# Patient Record
Sex: FEMALE | Race: WHITE | NOT HISPANIC OR LATINO | Employment: OTHER | URBAN - METROPOLITAN AREA
[De-identification: names, ages, dates, MRNs, and addresses within clinical notes are randomized per-mention and may not be internally consistent; named-entity substitution may affect disease eponyms.]

---

## 2023-03-08 DIAGNOSIS — G89.4 CHRONIC PAIN SYNDROME: Primary | ICD-10-CM

## 2023-03-08 RX ORDER — HYDROCODONE BITARTRATE AND ACETAMINOPHEN 5; 325 MG/1; MG/1
1 TABLET ORAL 2 TIMES DAILY PRN
Qty: 20 TABLET | Refills: 0 | Status: SHIPPED | OUTPATIENT
Start: 2023-03-08 | End: 2023-03-13 | Stop reason: SDUPTHER

## 2023-03-08 RX ORDER — HYDROCODONE BITARTRATE AND ACETAMINOPHEN 5; 325 MG/1; MG/1
1 TABLET ORAL 2 TIMES DAILY PRN
COMMUNITY
End: 2023-03-08 | Stop reason: SDUPTHER

## 2023-03-09 ENCOUNTER — TELEPHONE (OUTPATIENT)
Dept: PRIMARY CARE | Facility: CLINIC | Age: 73
End: 2023-03-09
Payer: COMMERCIAL

## 2023-03-09 DIAGNOSIS — Z79.4 TYPE 2 DIABETES MELLITUS WITHOUT COMPLICATION, WITH LONG-TERM CURRENT USE OF INSULIN (MULTI): ICD-10-CM

## 2023-03-09 DIAGNOSIS — R07.9 CHEST PAIN IN ADULT: ICD-10-CM

## 2023-03-09 DIAGNOSIS — E11.9 TYPE 2 DIABETES MELLITUS WITHOUT COMPLICATION, WITH LONG-TERM CURRENT USE OF INSULIN (MULTI): ICD-10-CM

## 2023-03-09 RX ORDER — METHADONE HYDROCHLORIDE 5 MG/1
5 TABLET ORAL 2 TIMES DAILY
COMMUNITY
End: 2023-04-06 | Stop reason: SDUPTHER

## 2023-03-09 RX ORDER — ALBUTEROL SULFATE 90 UG/1
2 AEROSOL, METERED RESPIRATORY (INHALATION) EVERY 4 HOURS PRN
COMMUNITY
End: 2023-04-06 | Stop reason: SDUPTHER

## 2023-03-09 RX ORDER — CARVEDILOL 6.25 MG/1
6.25 TABLET ORAL 2 TIMES DAILY
COMMUNITY
End: 2023-04-20 | Stop reason: SDUPTHER

## 2023-03-09 RX ORDER — ATORVASTATIN CALCIUM 40 MG/1
40 TABLET, FILM COATED ORAL DAILY
COMMUNITY
End: 2023-04-20 | Stop reason: SDUPTHER

## 2023-03-09 RX ORDER — NITROGLYCERIN 0.4 MG/1
0.4 TABLET SUBLINGUAL EVERY 5 MIN PRN
COMMUNITY
End: 2023-03-09 | Stop reason: SDUPTHER

## 2023-03-09 RX ORDER — METFORMIN HYDROCHLORIDE 1000 MG/1
1000 TABLET ORAL 2 TIMES DAILY
Qty: 90 TABLET | Refills: 3 | Status: SHIPPED | OUTPATIENT
Start: 2023-03-09 | End: 2023-03-09 | Stop reason: SDUPTHER

## 2023-03-09 RX ORDER — NITROGLYCERIN 0.4 MG/1
0.4 TABLET SUBLINGUAL EVERY 5 MIN PRN
Qty: 25 TABLET | Refills: 1 | Status: SHIPPED | OUTPATIENT
Start: 2023-03-09 | End: 2023-04-06 | Stop reason: ALTCHOICE

## 2023-03-09 RX ORDER — MONTELUKAST SODIUM 10 MG/1
10 TABLET ORAL DAILY
COMMUNITY
End: 2023-07-18 | Stop reason: SDUPTHER

## 2023-03-09 RX ORDER — NITROGLYCERIN 0.4 MG/1
0.4 TABLET SUBLINGUAL EVERY 5 MIN PRN
Qty: 90 TABLET | Refills: 2 | Status: SHIPPED | OUTPATIENT
Start: 2023-03-09 | End: 2023-03-09 | Stop reason: SDUPTHER

## 2023-03-09 RX ORDER — ALBUTEROL SULFATE 0.83 MG/ML
2.5 SOLUTION RESPIRATORY (INHALATION) EVERY 4 HOURS PRN
COMMUNITY
End: 2023-04-06 | Stop reason: SDUPTHER

## 2023-03-09 RX ORDER — POTASSIUM CHLORIDE 1500 MG/1
1 TABLET, EXTENDED RELEASE ORAL 2 TIMES DAILY
COMMUNITY
End: 2023-05-01 | Stop reason: SDUPTHER

## 2023-03-09 RX ORDER — METFORMIN HYDROCHLORIDE 1000 MG/1
1000 TABLET ORAL 2 TIMES DAILY
Qty: 90 TABLET | Refills: 3 | Status: SHIPPED | OUTPATIENT
Start: 2023-03-09 | End: 2023-04-06 | Stop reason: ALTCHOICE

## 2023-03-09 RX ORDER — METFORMIN HYDROCHLORIDE 1000 MG/1
1 TABLET ORAL 2 TIMES DAILY
COMMUNITY
End: 2023-03-09 | Stop reason: SDUPTHER

## 2023-03-09 RX ORDER — FUROSEMIDE 40 MG/1
40 TABLET ORAL 2 TIMES DAILY
COMMUNITY
End: 2023-04-20 | Stop reason: SDUPTHER

## 2023-03-09 RX ORDER — MELOXICAM 7.5 MG/1
7.5 TABLET ORAL DAILY PRN
COMMUNITY

## 2023-03-09 RX ORDER — BLOOD SUGAR DIAGNOSTIC
STRIP MISCELLANEOUS 2 TIMES DAILY
COMMUNITY

## 2023-03-13 DIAGNOSIS — G89.4 CHRONIC PAIN SYNDROME: ICD-10-CM

## 2023-03-13 RX ORDER — NALOXONE HYDROCHLORIDE 1 MG/ML
0.4 INJECTION INTRAMUSCULAR; INTRAVENOUS; SUBCUTANEOUS AS NEEDED
Qty: 4 ML | Status: SHIPPED | OUTPATIENT
Start: 2023-03-13 | End: 2023-04-06 | Stop reason: ALTCHOICE

## 2023-03-13 RX ORDER — HYDROCODONE BITARTRATE AND ACETAMINOPHEN 5; 325 MG/1; MG/1
1 TABLET ORAL 2 TIMES DAILY PRN
Qty: 20 TABLET | Refills: 0 | Status: SHIPPED | OUTPATIENT
Start: 2023-03-13 | End: 2023-04-06 | Stop reason: ALTCHOICE

## 2023-03-13 NOTE — TELEPHONE ENCOUNTER
Informed pt    ----- Message from Yamini Beckwith DO sent at 3/13/2023  9:43 AM EDT -----  Regarding: RE: Medication Refill  I fixed it, will be for 30 days next time she picksa up  ----- Message -----  From: Rea Payne  Sent: 3/13/2023   8:53 AM EDT  To: Yamini Beckwith DO  Subject: Medication Refill                                Pt said that when her hydrocodone was called in she only got 20 tablets. She said she usually gets 60 tablets because she takes it twice a day

## 2023-03-23 PROBLEM — R53.81 PHYSICAL DEBILITY: Status: ACTIVE | Noted: 2023-03-23

## 2023-03-23 PROBLEM — D51.0 PERNICIOUS ANEMIA: Status: ACTIVE | Noted: 2023-03-23

## 2023-03-23 PROBLEM — I25.119 CORONARY ARTERY DISEASE INVOLVING NATIVE HEART WITH ANGINA PECTORIS (CMS-HCC): Status: ACTIVE | Noted: 2023-03-23

## 2023-03-23 PROBLEM — L03.90 CELLULITIS: Status: ACTIVE | Noted: 2023-03-23

## 2023-03-23 PROBLEM — N94.89 ADNEXAL MASS: Status: ACTIVE | Noted: 2023-03-23

## 2023-03-23 PROBLEM — I10 ESSENTIAL HYPERTENSION: Status: ACTIVE | Noted: 2023-03-23

## 2023-03-23 PROBLEM — N85.00 ENDOMETRIAL HYPERPLASIA: Status: ACTIVE | Noted: 2023-03-23

## 2023-03-23 PROBLEM — E78.5 HLD (HYPERLIPIDEMIA): Status: ACTIVE | Noted: 2023-03-23

## 2023-03-23 PROBLEM — E53.8 VITAMIN B12 DEFICIENCY: Status: ACTIVE | Noted: 2023-03-23

## 2023-03-23 PROBLEM — G89.4 CHRONIC PAIN SYNDROME: Status: ACTIVE | Noted: 2023-03-23

## 2023-03-23 PROBLEM — J45.909 ASTHMA IN ADULT, UNSPECIFIED ASTHMA SEVERITY, UNCOMPLICATED (HHS-HCC): Status: ACTIVE | Noted: 2023-03-23

## 2023-03-23 PROBLEM — M19.90 DJD (DEGENERATIVE JOINT DISEASE): Status: ACTIVE | Noted: 2023-03-23

## 2023-03-23 PROBLEM — I50.9 CHRONIC CONGESTIVE HEART FAILURE (MULTI): Status: ACTIVE | Noted: 2023-03-23

## 2023-03-23 RX ORDER — NALOXONE HYDROCHLORIDE 4 MG/.1ML
4 SPRAY NASAL AS NEEDED
COMMUNITY
Start: 2020-01-30 | End: 2023-04-06 | Stop reason: SDUPTHER

## 2023-03-23 RX ORDER — MUPIROCIN 20 MG/G
1 OINTMENT TOPICAL 2 TIMES DAILY
COMMUNITY
End: 2023-04-06 | Stop reason: SDUPTHER

## 2023-03-23 RX ORDER — CLOTRIMAZOLE 1 %
1 CREAM (GRAM) TOPICAL 2 TIMES DAILY
COMMUNITY
End: 2023-04-06 | Stop reason: SDUPTHER

## 2023-03-23 RX ORDER — VITAMIN B COMPLEX
TABLET ORAL
COMMUNITY
Start: 2021-10-11 | End: 2023-04-20 | Stop reason: SDUPTHER

## 2023-04-05 DIAGNOSIS — R21 RASH: ICD-10-CM

## 2023-04-05 DIAGNOSIS — J45.909 ASTHMA IN ADULT, UNSPECIFIED ASTHMA SEVERITY, UNCOMPLICATED (HHS-HCC): Primary | ICD-10-CM

## 2023-04-05 NOTE — TELEPHONE ENCOUNTER
Pt called and said that she wants to talk to you directly. She said that she is having an issue with some of her medications and wants to know if you can call her at [phone]

## 2023-04-06 DIAGNOSIS — Z79.4 TYPE 2 DIABETES MELLITUS WITHOUT COMPLICATION, WITH LONG-TERM CURRENT USE OF INSULIN (MULTI): ICD-10-CM

## 2023-04-06 DIAGNOSIS — E11.9 TYPE 2 DIABETES MELLITUS WITHOUT COMPLICATION, WITH LONG-TERM CURRENT USE OF INSULIN (MULTI): ICD-10-CM

## 2023-04-06 DIAGNOSIS — G89.4 CHRONIC PAIN SYNDROME: ICD-10-CM

## 2023-04-06 DIAGNOSIS — R07.9 CHEST PAIN IN ADULT: ICD-10-CM

## 2023-04-06 RX ORDER — MUPIROCIN 20 MG/G
1 OINTMENT TOPICAL 2 TIMES DAILY PRN
Qty: 22 G | Refills: 3 | Status: SHIPPED | OUTPATIENT
Start: 2023-04-06 | End: 2023-05-01 | Stop reason: SDUPTHER

## 2023-04-06 RX ORDER — CLOTRIMAZOLE 1 %
1 CREAM (GRAM) TOPICAL 2 TIMES DAILY
Qty: 30 G | Refills: 3 | Status: SHIPPED | OUTPATIENT
Start: 2023-04-06 | End: 2023-05-01 | Stop reason: SDUPTHER

## 2023-04-06 RX ORDER — HYDROCODONE BITARTRATE AND ACETAMINOPHEN 5; 325 MG/1; MG/1
1 TABLET ORAL 2 TIMES DAILY PRN
Qty: 60 TABLET | Refills: 0 | Status: SHIPPED | OUTPATIENT
Start: 2023-04-06 | End: 2023-04-20 | Stop reason: SDUPTHER

## 2023-04-06 RX ORDER — METFORMIN HYDROCHLORIDE 1000 MG/1
1000 TABLET ORAL 2 TIMES DAILY
Qty: 180 TABLET | Refills: 3 | Status: SHIPPED | OUTPATIENT
Start: 2023-04-06 | End: 2023-10-14 | Stop reason: HOSPADM

## 2023-04-06 RX ORDER — NITROGLYCERIN 0.4 MG/1
0.4 TABLET SUBLINGUAL EVERY 5 MIN PRN
Qty: 25 TABLET | Refills: 1 | Status: SHIPPED | OUTPATIENT
Start: 2023-04-06 | End: 2024-04-05

## 2023-04-06 RX ORDER — ALBUTEROL SULFATE 90 UG/1
2 AEROSOL, METERED RESPIRATORY (INHALATION) EVERY 4 HOURS PRN
Qty: 18 G | Refills: 2 | Status: SHIPPED | OUTPATIENT
Start: 2023-04-06 | End: 2023-05-01 | Stop reason: SDUPTHER

## 2023-04-06 RX ORDER — ALBUTEROL SULFATE 0.83 MG/ML
2.5 SOLUTION RESPIRATORY (INHALATION) EVERY 4 HOURS PRN
Qty: 75 ML | Refills: 3 | Status: SHIPPED | OUTPATIENT
Start: 2023-04-06 | End: 2023-05-01 | Stop reason: SDUPTHER

## 2023-04-06 RX ORDER — NALOXONE HYDROCHLORIDE 1 MG/ML
0.4 INJECTION INTRAMUSCULAR; INTRAVENOUS; SUBCUTANEOUS AS NEEDED
Qty: 4 ML | Status: SHIPPED | OUTPATIENT
Start: 2023-04-06 | End: 2023-04-20 | Stop reason: ALTCHOICE

## 2023-04-06 RX ORDER — NALOXONE HYDROCHLORIDE 4 MG/.1ML
4 SPRAY NASAL AS NEEDED
Qty: 2 EACH | Refills: 1 | Status: SHIPPED | OUTPATIENT
Start: 2023-04-06

## 2023-04-06 RX ORDER — METHADONE HYDROCHLORIDE 5 MG/1
5 TABLET ORAL 2 TIMES DAILY
Qty: 60 TABLET | Refills: 0 | Status: SHIPPED | OUTPATIENT
Start: 2023-04-06 | End: 2023-04-20 | Stop reason: ALTCHOICE

## 2023-04-06 NOTE — TELEPHONE ENCOUNTER
Pt said that she waited all day yesterday for a call back and didn't receive a call. She wants someone to call her back asap.

## 2023-04-20 ENCOUNTER — OFFICE VISIT (OUTPATIENT)
Dept: PRIMARY CARE | Facility: CLINIC | Age: 73
End: 2023-04-20
Payer: COMMERCIAL

## 2023-04-20 VITALS
SYSTOLIC BLOOD PRESSURE: 124 MMHG | BODY MASS INDEX: 43.81 KG/M2 | DIASTOLIC BLOOD PRESSURE: 58 MMHG | WEIGHT: 209.6 LBS | HEART RATE: 95 BPM | TEMPERATURE: 97.4 F

## 2023-04-20 DIAGNOSIS — R53.81 PHYSICAL DEBILITY: ICD-10-CM

## 2023-04-20 DIAGNOSIS — I10 ESSENTIAL HYPERTENSION: ICD-10-CM

## 2023-04-20 DIAGNOSIS — Z79.899 HIGH RISK MEDICATION USE: ICD-10-CM

## 2023-04-20 DIAGNOSIS — D51.0 PERNICIOUS ANEMIA: ICD-10-CM

## 2023-04-20 DIAGNOSIS — R73.9 ELEVATED BLOOD SUGAR LEVEL: ICD-10-CM

## 2023-04-20 DIAGNOSIS — I50.9 CHRONIC CONGESTIVE HEART FAILURE, UNSPECIFIED HEART FAILURE TYPE (MULTI): ICD-10-CM

## 2023-04-20 DIAGNOSIS — G89.4 CHRONIC PAIN SYNDROME: ICD-10-CM

## 2023-04-20 DIAGNOSIS — E53.8 VITAMIN B12 DEFICIENCY: ICD-10-CM

## 2023-04-20 DIAGNOSIS — E11.69 TYPE 2 DIABETES MELLITUS WITH OTHER SPECIFIED COMPLICATION, WITHOUT LONG-TERM CURRENT USE OF INSULIN (MULTI): ICD-10-CM

## 2023-04-20 DIAGNOSIS — J45.909 ASTHMA IN ADULT, UNSPECIFIED ASTHMA SEVERITY, UNCOMPLICATED (HHS-HCC): ICD-10-CM

## 2023-04-20 DIAGNOSIS — M15.9 PRIMARY OSTEOARTHRITIS INVOLVING MULTIPLE JOINTS: ICD-10-CM

## 2023-04-20 DIAGNOSIS — Z00.00 ROUTINE GENERAL MEDICAL EXAMINATION AT HEALTH CARE FACILITY: Primary | ICD-10-CM

## 2023-04-20 DIAGNOSIS — E78.2 MIXED HYPERLIPIDEMIA: ICD-10-CM

## 2023-04-20 PROBLEM — L97.309: Status: ACTIVE | Noted: 2023-04-20

## 2023-04-20 PROBLEM — E11.9 TYPE 2 DIABETES MELLITUS, WITHOUT LONG-TERM CURRENT USE OF INSULIN (MULTI): Status: ACTIVE | Noted: 2023-04-20

## 2023-04-20 PROCEDURE — 1170F FXNL STATUS ASSESSED: CPT | Performed by: FAMILY MEDICINE

## 2023-04-20 PROCEDURE — 1036F TOBACCO NON-USER: CPT | Performed by: FAMILY MEDICINE

## 2023-04-20 PROCEDURE — 1159F MED LIST DOCD IN RCRD: CPT | Performed by: FAMILY MEDICINE

## 2023-04-20 PROCEDURE — 3078F DIAST BP <80 MM HG: CPT | Performed by: FAMILY MEDICINE

## 2023-04-20 PROCEDURE — 3074F SYST BP LT 130 MM HG: CPT | Performed by: FAMILY MEDICINE

## 2023-04-20 PROCEDURE — G0439 PPPS, SUBSEQ VISIT: HCPCS | Performed by: FAMILY MEDICINE

## 2023-04-20 RX ORDER — ATORVASTATIN CALCIUM 40 MG/1
40 TABLET, FILM COATED ORAL DAILY
Qty: 90 TABLET | Refills: 3 | Status: SHIPPED | OUTPATIENT
Start: 2023-04-20 | End: 2023-07-18 | Stop reason: SDUPTHER

## 2023-04-20 RX ORDER — HYDROCODONE BITARTRATE AND ACETAMINOPHEN 5; 325 MG/1; MG/1
1 TABLET ORAL 2 TIMES DAILY PRN
Qty: 60 TABLET | Refills: 0 | Status: SHIPPED | OUTPATIENT
Start: 2023-06-03 | End: 2023-07-03

## 2023-04-20 RX ORDER — VITAMIN B COMPLEX
1 TABLET ORAL
Qty: 90 TABLET | Refills: 3 | Status: SHIPPED | OUTPATIENT
Start: 2023-04-20

## 2023-04-20 RX ORDER — HYDROCODONE BITARTRATE AND ACETAMINOPHEN 5; 325 MG/1; MG/1
1 TABLET ORAL 2 TIMES DAILY PRN
Qty: 60 TABLET | Refills: 0 | Status: SHIPPED | OUTPATIENT
Start: 2023-05-05

## 2023-04-20 RX ORDER — CARVEDILOL 6.25 MG/1
6.25 TABLET ORAL 2 TIMES DAILY
Qty: 180 TABLET | Refills: 3 | Status: SHIPPED | OUTPATIENT
Start: 2023-04-20 | End: 2023-07-18 | Stop reason: SDUPTHER

## 2023-04-20 RX ORDER — FUROSEMIDE 40 MG/1
40 TABLET ORAL 2 TIMES DAILY
Qty: 90 TABLET | Refills: 3 | Status: SHIPPED | OUTPATIENT
Start: 2023-04-20 | End: 2023-10-14 | Stop reason: HOSPADM

## 2023-04-20 ASSESSMENT — ACTIVITIES OF DAILY LIVING (ADL)
ASSISTIVE_DEVICE: CANE;WALKER
BATHING: INDEPENDENT
WALKS IN HOME: INDEPENDENT
PATIENT'S MEMORY ADEQUATE TO SAFELY COMPLETE DAILY ACTIVITIES?: YES
HEARING - LEFT EAR: FUNCTIONAL
DRESSING YOURSELF: INDEPENDENT
FEEDING YOURSELF: INDEPENDENT
ADEQUATE_TO_COMPLETE_ADL: YES
JUDGMENT_ADEQUATE_SAFELY_COMPLETE_DAILY_ACTIVITIES: YES
HEARING - RIGHT EAR: FUNCTIONAL
TOILETING: INDEPENDENT
GROOMING: INDEPENDENT

## 2023-04-20 ASSESSMENT — PATIENT HEALTH QUESTIONNAIRE - PHQ9
SUM OF ALL RESPONSES TO PHQ9 QUESTIONS 1 AND 2: 0
1. LITTLE INTEREST OR PLEASURE IN DOING THINGS: NOT AT ALL
2. FEELING DOWN, DEPRESSED OR HOPELESS: NOT AT ALL

## 2023-04-20 NOTE — PROGRESS NOTES
Subjective   Reason for Visit: Isa Jack is an 72 y.o. female here for a Medicare Wellness visit.     Past Medical, Surgical, and Family History reviewed and updated in chart.    Reviewed all medications by prescribing practitioner or clinical pharmacist (such as prescriptions, OTCs, herbal therapies and supplements) and documented in the medical record.    Taking less hydrocodone and stopped methadone  Out of methadone, doing ok off of it, taking otc tylenol too  Has been able to do most adls at home in the last two weeks without methadone        Patient Care Team:  Yamini Beckwith DO as PCP - General  Yamini Beckwith DO as PCP - [place] [employer] PCP       Objective   Vitals:  /58   Pulse 95   Temp 36.3 °C (97.4 °F)   Wt 95.1 kg (209 lb 9.6 oz)   BMI 43.81 kg/m²       Physical Exam  Vitals reviewed.   Constitutional:       General: She is not in acute distress.     Appearance: Normal appearance.   HENT:      Head: Normocephalic and atraumatic.   Eyes:      General: No scleral icterus.     Conjunctiva/sclera: Conjunctivae normal.   Cardiovascular:      Rate and Rhythm: Normal rate and regular rhythm.      Heart sounds: No murmur heard.  Pulmonary:      Effort: Pulmonary effort is normal.      Breath sounds: No wheezing, rhonchi or rales.   Abdominal:      General: Bowel sounds are normal.      Palpations: Abdomen is soft.      Tenderness: There is no abdominal tenderness.   Musculoskeletal:      Right lower leg: Edema present.      Left lower leg: Edema present.      Comments: 3+ le edema of b/l le  Venous stasis ulcer of LLE, no surrounding redness or induration.  Ambulates with wheeled walker   Skin:     General: Skin is warm and dry.      Findings: No rash.   Neurological:      General: No focal deficit present.      Mental Status: She is alert.      Gait: Gait normal.   Psychiatric:         Mood and Affect: Mood normal.         Behavior: Behavior normal.         Assessment/Plan   Problem List  Items Addressed This Visit          Nervous    Chronic pain syndrome    Relevant Medications    HYDROcodone-acetaminophen (Norco) 5-325 mg tablet (Start on 5/5/2023)    HYDROcodone-acetaminophen (Norco) 5-325 mg tablet (Start on 6/3/2023)       Respiratory    Asthma in adult, unspecified asthma severity, uncomplicated       Circulatory    Chronic congestive heart failure (CMS/HCC)    Relevant Medications    carvedilol (Coreg) 6.25 mg tablet    furosemide (Lasix) 40 mg tablet    Essential hypertension    Relevant Medications    carvedilol (Coreg) 6.25 mg tablet    furosemide (Lasix) 40 mg tablet    Other Relevant Orders    Comprehensive Metabolic Panel       Musculoskeletal    DJD (degenerative joint disease)       Endocrine/Metabolic    Vitamin B12 deficiency    Relevant Medications    cyanocobalamin, vitamin B-12, 2,500 mcg tablet, sublingual    Other Relevant Orders    Vitamin B12       Hematologic    Pernicious anemia    Relevant Orders    CBC    Vitamin B12       Other    HLD (hyperlipidemia)    Relevant Medications    atorvastatin (Lipitor) 40 mg tablet    Other Relevant Orders    Lipid Panel    Physical debility    Overview     This patient has a mobility limitation that significantly impairs the ability to participate in MRADL without the help of a walker and/or rollator.  There also may be a need for hospital bed for positioning and bedside commode due to mobility limitations.            Other Visit Diagnoses       Routine general medical examination at health care facility    -  Primary    Relevant Orders    1 Year Follow Up In [facility] - PCP - Wellness Exam    High risk medication use        Relevant Orders    OPIATE/OPIOID/BENZO PRESCRIPTION COMPLIANCE    Elevated blood sugar level        Relevant Orders    Hemoglobin A1C               OARRS:  Yamini Beckwith DO on 4/20/2023  9:27 AM  I have personally reviewed the OARRS report for Isa Jack. I have considered the risks of abuse,  dependence, addiction and diversion    Is the patient prescribed a combination of a benzodiazepine and opioid?  No    Last Urine Drug Screen / ordered today: Yes  Recent Results (from the past 22695 hour(s))   OPIATE/OPIOID/BENZO PRESCRIPTION COMPLIANCE    Collection Time: 04/06/22 10:07 AM   Result Value Ref Range    DRUG SCREEN COMMENT URINE SEE BELOW     Creatine, Urine 212.0 mg/dL    Amphetamine Screen, Urine PRESUMPTIVE NEGATIVE NEGATIVE    Barbiturate Screen, Urine PRESUMPTIVE NEGATIVE NEGATIVE    Cannabinoid Screen, Urine PRESUMPTIVE NEGATIVE NEGATIVE    Cocaine Screen, Urine PRESUMPTIVE NEGATIVE NEGATIVE    PCP Screen, Urine PRESUMPTIVE NEGATIVE NEGATIVE    7-Aminoclonazepam <25 Cutoff <25 ng/mL    Alpha-Hydroxyalprazolam <25 Cutoff <25 ng/mL    Alpha-Hydroxymidazolam <25 Cutoff <25 ng/mL    Alprazolam <25 Cutoff <25 ng/mL    Chlordiazepoxide <25 Cutoff <25 ng/mL    Clonazepam <25 Cutoff <25 ng/mL    Diazepam <25 Cutoff <25 ng/mL    Lorazepam <25 Cutoff <25 ng/mL    Midazolam <25 Cutoff <25 ng/mL    Nordiazepam <25 Cutoff <25 ng/mL    Oxazepam <25 Cutoff <25 ng/mL    Temazepam <25 Cutoff <25 ng/mL    Zolpidem <25 Cutoff <25 ng/mL    Zolpidem Metabolite (ZCA) <25 Cutoff <25 ng/mL    6-Acetylmorphine <25 Cutoff <25 ng/mL    Codeine <50 Cutoff <50 ng/mL    Hydrocodone >2500 (A) Cutoff <25 ng/mL    Hydromorphone 256 (A) Cutoff <25 ng/mL    Morphine Urine <50 Cutoff <50 ng/mL    Norhydrocodone >1000 (A) Cutoff <25 ng/mL    Noroxycodone <25 Cutoff <25 ng/mL    Oxycodone <25 Cutoff <25 ng/mL    Oxymorphone <25 Cutoff <25 ng/mL    Tramadol <50 Cutoff <50 ng/mL    O-Desmethyltramadol <50 Cutoff <50 ng/mL    Fentanyl <2.5 Cutoff<2.5 ng/mL    Norfentanyl <2.5 Cutoff<2.5 ng/mL    METHADONE CONFIRMATION,URINE >1000 (A) Cutoff <25 ng/mL    EDDP >1000 (A) Cutoff <25 ng/mL     N/A    Controlled Substance Agreement:  Date of the Last Agreement: 4/19/23  Reviewed Controlled Substance Agreement including but not limited to the  benefits, risks, and alternatives to treatment with a Controlled Substance medication(s).    Opioids:  What is the patient's goal of therapy? Completion of adls with chronic pain  Is this being achieved with current treatment? yes    I have calculated the patient's Morphine Dose Equivalent (MED):   I have considered referral to Pain Management and/or a specialist, and do not feel it is necessary at this time.    I feel that it is clinically indicated to continue this current medication regimen after consideration of alternative therapies, and other non-opioid treatment.    Opioid Risk Screening:  No data recorded    Pain Assessment:  No data recorded

## 2023-04-21 LAB
ALBUMIN (MG/L) IN URINE: 164.5 MG/L
ALBUMIN/CREATININE (UG/MG) IN URINE: 119.2 UG/MG CRT (ref 0–30)
CREATININE (MG/DL) IN URINE: 138 MG/DL (ref 20–320)

## 2023-04-22 LAB
ALANINE AMINOTRANSFERASE (SGPT) (U/L) IN SER/PLAS: NORMAL
ALBUMIN (G/DL) IN SER/PLAS: NORMAL
ALKALINE PHOSPHATASE (U/L) IN SER/PLAS: NORMAL
ANION GAP IN SER/PLAS: NORMAL
ASPARTATE AMINOTRANSFERASE (SGOT) (U/L) IN SER/PLAS: NORMAL
BILIRUBIN TOTAL (MG/DL) IN SER/PLAS: NORMAL
CALCIDIOL (25 OH VITAMIN D3) (NG/ML) IN SER/PLAS: NORMAL
CALCIUM (MG/DL) IN SER/PLAS: NORMAL
CARBON DIOXIDE, TOTAL (MMOL/L) IN SER/PLAS: NORMAL
CHLORIDE (MMOL/L) IN SER/PLAS: NORMAL
CHOLESTEROL (MG/DL) IN SER/PLAS: NORMAL
CHOLESTEROL IN HDL (MG/DL) IN SER/PLAS: NORMAL
CHOLESTEROL/HDL RATIO: NORMAL
CREATININE (MG/DL) IN SER/PLAS: NORMAL
ERYTHROCYTE DISTRIBUTION WIDTH (RATIO) BY AUTOMATED COUNT: NORMAL
ERYTHROCYTE MEAN CORPUSCULAR HEMOGLOBIN CONCENTRATION (G/DL) BY AUTOMATED: NORMAL
ERYTHROCYTE MEAN CORPUSCULAR VOLUME (FL) BY AUTOMATED COUNT: NORMAL
ERYTHROCYTES (10*6/UL) IN BLOOD BY AUTOMATED COUNT: NORMAL
GFR FEMALE: NORMAL
GFR MALE: NORMAL
GLUCOSE (MG/DL) IN SER/PLAS: NORMAL
HEMATOCRIT (%) IN BLOOD BY AUTOMATED COUNT: NORMAL
HEMOGLOBIN (G/DL) IN BLOOD: NORMAL
LDL: NORMAL
LEUKOCYTES (10*3/UL) IN BLOOD BY AUTOMATED COUNT: NORMAL
NON HDL CHOLESTEROL: NORMAL
NRBC (PER 100 WBCS) BY AUTOMATED COUNT: NORMAL
PLATELETS (10*3/UL) IN BLOOD AUTOMATED COUNT: NORMAL
POTASSIUM (MMOL/L) IN SER/PLAS: NORMAL
PROTEIN TOTAL: NORMAL
SODIUM (MMOL/L) IN SER/PLAS: NORMAL
THYROTROPIN (MIU/L) IN SER/PLAS BY DETECTION LIMIT <= 0.05 MIU/L: NORMAL
TRIGLYCERIDE (MG/DL) IN SER/PLAS: NORMAL
UREA NITROGEN (MG/DL) IN SER/PLAS: NORMAL
VLDL: NORMAL

## 2023-04-25 ENCOUNTER — TELEPHONE (OUTPATIENT)
Dept: PRIMARY CARE | Facility: CLINIC | Age: 73
End: 2023-04-25
Payer: COMMERCIAL

## 2023-04-25 DIAGNOSIS — R80.9 MICROALBUMINURIA: Primary | ICD-10-CM

## 2023-04-25 LAB
6-ACETYLMORPHINE: <25 NG/ML
7-AMINOCLONAZEPAM: <25 NG/ML
ALPHA-HYDROXYALPRAZOLAM: <25 NG/ML
ALPHA-HYDROXYMIDAZOLAM: <25 NG/ML
ALPRAZOLAM: <25 NG/ML
AMPHETAMINE (PRESENCE) IN URINE BY SCREEN METHOD: ABNORMAL
BARBITURATES PRESENCE IN URINE BY SCREEN METHOD: ABNORMAL
CANNABINOIDS IN URINE BY SCREEN METHOD: ABNORMAL
CHLORDIAZEPOXIDE: <25 NG/ML
CLONAZEPAM: <25 NG/ML
COCAINE (PRESENCE) IN URINE BY SCREEN METHOD: ABNORMAL
CODEINE: <50 NG/ML
CREATINE, URINE FOR DRUG: 139.1 MG/DL
DIAZEPAM: <25 NG/ML
DRUG SCREEN COMMENT URINE: ABNORMAL
EDDP: 225 NG/ML
FENTANYL CONFIRMATION, URINE: <2.5 NG/ML
HYDROCODONE: 593 NG/ML
HYDROMORPHONE: 114 NG/ML
LORAZEPAM: <25 NG/ML
METHADONE CONFIRMATION,URINE: 59 NG/ML
MIDAZOLAM: <25 NG/ML
MORPHINE URINE: <50 NG/ML
NORDIAZEPAM: <25 NG/ML
NORFENTANYL: <2.5 NG/ML
NORHYDROCODONE: >1000 NG/ML
NOROXYCODONE: <25 NG/ML
O-DESMETHYLTRAMADOL: <50 NG/ML
OXAZEPAM: <25 NG/ML
OXYCODONE: <25 NG/ML
OXYMORPHONE: <25 NG/ML
PHENCYCLIDINE (PRESENCE) IN URINE BY SCREEN METHOD: ABNORMAL
TEMAZEPAM: <25 NG/ML
TRAMADOL: <50 NG/ML
ZOLPIDEM METABOLITE (ZCA): <25 NG/ML
ZOLPIDEM: <25 NG/ML

## 2023-04-25 RX ORDER — LISINOPRIL 2.5 MG/1
2.5 TABLET ORAL DAILY
Qty: 90 TABLET | Refills: 3 | Status: SHIPPED | OUTPATIENT
Start: 2023-04-25 | End: 2023-10-14 | Stop reason: HOSPADM

## 2023-04-25 NOTE — TELEPHONE ENCOUNTER
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

## 2023-04-26 DIAGNOSIS — R73.9 ELEVATED BLOOD SUGAR LEVEL: ICD-10-CM

## 2023-04-26 DIAGNOSIS — I10 ESSENTIAL HYPERTENSION: ICD-10-CM

## 2023-04-26 DIAGNOSIS — E11.69 TYPE 2 DIABETES MELLITUS WITH OTHER SPECIFIED COMPLICATION, WITHOUT LONG-TERM CURRENT USE OF INSULIN (MULTI): ICD-10-CM

## 2023-04-26 DIAGNOSIS — E78.2 MIXED HYPERLIPIDEMIA: ICD-10-CM

## 2023-04-26 DIAGNOSIS — E53.8 VITAMIN B12 DEFICIENCY: ICD-10-CM

## 2023-04-26 DIAGNOSIS — E55.9 VITAMIN D DEFICIENCY: Primary | ICD-10-CM

## 2023-05-01 DIAGNOSIS — I10 ESSENTIAL HYPERTENSION: Primary | ICD-10-CM

## 2023-05-01 DIAGNOSIS — R21 RASH: ICD-10-CM

## 2023-05-01 DIAGNOSIS — J45.909 ASTHMA IN ADULT, UNSPECIFIED ASTHMA SEVERITY, UNCOMPLICATED (HHS-HCC): ICD-10-CM

## 2023-05-01 RX ORDER — MUPIROCIN 20 MG/G
1 OINTMENT TOPICAL 2 TIMES DAILY PRN
Qty: 22 G | Refills: 3 | Status: SHIPPED | OUTPATIENT
Start: 2023-05-01 | End: 2023-06-09 | Stop reason: SDUPTHER

## 2023-05-01 RX ORDER — ALBUTEROL SULFATE 90 UG/1
2 AEROSOL, METERED RESPIRATORY (INHALATION) EVERY 4 HOURS PRN
Qty: 18 G | Refills: 2 | Status: SHIPPED | OUTPATIENT
Start: 2023-05-01 | End: 2023-05-15 | Stop reason: SDUPTHER

## 2023-05-01 RX ORDER — ALBUTEROL SULFATE 0.83 MG/ML
2.5 SOLUTION RESPIRATORY (INHALATION) EVERY 4 HOURS PRN
Qty: 75 ML | Refills: 3 | Status: SHIPPED | OUTPATIENT
Start: 2023-05-01 | End: 2023-05-15 | Stop reason: SDUPTHER

## 2023-05-01 RX ORDER — POTASSIUM CHLORIDE 1500 MG/1
20 TABLET, EXTENDED RELEASE ORAL 2 TIMES DAILY
Qty: 180 TABLET | Refills: 3 | Status: SHIPPED | OUTPATIENT
Start: 2023-05-01 | End: 2023-05-02 | Stop reason: SDUPTHER

## 2023-05-01 RX ORDER — CLOTRIMAZOLE 1 %
1 CREAM (GRAM) TOPICAL 2 TIMES DAILY
Qty: 30 G | Refills: 3 | Status: SHIPPED | OUTPATIENT
Start: 2023-05-01

## 2023-05-01 NOTE — TELEPHONE ENCOUNTER
Rx Refill Request Telephone Encounter    Name:  Isa Jack  :  [id number]  Medication Name:  clotrimacole 1% cream. 2-3 tubes per month, mupirocin ointment 2% 2 tubes, albuterol 83% for nebulizer wants 12 boxes for a 3 month supply,albuterol inhaler #3, potassium 20meq 2 every day #180, hydrocodone 2 every day,            Specific Pharmacy location:  [facility]  Date of last appointment:   Date of next appointment:  2024  Best number to reach patient:  [phone]

## 2023-05-02 DIAGNOSIS — I10 ESSENTIAL HYPERTENSION: ICD-10-CM

## 2023-05-02 RX ORDER — POTASSIUM CHLORIDE 20 MEQ/1
20 TABLET, EXTENDED RELEASE ORAL 2 TIMES DAILY
Qty: 180 TABLET | Refills: 3 | Status: SHIPPED | OUTPATIENT
Start: 2023-05-02 | End: 2023-10-14 | Stop reason: HOSPADM

## 2023-05-02 NOTE — TELEPHONE ENCOUNTER
Can you resend. It had the RASHEED checked and they called to see if what she has been getting is ok to fill or did we want RASHEED.  I took the RASHEED off//yv

## 2023-05-15 DIAGNOSIS — J45.909 ASTHMA IN ADULT, UNSPECIFIED ASTHMA SEVERITY, UNCOMPLICATED (HHS-HCC): ICD-10-CM

## 2023-05-15 RX ORDER — ALBUTEROL SULFATE 90 UG/1
2 AEROSOL, METERED RESPIRATORY (INHALATION) EVERY 4 HOURS PRN
Qty: 18 G | Refills: 2 | Status: SHIPPED | OUTPATIENT
Start: 2023-05-15 | End: 2023-06-09 | Stop reason: SDUPTHER

## 2023-05-15 RX ORDER — ALBUTEROL SULFATE 0.83 MG/ML
2.5 SOLUTION RESPIRATORY (INHALATION) EVERY 4 HOURS PRN
Qty: 75 ML | Refills: 3 | Status: SHIPPED | OUTPATIENT
Start: 2023-05-15 | End: 2023-06-09 | Stop reason: SDUPTHER

## 2023-05-15 NOTE — TELEPHONE ENCOUNTER
Pt wanted to know if you could call in more of the albuterol inhaler and albuterol nebulizer solution. She said that she used to get three boxes at a time of both the inhaler and the nebulizer solution and she only got one of each the last time her meds were picked up and they only last her a couple days. Can you call in more for her? Thank you!

## 2023-06-08 DIAGNOSIS — J45.909 ASTHMA IN ADULT, UNSPECIFIED ASTHMA SEVERITY, UNCOMPLICATED (HHS-HCC): ICD-10-CM

## 2023-06-08 DIAGNOSIS — R21 RASH: ICD-10-CM

## 2023-06-08 NOTE — TELEPHONE ENCOUNTER
Pt requesting meds and supplies:    5 or 6 boxes of nebulizer solution  3 albuterol inhalers  Mupirocin cream 2%  She only has one refill of hydrocodone left.  All of the above [facility]      She also needs:  Rolled gauze  Paper tape  To aubrey mora

## 2023-06-09 RX ORDER — ALBUTEROL SULFATE 0.83 MG/ML
2.5 SOLUTION RESPIRATORY (INHALATION) EVERY 4 HOURS PRN
Qty: 75 ML | Refills: 11 | Status: SHIPPED | OUTPATIENT
Start: 2023-06-09

## 2023-06-09 RX ORDER — MUPIROCIN 20 MG/G
1 OINTMENT TOPICAL 2 TIMES DAILY PRN
Qty: 22 G | Refills: 11 | Status: SHIPPED | OUTPATIENT
Start: 2023-06-09 | End: 2023-07-18 | Stop reason: SDUPTHER

## 2023-06-09 RX ORDER — ALBUTEROL SULFATE 90 UG/1
2 AEROSOL, METERED RESPIRATORY (INHALATION) EVERY 4 HOURS PRN
Qty: 18 G | Refills: 11 | Status: SHIPPED | OUTPATIENT
Start: 2023-06-09 | End: 2023-07-18 | Stop reason: SDUPTHER

## 2023-07-18 DIAGNOSIS — E78.2 MIXED HYPERLIPIDEMIA: ICD-10-CM

## 2023-07-18 DIAGNOSIS — J45.909 ASTHMA IN ADULT, UNSPECIFIED ASTHMA SEVERITY, UNCOMPLICATED (HHS-HCC): ICD-10-CM

## 2023-07-18 DIAGNOSIS — R21 RASH: ICD-10-CM

## 2023-07-18 DIAGNOSIS — I10 ESSENTIAL HYPERTENSION: ICD-10-CM

## 2023-07-18 RX ORDER — CARVEDILOL 6.25 MG/1
6.25 TABLET ORAL 2 TIMES DAILY
Qty: 180 TABLET | Refills: 3 | Status: SHIPPED | OUTPATIENT
Start: 2023-07-18 | End: 2023-10-14 | Stop reason: HOSPADM

## 2023-07-18 RX ORDER — MONTELUKAST SODIUM 10 MG/1
10 TABLET ORAL DAILY
Qty: 90 TABLET | Refills: 3 | Status: SHIPPED | OUTPATIENT
Start: 2023-07-18

## 2023-07-18 RX ORDER — ALBUTEROL SULFATE 90 UG/1
2 AEROSOL, METERED RESPIRATORY (INHALATION) EVERY 4 HOURS PRN
Qty: 18 G | Refills: 11 | Status: SHIPPED | OUTPATIENT
Start: 2023-07-18

## 2023-07-18 RX ORDER — ATORVASTATIN CALCIUM 40 MG/1
40 TABLET, FILM COATED ORAL DAILY
Qty: 90 TABLET | Refills: 3 | Status: SHIPPED | OUTPATIENT
Start: 2023-07-18 | End: 2023-10-14 | Stop reason: HOSPADM

## 2023-07-18 RX ORDER — MUPIROCIN 20 MG/G
1 OINTMENT TOPICAL 2 TIMES DAILY PRN
Qty: 22 G | Refills: 11 | Status: SHIPPED | OUTPATIENT
Start: 2023-07-18

## 2023-07-18 NOTE — TELEPHONE ENCOUNTER
PT CALLING FOR REFILLS ON    Carvediolol 6.25mg 2 pills daily #180  Singulair 10mg daily #90  Lipitor 40mg daily #90  Albuterol 90mcg #3 inhalers  Mupirocin 2% ointment 3 tubes          [facility] for a 90 day supply

## 2023-10-07 ENCOUNTER — APPOINTMENT (OUTPATIENT)
Dept: RADIOLOGY | Facility: HOSPITAL | Age: 73
DRG: 871 | End: 2023-10-07
Payer: COMMERCIAL

## 2023-10-07 ENCOUNTER — HOSPITAL ENCOUNTER (INPATIENT)
Facility: HOSPITAL | Age: 73
LOS: 7 days | Discharge: NURSING, REHAB OR LONG-TERM CARE | DRG: 871 | End: 2023-10-14
Attending: STUDENT IN AN ORGANIZED HEALTH CARE EDUCATION/TRAINING PROGRAM | Admitting: STUDENT IN AN ORGANIZED HEALTH CARE EDUCATION/TRAINING PROGRAM
Payer: COMMERCIAL

## 2023-10-07 DIAGNOSIS — A41.9 SEPSIS DUE TO PNEUMONIA (MULTI): ICD-10-CM

## 2023-10-07 DIAGNOSIS — L97.321 SKIN ULCER OF LEFT ANKLE, LIMITED TO BREAKDOWN OF SKIN (MULTI): Primary | ICD-10-CM

## 2023-10-07 DIAGNOSIS — J18.9 SEPSIS DUE TO PNEUMONIA (MULTI): ICD-10-CM

## 2023-10-07 DIAGNOSIS — I48.92 ATRIAL FIBRILLATION AND FLUTTER (MULTI): ICD-10-CM

## 2023-10-07 DIAGNOSIS — I48.91 ATRIAL FIBRILLATION AND FLUTTER (MULTI): ICD-10-CM

## 2023-10-07 DIAGNOSIS — J81.0 ACUTE PULMONARY EDEMA (MULTI): ICD-10-CM

## 2023-10-07 LAB
ALBUMIN SERPL BCP-MCNC: 4 G/DL (ref 3.4–5)
ALP SERPL-CCNC: 70 U/L (ref 33–136)
ALT SERPL W P-5'-P-CCNC: 11 U/L (ref 7–45)
ANION GAP SERPL CALC-SCNC: 20 MMOL/L (ref 10–20)
APPEARANCE UR: CLEAR
APTT PPP: 25 SECONDS (ref 27–38)
AST SERPL W P-5'-P-CCNC: 17 U/L (ref 9–39)
BASOPHILS # BLD AUTO: 0.05 X10*3/UL (ref 0–0.1)
BASOPHILS NFR BLD AUTO: 0.3 %
BILIRUB SERPL-MCNC: 0.6 MG/DL (ref 0–1.2)
BILIRUB UR STRIP.AUTO-MCNC: NEGATIVE MG/DL
BNP SERPL-MCNC: 301 PG/ML (ref 0–99)
BUN SERPL-MCNC: 17 MG/DL (ref 6–23)
CALCIUM SERPL-MCNC: 9.1 MG/DL (ref 8.6–10.3)
CARDIAC TROPONIN I PNL SERPL HS: 20 NG/L (ref 0–13)
CARDIAC TROPONIN I PNL SERPL HS: 46 NG/L (ref 0–13)
CARDIAC TROPONIN I PNL SERPL HS: 55 NG/L (ref 0–13)
CHLORIDE SERPL-SCNC: 103 MMOL/L (ref 98–107)
CO2 SERPL-SCNC: 20 MMOL/L (ref 21–32)
COLOR UR: YELLOW
CREAT SERPL-MCNC: 0.72 MG/DL (ref 0.5–1.05)
EOSINOPHIL # BLD AUTO: 0.01 X10*3/UL (ref 0–0.4)
EOSINOPHIL NFR BLD AUTO: 0.1 %
ERYTHROCYTE [DISTWIDTH] IN BLOOD BY AUTOMATED COUNT: 13.4 % (ref 11.5–14.5)
FLUAV RNA RESP QL NAA+PROBE: NOT DETECTED
FLUBV RNA RESP QL NAA+PROBE: NOT DETECTED
GFR SERPL CREATININE-BSD FRML MDRD: 88 ML/MIN/1.73M*2
GLUCOSE SERPL-MCNC: 212 MG/DL (ref 74–99)
GLUCOSE UR STRIP.AUTO-MCNC: NEGATIVE MG/DL
HCT VFR BLD AUTO: 36.6 % (ref 36–46)
HGB BLD-MCNC: 11.4 G/DL (ref 12–16)
IMM GRANULOCYTES # BLD AUTO: 0.21 X10*3/UL (ref 0–0.5)
IMM GRANULOCYTES NFR BLD AUTO: 1.3 % (ref 0–0.9)
INR PPP: 1 (ref 0.9–1.1)
KETONES UR STRIP.AUTO-MCNC: NEGATIVE MG/DL
LACTATE SERPL-SCNC: 1.7 MMOL/L (ref 0.4–2)
LACTATE SERPL-SCNC: 3.2 MMOL/L (ref 0.4–2)
LEUKOCYTE ESTERASE UR QL STRIP.AUTO: NEGATIVE
LYMPHOCYTES # BLD AUTO: 0.3 X10*3/UL (ref 0.8–3)
LYMPHOCYTES NFR BLD AUTO: 1.8 %
MAGNESIUM SERPL-MCNC: 1.68 MG/DL (ref 1.6–2.4)
MCH RBC QN AUTO: 32.2 PG (ref 26–34)
MCHC RBC AUTO-ENTMCNC: 31.1 G/DL (ref 32–36)
MCV RBC AUTO: 103 FL (ref 80–100)
MONOCYTES # BLD AUTO: 0.72 X10*3/UL (ref 0.05–0.8)
MONOCYTES NFR BLD AUTO: 4.3 %
NEUTROPHILS # BLD AUTO: 15.41 X10*3/UL (ref 1.6–5.5)
NEUTROPHILS NFR BLD AUTO: 92.2 %
NITRITE UR QL STRIP.AUTO: NEGATIVE
NRBC BLD-RTO: 0 /100 WBCS (ref 0–0)
PH UR STRIP.AUTO: 6 [PH]
PLATELET # BLD AUTO: 224 X10*3/UL (ref 150–450)
PMV BLD AUTO: 10.4 FL (ref 7.5–11.5)
POTASSIUM SERPL-SCNC: 4.4 MMOL/L (ref 3.5–5.3)
PROT SERPL-MCNC: 7.4 G/DL (ref 6.4–8.2)
PROT UR STRIP.AUTO-MCNC: ABNORMAL MG/DL
PROTHROMBIN TIME: 10.7 SECONDS (ref 9.8–12.8)
RBC # BLD AUTO: 3.54 X10*6/UL (ref 4–5.2)
RBC # UR STRIP.AUTO: ABNORMAL /UL
RBC #/AREA URNS AUTO: NORMAL /HPF
SARS-COV-2 RNA RESP QL NAA+PROBE: NOT DETECTED
SODIUM SERPL-SCNC: 139 MMOL/L (ref 136–145)
SP GR UR STRIP.AUTO: 1.03
SQUAMOUS #/AREA URNS AUTO: NORMAL /HPF
TSH SERPL-ACNC: 0.84 MIU/L (ref 0.44–3.98)
UROBILINOGEN UR STRIP.AUTO-MCNC: <2 MG/DL
VANCOMYCIN SERPL-MCNC: 30 UG/ML (ref 5–20)
WBC # BLD AUTO: 16.7 X10*3/UL (ref 4.4–11.3)
WBC #/AREA URNS AUTO: NORMAL /HPF

## 2023-10-07 PROCEDURE — 83605 ASSAY OF LACTIC ACID: CPT | Performed by: PHYSICIAN ASSISTANT

## 2023-10-07 PROCEDURE — 96361 HYDRATE IV INFUSION ADD-ON: CPT

## 2023-10-07 PROCEDURE — 84484 ASSAY OF TROPONIN QUANT: CPT | Performed by: PHYSICIAN ASSISTANT

## 2023-10-07 PROCEDURE — 80202 ASSAY OF VANCOMYCIN: CPT | Performed by: STUDENT IN AN ORGANIZED HEALTH CARE EDUCATION/TRAINING PROGRAM

## 2023-10-07 PROCEDURE — 87040 BLOOD CULTURE FOR BACTERIA: CPT | Mod: CMCLAB,ELYLAB | Performed by: PHYSICIAN ASSISTANT

## 2023-10-07 PROCEDURE — 96375 TX/PRO/DX INJ NEW DRUG ADDON: CPT

## 2023-10-07 PROCEDURE — 85610 PROTHROMBIN TIME: CPT | Performed by: PHYSICIAN ASSISTANT

## 2023-10-07 PROCEDURE — 94640 AIRWAY INHALATION TREATMENT: CPT

## 2023-10-07 PROCEDURE — 80053 COMPREHEN METABOLIC PANEL: CPT | Performed by: PHYSICIAN ASSISTANT

## 2023-10-07 PROCEDURE — 84484 ASSAY OF TROPONIN QUANT: CPT | Performed by: STUDENT IN AN ORGANIZED HEALTH CARE EDUCATION/TRAINING PROGRAM

## 2023-10-07 PROCEDURE — 96367 TX/PROPH/DG ADDL SEQ IV INF: CPT

## 2023-10-07 PROCEDURE — 1200000002 HC GENERAL ROOM WITH TELEMETRY DAILY

## 2023-10-07 PROCEDURE — 99285 EMERGENCY DEPT VISIT HI MDM: CPT | Mod: CS,25 | Performed by: STUDENT IN AN ORGANIZED HEALTH CARE EDUCATION/TRAINING PROGRAM

## 2023-10-07 PROCEDURE — 71045 X-RAY EXAM CHEST 1 VIEW: CPT | Mod: FY

## 2023-10-07 PROCEDURE — 99222 1ST HOSP IP/OBS MODERATE 55: CPT | Performed by: STUDENT IN AN ORGANIZED HEALTH CARE EDUCATION/TRAINING PROGRAM

## 2023-10-07 PROCEDURE — 81001 URINALYSIS AUTO W/SCOPE: CPT | Performed by: PHYSICIAN ASSISTANT

## 2023-10-07 PROCEDURE — 2500000002 HC RX 250 W HCPCS SELF ADMINISTERED DRUGS (ALT 637 FOR MEDICARE OP, ALT 636 FOR OP/ED): Performed by: PHYSICIAN ASSISTANT

## 2023-10-07 PROCEDURE — 85025 COMPLETE CBC W/AUTO DIFF WBC: CPT | Performed by: PHYSICIAN ASSISTANT

## 2023-10-07 PROCEDURE — G1004 CDSM NDSC: HCPCS

## 2023-10-07 PROCEDURE — 76937 US GUIDE VASCULAR ACCESS: CPT

## 2023-10-07 PROCEDURE — 2500000004 HC RX 250 GENERAL PHARMACY W/ HCPCS (ALT 636 FOR OP/ED): Performed by: STUDENT IN AN ORGANIZED HEALTH CARE EDUCATION/TRAINING PROGRAM

## 2023-10-07 PROCEDURE — 2500000004 HC RX 250 GENERAL PHARMACY W/ HCPCS (ALT 636 FOR OP/ED): Performed by: PHYSICIAN ASSISTANT

## 2023-10-07 PROCEDURE — 2500000001 HC RX 250 WO HCPCS SELF ADMINISTERED DRUGS (ALT 637 FOR MEDICARE OP): Performed by: STUDENT IN AN ORGANIZED HEALTH CARE EDUCATION/TRAINING PROGRAM

## 2023-10-07 PROCEDURE — 96376 TX/PRO/DX INJ SAME DRUG ADON: CPT

## 2023-10-07 PROCEDURE — 96365 THER/PROPH/DIAG IV INF INIT: CPT

## 2023-10-07 PROCEDURE — 83880 ASSAY OF NATRIURETIC PEPTIDE: CPT | Performed by: PHYSICIAN ASSISTANT

## 2023-10-07 PROCEDURE — 36415 COLL VENOUS BLD VENIPUNCTURE: CPT | Performed by: PHYSICIAN ASSISTANT

## 2023-10-07 PROCEDURE — 84443 ASSAY THYROID STIM HORMONE: CPT | Performed by: PHYSICIAN ASSISTANT

## 2023-10-07 PROCEDURE — 71250 CT THORAX DX C-: CPT | Performed by: RADIOLOGY

## 2023-10-07 PROCEDURE — 71045 X-RAY EXAM CHEST 1 VIEW: CPT | Performed by: RADIOLOGY

## 2023-10-07 PROCEDURE — 2500000002 HC RX 250 W HCPCS SELF ADMINISTERED DRUGS (ALT 637 FOR MEDICARE OP, ALT 636 FOR OP/ED): Performed by: STUDENT IN AN ORGANIZED HEALTH CARE EDUCATION/TRAINING PROGRAM

## 2023-10-07 PROCEDURE — 83735 ASSAY OF MAGNESIUM: CPT | Performed by: PHYSICIAN ASSISTANT

## 2023-10-07 PROCEDURE — 87502 INFLUENZA DNA AMP PROBE: CPT | Performed by: PHYSICIAN ASSISTANT

## 2023-10-07 RX ORDER — IPRATROPIUM BROMIDE AND ALBUTEROL SULFATE 2.5; .5 MG/3ML; MG/3ML
3 SOLUTION RESPIRATORY (INHALATION) ONCE
Status: COMPLETED | OUTPATIENT
Start: 2023-10-07 | End: 2023-10-07

## 2023-10-07 RX ORDER — FUROSEMIDE 40 MG/1
40 TABLET ORAL 2 TIMES DAILY
COMMUNITY
Start: 2021-09-17

## 2023-10-07 RX ORDER — IPRATROPIUM BROMIDE AND ALBUTEROL SULFATE 2.5; .5 MG/3ML; MG/3ML
3 SOLUTION RESPIRATORY (INHALATION)
Status: DISCONTINUED | OUTPATIENT
Start: 2023-10-07 | End: 2023-10-08

## 2023-10-07 RX ORDER — ACETAMINOPHEN 325 MG/1
650 TABLET ORAL EVERY 4 HOURS PRN
Status: DISCONTINUED | OUTPATIENT
Start: 2023-10-07 | End: 2023-10-14 | Stop reason: HOSPADM

## 2023-10-07 RX ORDER — ATORVASTATIN CALCIUM 40 MG/1
40 TABLET, FILM COATED ORAL NIGHTLY
COMMUNITY
Start: 2021-09-08

## 2023-10-07 RX ORDER — ACETAMINOPHEN 325 MG/1
650 TABLET ORAL ONCE
Status: COMPLETED | OUTPATIENT
Start: 2023-10-07 | End: 2023-10-07

## 2023-10-07 RX ORDER — FUROSEMIDE 10 MG/ML
20 INJECTION INTRAMUSCULAR; INTRAVENOUS ONCE
Status: COMPLETED | OUTPATIENT
Start: 2023-10-07 | End: 2023-10-07

## 2023-10-07 RX ORDER — ALUMINUM HYDROXIDE, MAGNESIUM HYDROXIDE, AND SIMETHICONE 1200; 120; 1200 MG/30ML; MG/30ML; MG/30ML
30 SUSPENSION ORAL EVERY 6 HOURS PRN
Status: DISCONTINUED | OUTPATIENT
Start: 2023-10-07 | End: 2023-10-14 | Stop reason: HOSPADM

## 2023-10-07 RX ORDER — ACETAMINOPHEN 325 MG/1
975 TABLET ORAL ONCE
Status: COMPLETED | OUTPATIENT
Start: 2023-10-07 | End: 2023-10-07

## 2023-10-07 RX ORDER — MONTELUKAST SODIUM 10 MG/1
10 TABLET ORAL NIGHTLY
COMMUNITY
Start: 2021-09-13

## 2023-10-07 RX ORDER — CEFADROXIL 500 MG/1
1000 CAPSULE ORAL 2 TIMES DAILY
COMMUNITY
Start: 2023-10-03 | End: 2023-10-14 | Stop reason: HOSPADM

## 2023-10-07 RX ORDER — GUAIFENESIN/DEXTROMETHORPHAN 100-10MG/5
5 SYRUP ORAL EVERY 4 HOURS PRN
Status: DISCONTINUED | OUTPATIENT
Start: 2023-10-07 | End: 2023-10-14 | Stop reason: HOSPADM

## 2023-10-07 RX ORDER — HYDROCODONE BITARTRATE AND ACETAMINOPHEN 5; 325 MG/1; MG/1
1 TABLET ORAL 2 TIMES DAILY
COMMUNITY
Start: 2021-09-08

## 2023-10-07 RX ORDER — POTASSIUM CHLORIDE 20 MEQ/1
20 TABLET, EXTENDED RELEASE ORAL 2 TIMES DAILY
COMMUNITY
Start: 2021-09-14

## 2023-10-07 RX ORDER — ALBUTEROL SULFATE 0.83 MG/ML
2.5 SOLUTION RESPIRATORY (INHALATION) EVERY 4 HOURS PRN
COMMUNITY
Start: 2021-09-08

## 2023-10-07 RX ORDER — IPRATROPIUM BROMIDE AND ALBUTEROL SULFATE 2.5; .5 MG/3ML; MG/3ML
3 SOLUTION RESPIRATORY (INHALATION) EVERY 20 MIN
Status: COMPLETED | OUTPATIENT
Start: 2023-10-07 | End: 2023-10-07

## 2023-10-07 RX ORDER — METHADONE HYDROCHLORIDE 5 MG/1
5 TABLET ORAL 2 TIMES DAILY
COMMUNITY
Start: 2021-09-08

## 2023-10-07 RX ORDER — ONDANSETRON HYDROCHLORIDE 2 MG/ML
4 INJECTION, SOLUTION INTRAVENOUS EVERY 8 HOURS PRN
Status: DISCONTINUED | OUTPATIENT
Start: 2023-10-07 | End: 2023-10-14 | Stop reason: HOSPADM

## 2023-10-07 RX ORDER — VANCOMYCIN HYDROCHLORIDE 1 G/200ML
1 INJECTION, SOLUTION INTRAVENOUS ONCE
Status: DISCONTINUED | OUTPATIENT
Start: 2023-10-07 | End: 2023-10-07

## 2023-10-07 RX ORDER — METFORMIN HYDROCHLORIDE 1000 MG/1
1000 TABLET ORAL 2 TIMES DAILY
COMMUNITY
Start: 2021-09-08

## 2023-10-07 RX ORDER — TALC
3 POWDER (GRAM) TOPICAL DAILY
Status: DISCONTINUED | OUTPATIENT
Start: 2023-10-07 | End: 2023-10-14 | Stop reason: HOSPADM

## 2023-10-07 RX ORDER — NITROGLYCERIN 0.4 MG/1
0.4 TABLET SUBLINGUAL EVERY 5 MIN PRN
COMMUNITY
Start: 2021-07-03

## 2023-10-07 RX ORDER — CARVEDILOL 6.25 MG/1
6.25 TABLET ORAL
COMMUNITY
Start: 2021-08-08

## 2023-10-07 RX ORDER — POLYETHYLENE GLYCOL 3350 17 G/17G
17 POWDER, FOR SOLUTION ORAL DAILY
Status: DISCONTINUED | OUTPATIENT
Start: 2023-10-08 | End: 2023-10-14 | Stop reason: HOSPADM

## 2023-10-07 RX ORDER — ALBUTEROL SULFATE 90 UG/1
2 AEROSOL, METERED RESPIRATORY (INHALATION) EVERY 4 HOURS PRN
COMMUNITY
Start: 2021-09-08

## 2023-10-07 RX ORDER — ALBUTEROL SULFATE 0.83 MG/ML
SOLUTION RESPIRATORY (INHALATION)
Status: DISCONTINUED
Start: 2023-10-07 | End: 2023-10-07 | Stop reason: WASHOUT

## 2023-10-07 RX ORDER — DEXTROMETHORPHAN HYDROBROMIDE, GUAIFENESIN 5; 100 MG/5ML; MG/5ML
650 LIQUID ORAL EVERY 12 HOURS PRN
COMMUNITY
Start: 2023-09-29 | End: 2023-11-28

## 2023-10-07 RX ORDER — SODIUM CHLORIDE 9 MG/ML
100 INJECTION, SOLUTION INTRAVENOUS CONTINUOUS
Status: DISCONTINUED | OUTPATIENT
Start: 2023-10-07 | End: 2023-10-14 | Stop reason: HOSPADM

## 2023-10-07 RX ORDER — SODIUM CHLORIDE 9 MG/ML
10 INJECTION, SOLUTION INTRAVENOUS EVERY 8 HOURS
Status: DISCONTINUED | OUTPATIENT
Start: 2023-10-08 | End: 2023-10-14 | Stop reason: HOSPADM

## 2023-10-07 RX ADMIN — METHYLPREDNISOLONE SODIUM SUCCINATE 60 MG: 125 INJECTION, POWDER, FOR SOLUTION INTRAMUSCULAR; INTRAVENOUS at 12:35

## 2023-10-07 RX ADMIN — IPRATROPIUM BROMIDE AND ALBUTEROL SULFATE 3 ML: .5; 3 SOLUTION RESPIRATORY (INHALATION) at 16:13

## 2023-10-07 RX ADMIN — IPRATROPIUM BROMIDE AND ALBUTEROL SULFATE 3 ML: .5; 3 SOLUTION RESPIRATORY (INHALATION) at 16:15

## 2023-10-07 RX ADMIN — SODIUM CHLORIDE 1000 ML: 9 INJECTION, SOLUTION INTRAVENOUS at 19:43

## 2023-10-07 RX ADMIN — IPRATROPIUM BROMIDE AND ALBUTEROL SULFATE 3 ML: .5; 3 SOLUTION RESPIRATORY (INHALATION) at 16:11

## 2023-10-07 RX ADMIN — IPRATROPIUM BROMIDE AND ALBUTEROL SULFATE 3 ML: 2.5; .5 SOLUTION RESPIRATORY (INHALATION) at 23:43

## 2023-10-07 RX ADMIN — AZITHROMYCIN MONOHYDRATE 500 MG: 500 INJECTION, POWDER, LYOPHILIZED, FOR SOLUTION INTRAVENOUS at 16:51

## 2023-10-07 RX ADMIN — ACETAMINOPHEN 650 MG: 325 TABLET ORAL at 20:53

## 2023-10-07 RX ADMIN — METHYLPREDNISOLONE SODIUM SUCCINATE 40 MG: 40 INJECTION, POWDER, FOR SOLUTION INTRAMUSCULAR; INTRAVENOUS at 23:43

## 2023-10-07 RX ADMIN — IPRATROPIUM BROMIDE AND ALBUTEROL SULFATE 3 ML: .5; 3 SOLUTION RESPIRATORY (INHALATION) at 16:05

## 2023-10-07 RX ADMIN — SODIUM CHLORIDE 1000 ML: 9 INJECTION, SOLUTION INTRAVENOUS at 12:38

## 2023-10-07 RX ADMIN — METHYLPREDNISOLONE SODIUM SUCCINATE 60 MG: 125 INJECTION, POWDER, FOR SOLUTION INTRAMUSCULAR; INTRAVENOUS at 14:05

## 2023-10-07 RX ADMIN — VANCOMYCIN HYDROCHLORIDE 2 G: 1 INJECTION, POWDER, LYOPHILIZED, FOR SOLUTION INTRAVENOUS at 11:55

## 2023-10-07 RX ADMIN — FUROSEMIDE 20 MG: 10 INJECTION, SOLUTION INTRAMUSCULAR; INTRAVENOUS at 16:51

## 2023-10-07 RX ADMIN — ACETAMINOPHEN 975 MG: 325 TABLET ORAL at 14:24

## 2023-10-07 RX ADMIN — TAZOBACTAM SODIUM AND PIPERACILLIN SODIUM 4.5 G: 500; 4 INJECTION, SOLUTION INTRAVENOUS at 11:55

## 2023-10-07 RX ADMIN — PIPERACILLIN SODIUM AND TAZOBACTAM SODIUM 3.38 G: 3; .375 INJECTION, SOLUTION INTRAVENOUS at 23:42

## 2023-10-07 RX ADMIN — SODIUM CHLORIDE 100 ML/HR: 9 INJECTION, SOLUTION INTRAVENOUS at 23:43

## 2023-10-07 RX ADMIN — SODIUM CHLORIDE 1000 ML: 9 INJECTION, SOLUTION INTRAVENOUS at 14:46

## 2023-10-07 RX ADMIN — Medication 3 MG: at 23:43

## 2023-10-07 SDOH — ECONOMIC STABILITY: TRANSPORTATION INSECURITY
IN THE PAST 12 MONTHS, HAS LACK OF TRANSPORTATION KEPT YOU FROM MEETINGS, WORK, OR FROM GETTING THINGS NEEDED FOR DAILY LIVING?: NO

## 2023-10-07 SDOH — ECONOMIC STABILITY: HOUSING INSECURITY: IN THE LAST 12 MONTHS, HOW MANY PLACES HAVE YOU LIVED?: 2

## 2023-10-07 SDOH — ECONOMIC STABILITY: HOUSING INSECURITY
IN THE LAST 12 MONTHS, WAS THERE A TIME WHEN YOU DID NOT HAVE A STEADY PLACE TO SLEEP OR SLEPT IN A SHELTER (INCLUDING NOW)?: NO

## 2023-10-07 SDOH — ECONOMIC STABILITY: INCOME INSECURITY: IN THE LAST 12 MONTHS, WAS THERE A TIME WHEN YOU WERE NOT ABLE TO PAY THE MORTGAGE OR RENT ON TIME?: NO

## 2023-10-07 SDOH — ECONOMIC STABILITY: FOOD INSECURITY: WITHIN THE PAST 12 MONTHS, YOU WORRIED THAT YOUR FOOD WOULD RUN OUT BEFORE YOU GOT MONEY TO BUY MORE.: NEVER TRUE

## 2023-10-07 SDOH — SOCIAL STABILITY: SOCIAL NETWORK: IN A TYPICAL WEEK, HOW MANY TIMES DO YOU TALK ON THE PHONE WITH FAMILY, FRIENDS, OR NEIGHBORS?: NEVER

## 2023-10-07 SDOH — SOCIAL STABILITY: SOCIAL INSECURITY: DO YOU FEEL UNSAFE GOING BACK TO THE PLACE WHERE YOU ARE LIVING?: NO

## 2023-10-07 SDOH — ECONOMIC STABILITY: FOOD INSECURITY: WITHIN THE PAST 12 MONTHS, THE FOOD YOU BOUGHT JUST DIDN'T LAST AND YOU DIDN'T HAVE MONEY TO GET MORE.: NEVER TRUE

## 2023-10-07 SDOH — HEALTH STABILITY: MENTAL HEALTH: HOW OFTEN DO YOU HAVE A DRINK CONTAINING ALCOHOL?: NEVER

## 2023-10-07 SDOH — ECONOMIC STABILITY: INCOME INSECURITY: IN THE PAST 12 MONTHS, HAS THE ELECTRIC, GAS, OIL, OR WATER COMPANY THREATENED TO SHUT OFF SERVICE IN YOUR HOME?: NO

## 2023-10-07 SDOH — SOCIAL STABILITY: SOCIAL NETWORK
DO YOU BELONG TO ANY CLUBS OR ORGANIZATIONS SUCH AS CHURCH GROUPS UNIONS, FRATERNAL OR ATHLETIC GROUPS, OR SCHOOL GROUPS?: YES

## 2023-10-07 SDOH — HEALTH STABILITY: MENTAL HEALTH
STRESS IS WHEN SOMEONE FEELS TENSE, NERVOUS, ANXIOUS, OR CAN'T SLEEP AT NIGHT BECAUSE THEIR MIND IS TROUBLED. HOW STRESSED ARE YOU?: NOT AT ALL

## 2023-10-07 SDOH — HEALTH STABILITY: MENTAL HEALTH: HOW OFTEN DO YOU HAVE 6 OR MORE DRINKS ON ONE OCCASION?: NEVER

## 2023-10-07 SDOH — ECONOMIC STABILITY: INCOME INSECURITY: HOW HARD IS IT FOR YOU TO PAY FOR THE VERY BASICS LIKE FOOD, HOUSING, MEDICAL CARE, AND HEATING?: NOT HARD AT ALL

## 2023-10-07 SDOH — SOCIAL STABILITY: SOCIAL INSECURITY: ARE YOU OR HAVE YOU BEEN THREATENED OR ABUSED PHYSICALLY, EMOTIONALLY, OR SEXUALLY BY ANYONE?: NO

## 2023-10-07 SDOH — SOCIAL STABILITY: SOCIAL INSECURITY: WERE YOU ABLE TO COMPLETE ALL THE BEHAVIORAL HEALTH SCREENINGS?: YES

## 2023-10-07 SDOH — SOCIAL STABILITY: SOCIAL INSECURITY: ARE THERE ANY APPARENT SIGNS OF INJURIES/BEHAVIORS THAT COULD BE RELATED TO ABUSE/NEGLECT?: NO

## 2023-10-07 SDOH — SOCIAL STABILITY: SOCIAL NETWORK: HOW OFTEN DO YOU ATTEND CHURCH OR RELIGIOUS SERVICES?: NEVER

## 2023-10-07 SDOH — HEALTH STABILITY: MENTAL HEALTH: HOW MANY STANDARD DRINKS CONTAINING ALCOHOL DO YOU HAVE ON A TYPICAL DAY?: PATIENT DOES NOT DRINK

## 2023-10-07 SDOH — SOCIAL STABILITY: SOCIAL NETWORK: HOW OFTEN DO YOU GET TOGETHER WITH FRIENDS OR RELATIVES?: NEVER

## 2023-10-07 SDOH — ECONOMIC STABILITY: TRANSPORTATION INSECURITY
IN THE PAST 12 MONTHS, HAS THE LACK OF TRANSPORTATION KEPT YOU FROM MEDICAL APPOINTMENTS OR FROM GETTING MEDICATIONS?: NO

## 2023-10-07 SDOH — ECONOMIC STABILITY: HOUSING INSECURITY: IN THE LAST 12 MONTHS, HOW MANY PLACES HAVE YOU LIVED?: 1

## 2023-10-07 SDOH — HEALTH STABILITY: PHYSICAL HEALTH: ON AVERAGE, HOW MANY DAYS PER WEEK DO YOU ENGAGE IN MODERATE TO STRENUOUS EXERCISE (LIKE A BRISK WALK)?: 0 DAYS

## 2023-10-07 SDOH — SOCIAL STABILITY: SOCIAL INSECURITY: DOES ANYONE TRY TO KEEP YOU FROM HAVING/CONTACTING OTHER FRIENDS OR DOING THINGS OUTSIDE YOUR HOME?: NO

## 2023-10-07 SDOH — SOCIAL STABILITY: SOCIAL INSECURITY: HAVE YOU HAD THOUGHTS OF HARMING ANYONE ELSE?: NO

## 2023-10-07 SDOH — SOCIAL STABILITY: SOCIAL INSECURITY: ABUSE: ADULT

## 2023-10-07 SDOH — SOCIAL STABILITY: SOCIAL INSECURITY: HAS ANYONE EVER THREATENED TO HURT YOUR FAMILY OR YOUR PETS?: NO

## 2023-10-07 SDOH — SOCIAL STABILITY: SOCIAL NETWORK: HOW OFTEN DO YOU ATTENT MEETINGS OF THE CLUB OR ORGANIZATION YOU BELONG TO?: NEVER

## 2023-10-07 SDOH — SOCIAL STABILITY: SOCIAL INSECURITY: DO YOU FEEL ANYONE HAS EXPLOITED OR TAKEN ADVANTAGE OF YOU FINANCIALLY OR OF YOUR PERSONAL PROPERTY?: NO

## 2023-10-07 SDOH — HEALTH STABILITY: PHYSICAL HEALTH: ON AVERAGE, HOW MANY MINUTES DO YOU ENGAGE IN EXERCISE AT THIS LEVEL?: 0 MIN

## 2023-10-07 ASSESSMENT — LIFESTYLE VARIABLES
AUDIT-C TOTAL SCORE: 0
AUDIT-C TOTAL SCORE: 0
HAVE YOU EVER FELT YOU SHOULD CUT DOWN ON YOUR DRINKING: NO
HOW OFTEN DO YOU HAVE A DRINK CONTAINING ALCOHOL: NEVER
AUDIT-C TOTAL SCORE: 0
SUBSTANCE_ABUSE_PAST_12_MONTHS: NO
SKIP TO QUESTIONS 9-10: 1
PRESCIPTION_ABUSE_PAST_12_MONTHS: NO
AUDIT-C TOTAL SCORE: 0
SKIP TO QUESTIONS 9-10: 1
HAVE PEOPLE ANNOYED YOU BY CRITICIZING YOUR DRINKING: NO
SKIP TO QUESTIONS 9-10: 1
EVER HAD A DRINK FIRST THING IN THE MORNING TO STEADY YOUR NERVES TO GET RID OF A HANGOVER: NO
HOW MANY STANDARD DRINKS CONTAINING ALCOHOL DO YOU HAVE ON A TYPICAL DAY: PATIENT DOES NOT DRINK
HOW OFTEN DO YOU HAVE 6 OR MORE DRINKS ON ONE OCCASION: NEVER
EVER FELT BAD OR GUILTY ABOUT YOUR DRINKING: NO

## 2023-10-07 ASSESSMENT — ACTIVITIES OF DAILY LIVING (ADL)
ADEQUATE_TO_COMPLETE_ADL: YES
GROOMING: NEEDS ASSISTANCE
DRESSING YOURSELF: DEPENDENT
BATHING: DEPENDENT
WALKS IN HOME: DEPENDENT
HEARING - RIGHT EAR: FUNCTIONAL
FEEDING YOURSELF: INDEPENDENT
PATIENT'S MEMORY ADEQUATE TO SAFELY COMPLETE DAILY ACTIVITIES?: YES
JUDGMENT_ADEQUATE_SAFELY_COMPLETE_DAILY_ACTIVITIES: YES
HEARING - LEFT EAR: FUNCTIONAL
ASSISTIVE_DEVICE: CANE;WHEELCHAIR
TOILETING: DEPENDENT

## 2023-10-07 ASSESSMENT — PATIENT HEALTH QUESTIONNAIRE - PHQ9
1. LITTLE INTEREST OR PLEASURE IN DOING THINGS: NOT AT ALL
SUM OF ALL RESPONSES TO PHQ9 QUESTIONS 1 & 2: 0
2. FEELING DOWN, DEPRESSED OR HOPELESS: NOT AT ALL

## 2023-10-07 ASSESSMENT — COLUMBIA-SUICIDE SEVERITY RATING SCALE - C-SSRS
1. IN THE PAST MONTH, HAVE YOU WISHED YOU WERE DEAD OR WISHED YOU COULD GO TO SLEEP AND NOT WAKE UP?: NO
2. HAVE YOU ACTUALLY HAD ANY THOUGHTS OF KILLING YOURSELF?: NO
6. HAVE YOU EVER DONE ANYTHING, STARTED TO DO ANYTHING, OR PREPARED TO DO ANYTHING TO END YOUR LIFE?: NO

## 2023-10-07 ASSESSMENT — ENCOUNTER SYMPTOMS
VOMITING: 0
PSYCHIATRIC NEGATIVE: 1
CHILLS: 1
COLOR CHANGE: 1
GASTROINTESTINAL NEGATIVE: 1
COUGH: 1
ALLERGIC/IMMUNOLOGIC NEGATIVE: 1
WOUND: 1
MUSCULOSKELETAL NEGATIVE: 1
SHORTNESS OF BREATH: 1
NAUSEA: 0
WHEEZING: 1
ENDOCRINE NEGATIVE: 1
NEUROLOGICAL NEGATIVE: 1
FATIGUE: 1
EYES NEGATIVE: 1
DIZZINESS: 0
LIGHT-HEADEDNESS: 0
PALPITATIONS: 1
ABDOMINAL PAIN: 0
HEMATOLOGIC/LYMPHATIC NEGATIVE: 1

## 2023-10-07 ASSESSMENT — PAIN SCALES - GENERAL
PAINLEVEL_OUTOF10: 9
PAINLEVEL_OUTOF10: 9

## 2023-10-07 NOTE — H&P
History Of Present Illness  sIa Jack is a 72 y.o. female presenting with With past medical history of chronic pain syndrome on Norco, former smoker, asthma/COPD not on home oxygen, CHF, essential hypertension, degenerative joint disease, B12 deficiency, hyperlipidemia, chronic venous stasis, chronic ulcer over the left leg presented to the ED with a complaint of increased discharge from the left lower extremity wound, fever, increased shortness of breath.  Patient reported that she has a chronic wound over the left lower extremity for which she does a dressing at home also has a home care services that come to her house through [facility], reported that she is noted increased discharge foul-smelling from the left leg more than the usual.  Has increased redness over the left lower extremity.  Also reports fever.  Reports increased shortness of breath more than the usual but states that she is usually short of breath due to her underlying COPD.  Does not use home oxygen.  Denies any chest pain palpitation dizziness or syncope.  Lives with her son and daughter-in-law.    Upon presentation to the ED vital signs include blood pressure 163/115, but it trended down to 118/55, febrile temperature of 101.3, tachycardic 138, sinus tachycardia, On labs CBC showed leukocytosis 16,000 hemoglobin 11.4 which is around her baseline, MCV of 103, patient has pernicious anemia/B12 deficiency, on CMP had hyperglycemia at 112 bicarb of 20 anion gap of 20 magnesium was 1.6 TSH within normal limit initial lactate was 3.2 and repeat lactate normalized to 1.7, blood culture was drawn, urinalysis showed no UTI, small blood, no RBCs, chest x-ray was done and showed increased irregular interstitial thickening bilaterally with patchy multifocal infiltrate greater in perihilar regions related to vascular congestion or edema, CT chest was done and showed severe cardiomegaly with interstitial edema and trace pleural effusion, mild  mediastinal lymphadenopathy perhaps reactive BNP was done and was 301.  Patient was admitted for sepsis secondary to cellulitis     Past Medical History  She has a past medical history of Diabetes mellitus (CMS/[facility]), Hyperlipemia, Hypertension, Morbid (severe) obesity due to excess calories (CMS/[facility]) (10/28/2022), and Non-pressure chronic ulcer of left ankle with unspecified severity (CMS/[facility]) (2020).    Surgical History  She has a past surgical history that includes Other surgical history (2019).       Social History  She reports that she quit smoking about 4 years ago. Her smoking use included cigarettes. She has never used smokeless tobacco. She reports that she does not drink alcohol and does not use drugs.    Family History  Reviewed noncontributory to current presentation     Allergies  Patient has no known allergies.    Review of Systems   Constitutional:  Positive for chills and fatigue.   HENT: Negative.     Eyes: Negative.  Negative for visual disturbance.   Respiratory:  Positive for cough, shortness of breath and wheezing.    Cardiovascular:  Positive for palpitations and leg swelling. Negative for chest pain.   Gastrointestinal: Negative.  Negative for abdominal pain, nausea and vomiting.   Endocrine: Negative.    Genitourinary: Negative.    Musculoskeletal: Negative.    Skin:  Positive for color change and wound.   Allergic/Immunologic: Negative.    Neurological: Negative.  Negative for dizziness, syncope and light-headedness.   Hematological: Negative.    Psychiatric/Behavioral: Negative.  Negative for behavioral problems.         Physical Exam  Constitutional:       General: She is not in acute distress.     Appearance: Normal appearance.   HENT:      Head: Normocephalic and atraumatic.      Nose: Nose normal.      Mouth/Throat:      Mouth: Mucous membranes are dry.   Eyes:      Extraocular Movements: Extraocular movements intact.      Conjunctiva/sclera: Conjunctivae normal.      " Pupils: Pupils are equal, round, and reactive to light.   Cardiovascular:      Rate and Rhythm: Tachycardia present.      Heart sounds: No murmur heard.     No gallop.   Pulmonary:      Breath sounds: Wheezing present.      Comments: Diminished air entry diffuse wheeze  Abdominal:      General: Abdomen is flat. Bowel sounds are normal. There is no distension.      Tenderness: There is no abdominal tenderness.   Musculoskeletal:      Cervical back: Normal range of motion and neck supple. No rigidity or tenderness.      Right lower leg: No edema.      Left lower leg: No edema.   Neurological:      Mental Status: She is alert.          Last Recorded Vitals  Blood pressure 118/55, pulse (!) 132, temperature 38.3 °C (100.9 °F), temperature source Tympanic, resp. rate 26, weight 100 kg (220 lb 7.4 oz), SpO2 99 %.    Intake/Output Summary (Last 24 hours) at 10/7/2023 1755  Last data filed at 10/7/2023 1155  Gross per 24 hour   Intake 100 ml   Output --   Net 100 ml      No current facility-administered medications for this encounter.     Current Outpatient Medications   Medication Sig Dispense Refill    albuterol 2.5 mg /3 mL (0.083 %) nebulizer solution Take 3 mL (2.5 mg) by nebulization every 4 hours if needed for shortness of breath or wheezing. 75 mL 11    albuterol 90 mcg/actuation inhaler Inhale 2 puffs every 4 hours if needed for shortness of breath or wheezing. OK to substitute in class 18 g 11    atorvastatin (Lipitor) 40 mg tablet Take 1 tablet (40 mg) by mouth once daily. 90 tablet 3    carvedilol (Coreg) 6.25 mg tablet Take 1 tablet (6.25 mg) by mouth 2 times a day. 180 tablet 3    clotrimazole (Lotrimin) 1 % cream Apply 1 Application topically 2 times a day. Apply to affected areas 2-3 times daily 30 g 3    cyanocobalamin, vitamin B-12, 2,500 mcg tablet, sublingual Place 1 each under the tongue once daily. 90 tablet 3    elastic bandage (BAND-AID ACTIVE FLEX TOP) USE AS DIRECTED. apply 1 4x4\" bandage to open " wound once daily      elastic bandage bandage USE AS DIRECTED.      furosemide (Lasix) 40 mg tablet Take 1 tablet (40 mg) by mouth in the morning and 1 tablet (40 mg) before bedtime. 90 tablet 3    HYDROcodone-acetaminophen (Norco) 5-325 mg tablet Take 1 tablet by mouth 2 times a day as needed for severe pain (7 - 10). Do not start before May 5, 2023. 60 tablet 0    HYDROcodone-acetaminophen (Norco) 5-325 mg tablet Take 1 tablet by mouth 2 times a day as needed for severe pain (7 - 10). Do not start before [place] 3, 2023. 60 tablet 0    lisinopril 2.5 mg tablet Take 1 tablet (2.5 mg) by mouth once daily. 90 tablet 3    meloxicam (Mobic) 7.5 mg tablet Take 1 tablet (7.5 mg) by mouth once daily as needed.      metFORMIN (Glucophage) 1,000 mg tablet Take 1 tablet (1,000 mg) by mouth in the morning and 1 tablet (1,000 mg) before bedtime. 180 tablet 3    montelukast (Singulair) 10 mg tablet Take 1 tablet (10 mg) by mouth once daily. 90 tablet 3    mupirocin (Bactroban) 2 % ointment Apply 1 Application topically 2 times a day as needed (infection). 22 g 11    naloxone (Narcan) 4 mg/0.1 mL nasal spray Administer 1 spray (4 mg) into affected nostril(s) if needed for opioid reversal. 2 each 1    nitroglycerin (Nitrostat) 0.4 mg SL tablet Place 1 tablet (0.4 mg) under the tongue every 5 minutes if needed for chest pain (chest pain). 25 tablet 1    OneTouch Ultra Test strip 2 times a day.      potassium chloride CR (Klor-Con M20) 20 mEq ER tablet Take 1 tablet (20 mEq) by mouth 2 times a day. 180 tablet 3      Prior to Admission medications    Medication Sig Start Date End Date Taking? Authorizing Provider   albuterol 2.5 mg /3 mL (0.083 %) nebulizer solution Take 3 mL (2.5 mg) by nebulization every 4 hours if needed for shortness of breath or wheezing. 6/9/23   Yamini Beckwith, DO   albuterol 90 mcg/actuation inhaler Inhale 2 puffs every 4 hours if needed for shortness of breath or wheezing. OK to substitute in class 7/18/23    "Yamini Beckwith, DO   atorvastatin (Lipitor) 40 mg tablet Take 1 tablet (40 mg) by mouth once daily. 7/18/23   Yamini Beckwith DO   carvedilol (Coreg) 6.25 mg tablet Take 1 tablet (6.25 mg) by mouth 2 times a day. 7/18/23   Yamini Beckwith, DO   clotrimazole (Lotrimin) 1 % cream Apply 1 Application topically 2 times a day. Apply to affected areas 2-3 times daily 5/1/23   Yamini Beckwith, DO   cyanocobalamin, vitamin B-12, 2,500 mcg tablet, sublingual Place 1 each under the tongue once daily. 4/20/23   Yamini Beckwith, DO   elastic bandage (BAND-AID ACTIVE FLEX TOP) USE AS DIRECTED. apply 1 4x4\" bandage to open wound once daily 1/22/20   Historical Provider, MD   elastic bandage bandage USE AS DIRECTED.    Historical Provider, MD   furosemide (Lasix) 40 mg tablet Take 1 tablet (40 mg) by mouth in the morning and 1 tablet (40 mg) before bedtime. 4/20/23   Yamini Beckwith DO   HYDROcodone-acetaminophen (Norco) 5-325 mg tablet Take 1 tablet by mouth 2 times a day as needed for severe pain (7 - 10). Do not start before May 5, 2023. 5/5/23   Yamini Beckwith DO   HYDROcodone-acetaminophen (Norco) 5-325 mg tablet Take 1 tablet by mouth 2 times a day as needed for severe pain (7 - 10). Do not start before [place] 3, 2023. 6/3/23 7/3/23  Yamini Beckwith DO   lisinopril 2.5 mg tablet Take 1 tablet (2.5 mg) by mouth once daily. 4/25/23 10/22/23  Yamini Beckwith DO   meloxicam (Mobic) 7.5 mg tablet Take 1 tablet (7.5 mg) by mouth once daily as needed.    Historical Provider, MD   metFORMIN (Glucophage) 1,000 mg tablet Take 1 tablet (1,000 mg) by mouth in the morning and 1 tablet (1,000 mg) before bedtime. 4/6/23   Yamini Beckwith DO   montelukast (Singulair) 10 mg tablet Take 1 tablet (10 mg) by mouth once daily. 7/18/23   Yamini Beckwith, DO   mupirocin (Bactroban) 2 % ointment Apply 1 Application topically 2 times a day as needed (infection). 7/18/23   Yamini Beckwith, DO   naloxone (Narcan) 4 mg/0.1 mL nasal spray Administer 1 spray " (4 mg) into affected nostril(s) if needed for opioid reversal. 4/6/23   Yamini Beckwith, DO   nitroglycerin (Nitrostat) 0.4 mg SL tablet Place 1 tablet (0.4 mg) under the tongue every 5 minutes if needed for chest pain (chest pain). 4/6/23 4/5/24  Yamini Beckwith, DO   OneTouch Ultra Test strip 2 times a day.    Historical Provider, MD   potassium chloride CR (Klor-Con M20) 20 mEq ER tablet Take 1 tablet (20 mEq) by mouth 2 times a day. 5/2/23   Yamini Beckwith, DO      Relevant Results      Results for orders placed or performed during the hospital encounter of 10/07/23 (from the past 24 hour(s))   Sars-CoV-2 RT PCR, Symptomatic   Result Value Ref Range    Coronavirus 2019, PCR Not Detected Not Detected   Influenza A, and B PCR   Result Value Ref Range    Flu A Result Not Detected Not Detected    Flu B Result Not Detected Not Detected   CBC and Auto Differential   Result Value Ref Range    WBC 16.7 (H) 4.4 - 11.3 x10*3/uL    nRBC 0.0 0.0 - 0.0 /100 WBCs    RBC 3.54 (L) 4.00 - 5.20 x10*6/uL    Hemoglobin 11.4 (L) 12.0 - 16.0 g/dL    Hematocrit 36.6 36.0 - 46.0 %     (H) 80 - 100 fL    MCH 32.2 26.0 - 34.0 pg    MCHC 31.1 (L) 32.0 - 36.0 g/dL    RDW 13.4 11.5 - 14.5 %    Platelets 224 150 - 450 x10*3/uL    MPV 10.4 7.5 - 11.5 fL    Neutrophils % 92.2 40.0 - 80.0 %    Immature Granulocytes %, Automated 1.3 (H) 0.0 - 0.9 %    Lymphocytes % 1.8 13.0 - 44.0 %    Monocytes % 4.3 2.0 - 10.0 %    Eosinophils % 0.1 0.0 - 6.0 %    Basophils % 0.3 0.0 - 2.0 %    Neutrophils Absolute 15.41 (H) 1.60 - 5.50 x10*3/uL    Immature Granulocytes Absolute, Automated 0.21 0.00 - 0.50 x10*3/uL    Lymphocytes Absolute 0.30 (L) 0.80 - 3.00 x10*3/uL    Monocytes Absolute 0.72 0.05 - 0.80 x10*3/uL    Eosinophils Absolute 0.01 0.00 - 0.40 x10*3/uL    Basophils Absolute 0.05 0.00 - 0.10 x10*3/uL   Comprehensive Metabolic Panel   Result Value Ref Range    Glucose 212 (H) 74 - 99 mg/dL    Sodium 139 136 - 145 mmol/L    Potassium 4.4 3.5 -  5.3 mmol/L    Chloride 103 98 - 107 mmol/L    Bicarbonate 20 (L) 21 - 32 mmol/L    Anion Gap 20 10 - 20 mmol/L    Urea Nitrogen 17 6 - 23 mg/dL    Creatinine 0.72 0.50 - 1.05 mg/dL    eGFR 88 >60 mL/min/1.73m*2    Calcium 9.1 8.6 - 10.3 mg/dL    Albumin 4.0 3.4 - 5.0 g/dL    Alkaline Phosphatase 70 33 - 136 U/L    Total Protein 7.4 6.4 - 8.2 g/dL    AST 17 9 - 39 U/L    Bilirubin, Total 0.6 0.0 - 1.2 mg/dL    ALT 11 7 - 45 U/L   Magnesium   Result Value Ref Range    Magnesium 1.68 1.60 - 2.40 mg/dL   Coagulation Screen   Result Value Ref Range    Protime 10.7 9.8 - 12.8 seconds    INR 1.0 0.9 - 1.1    aPTT 25 (L) 27 - 38 seconds   TSH with reflex to Free T4 if abnormal   Result Value Ref Range    Thyroid Stimulating Hormone 0.84 0.44 - 3.98 mIU/L   Troponin I, High Sensitivity, Initial   Result Value Ref Range    Troponin I, High Sensitivity 20 (H) 0 - 13 ng/L   Lactate   Result Value Ref Range    Lactate 3.2 (H) 0.4 - 2.0 mmol/L   Troponin, High Sensitivity, 1 Hour   Result Value Ref Range    Troponin I, High Sensitivity 46 (H) 0 - 13 ng/L   B-type natriuretic peptide   Result Value Ref Range     (H) 0 - 99 pg/mL   Urinalysis with Reflex Microscopic and Culture   Result Value Ref Range    Color, Urine Yellow Straw, Yellow    Appearance, Urine Clear Clear    Specific Gravity, Urine 1.026 1.005 - 1.035    pH, Urine 6.0 5.0, 5.5, 6.0, 6.5, 7.0, 7.5, 8.0    Protein, Urine 100 (2+) (N) NEGATIVE mg/dL    Glucose, Urine NEGATIVE NEGATIVE mg/dL    Blood, Urine SMALL (1+) (A) NEGATIVE    Ketones, Urine NEGATIVE NEGATIVE mg/dL    Bilirubin, Urine NEGATIVE NEGATIVE    Urobilinogen, Urine <2.0 <2.0 mg/dL    Nitrite, Urine NEGATIVE NEGATIVE    Leukocyte Esterase, Urine NEGATIVE NEGATIVE   Urinalysis Microscopic   Result Value Ref Range    WBC, Urine 1-5 1-5, NONE /HPF    RBC, Urine 1-2 NONE, 1-2, 3-5 /HPF    Squamous Epithelial Cells, Urine 1-9 (SPARSE) Reference range not established. /HPF   Lactate   Result Value Ref  Range    Lactate 1.7 0.4 - 2.0 mmol/L           Principal Problem:    Sepsis due to pneumonia (CMS/HCC)            Assessment and plan     #Possible sepsis secondary to left lower extremity cellulitis/infected wound  #Fever, leukocytosis, tachycardia secondary to the above    Admit to medical floor  Telemetry monitoring  Sinus tachycardia noted with fever and leukocytosis  Tylenol as needed for fever  Patient received 1 L NS bolus  We will give another liter bolus and start NS  Clinically patient is dry no significant fluid overload noted  Started on vancomycin, Zosyn and Zithromax  Seen during culture  Send procalcitonin  ID consult  Wound care consult for left lower extremity wound  Local wound care  Send cultures from left lower extremity wound  Follow-up blood culture result  Patient is tachycardic but not hypotensive if patient becomes hypotensive might need care upgraded to ICU    #Acute hypoxic respiratory failure  #COPD exacerbation  #Possible pneumonia  Former smoker    Started on steroid Solu-Medrol IV 40 mg every 8 hours  Duo nebulization every 4 hours  Nasal cannula oxygen titrate down as tolerated    #Chronic heart failure,    Patient reports taking diuretics at home  Received 1 dose of IV diuretics  Based on my exam patient is on the dry side not clinically fluid overloaded  We will hold off further diuresis  Hold off home dose of Lasix  Rehydrate and monitor for fluid overload  Echocardiogram  #Essential hypertension  #Hyperlipidemia  #Chronic venous stasis  #Chronic back pain    Resume home medications  Hold off on antihypertensives since patient's blood pressure is borderline low     DVT prophylaxis on Lovenox  CODE STATUS patient confirmed being full code at this time      Principal Problem:    Sepsis due to pneumonia (CMS/HCC)      I spent 45 minutes in the professional and overall care of this patient.      Cheri Mcdermott MD  10/07/23 5:55 PM

## 2023-10-07 NOTE — ED PROVIDER NOTES
HPI   Chief Complaint   Patient presents with    Weakness, Gen       This is a 72-year-old female with PMH HTN, HLD, T2DM, COPD presenting for evaluation of weakness.  Presents via EMS from home, called by her son.  She states she woke up this morning with a heavy midsternal chest discomfort.  Did not take any medications this morning.  Feeling generally weak, fatigued no energy.  States her current shortness of breath is baseline for her.  Denies fevers, chills, hemoptysis, abdominal pain, nausea, vomiting, diarrhea, headache.                            No data recorded                Patient History   Past Medical History:   Diagnosis Date    Diabetes mellitus (CMS/[facility])     Hyperlipemia     Hypertension     Morbid (severe) obesity due to excess calories (CMS/[facility]) 10/28/2022    Class 3 severe obesity due to excess calories with serious comorbidity and body mass index (BMI) of 40.0 to 44.9 in adult    Non-pressure chronic ulcer of left ankle with unspecified severity (CMS/[facility]) 2020    Ulcer of left ankle     Past Surgical History:   Procedure Laterality Date    OTHER SURGICAL HISTORY  2019    No history of surgery     No family history on file.  Social History     Tobacco Use    Smoking status: Former     Types: Cigarettes     Quit date:      Years since quittin.7    Smokeless tobacco: Never   Substance Use Topics    Alcohol use: Never    Drug use: Never       Physical Exam   ED Triage Vitals [10/07/23 1139]   Temp Heart Rate Resp BP   38.5 °C (101.3 °F) (!) 138 (!) 36 (!) 163/115      SpO2 Temp src Heart Rate Source Patient Position   95 % -- Monitor --      BP Location FiO2 (%)     -- --       Physical Exam    General: Vitals noted, no distress.  Febrile.  EENT: Posterior oropharynx patent and unremarkable. Mucous membranes moist  Neck: Supple. No meningismus  Cardiac: Tachycardic rate regular rhythm. No murmur  Pulmonary: Expiratory wheezing bilaterally.  Abdomen: Soft. Nontender  Extremities:  No peripheral edema   Skin: No rash  Neuro: No focal neurologic deficits    ED Course & MDM   Diagnoses as of 10/07/23 1704   Sepsis due to pneumonia (CMS/HCC)   Acute pulmonary edema (CMS/HCC)     Labs Reviewed   CBC WITH AUTO DIFFERENTIAL - Abnormal       Result Value    WBC 16.7 (*)     nRBC 0.0      RBC 3.54 (*)     Hemoglobin 11.4 (*)     Hematocrit 36.6       (*)     MCH 32.2      MCHC 31.1 (*)     RDW 13.4      Platelets 224      MPV 10.4      Neutrophils % 92.2      Immature Granulocytes %, Automated 1.3 (*)     Lymphocytes % 1.8      Monocytes % 4.3      Eosinophils % 0.1      Basophils % 0.3      Neutrophils Absolute 15.41 (*)     Immature Granulocytes Absolute, Automated 0.21      Lymphocytes Absolute 0.30 (*)     Monocytes Absolute 0.72      Eosinophils Absolute 0.01      Basophils Absolute 0.05     COMPREHENSIVE METABOLIC PANEL - Abnormal    Glucose 212 (*)     Sodium 139      Potassium 4.4      Chloride 103      Bicarbonate 20 (*)     Anion Gap 20      Urea Nitrogen 17      Creatinine 0.72      eGFR 88      Calcium 9.1      Albumin 4.0      Alkaline Phosphatase 70      Total Protein 7.4      AST 17      Bilirubin, Total 0.6      ALT 11     COAGULATION SCREEN - Abnormal    Protime 10.7      INR 1.0      aPTT 25 (*)     Narrative:     The APTT is no longer used for monitoring Unfractionated Heparin Therapy. For monitoring Heparin Therapy, use the Heparin Assay.   SERIAL TROPONIN-INITIAL - Abnormal    Troponin I, High Sensitivity 20 (*)     Narrative:     Less than 99th percentile of normal range cutoff-  Female and children under 18 years old <14 ng/L; Male <21 ng/L: Negative  Repeat testing should be performed if clinically indicated.     Female and children under 18 years old 14-50 ng/L; Male 21-50 ng/L:  Consistent with possible cardiac damage and possible increased clinical   risk. Serial measurements may help to assess extent of myocardial damage.     >50 ng/L: Consistent with cardiac damage,  increased clinical risk and  myocardial infarction. Serial measurements may help assess extent of   myocardial damage.      NOTE: Children less than 1 year old may have higher baseline troponin   levels and results should be interpreted in conjunction with the overall   clinical context.     NOTE: Troponin I testing is performed using a different   testing methodology at [facility] than at other   [facility]. Direct result comparisons should only   be made within the same method.   LACTATE - Abnormal    Lactate 3.2 (*)     Narrative:     Venipuncture immediately after or during the administration of Metamizole may lead to falsely low results. Testing should be performed immediately  prior to Metamizole dosing.   SERIAL TROPONIN, 1 HOUR - Abnormal    Troponin I, High Sensitivity 46 (*)     Narrative:     Less than 99th percentile of normal range cutoff-  Female and children under 18 years old <14 ng/L; Male <21 ng/L: Negative  Repeat testing should be performed if clinically indicated.     Female and children under 18 years old 14-50 ng/L; Male 21-50 ng/L:  Consistent with possible cardiac damage and possible increased clinical   risk. Serial measurements may help to assess extent of myocardial damage.     >50 ng/L: Consistent with cardiac damage, increased clinical risk and  myocardial infarction. Serial measurements may help assess extent of   myocardial damage.      NOTE: Children less than 1 year old may have higher baseline troponin   levels and results should be interpreted in conjunction with the overall   clinical context.     NOTE: Troponin I testing is performed using a different   testing methodology at [facility] than at other   [facility]. Direct result comparisons should only   be made within the same method.   B-TYPE NATRIURETIC PEPTIDE - Abnormal     (*)     Narrative:        <100 pg/mL - Heart failure unlikely  100-299 pg/mL - Intermediate probability of  acute heart                  failure exacerbation. Correlate with clinical                  context and patient history.    >=300 pg/mL - Heart Failure likely. Correlate with clinical                  context and patient history.    BNP testing is performed using different testing methodology at [facility] than at other [facility]. Direct result comparisons should only be made within the same method.      MAGNESIUM - Normal    Magnesium 1.68     SARS-COV-2 PCR, SYMPTOMATIC - Normal    Coronavirus 2019, PCR Not Detected      Narrative:     This assay has received FDA Emergency Use Authorization (EUA) and is only authorized for the duration of time that circumstances exist to justify the authorization of the emergency use of in vitro diagnostic tests for the detection of SARS-CoV-2 virus and/or diagnosis of COVID-19 infection under section 564(b)(1) of the Act, 21 U.S.C. 360bbb-3(b)(1). This assay is an in vitro diagnostic nucleic acid amplification test for the qualitative detection of SARS-CoV-2 from nasopharyngeal specimens and has been validated for use at [facility]. Negative results do not preclude COVID-19 infections and should not be used as the sole basis for diagnosis, treatment, or other management decisions.     INFLUENZA A AND B PCR - Normal    Flu A Result Not Detected      Flu B Result Not Detected      Narrative:     This assay is an in vitro diagnostic multiplex nucleic acid amplification test for the detection and discrimination of Influenza A & B from nasopharyngeal specimens, and has been validated for use at [facility]. Negative results do not preclude Influenza A/B infections, and should not be used as the sole basis for diagnosis, treatment, or other management decisions. If Influenza A/B and RSV PCR results are negative, testing for Parainfluenza virus, Adenovirus and Metapneumovirus is routinely performed for [facility] pediatric  oncology and intensive care inpatients, and is available on other patients by placing an add-on request.   TSH WITH REFLEX TO FREE T4 IF ABNORMAL - Normal    Thyroid Stimulating Hormone 0.84      Narrative:     TSH testing is performed using different testing methodology at [facility] than at other [facility]. Direct result comparisons should only be made within the same method.     BLOOD CULTURE   BLOOD CULTURE   TROPONIN SERIES- (INITIAL, 1 HR)    Narrative:     The following orders were created for panel order Troponin I Series, High Sensitivity (0, 1 HR).  Procedure                               Abnormality         Status                     ---------                               -----------         ------                     Troponin I, High Sensiti...[[id number]]  Abnormal            Final result               Troponin, High Sensitivi...[[id number]]  Abnormal            Final result                 Please view results for these tests on the individual orders.   URINALYSIS WITH REFLEX MICROSCOPIC AND CULTURE    Narrative:     The following orders were created for panel order Urinalysis with Reflex Microscopic and Culture.  Procedure                               Abnormality         Status                     ---------                               -----------         ------                     Urinalysis with Reflex M...[[id number]]                                                 Extra Urine Gray Tube[[id number]]                                                         Please view results for these tests on the individual orders.   URINALYSIS WITH REFLEX MICROSCOPIC AND CULTURE   EXTRA URINE GRAY TUBE   LACTATE      CT chest wo IV contrast   Final Result   1.  Severe cardiomegaly with interstitial edema and trace pleural   effusions.   2. Mild mediastinal lymphadenopathy perhaps reactive. Consider   follow-up in 3-6 months to document stability.             Signed by: Bassem Holbrook 10/7/2023 4:15  PM   Dictation workstation:   [id number]      XR chest 1 view   Final Result   1.  Increased irregular interstitial thickening bilaterally with   patchy multifocal infiltrates greatest in the perihilar regions which   may be related to vascular congestion/edema. Infection not excluded.                  MACRO:   None.        Signed by: Hector Barron 10/7/2023 12:53 PM   Dictation workstation:   [id number]           Medical Decision Making  DDx: Sepsis, dysrhythmia, metabolic derangement, ACS, PE      Elderly female presenting febrile and tachycardic.  Sepsis alert paged after vital signs taken.  Was ordered empiric broad coverage antibiotics IV Zosyn IV vancomycin and IV normal saline.  Was not given 30 cc/kg given age and comorbidities.  Found to be bronchospastic on exam and was ordered a DuoNeb and half dose Solu-Medrol due to age but was later given the rest.  She is saturating in the mid 90s on room air.  Soft nontender abdomen.  Mentating appropriately.  Her work of breathing continued to be unimproved so she was started on supplemental oxygen 3 L nasal cannula.  Her chest x-ray as read by the radiologist showed increased irregular interstitial thickening and patchy multifocal infiltrates possibly congestion versus edema versus infection.  CT scan without contrast was obtained given that the patient apparently has a contrast allergy that she verbalized to the CT tech and it demonstrated severe cardiomegaly with interstitial edema and bilateral pleural effusions.  She has a white count of 16, lactate elevated at 3.2, hyperglycemia without evidence of DKA.  Remainder of her lab reevaluate [place] evaluation is significant for a troponin that went from 20 to 40s.  Was given IV Lasix.  Her heart rate is minimally improved.  Her blood pressure was initially elevated and is somewhat improved at this time.  She will require hospitalization for further antibiosis and continued reassessment and care.  Discussed  with the hospitalist team who accepted the patient.  This visit was staffed with the attending physician Dr. Ragland.    Impression: See diagnosis    Disposition: Hospitalize      Disclaimer: This note was dictated using speech recognition software. An attempt at proofreading was made to minimize errors. Minor errors in transcription may be present. Please call if questions.    Problems Addressed:  Acute pulmonary edema (CMS/HCC): acute illness or injury  Sepsis due to pneumonia (CMS/HCC): acute illness or injury    Amount and/or Complexity of Data Reviewed  ECG/medicine tests: ordered and independent interpretation performed. Decision-making details documented in ED Course.     Details: EKG Interpreted by me: Sinus tachycardia.  Rate 138.  LAD.  QTc 360 ms.  PVCs.  No STEMI.   EKG Interpreted by me: Sinus tachycardia. Rate 131. LAD. QTc 522 ms. No STEMI.        Procedure  Procedures     Yousif Thomas PA-C  10/07/23 8338

## 2023-10-08 LAB
ALBUMIN SERPL BCP-MCNC: 3.4 G/DL (ref 3.4–5)
ANION GAP SERPL CALC-SCNC: 15 MMOL/L (ref 10–20)
ANION GAP SERPL CALC-SCNC: 16 MMOL/L (ref 10–20)
BUN SERPL-MCNC: 22 MG/DL (ref 6–23)
BUN SERPL-MCNC: 23 MG/DL (ref 6–23)
CALCIUM SERPL-MCNC: 8 MG/DL (ref 8.6–10.3)
CALCIUM SERPL-MCNC: 8.1 MG/DL (ref 8.6–10.3)
CHLORIDE SERPL-SCNC: 102 MMOL/L (ref 98–107)
CHLORIDE SERPL-SCNC: 102 MMOL/L (ref 98–107)
CO2 SERPL-SCNC: 21 MMOL/L (ref 21–32)
CO2 SERPL-SCNC: 21 MMOL/L (ref 21–32)
CREAT SERPL-MCNC: 0.96 MG/DL (ref 0.5–1.05)
CREAT SERPL-MCNC: 0.97 MG/DL (ref 0.5–1.05)
ERYTHROCYTE [DISTWIDTH] IN BLOOD BY AUTOMATED COUNT: 13.8 % (ref 11.5–14.5)
GFR SERPL CREATININE-BSD FRML MDRD: 62 ML/MIN/1.73M*2
GFR SERPL CREATININE-BSD FRML MDRD: 63 ML/MIN/1.73M*2
GLUCOSE BLD MANUAL STRIP-MCNC: 282 MG/DL (ref 74–99)
GLUCOSE BLD MANUAL STRIP-MCNC: 289 MG/DL (ref 74–99)
GLUCOSE BLD MANUAL STRIP-MCNC: 311 MG/DL (ref 74–99)
GLUCOSE SERPL-MCNC: 275 MG/DL (ref 74–99)
GLUCOSE SERPL-MCNC: 284 MG/DL (ref 74–99)
HCT VFR BLD AUTO: 30.2 % (ref 36–46)
HGB BLD-MCNC: 9.5 G/DL (ref 12–16)
MCH RBC QN AUTO: 31.8 PG (ref 26–34)
MCHC RBC AUTO-ENTMCNC: 31.5 G/DL (ref 32–36)
MCV RBC AUTO: 101 FL (ref 80–100)
NRBC BLD-RTO: 0 /100 WBCS (ref 0–0)
PHOSPHATE SERPL-MCNC: 3.9 MG/DL (ref 2.5–4.9)
PLATELET # BLD AUTO: 178 X10*3/UL (ref 150–450)
PMV BLD AUTO: 11.2 FL (ref 7.5–11.5)
POTASSIUM SERPL-SCNC: 3.8 MMOL/L (ref 3.5–5.3)
POTASSIUM SERPL-SCNC: 3.9 MMOL/L (ref 3.5–5.3)
PROCALCITONIN SERPL-MCNC: 15.29 NG/ML
RBC # BLD AUTO: 2.99 X10*6/UL (ref 4–5.2)
SODIUM SERPL-SCNC: 134 MMOL/L (ref 136–145)
SODIUM SERPL-SCNC: 135 MMOL/L (ref 136–145)
VANCOMYCIN SERPL-MCNC: 10.7 UG/ML (ref 5–20)
WBC # BLD AUTO: 16.8 X10*3/UL (ref 4.4–11.3)

## 2023-10-08 PROCEDURE — 80048 BASIC METABOLIC PNL TOTAL CA: CPT | Mod: CCI | Performed by: STUDENT IN AN ORGANIZED HEALTH CARE EDUCATION/TRAINING PROGRAM

## 2023-10-08 PROCEDURE — 97161 PT EVAL LOW COMPLEX 20 MIN: CPT | Mod: GP

## 2023-10-08 PROCEDURE — 82947 ASSAY GLUCOSE BLOOD QUANT: CPT

## 2023-10-08 PROCEDURE — 87186 SC STD MICRODIL/AGAR DIL: CPT | Mod: CMCLAB,ELYLAB | Performed by: STUDENT IN AN ORGANIZED HEALTH CARE EDUCATION/TRAINING PROGRAM

## 2023-10-08 PROCEDURE — 2500000001 HC RX 250 WO HCPCS SELF ADMINISTERED DRUGS (ALT 637 FOR MEDICARE OP): Performed by: INTERNAL MEDICINE

## 2023-10-08 PROCEDURE — 84145 PROCALCITONIN (PCT): CPT | Mod: CMCLAB,ELYLAB | Performed by: STUDENT IN AN ORGANIZED HEALTH CARE EDUCATION/TRAINING PROGRAM

## 2023-10-08 PROCEDURE — 2500000002 HC RX 250 W HCPCS SELF ADMINISTERED DRUGS (ALT 637 FOR MEDICARE OP, ALT 636 FOR OP/ED): Performed by: STUDENT IN AN ORGANIZED HEALTH CARE EDUCATION/TRAINING PROGRAM

## 2023-10-08 PROCEDURE — 80069 RENAL FUNCTION PANEL: CPT | Performed by: STUDENT IN AN ORGANIZED HEALTH CARE EDUCATION/TRAINING PROGRAM

## 2023-10-08 PROCEDURE — 2500000004 HC RX 250 GENERAL PHARMACY W/ HCPCS (ALT 636 FOR OP/ED): Performed by: STUDENT IN AN ORGANIZED HEALTH CARE EDUCATION/TRAINING PROGRAM

## 2023-10-08 PROCEDURE — 96372 THER/PROPH/DIAG INJ SC/IM: CPT | Performed by: STUDENT IN AN ORGANIZED HEALTH CARE EDUCATION/TRAINING PROGRAM

## 2023-10-08 PROCEDURE — 97167 OT EVAL HIGH COMPLEX 60 MIN: CPT | Mod: GO | Performed by: OCCUPATIONAL THERAPIST

## 2023-10-08 PROCEDURE — 36415 COLL VENOUS BLD VENIPUNCTURE: CPT | Performed by: STUDENT IN AN ORGANIZED HEALTH CARE EDUCATION/TRAINING PROGRAM

## 2023-10-08 PROCEDURE — 85027 COMPLETE CBC AUTOMATED: CPT | Performed by: STUDENT IN AN ORGANIZED HEALTH CARE EDUCATION/TRAINING PROGRAM

## 2023-10-08 PROCEDURE — 99232 SBSQ HOSP IP/OBS MODERATE 35: CPT | Performed by: STUDENT IN AN ORGANIZED HEALTH CARE EDUCATION/TRAINING PROGRAM

## 2023-10-08 PROCEDURE — 1200000002 HC GENERAL ROOM WITH TELEMETRY DAILY

## 2023-10-08 PROCEDURE — 94640 AIRWAY INHALATION TREATMENT: CPT

## 2023-10-08 PROCEDURE — 2500000001 HC RX 250 WO HCPCS SELF ADMINISTERED DRUGS (ALT 637 FOR MEDICARE OP): Performed by: STUDENT IN AN ORGANIZED HEALTH CARE EDUCATION/TRAINING PROGRAM

## 2023-10-08 PROCEDURE — 80202 ASSAY OF VANCOMYCIN: CPT | Performed by: STUDENT IN AN ORGANIZED HEALTH CARE EDUCATION/TRAINING PROGRAM

## 2023-10-08 RX ORDER — FUROSEMIDE 40 MG/1
40 TABLET ORAL 2 TIMES DAILY
Status: DISCONTINUED | OUTPATIENT
Start: 2023-10-08 | End: 2023-10-08

## 2023-10-08 RX ORDER — FUROSEMIDE 40 MG/1
20 TABLET ORAL 2 TIMES DAILY
Status: DISCONTINUED | OUTPATIENT
Start: 2023-10-08 | End: 2023-10-09

## 2023-10-08 RX ORDER — CARVEDILOL 3.12 MG/1
3.12 TABLET ORAL 2 TIMES DAILY
Status: DISCONTINUED | OUTPATIENT
Start: 2023-10-08 | End: 2023-10-12

## 2023-10-08 RX ORDER — INSULIN LISPRO 100 [IU]/ML
0-5 INJECTION, SOLUTION INTRAVENOUS; SUBCUTANEOUS
Status: DISCONTINUED | OUTPATIENT
Start: 2023-10-08 | End: 2023-10-14 | Stop reason: HOSPADM

## 2023-10-08 RX ORDER — UBIDECARENONE 75 MG
2500 CAPSULE ORAL
Status: DISCONTINUED | OUTPATIENT
Start: 2023-10-08 | End: 2023-10-14 | Stop reason: HOSPADM

## 2023-10-08 RX ORDER — ATORVASTATIN CALCIUM 20 MG/1
40 TABLET, FILM COATED ORAL NIGHTLY
Status: DISCONTINUED | OUTPATIENT
Start: 2023-10-08 | End: 2023-10-14 | Stop reason: HOSPADM

## 2023-10-08 RX ORDER — MONTELUKAST SODIUM 10 MG/1
10 TABLET ORAL DAILY
Status: DISCONTINUED | OUTPATIENT
Start: 2023-10-08 | End: 2023-10-14 | Stop reason: HOSPADM

## 2023-10-08 RX ORDER — LISINOPRIL 2.5 MG/1
2.5 TABLET ORAL DAILY
Status: DISCONTINUED | OUTPATIENT
Start: 2023-10-08 | End: 2023-10-08

## 2023-10-08 RX ORDER — DEXTROSE 50 % IN WATER (D50W) INTRAVENOUS SYRINGE
25
Status: DISCONTINUED | OUTPATIENT
Start: 2023-10-08 | End: 2023-10-14 | Stop reason: HOSPADM

## 2023-10-08 RX ORDER — LISINOPRIL 5 MG/1
2.5 TABLET ORAL DAILY
Status: DISCONTINUED | OUTPATIENT
Start: 2023-10-08 | End: 2023-10-11

## 2023-10-08 RX ORDER — VANCOMYCIN HYDROCHLORIDE 1 G/200ML
1 INJECTION, SOLUTION INTRAVENOUS EVERY 24 HOURS
Status: DISCONTINUED | OUTPATIENT
Start: 2023-10-08 | End: 2023-10-09 | Stop reason: DRUGHIGH

## 2023-10-08 RX ORDER — TRAMADOL HYDROCHLORIDE 50 MG/1
50 TABLET ORAL EVERY 8 HOURS PRN
Status: DISCONTINUED | OUTPATIENT
Start: 2023-10-08 | End: 2023-10-14 | Stop reason: HOSPADM

## 2023-10-08 RX ORDER — CARVEDILOL 6.25 MG/1
6.25 TABLET ORAL 2 TIMES DAILY
Status: DISCONTINUED | OUTPATIENT
Start: 2023-10-08 | End: 2023-10-08

## 2023-10-08 RX ORDER — ALBUTEROL SULFATE 0.83 MG/ML
2.5 SOLUTION RESPIRATORY (INHALATION) EVERY 4 HOURS PRN
Status: DISCONTINUED | OUTPATIENT
Start: 2023-10-08 | End: 2023-10-14 | Stop reason: HOSPADM

## 2023-10-08 RX ORDER — HYDROCODONE BITARTRATE AND ACETAMINOPHEN 5; 325 MG/1; MG/1
1 TABLET ORAL 2 TIMES DAILY PRN
Status: DISCONTINUED | OUTPATIENT
Start: 2023-10-08 | End: 2023-10-14 | Stop reason: HOSPADM

## 2023-10-08 RX ORDER — NITROGLYCERIN 0.4 MG/1
0.4 TABLET SUBLINGUAL EVERY 5 MIN PRN
Status: DISCONTINUED | OUTPATIENT
Start: 2023-10-08 | End: 2023-10-14 | Stop reason: HOSPADM

## 2023-10-08 RX ORDER — IPRATROPIUM BROMIDE AND ALBUTEROL SULFATE 2.5; .5 MG/3ML; MG/3ML
3 SOLUTION RESPIRATORY (INHALATION) 4 TIMES DAILY
Status: DISCONTINUED | OUTPATIENT
Start: 2023-10-09 | End: 2023-10-14 | Stop reason: HOSPADM

## 2023-10-08 RX ORDER — DEXTROSE MONOHYDRATE 100 MG/ML
0.3 INJECTION, SOLUTION INTRAVENOUS ONCE AS NEEDED
Status: DISCONTINUED | OUTPATIENT
Start: 2023-10-08 | End: 2023-10-14 | Stop reason: HOSPADM

## 2023-10-08 RX ORDER — MELOXICAM 7.5 MG/1
7.5 TABLET ORAL DAILY PRN
Status: DISCONTINUED | OUTPATIENT
Start: 2023-10-08 | End: 2023-10-14 | Stop reason: HOSPADM

## 2023-10-08 RX ADMIN — PIPERACILLIN SODIUM AND TAZOBACTAM SODIUM 3.38 G: 3; .375 INJECTION, SOLUTION INTRAVENOUS at 23:49

## 2023-10-08 RX ADMIN — FUROSEMIDE 20 MG: 40 TABLET ORAL at 20:17

## 2023-10-08 RX ADMIN — AZITHROMYCIN 500 MG: 500 INJECTION, POWDER, LYOPHILIZED, FOR SOLUTION INTRAVENOUS at 09:00

## 2023-10-08 RX ADMIN — ATORVASTATIN CALCIUM 40 MG: 20 TABLET, FILM COATED ORAL at 20:16

## 2023-10-08 RX ADMIN — LISINOPRIL 2.5 MG: 5 TABLET ORAL at 20:21

## 2023-10-08 RX ADMIN — VANCOMYCIN HYDROCHLORIDE 1 G: 1 INJECTION, SOLUTION INTRAVENOUS at 16:12

## 2023-10-08 RX ADMIN — TRAMADOL HYDROCHLORIDE 50 MG: 50 TABLET, COATED ORAL at 10:11

## 2023-10-08 RX ADMIN — Medication 4 L/MIN: at 12:48

## 2023-10-08 RX ADMIN — SODIUM CHLORIDE 10 ML/HR: 9 INJECTION, SOLUTION INTRAVENOUS at 23:47

## 2023-10-08 RX ADMIN — TRAMADOL HYDROCHLORIDE 50 MG: 50 TABLET, COATED ORAL at 00:52

## 2023-10-08 RX ADMIN — PIPERACILLIN SODIUM AND TAZOBACTAM SODIUM 3.38 G: 3; .375 INJECTION, SOLUTION INTRAVENOUS at 16:11

## 2023-10-08 RX ADMIN — IPRATROPIUM BROMIDE AND ALBUTEROL SULFATE 3 ML: 2.5; .5 SOLUTION RESPIRATORY (INHALATION) at 04:00

## 2023-10-08 RX ADMIN — HYDROCODONE BITARTRATE AND ACETAMINOPHEN 1 TABLET: 5; 325 TABLET ORAL at 22:13

## 2023-10-08 RX ADMIN — Medication: at 23:13

## 2023-10-08 RX ADMIN — INSULIN LISPRO 3 UNITS: 100 INJECTION, SOLUTION INTRAVENOUS; SUBCUTANEOUS at 17:24

## 2023-10-08 RX ADMIN — CYANOCOBALAMIN TAB 500 MCG 2500 MCG: 500 TAB at 11:48

## 2023-10-08 RX ADMIN — HYDROCODONE BITARTRATE AND ACETAMINOPHEN 1 TABLET: 5; 325 TABLET ORAL at 13:34

## 2023-10-08 RX ADMIN — METHYLPREDNISOLONE SODIUM SUCCINATE 40 MG: 40 INJECTION, POWDER, FOR SOLUTION INTRAMUSCULAR; INTRAVENOUS at 04:17

## 2023-10-08 RX ADMIN — Medication 3 MG: at 20:17

## 2023-10-08 RX ADMIN — CARVEDILOL 3.12 MG: 3.12 TABLET, FILM COATED ORAL at 20:17

## 2023-10-08 RX ADMIN — METHYLPREDNISOLONE SODIUM SUCCINATE 40 MG: 40 INJECTION, POWDER, FOR SOLUTION INTRAMUSCULAR; INTRAVENOUS at 22:14

## 2023-10-08 RX ADMIN — IPRATROPIUM BROMIDE AND ALBUTEROL SULFATE 3 ML: 2.5; .5 SOLUTION RESPIRATORY (INHALATION) at 12:41

## 2023-10-08 RX ADMIN — INSULIN LISPRO 4 UNITS: 100 INJECTION, SOLUTION INTRAVENOUS; SUBCUTANEOUS at 11:49

## 2023-10-08 RX ADMIN — POLYETHYLENE GLYCOL 3350 17 G: 17 POWDER, FOR SOLUTION ORAL at 10:04

## 2023-10-08 RX ADMIN — METHYLPREDNISOLONE SODIUM SUCCINATE 40 MG: 40 INJECTION, POWDER, FOR SOLUTION INTRAMUSCULAR; INTRAVENOUS at 16:12

## 2023-10-08 RX ADMIN — IPRATROPIUM BROMIDE AND ALBUTEROL SULFATE 3 ML: 2.5; .5 SOLUTION RESPIRATORY (INHALATION) at 07:12

## 2023-10-08 RX ADMIN — SODIUM CHLORIDE 10 ML/HR: 9 INJECTION, SOLUTION INTRAVENOUS at 04:18

## 2023-10-08 RX ADMIN — ACETAMINOPHEN 650 MG: 325 TABLET ORAL at 00:39

## 2023-10-08 RX ADMIN — MONTELUKAST SODIUM 10 MG: 10 TABLET, FILM COATED ORAL at 11:48

## 2023-10-08 RX ADMIN — PIPERACILLIN SODIUM AND TAZOBACTAM SODIUM 3.38 G: 3; .375 INJECTION, SOLUTION INTRAVENOUS at 04:17

## 2023-10-08 RX ADMIN — METHYLPREDNISOLONE SODIUM SUCCINATE 40 MG: 40 INJECTION, POWDER, FOR SOLUTION INTRAMUSCULAR; INTRAVENOUS at 10:04

## 2023-10-08 RX ADMIN — SODIUM CHLORIDE 10 ML/HR: 9 INJECTION, SOLUTION INTRAVENOUS at 17:02

## 2023-10-08 SDOH — SOCIAL STABILITY: SOCIAL INSECURITY: HAS ANYONE EVER THREATENED TO HURT YOUR FAMILY OR YOUR PETS?: NO

## 2023-10-08 SDOH — SOCIAL STABILITY: SOCIAL INSECURITY: ABUSE: ADULT

## 2023-10-08 SDOH — SOCIAL STABILITY: SOCIAL INSECURITY: WERE YOU ABLE TO COMPLETE ALL THE BEHAVIORAL HEALTH SCREENINGS?: YES

## 2023-10-08 SDOH — SOCIAL STABILITY: SOCIAL INSECURITY: ARE THERE ANY APPARENT SIGNS OF INJURIES/BEHAVIORS THAT COULD BE RELATED TO ABUSE/NEGLECT?: NO

## 2023-10-08 SDOH — SOCIAL STABILITY: SOCIAL INSECURITY: DO YOU FEEL ANYONE HAS EXPLOITED OR TAKEN ADVANTAGE OF YOU FINANCIALLY OR OF YOUR PERSONAL PROPERTY?: NO

## 2023-10-08 SDOH — SOCIAL STABILITY: SOCIAL INSECURITY: DO YOU FEEL UNSAFE GOING BACK TO THE PLACE WHERE YOU ARE LIVING?: NO

## 2023-10-08 SDOH — SOCIAL STABILITY: SOCIAL INSECURITY

## 2023-10-08 SDOH — SOCIAL STABILITY: SOCIAL INSECURITY: DOES ANYONE TRY TO KEEP YOU FROM HAVING/CONTACTING OTHER FRIENDS OR DOING THINGS OUTSIDE YOUR HOME?: NO

## 2023-10-08 SDOH — SOCIAL STABILITY: SOCIAL INSECURITY: ARE YOU OR HAVE YOU BEEN THREATENED OR ABUSED PHYSICALLY, EMOTIONALLY, OR SEXUALLY BY ANYONE?: NO

## 2023-10-08 SDOH — SOCIAL STABILITY: SOCIAL INSECURITY: HAVE YOU HAD THOUGHTS OF HARMING ANYONE ELSE?: NO

## 2023-10-08 ASSESSMENT — COGNITIVE AND FUNCTIONAL STATUS - GENERAL
CLIMB 3 TO 5 STEPS WITH RAILING: TOTAL
TURNING FROM BACK TO SIDE WHILE IN FLAT BAD: A LOT
STANDING UP FROM CHAIR USING ARMS: A LITTLE
MOVING TO AND FROM BED TO CHAIR: A LOT
CLIMB 3 TO 5 STEPS WITH RAILING: TOTAL
TOILETING: A LITTLE
CLIMB 3 TO 5 STEPS WITH RAILING: A LOT
MOVING FROM LYING ON BACK TO SITTING ON SIDE OF FLAT BED WITH BEDRAILS: A LOT
DRESSING REGULAR LOWER BODY CLOTHING: TOTAL
DRESSING REGULAR UPPER BODY CLOTHING: A LOT
WALKING IN HOSPITAL ROOM: A LOT
MOVING FROM LYING ON BACK TO SITTING ON SIDE OF FLAT BED WITH BEDRAILS: A LOT
MOBILITY SCORE: 11
STANDING UP FROM CHAIR USING ARMS: A LOT
TOILETING: A LITTLE
HELP NEEDED FOR BATHING: A LOT
MOVING TO AND FROM BED TO CHAIR: TOTAL
DRESSING REGULAR UPPER BODY CLOTHING: TOTAL
STANDING UP FROM CHAIR USING ARMS: TOTAL
TOILETING: TOTAL
MOVING TO AND FROM BED TO CHAIR: A LITTLE
HELP NEEDED FOR BATHING: A LITTLE
DAILY ACTIVITIY SCORE: 17
WALKING IN HOSPITAL ROOM: A LOT
TURNING FROM BACK TO SIDE WHILE IN FLAT BAD: TOTAL
PATIENT BASELINE BEDBOUND: NO
WALKING IN HOSPITAL ROOM: A LITTLE
MOBILITY SCORE: 19
DRESSING REGULAR LOWER BODY CLOTHING: A LITTLE
HELP NEEDED FOR BATHING: TOTAL
PERSONAL GROOMING: A LITTLE
DRESSING REGULAR LOWER BODY CLOTHING: A LOT
DAILY ACTIVITIY SCORE: 21

## 2023-10-08 ASSESSMENT — PAIN SCALES - GENERAL
PAINLEVEL_OUTOF10: 0 - NO PAIN
PAINLEVEL_OUTOF10: 10 - WORST POSSIBLE PAIN
PAINLEVEL_OUTOF10: 8
PAINLEVEL_OUTOF10: 8
PAINLEVEL_OUTOF10: 7
PAINLEVEL_OUTOF10: 7
PAINLEVEL_OUTOF10: 9
PAINLEVEL_OUTOF10: 10 - WORST POSSIBLE PAIN
PAINLEVEL_OUTOF10: 0 - NO PAIN

## 2023-10-08 ASSESSMENT — PAIN - FUNCTIONAL ASSESSMENT
PAIN_FUNCTIONAL_ASSESSMENT: 0-10

## 2023-10-08 ASSESSMENT — LIFESTYLE VARIABLES
AUDIT-C TOTAL SCORE: 0
PRESCIPTION_ABUSE_PAST_12_MONTHS: NO
AUDIT-C TOTAL SCORE: 0
SKIP TO QUESTIONS 9-10: 1
HOW OFTEN DO YOU HAVE 6 OR MORE DRINKS ON ONE OCCASION: NEVER
HOW MANY STANDARD DRINKS CONTAINING ALCOHOL DO YOU HAVE ON A TYPICAL DAY: PATIENT DOES NOT DRINK
SUBSTANCE_ABUSE_PAST_12_MONTHS: NO
HOW OFTEN DO YOU HAVE A DRINK CONTAINING ALCOHOL: NEVER

## 2023-10-08 ASSESSMENT — ACTIVITIES OF DAILY LIVING (ADL)
TOILETING_ASSISTANCE: MINIMAL
ADL_ASSISTANCE: NEEDS ASSISTANCE
BATHING_ASSISTANCE: MAXIMAL
ADL_ASSISTANCE: NEEDS ASSISTANCE
BATHING_ASSISTANCE: MINIMAL

## 2023-10-08 NOTE — CARE PLAN
Problem: Nutrition  Goal: Promote healing  10/8/2023 0042 by Deanna Gayle RN  Outcome: Progressing  10/8/2023 0041 by Deanna Gayle RN  Outcome: Progressing   The patient's goals for the shift include Patient will be free of pain this shift    The clinical goals for the shift include LLE wound care and dressing change    Over the shift, the patient did not make progress toward the following goals. Barriers to progression include ***. Recommendations to address these barriers include ***.

## 2023-10-08 NOTE — CARE PLAN
Problem: OT Problem  Goal: PATIENT WILL MAINTAIN DYNAMIC STANDING BALANCE DURING ADLs  Outcome: Progressing  Goal: PATIENT WILL MAINTAIN DYNAMIC SEATED BALANCE DURING ADLs  Outcome: Progressing  Goal: PATIENT WILL DEMONSTRATE INDEPENDENCE WITH UPPER BODY donning and doffing all UE clothes  Outcome: Progressing  Goal: PATIENT WILL DEMONSTRATE INDEPENDENCE WITH LOWER BODY donning and doffing all LE clothes   Outcome: Progressing  Goal: PATIENT WILL BE INDEPENDENT IN USE OF reacher IN ORDER TO DRESS ON OWN  Outcome: Progressing  Goal: Patient will demonstrate independence with UB/LB bathing with AE as needed  10/8/2023 1408 by Allie Han OT  Outcome: Progressing

## 2023-10-08 NOTE — CARE PLAN
The patient's goals for the shift include Patient will be free of pain this shift    The clinical goals for the shift include LLE wound care and dressing change

## 2023-10-08 NOTE — PROGRESS NOTES
Physical Therapy    Physical Therapy Evaluation    Patient Name: Isa Jack  MRN: [id number]  Today's Date: 10/8/2023   Time Calculation  Start Time: 0953  Stop Time: 1017  Time Calculation (min): 24 min    Assessment/Plan   PT Assessment  PT Assessment Results: Decreased strength, Decreased endurance, Impaired balance, Decreased mobility, Decreased coordination, Pain  Rehab Prognosis: Good  Evaluation/Treatment Tolerance: Patient limited by fatigue, Patient limited by pain  End of Session Communication: Bedside nurse  Assessment Comment: pt presents with decreased mobility/gait , decreased strength, balance and endurance pt to benefit from skilled PT during hospital stay to address deficits . improve mobilty  End of Session Patient Position: Alarm on, Up in chair  IP OR SWING BED PT PLAN  Inpatient or Swing Bed: Inpatient  PT Plan  Treatment/Interventions: Bed mobility, Transfer training, Gait training, Stair training, Balance training, Therapeutic exercise, Therapeutic activity, Home exercise program, Endurance training, Strengthening  PT Plan: Skilled PT  PT Frequency: 3 times per week  PT Discharge Recommendations: Moderate intensity level of continued care, Low intensity level of continued care (low to mod 24 hrs)  PT Recommended Transfer Status: Assist x1 (with FWW)    Subjective     Current Problem:  1. Sepsis due to pneumonia (CMS/HCC)        2. Acute pulmonary edema (CMS/HCC)  Transthoracic Echo (TTE) Complete    Transthoracic Echo (TTE) Complete        Past Medical History:   Diagnosis Date    Diabetes mellitus (CMS/HCC)     Hyperlipemia     Hypertension     Morbid (severe) obesity due to excess calories (CMS/HCC) 10/28/2022    Class 3 severe obesity due to excess calories with serious comorbidity and body mass index (BMI) of 40.0 to 44.9 in adult    Non-pressure chronic ulcer of left ankle with unspecified severity (CMS/HCC) 04/27/2020    Ulcer of left ankle     Past Surgical History:   Procedure  Laterality Date    OTHER SURGICAL HISTORY  08/07/2019    No history of surgery       General Visit Information:  General  Reason for Referral: weakness/ impaired mobility  Referred By: Daniel  OT/PT 10/7  Past Medical History Relevant to Rehab: Asthma, COPD, HTN , CAD,  CHF, Anemia , Dm2,  HLD , adrenal mass, venous stasis  Chronic L LE ulcers . DJD, cellulitis .  Prior to Session Communication: Bedside nurse  Patient Position Received: Bed, 3 rail up, Alarm on (pt has IV, purwick 02 .)  General Comment: pt is a 73 yo female came into the hospital on 10/7  with reports of discharge from L LE wounds .  dx : cellulitis , sepsis, PNA .  test/lab :  hgb 11.4, trop 46, CXR: increased irregular  interstitial  thickening bilateral  with patchy multifocal infiltrates .  CT chest :  cardiomegaly intersitial edema,  trace pleural effusion .    Home Living:  Home Living  Type of Home: House  Lives With: Adult children  Home Adaptive Equipment: None  Home Layout: One level  Home Access: Stairs to enter with rails  Entrance Stairs-Number of Steps: 2  Bathroom Shower/Tub: Tub/shower unit  Bathroom Equipment: Shower chair with back    Prior Level of Function:  Prior Function Per Pt/Caregiver Report  Level of [place]: Other (Comment) (pt ind with gait in home with Rollator or 3 point cane . assistance with alds and ialds.)  Receives Help From: Family  ADL Assistance: Needs assistance  Homemaking Assistance: Needs assistance  Driving/Transportation: Family/Friend  Prior Function Comments: no falls doesnt drive    Precautions:  Precautions  Medical Precautions: Fall precautions, Oxygen therapy device and L/min    Vital Signs:     Objective     Pain:  Pain Assessment  Pain Assessment: 0-10  Pain Score: 8  Pain Type: Chronic pain  Pain Location: Leg  Pain Orientation: Right, Left    Cognition:  Cognition  Overall Cognitive Status: Within Functional Limits    General Assessments:     Strength  Strength Comments: 4-/5 BLEs      Dynamic Sitting Balance  Dynamic Sitting-Comments: fair -  Dynamic Standing Balance  Dynamic Standing-Comments: fair -    Functional Assessments:     Bed Mobility  Bed Mobility: Yes  Bed Mobility 1  Bed Mobility 1: Supine to sitting, Scooting  Level of Assistance 1: Moderate assistance  Transfers  Transfer: Yes  Transfer 1  Technique 1: Sit to stand, Stand to sit  Transfer Device 1: Walker (FWW)  Transfer Level of Assistance 1: Moderate assistance  Trials/Comments 1: cues to push up  Ambulation/Gait Training  Ambulation/Gait Training Performed: Yes  Ambulation/Gait Training 1  Surface 1: Level tile  Device 1: Rolling walker  Assistance 1: Minimum assistance  Quality of Gait 1: Inconsistent stride length  Comments/Distance (ft) 1: 4 steps to chair . slow gait speed .     Extremity/Trunk Assessments:    RLE   RLE : Within Functional Limits (ROM)  LLE   LLE : Within Functional Limits (ROM)    Outcome Measures:     [facility] Basic Mobility  Turning from your back to your side while in a flat bed without using bedrails: A lot  Moving from lying on your back to sitting on the side of a flat bed without using bedrails: A lot  Moving to and from bed to chair (including a wheelchair): A lot  Standing up from a chair using your arms (e.g. wheelchair or bedside chair): A lot  To walk in hospital room: A lot  Climbing 3-5 steps with railing: Total  Basic Mobility - Total Score: 11  Encounter Problems       Encounter Problems (Active)       Pain - Adult          Weakness/ impaired mobility        Pt will demonstrate mod I  with bed mobility to edge of bed.   (Progressing)       Start:  10/08/23    Expected End:  10/22/23            Pt will demonstrate mod I  with sit to stand/chair transfers with FWW.   (Progressing)       Start:  10/08/23    Expected End:  10/22/23            Pt will ambulate 40 feet with FWW SBA.   (Progressing)       Start:  10/08/23    Expected End:  10/22/23            Pt to demo improved BLE strength by  being able to complete supine/seated thera ex 2x20 BLEs with 4 or less rest breaks .   (Progressing)       Start:  10/08/23    Expected End:  10/22/23                 Education Documentation  Mobility Training, taught by Yousif Castillo PT at 10/8/2023 12:28 PM.  Learner: Patient  Readiness: Eager  Method: Explanation  Response: Verbalizes Understanding    Education Comments  No comments found.

## 2023-10-08 NOTE — CARE PLAN
Problem: Weakness/ impaired mobility   Goal: Pt will demonstrate mod I  with bed mobility to edge of bed.    Outcome: Progressing  Goal: Pt will demonstrate mod I  with sit to stand/chair transfers with FWW.    Outcome: Progressing  Goal: Pt will ambulate 40 feet with FWW SBA.    Outcome: Progressing  Goal: Pt to demo improved BLE strength by being able to complete supine/seated thera ex 2x20 BLEs with 4 or less rest breaks .    Outcome: Progressing

## 2023-10-08 NOTE — CARE PLAN
Problem: Skin  Goal: Promote skin healing  10/8/2023 0049 by Deanna Gayle RN  Outcome: Progressing  10/8/2023 0045 by Deanna Gayle RN  Outcome: Progressing   The patient's goals for the shift include Patient will be free of pain this shift    The clinical goals for the shift include LLE wound care and dressing change    Over the shift, the patient did not make progress toward the following goals. Barriers to progression include ***. Recommendations to address these barriers include ***.

## 2023-10-08 NOTE — CARE PLAN
Problem: Nutrition  Goal: Lab values WNL  10/8/2023 0042 by Deanna Gayle RN  Outcome: Progressing  10/8/2023 0041 by Deanna Gayle RN  Outcome: Progressing     Problem: Nutrition  Goal: Electrolytes WNL  10/8/2023 0042 by Deanna Gayle RN  Outcome: Progressing  10/8/2023 0041 by Deanna Gayle RN  Outcome: Progressing     Problem: Nutrition  Goal: Promote healing  10/8/2023 0042 by Deanna Gayle RN  Outcome: Progressing  10/8/2023 0041 by Deanna Gayle RN  Outcome: Progressing     Problem: Fall/Injury  Goal: Not fall by end of shift  10/8/2023 0042 by Deanna Gayle RN  Outcome: Progressing  10/8/2023 0041 by Deanna Gayle RN  Outcome: Progressing     Problem: Fall/Injury  Goal: Be free from injury by end of the shift  10/8/2023 0042 by Deanna Gayle RN  Outcome: Progressing  10/8/2023 0041 by Deanna Gayle RN  Outcome: Progressing     Problem: Fall/Injury  Goal: Verbalize understanding of personal risk factors for fall in the hospital  10/8/2023 0042 by Deanna Gayle RN  Outcome: Progressing  10/8/2023 0041 by Deanna Gayle RN  Outcome: Progressing     Problem: Fall/Injury  Goal: Verbalize understanding of risk factor reduction measures to prevent injury from fall in the home  10/8/2023 0042 by Deanna Gayle RN  Outcome: Progressing  10/8/2023 0041 by Deanna Gayle RN  Outcome: Progressing     Problem: Fall/Injury  Goal: Use assistive devices by end of the shift  10/8/2023 0042 by Deanna Gayle RN  Outcome: Progressing  10/8/2023 0041 by Deanna Gayle RN  Outcome: Progressing     Problem: Fall/Injury  Goal: Pace activities to prevent fatigue by end of the shift  10/8/2023 0042 by Deanna Gayle RN  Outcome: Progressing  10/8/2023 0041 by Deanna Gayle RN  Outcome: Progressing     Problem: Pain  Goal: Takes deep breaths with improved pain control throughout the shift  10/8/2023 0042 by Deanna Gayle RN  Outcome: Progressing  10/8/2023 0041 by Deanna Gayle RN  Outcome: Progressing     Problem:  Pain  Goal: Turns in bed with improved pain control throughout the shift  10/8/2023 0042 by Deanna Gayle RN  Outcome: Progressing  10/8/2023 0041 by Deanna Gayle RN  Outcome: Progressing     Problem: Pain  Goal: Walks with improved pain control throughout the shift  10/8/2023 0042 by Deanna Gayle RN  Outcome: Progressing  10/8/2023 0041 by Deanna Gayle RN  Outcome: Progressing     Problem: Pain  Goal: Performs ADL's with improved pain control throughout shift  10/8/2023 0042 by Deanna Gayle RN  Outcome: Progressing  10/8/2023 0041 by Deanna Gayle RN  Outcome: Progressing     Problem: Pain  Goal: Participates in PT with improved pain control throughout the shift  10/8/2023 0042 by Deanna Gayle RN  Outcome: Progressing  10/8/2023 0041 by Deanna Gayle RN  Outcome: Progressing     Problem: Pain  Goal: Free from opioid side effects throughout the shift  10/8/2023 0042 by Deanna Gayle RN  Outcome: Progressing  10/8/2023 0041 by Deanna Gayle RN  Outcome: Progressing   The patient's goals for the shift include Patient will be free of pain this shift    The clinical goals for the shift include LLE wound care and dressing change    Over the shift, the patient did not make progress toward the following goals. Barriers to progression include ***. Recommendations to address these barriers include ***.

## 2023-10-08 NOTE — PROGRESS NOTES
"Vancomycin Dosing by Pharmacy- INITIAL    Isa Jack is a 72 y.o. year old female who Pharmacy has been consulted for vancomycin dosing for cellulitis, skin and soft tissue. Based on the patient's indication and renal status this patient will be dosed based on a goal AUC of 400-600.     Renal function is currently stable.    Visit Vitals  /64 (BP Location: Right arm, Patient Position: Lying)   Pulse 99   Temp 37 °C (98.6 °F) (Temporal)   Resp 21        Lab Results   Component Value Date    CREATININE 0.72 10/07/2023    CREATININE CANCELED 04/22/2023    CREATININE 0.80 10/04/2021    CREATININE 0.92 08/08/2019    CREATININE 0.79 10/22/2018        Patient weight is No results found for: \"PTWEIGHT\"    No results found for: \"CULTURE\"     I/O last 3 completed shifts:  In: 100 (1 mL/kg) [IV Piggyback:100]  Out: - (0 mL/kg)   Weight: 100 kg   [unfilled]    No results found for: \"PATIENTTEMP\"       Assessment/Plan     Patient has already been given a loading dose of 2000 mg in ER at 1155am on 10/7/23.  Will initiate vancomycin maintenance,  1500 mg every 24 hours, beginning 12pm on 10/8/23.    This dosing regimen is predicted by [employer] to result in the following pharmacokinetic parameters:  Loading dose: 2000 mg IV 11:55 on 10/7/23  Regimen: 1500 mg IV every 24 hours.  Start time: 12:00 on 10/08/2023  Exposure target: AUC24 (range)400-600 mg/L.hr   AUC24,ss: 521 mg/L.hr  Probability of AUC24 > 400: 70 %  Ctrough,ss: 12.6 mg/L  Probability of Ctrough,ss > 20: 30 %  Probability of nephrotoxicity (Lodise BURKE 2009): 8 %    Follow-up level will be ordered on 10/7/23 at 1040pm (using specimen drawn 3 hours prior) and second order for 10/8/23 with 1st AM labs  unless clinically indicated sooner.  Will continue to monitor renal function daily while on vancomycin and order serum creatinine at least every 48 hours if not already ordered.  Follow for continued vancomycin needs, clinical response, and signs/symptoms of " Total bilirubin now 12 49  Increased by < 1 in 12 hrs  Will d/c home  F/U with PCP tomorrow morning  PCP to order f/u bilirubin as needed  Will give parents lab results to take to PCP as she is not [facility]  toxicity.       Tray Pineda, PharmD

## 2023-10-08 NOTE — PROGRESS NOTES
Occupational Therapy  Occupational Therapy    Evaluation/Treatment    Patient Name: Isa Jack  MRN: [id number]  : 1950  Today's Date: 10/08/23  Time Calculation  Start Time: 954  Stop Time:   Time Calculation (min): 22 min       Assessment:  OT Assessment: Impaired ADLs  Prognosis: Good  OT Assessment Results: Decreased ADL status  Prognosis: Good    Plan:  Treatment Interventions: ADL retraining  Treatment Interventions: ADL retraining  Subjective     Current Problem:  1. Sepsis due to pneumonia (CMS/HCC)        2. Acute pulmonary edema (CMS/HCC)  Transthoracic Echo (TTE) Complete    Transthoracic Echo (TTE) Complete        Past Medical History:   Diagnosis Date    Diabetes mellitus (CMS/HCC)     Hyperlipemia     Hypertension     Morbid (severe) obesity due to excess calories (CMS/HCC) 10/28/2022    Class 3 severe obesity due to excess calories with serious comorbidity and body mass index (BMI) of 40.0 to 44.9 in adult    Non-pressure chronic ulcer of left ankle with unspecified severity (CMS/HCC) 2020    Ulcer of left ankle     Past Surgical History:   Procedure Laterality Date    OTHER SURGICAL HISTORY  2019    No history of surgery       General:   OT Received On: 10/08/23  General  Reason for Referral: ADLs  Referred By: Daniel  OT/PT 10/7  Past Medical History Relevant to Rehab: Asthma, COPD, HTN , CAD,  CHF, Anemia , Dm2,  HLD , adrenal mass, venous stasis  Chronic L LE ulcers . DJD, cellulitis .  Prior to Session Communication: Bedside nurse (RN Cleared patietn for therapy)  Patient Position Received: Bed, 3 rail up  General Comment: pt is a 73 yo female came into the hospital on 10/7  with reports of discharge from L LE wounds .  dx : cellulitis , sepsis, PNA .  test/lab :  hgb 11.4, trop 46, CXR: increased irregular  interstitial  thickening bilateral  with patchy multifocal infiltrates .  CT chest :  cardiomegaly intersitial edema,  trace pleural effusion  .    Precautions:  Medical Precautions: Fall precautions, Oxygen therapy device and L/min    Vital Signs:       Pain:  Pain Assessment  Pain Assessment: 0-10  Pain Score: 8  Pain Type: Chronic pain (Per patient- chronic leg pain)  Pain Location: Leg (Leg with the wound)  Pain Orientation: Left  Objective     Cognition:  Overall Cognitive Status: Within Functional Limits  Insight: Within function limits         Balance:  Static Sitting: Fair+/G-  Dynamic Sitting: Fair  Static Standing: Fair  Dynamic Standing: Fair    Home Living:  Type of Home: House  Home Adaptive Equipment: Cane (Rollator)  Home Layout: One level  Entrance Stairs-Number of Steps: 2 (with HR)  Bathroom Shower/Tub: Tub/shower unit  Bathroom Equipment: Shower chair with back    Prior Function:  Level of [place]: Other (Comment)  Receives Help From: Family  ADL Assistance: Needs assistance  Bath: Minimal  Toileting: Minimal  Dressing: Minimal  Grooming:  (none)  Feeding:  (none)  Homemaking Assistance: Needs assistance  Driving/Transportation: Family/Friend  Prior Function Comments: no falls doesnt drive        ADL  Eating Assistance: Independent  Grooming Assistance: Independent  Bathing Assistance: Maximal  UE Dressing Assistance: Moderate  LE Dressing Assistance: Maximal  Toileting Deficit: Bedside commode  Functional Assistance: Moderate  Functional Deficit:  (mod/max)       Bed Mobility/Transfers: Bed Mobility  Bed Mobility: Yes  Bed Mobility 1  Level of Assistance 1: Moderate assistance  Transfers  Transfer: Yes  Transfer 1  Technique 1: Sit to stand, Stand to sit  Transfer Device 1: Walker  Transfer Level of Assistance 1: Moderate assistance  Trials/Comments 1: save mobility with FWW       Strength:  Strength Comments: LUE 4-/5; RUE -baseline impaired at shoulder 3-/5      Outcome Measures: [facility] Daily Activity  Putting on and taking off regular lower body clothing: A lot  Bathing (including washing, rinsing, drying): A lot  Putting on and  taking off regular upper body clothing: A lot  Toileting, which includes using toilet, bedpan or urinal: A little  Taking care of personal grooming such as brushing teeth: None  Eating Meals: None  Daily Activity - Total Score: 17  Education Documentation  ADL Training, taught by Allie Han OT at 10/8/2023  2:14 PM.  Learner: Patient  Readiness: Acceptance  Method: Explanation  Response: Verbalizes Understanding    Education Comments  No comments found.        EDUCATION:       Goals:  Encounter Problems       Encounter Problems (Active)       OT Problem       PATIENT WILL MAINTAIN DYNAMIC STANDING BALANCE DURING ADLs (Progressing)       Start:  10/08/23    Expected End:  10/22/23            PATIENT WILL MAINTAIN DYNAMIC SEATED BALANCE DURING ADLs (Progressing)       Start:  10/08/23    Expected End:  10/22/23            PATIENT WILL DEMONSTRATE INDEPENDENCE WITH UPPER BODY donning and doffing all UE clothes (Progressing)       Start:  10/08/23    Expected End:  10/22/23            PATIENT WILL DEMONSTRATE INDEPENDENCE WITH LOWER BODY donning and doffing all LE clothes  (Progressing)       Start:  10/08/23    Expected End:  10/22/23            PATIENT WILL BE INDEPENDENT IN USE OF reacher IN ORDER TO DRESS ON OWN (Progressing)       Start:  10/08/23    Expected End:  10/22/23            Patient will demonstrate independence with UB/LB bathing with AE as needed (Progressing)       Start:  10/08/23    Expected End:  10/22/23

## 2023-10-08 NOTE — PROGRESS NOTES
"Vancomycin Dosing by Pharmacy- FOLLOW UP    Isa Jack is a 72 y.o. year old female who Pharmacy has been consulted for vancomycin dosing for cellulitis, skin and soft tissue. Based on the patient's indication and renal status this patient is being dosed based on a goal AUC of 400-600.     Renal function is currently declining.    Current vancomycin dose: 1500 mg given every 24 hours    Estimated vancomycin AUC on current dose: 673 mg/L.hr     Visit Vitals  /72   Pulse 96   Temp 36.6 °C (97.9 °F) (Temporal)   Resp 18        Lab Results   Component Value Date    CREATININE 0.97 10/08/2023    CREATININE 0.96 10/08/2023    CREATININE 0.72 10/07/2023    CREATININE CANCELED 04/22/2023    CREATININE 0.80 10/04/2021    CREATININE 0.92 08/08/2019    CREATININE 0.79 10/22/2018        Patient weight is No results found for: \"PTWEIGHT\"    No results found for: \"CULTURE\"     I/O last 3 completed shifts:  In: 1210 (11.7 mL/kg) [I.V.:110 (1.1 mL/kg); IV Piggyback:1100]  Out: - (0 mL/kg)   Weight: 103.4 kg   [unfilled]    No results found for: \"PATIENTTEMP\"     Assessment/Plan    Above goal AUC. Orders placed for new vancomcyin regimen of 1000 mg every 24 hours to begin at 10-8-23 1500.    This dosing regimen is predicted by [employer] to result in the following pharmacokinetic parameters:  <<<<<PASTE [employer] DATA HERE>>>>>    The next level will be obtained on 10-9-23 at 1st am labs. May be obtained sooner if clinically indicated.   Will continue to monitor renal function daily while on vancomycin and order serum creatinine at least every 48 hours if not already ordered.  Follow for continued vancomycin needs, clinical response, and signs/symptoms of toxicity.       Hector Hutchison, PharmD           "

## 2023-10-08 NOTE — PROGRESS NOTES
"Daily progress note     Isa Jack is 72 y.o. female who was admitted with sepsis left lower extremity cellulitis infected wound  Fever has resolved, tachycardia has resolved, patient received IV hydration  On nasal cannula oxygen 2 L  Denies any chest pain dizziness lightheadedness.  No nausea or vomiting            Vital signs in last 24 hours:  Temp:  [36.6 °C (97.9 °F)-38.3 °C (100.9 °F)] 36.6 °C (97.9 °F)  Heart Rate:  [] 96  Resp:  [18-26] 18  BP: ()/(55-72) 120/72  FiO2 (%):  [32 %] 32 %  /72   Pulse 96   Temp 36.6 °C (97.9 °F) (Temporal)   Resp 18   Ht 1.549 m (5' 0.98\")   Wt 103 kg (227 lb 15.3 oz)   SpO2 99%   BMI 43.09 kg/m²    Intake/Output last 3 shifts:  I/O last 3 completed shifts:  In: 1210 (11.7 mL/kg) [I.V.:110 (1.1 mL/kg); IV Piggyback:1100]  Out: - (0 mL/kg)   Weight: 103.4 kg   Intake/Output this shift:  No intake/output data recorded.    Physical Exam  Constitutional:       General: She is not in acute distress.     Appearance: Normal appearance. She is obese.   HENT:      Head: Normocephalic and atraumatic.      Nose: No congestion or rhinorrhea.      Mouth/Throat:      Mouth: Mucous membranes are moist.   Eyes:      Extraocular Movements: Extraocular movements intact.      Pupils: Pupils are equal, round, and reactive to light.   Cardiovascular:      Rate and Rhythm: Normal rate and regular rhythm.      Pulses: Normal pulses.      Heart sounds: Normal heart sounds. No murmur heard.  Pulmonary:      Effort: No respiratory distress.      Breath sounds: Wheezing present. No rales.   Abdominal:      General: Abdomen is flat. Bowel sounds are normal. There is no distension.      Palpations: Abdomen is soft.   Musculoskeletal:         General: Swelling present.      Cervical back: Normal range of motion and neck supple. No rigidity.      Right lower leg: Edema present.      Left lower leg: Edema present.   Skin:     Findings: Erythema and lesion present. "   Neurological:      General: No focal deficit present.      Mental Status: She is alert and oriented to person, place, and time.   Psychiatric:         Mood and Affect: Mood normal.         Behavior: Behavior normal.         Current medication   Current Facility-Administered Medications   Medication Dose Route Frequency Provider Last Rate Last Admin    acetaminophen (Tylenol) tablet 650 mg  650 mg oral q4h PRN Cheri Mcdermott MD   650 mg at 10/08/23 0039    acetaminophen (Tylenol) tablet 650 mg  650 mg oral q4h PRN Cheri Mcdermott MD        albuterol 2.5 mg /3 mL (0.083 %) nebulizer solution 2.5 mg  2.5 mg nebulization q4h PRN Cheri Mcdermott MD        alum-mag hydroxide-simeth (Mylanta) 200-200-20 mg/5 mL oral suspension 30 mL  30 mL oral q6h PRN Cheri Mcdermott MD        atorvastatin (Lipitor) tablet 40 mg  40 mg oral Nightly Cheri Mcdermott MD        azithromycin (Zithromax) in sodium chloride 0.9 % 250 mL  mg  500 mg intravenous Daily Cheri Mcdermott MD   Stopped at 10/08/23 1000    [Held by provider] carvedilol (Coreg) tablet 6.25 mg  6.25 mg oral BID Cheri Mcdermott MD        cyanocobalamin (Vitamin B-12) tablet 2,500 mcg  2,500 mcg oral Daily Cheri Mcdermott MD   2,500 mcg at 10/08/23 1148    dextromethorphan-guaifenesin (Robitussin DM)  mg/5 mL oral liquid 5 mL  5 mL oral q4h PRN Cheri Mcdermott MD        dextrose 10 % in water (D10W) infusion  0.3 g/kg/hr intravenous Once PRN Cheri Mcdermott MD        dextrose 50 % injection 25 g  25 g intravenous q15 min PRN Cheri Mcdermott MD        [Held by provider] furosemide (Lasix) tablet 40 mg  40 mg oral BID Cheri Mcdermott MD        glucagon (Glucagen) injection 1 mg  1 mg intramuscular q15 min PRN Cheri Mcdermott MD        HYDROcodone-acetaminophen (Norco) 5-325 mg per tablet 1 tablet  1 tablet oral BID ELMER Mcdermott MD   1 tablet at 10/08/23 1334    insulin lispro (HumaLOG) injection 0-5 Units  0-5  Units subcutaneous TID with meals Cheri Mcdermott MD   4 Units at 10/08/23 1149    ipratropium-albuteroL (Duo-Neb) 0.5-2.5 mg/3 mL nebulizer solution 3 mL  3 mL nebulization q4h Cheri Mcdermott MD   3 mL at 10/08/23 1241    [Held by provider] lisinopril tablet 2.5 mg  2.5 mg oral Daily Cheri Mcdermott MD        melatonin tablet 3 mg  3 mg oral Daily Cheri Mcdermott MD   3 mg at 10/07/23 2343    methylPREDNISolone sod succinate (PF) (SOLU-Medrol) 40 mg/mL injection 40 mg  40 mg intravenous q6h Cheri Mcdermott MD   40 mg at 10/08/23 1004    montelukast (Singulair) tablet 10 mg  10 mg oral Daily Cheri Mcdermott MD   10 mg at 10/08/23 1148    [Held by provider] nitroglycerin (Nitrostat) SL tablet 0.4 mg  0.4 mg sublingual q5 min PRN Cheri Mcdermott MD        ondansetron (Zofran) injection 4 mg  4 mg intravenous q8h PRN Cheri Mcdermott MD        oxygen (O2) therapy   inhalation Continuous PRN - O2/gases Cheri Mcdermott MD   4 L/min at 10/08/23 1248    perflutren lipid microspheres (Definity) injection 0.5 mL  0.5 mL intravenous Once in imaging Cheri Mcdermott MD        piperacillin-tazobactam-dextrose (Zosyn) IV 3.375 g  3.375 g intravenous q8h Cheri Mcdermott MD   Stopped at 10/08/23 0817    And    sodium chloride 0.9% infusion  10 mL/hr intravenous q8h Cheri Mcdermott MD 10 mL/hr at 10/08/23 0418 10 mL/hr at 10/08/23 0418    polyethylene glycol (Glycolax, Miralax) packet 17 g  17 g oral Daily Cheri Mcdermott MD   17 g at 10/08/23 1004    sodium chloride 0.9 % bolus 1,000 mL  1,000 mL intravenous Once Cheri Mcdermott MD        [Held by provider] sodium chloride 0.9% infusion  100 mL/hr intravenous Continuous Cheri Mcdermott  mL/hr at 10/08/23 0049 100 mL/hr at 10/08/23 0049    traMADol (Ultram) tablet 50 mg  50 mg oral q8h PRN Marc Olvera MD   50 mg at 10/08/23 1011    vancomycin in dextrose 5 % (Vancocin) IVPB 1 g  1 g intravenous q24h Cheri Mcdermott MD             Labs  Lab Results   Component Value Date    WBC 16.8 (H) 10/08/2023    HGB 9.5 (L) 10/08/2023    HCT 30.2 (L) 10/08/2023     (H) 10/08/2023     10/08/2023     Lab Results   Component Value Date    HGBA1C 6.5 (H) 08/17/2023     Lab Results   Component Value Date    GLUCOSE 275 (H) 10/08/2023    CALCIUM 8.0 (L) 10/08/2023     (L) 10/08/2023    K 3.9 10/08/2023    CO2 21 10/08/2023     10/08/2023    BUN 23 10/08/2023    CREATININE 0.97 10/08/2023           Principal Problem:    Sepsis due to pneumonia (CMS/[facility])      Assessment and Plan   #Possible sepsis secondary to left lower extremity cellulitis/infected wound  #Fever, leukocytosis, tachycardia secondary to the above     Admit to medical floor  Telemetry monitoring  Sinus tachycardia noted with fever and leukocytosis  Tylenol as needed for fever  Patient received 1 L NS bolus  We will give another liter bolus and start NS  Clinically patient is dry no significant fluid overload noted  Started on vancomycin, Zosyn and Zithromax  Seen during culture  Send procalcitonin  ID consult  Wound care consult for left lower extremity wound  Local wound care  Send cultures from left lower extremity wound  Follow-up blood culture result  Patient is tachycardic but not hypotensive if patient becomes hypotensive might need care upgraded to ICU     #Acute hypoxic respiratory failure  #COPD exacerbation  #Possible pneumonia  Former smoker     Started on steroid Solu-Medrol IV 40 mg every 8 hours  Duo nebulization every 4 hours  Nasal cannula oxygen titrate down as tolerated     #Chronic heart failure,     Patient reports taking diuretics at home  Received 1 dose of IV diuretics  Based on my exam patient is on the dry side not clinically fluid overloaded  We will hold off further diuresis  Hold off home dose of Lasix  Rehydrate and monitor for fluid overload  Echocardiogram  #Essential hypertension  #Hyperlipidemia  #Chronic venous stasis  #Chronic  back pain     Resume home medications  Hold off on antihypertensives since patient's blood pressure is borderline low      DVT prophylaxis on Lovenox  CODE STATUS full code    10/8/2023  Patient was seen and examined at bedside  Tachycardia resolved leukocytosis present but fever has resolved  Blood pressure has improved but continue holding antihypertensives for now  Follow culture result  Continue antibiotics, procalcitonin was 15.29  ID consulted  Continue antibiotics broad-spectrum for now  Likely source of sepsis left leg cellulitis and wound  Follow wound culture result            LOS: 1 day

## 2023-10-09 ENCOUNTER — APPOINTMENT (OUTPATIENT)
Dept: CARDIOLOGY | Facility: HOSPITAL | Age: 73
DRG: 871 | End: 2023-10-09
Payer: COMMERCIAL

## 2023-10-09 LAB
ANION GAP SERPL CALC-SCNC: 18 MMOL/L (ref 10–20)
BUN SERPL-MCNC: 27 MG/DL (ref 6–23)
CALCIUM SERPL-MCNC: 8.6 MG/DL (ref 8.6–10.3)
CHLORIDE SERPL-SCNC: 101 MMOL/L (ref 98–107)
CO2 SERPL-SCNC: 21 MMOL/L (ref 21–32)
CREAT SERPL-MCNC: 1 MG/DL (ref 0.5–1.05)
EJECTION FRACTION APICAL 4 CHAMBER: 33.7
ERYTHROCYTE [DISTWIDTH] IN BLOOD BY AUTOMATED COUNT: 13.7 % (ref 11.5–14.5)
GFR SERPL CREATININE-BSD FRML MDRD: 60 ML/MIN/1.73M*2
GLUCOSE BLD MANUAL STRIP-MCNC: 242 MG/DL (ref 74–99)
GLUCOSE BLD MANUAL STRIP-MCNC: 248 MG/DL (ref 74–99)
GLUCOSE BLD MANUAL STRIP-MCNC: 257 MG/DL (ref 74–99)
GLUCOSE BLD MANUAL STRIP-MCNC: 258 MG/DL (ref 74–99)
GLUCOSE SERPL-MCNC: 247 MG/DL (ref 74–99)
HCT VFR BLD AUTO: 32.4 % (ref 36–46)
HGB BLD-MCNC: 10.2 G/DL (ref 12–16)
LEGIONELLA AG UR QL: NEGATIVE
MCH RBC QN AUTO: 31.8 PG (ref 26–34)
MCHC RBC AUTO-ENTMCNC: 31.5 G/DL (ref 32–36)
MCV RBC AUTO: 101 FL (ref 80–100)
NRBC BLD-RTO: 0 /100 WBCS (ref 0–0)
PLATELET # BLD AUTO: 154 X10*3/UL (ref 150–450)
PMV BLD AUTO: 12 FL (ref 7.5–11.5)
POTASSIUM SERPL-SCNC: 4.1 MMOL/L (ref 3.5–5.3)
RBC # BLD AUTO: 3.21 X10*6/UL (ref 4–5.2)
S PNEUM AG UR QL: NEGATIVE
SODIUM SERPL-SCNC: 136 MMOL/L (ref 136–145)
VANCOMYCIN SERPL-MCNC: 12.8 UG/ML (ref 5–20)
WBC # BLD AUTO: 18 X10*3/UL (ref 4.4–11.3)

## 2023-10-09 PROCEDURE — 36415 COLL VENOUS BLD VENIPUNCTURE: CPT | Performed by: STUDENT IN AN ORGANIZED HEALTH CARE EDUCATION/TRAINING PROGRAM

## 2023-10-09 PROCEDURE — 87899 AGENT NOS ASSAY W/OPTIC: CPT | Mod: CMCLAB,ELYLAB | Performed by: STUDENT IN AN ORGANIZED HEALTH CARE EDUCATION/TRAINING PROGRAM

## 2023-10-09 PROCEDURE — 85027 COMPLETE CBC AUTOMATED: CPT | Performed by: STUDENT IN AN ORGANIZED HEALTH CARE EDUCATION/TRAINING PROGRAM

## 2023-10-09 PROCEDURE — 93306 TTE W/DOPPLER COMPLETE: CPT | Performed by: INTERNAL MEDICINE

## 2023-10-09 PROCEDURE — 2500000001 HC RX 250 WO HCPCS SELF ADMINISTERED DRUGS (ALT 637 FOR MEDICARE OP): Performed by: STUDENT IN AN ORGANIZED HEALTH CARE EDUCATION/TRAINING PROGRAM

## 2023-10-09 PROCEDURE — 87449 NOS EACH ORGANISM AG IA: CPT | Mod: CMCLAB,ELYLAB | Performed by: STUDENT IN AN ORGANIZED HEALTH CARE EDUCATION/TRAINING PROGRAM

## 2023-10-09 PROCEDURE — 80048 BASIC METABOLIC PNL TOTAL CA: CPT | Performed by: STUDENT IN AN ORGANIZED HEALTH CARE EDUCATION/TRAINING PROGRAM

## 2023-10-09 PROCEDURE — 80202 ASSAY OF VANCOMYCIN: CPT | Performed by: STUDENT IN AN ORGANIZED HEALTH CARE EDUCATION/TRAINING PROGRAM

## 2023-10-09 PROCEDURE — 97110 THERAPEUTIC EXERCISES: CPT | Mod: GP,CQ

## 2023-10-09 PROCEDURE — 1200000002 HC GENERAL ROOM WITH TELEMETRY DAILY

## 2023-10-09 PROCEDURE — 2500000004 HC RX 250 GENERAL PHARMACY W/ HCPCS (ALT 636 FOR OP/ED): Performed by: STUDENT IN AN ORGANIZED HEALTH CARE EDUCATION/TRAINING PROGRAM

## 2023-10-09 PROCEDURE — 94640 AIRWAY INHALATION TREATMENT: CPT

## 2023-10-09 PROCEDURE — 2500000002 HC RX 250 W HCPCS SELF ADMINISTERED DRUGS (ALT 637 FOR MEDICARE OP, ALT 636 FOR OP/ED): Performed by: STUDENT IN AN ORGANIZED HEALTH CARE EDUCATION/TRAINING PROGRAM

## 2023-10-09 PROCEDURE — 82947 ASSAY GLUCOSE BLOOD QUANT: CPT

## 2023-10-09 PROCEDURE — 93306 TTE W/DOPPLER COMPLETE: CPT

## 2023-10-09 PROCEDURE — 99232 SBSQ HOSP IP/OBS MODERATE 35: CPT | Performed by: STUDENT IN AN ORGANIZED HEALTH CARE EDUCATION/TRAINING PROGRAM

## 2023-10-09 RX ORDER — FUROSEMIDE 10 MG/ML
40 INJECTION INTRAMUSCULAR; INTRAVENOUS EVERY 12 HOURS
Status: DISCONTINUED | OUTPATIENT
Start: 2023-10-09 | End: 2023-10-14 | Stop reason: HOSPADM

## 2023-10-09 RX ORDER — KETOCONAZOLE 20 MG/G
CREAM TOPICAL DAILY
Status: DISCONTINUED | OUTPATIENT
Start: 2023-10-09 | End: 2023-10-14 | Stop reason: HOSPADM

## 2023-10-09 RX ADMIN — Medication: at 07:02

## 2023-10-09 RX ADMIN — ACETAMINOPHEN 650 MG: 325 TABLET ORAL at 08:23

## 2023-10-09 RX ADMIN — FUROSEMIDE 40 MG: 10 INJECTION, SOLUTION INTRAMUSCULAR; INTRAVENOUS at 13:45

## 2023-10-09 RX ADMIN — PERFLUTREN 0.5 ML: 6.52 INJECTION, SUSPENSION INTRAVENOUS at 14:42

## 2023-10-09 RX ADMIN — SODIUM CHLORIDE 10 ML/HR: 9 INJECTION, SOLUTION INTRAVENOUS at 13:00

## 2023-10-09 RX ADMIN — PIPERACILLIN SODIUM AND TAZOBACTAM SODIUM 3.38 G: 3; .375 INJECTION, SOLUTION INTRAVENOUS at 06:32

## 2023-10-09 RX ADMIN — LISINOPRIL 2.5 MG: 5 TABLET ORAL at 08:17

## 2023-10-09 RX ADMIN — METHYLPREDNISOLONE SODIUM SUCCINATE 40 MG: 40 INJECTION, POWDER, FOR SOLUTION INTRAMUSCULAR; INTRAVENOUS at 17:08

## 2023-10-09 RX ADMIN — SODIUM CHLORIDE 10 ML/HR: 9 INJECTION, SOLUTION INTRAVENOUS at 06:33

## 2023-10-09 RX ADMIN — PIPERACILLIN SODIUM AND TAZOBACTAM SODIUM 3.38 G: 3; .375 INJECTION, SOLUTION INTRAVENOUS at 20:12

## 2023-10-09 RX ADMIN — HYDROCODONE BITARTRATE AND ACETAMINOPHEN 1 TABLET: 5; 325 TABLET ORAL at 21:55

## 2023-10-09 RX ADMIN — CYANOCOBALAMIN TAB 500 MCG 2500 MCG: 500 TAB at 08:15

## 2023-10-09 RX ADMIN — PIPERACILLIN SODIUM AND TAZOBACTAM SODIUM 3.38 G: 3; .375 INJECTION, SOLUTION INTRAVENOUS at 13:00

## 2023-10-09 RX ADMIN — Medication 3 MG: at 20:14

## 2023-10-09 RX ADMIN — IPRATROPIUM BROMIDE AND ALBUTEROL SULFATE 3 ML: 2.5; .5 SOLUTION RESPIRATORY (INHALATION) at 07:00

## 2023-10-09 RX ADMIN — ATORVASTATIN CALCIUM 40 MG: 20 TABLET, FILM COATED ORAL at 20:14

## 2023-10-09 RX ADMIN — INSULIN LISPRO 3 UNITS: 100 INJECTION, SOLUTION INTRAVENOUS; SUBCUTANEOUS at 17:08

## 2023-10-09 RX ADMIN — METHYLPREDNISOLONE SODIUM SUCCINATE 40 MG: 40 INJECTION, POWDER, FOR SOLUTION INTRAMUSCULAR; INTRAVENOUS at 08:30

## 2023-10-09 RX ADMIN — FUROSEMIDE 20 MG: 40 TABLET ORAL at 08:16

## 2023-10-09 RX ADMIN — MONTELUKAST SODIUM 10 MG: 10 TABLET, FILM COATED ORAL at 08:16

## 2023-10-09 RX ADMIN — SODIUM CHLORIDE 10 ML/HR: 9 INJECTION, SOLUTION INTRAVENOUS at 20:13

## 2023-10-09 RX ADMIN — ACETAMINOPHEN 325 MG: 325 TABLET ORAL at 08:25

## 2023-10-09 RX ADMIN — INSULIN LISPRO 3 UNITS: 100 INJECTION, SOLUTION INTRAVENOUS; SUBCUTANEOUS at 11:15

## 2023-10-09 RX ADMIN — IPRATROPIUM BROMIDE AND ALBUTEROL SULFATE 3 ML: 2.5; .5 SOLUTION RESPIRATORY (INHALATION) at 20:52

## 2023-10-09 RX ADMIN — METHYLPREDNISOLONE SODIUM SUCCINATE 40 MG: 40 INJECTION, POWDER, FOR SOLUTION INTRAMUSCULAR; INTRAVENOUS at 04:08

## 2023-10-09 RX ADMIN — IPRATROPIUM BROMIDE AND ALBUTEROL SULFATE 3 ML: 2.5; .5 SOLUTION RESPIRATORY (INHALATION) at 11:06

## 2023-10-09 RX ADMIN — CARVEDILOL 3.12 MG: 3.12 TABLET, FILM COATED ORAL at 20:14

## 2023-10-09 RX ADMIN — CARVEDILOL 3.12 MG: 3.12 TABLET, FILM COATED ORAL at 08:16

## 2023-10-09 RX ADMIN — INSULIN LISPRO 2 UNITS: 100 INJECTION, SOLUTION INTRAVENOUS; SUBCUTANEOUS at 06:22

## 2023-10-09 RX ADMIN — IPRATROPIUM BROMIDE AND ALBUTEROL SULFATE 3 ML: 2.5; .5 SOLUTION RESPIRATORY (INHALATION) at 15:43

## 2023-10-09 ASSESSMENT — COGNITIVE AND FUNCTIONAL STATUS - GENERAL
MOVING TO AND FROM BED TO CHAIR: A LOT
MOBILITY SCORE: 11
STANDING UP FROM CHAIR USING ARMS: A LOT
CLIMB 3 TO 5 STEPS WITH RAILING: TOTAL
MOVING FROM LYING ON BACK TO SITTING ON SIDE OF FLAT BED WITH BEDRAILS: A LOT
WALKING IN HOSPITAL ROOM: A LOT
TURNING FROM BACK TO SIDE WHILE IN FLAT BAD: A LOT

## 2023-10-09 ASSESSMENT — PAIN SCALES - GENERAL: PAINLEVEL_OUTOF10: 8

## 2023-10-09 NOTE — CONSULTS
Wound Care Consult     Visit Date: 10/9/2023      Patient Name: Isa Jack         MRN: [id number]           YOB: 1950     Reason for Consult: left lower extremity wound     Wound History: Patient with chronic venous insufficiency wound to left lower extremity. Patient presented to ER on 10/7 with complaints of increased drainage, redness, fever and pain to leg. States she has home health care nurses that assist with dressing changes. States she has been using mupirocin ointment and dry sterile dressing daily.      Pertinent Labs:   Albumin   Date Value Ref Range Status   10/08/2023 3.4 3.4 - 5.0 g/dL Final     ALBUMIN (MG/L) IN URINE   Date Value Ref Range Status   04/20/2023 164.5 Not Established mg/L Final       Wound Assessment:  Wound 10/09/23 Tibial Dorsal;Left (Active)   Wound Image   10/09/23 1523   Wound Length (cm) 4 cm 10/09/23 1549   Wound Width (cm) 6 cm 10/09/23 1549   Wound Surface Area (cm^2) 24 cm^2 10/09/23 1549   Wound Depth (cm) 0.1 cm 10/09/23 1549   Wound Volume (cm^3) 2.4 cm^3 10/09/23 1549       Wound Team Summary Assessment: Dressing removed from left lower extremity. Leg slightly red - patient states redness and swelling has significantly improved. Open wound noted to left lower posterior leg. Wound bed with small amount of serous drainage. Total area measures 4.0 x 6.0 x 0.1 cm. Patient states she applies mupirocin ointment to area, covered with non-stick pads, wraps with kerlix and ACE wrap daily. Xeroform gauze, ABD, kerlix and ACE wrap applied until antibiotic ointment ordered.      Wound Team Plan: Continue with at home regimen. Physician notified.      Eb Harrison LPN  10/9/2023  3:50 PM

## 2023-10-09 NOTE — NURSING NOTE
CHF Clinical Nurse Navigator Documentation  Congestive Heart Failure disease education was performed by the Clinical Nurse Navigator with a good understanding: yes  CHF signs and symptoms discussed and when to call cardiologist?  yes  Living With Heart Failure Education booklet?  no  Controlling Heart Failure at Home Education? yes  CHF Education Teaching Tool? yes  EDILBERTO education modules assigned?  no  Home medication usage?  yes  Nutrition Education? Yes-low sodium   Fluid Restriction Education? yes  Daily Weight Education? Yes-daily weight log provided  Cardiovascular Rehab Referral ordered?  no  Follow-up with Cardiologist after discharge education? yes  Comments: Met with patient at bedside for heart failure education. Patient verbalized understanding. Patient lives at home with her son and daughter in law. Her cardiologist is Dr. Luque at [facility]. She says this is a new cardiologist for her. Patient is compliant with all her medications and follow up appointments. She receives a home health aide 7 days a week and a nurse every 2 months through [facility]. She receives a  through her [employer] insurance.  She also has low sodium meals delivered through the [facility]. She receives 2 melas a day. No questions or concerns at this time. Provided my contact information should any questions or concerns arise.

## 2023-10-09 NOTE — CARE PLAN
The patient's goals for the shift include Patient will be free of pain this shift    The clinical goals for the shift include Blood sugar will be wnl

## 2023-10-09 NOTE — PROGRESS NOTES
" Daily progress note    Isa Jack is 72 y.o. female   Patient is having shortness of breath and wheezing  On steroid antibiotics  IV Lasix was started for CHF  Echocardiogram was done showed EF of 25%        Vital signs in last 24 hours:  Temp:  [36.8 °C (98.2 °F)-37.3 °C (99.1 °F)] 37.3 °C (99.1 °F)  Heart Rate:  [93-99] 93  Resp:  [21-24] 21  BP: (119-130)/(62-72) 130/71  FiO2 (%):  [3 %-32 %] 3 %  /71   Pulse 93   Temp 37.3 °C (99.1 °F)   Resp 21   Ht 1.549 m (5' 0.98\")   Wt 103 kg (227 lb 15.3 oz)   SpO2 96%   BMI 43.09 kg/m²    Intake/Output last 3 shifts:  I/O last 3 completed shifts:  In: 925 (8.9 mL/kg) [P.O.:875; IV Piggyback:50]  Out: 350 (3.4 mL/kg) [Urine:350 (0.1 mL/kg/hr)]  Weight: 103.4 kg   Intake/Output this shift:  No intake/output data recorded.    Physical Exam  Constitutional:       General: She is not in acute distress.     Appearance: Normal appearance. She is obese.   HENT:      Head: Normocephalic and atraumatic.      Nose: No congestion or rhinorrhea.      Mouth/Throat:      Mouth: Mucous membranes are moist.   Eyes:      Extraocular Movements: Extraocular movements intact.      Pupils: Pupils are equal, round, and reactive to light.   Cardiovascular:      Rate and Rhythm: Normal rate and regular rhythm.      Pulses: Normal pulses.      Heart sounds: Normal heart sounds. No murmur heard.  Pulmonary:      Effort: No respiratory distress.      Breath sounds: Wheezing present. No rales.   Abdominal:      General: Abdomen is flat. Bowel sounds are normal. There is no distension.      Palpations: Abdomen is soft.   Musculoskeletal:         General: Swelling present.      Cervical back: Normal range of motion and neck supple. No rigidity.      Right lower leg: Edema present.      Left lower leg: Edema present.   Skin:     Findings: Erythema and lesion present.   Neurological:      General: No focal deficit present.      Mental Status: She is alert and oriented to person, " place, and time.   Psychiatric:         Mood and Affect: Mood normal.         Behavior: Behavior normal.   Current medication   Current Facility-Administered Medications   Medication Dose Route Frequency Provider Last Rate Last Admin    acetaminophen (Tylenol) tablet 650 mg  650 mg oral q4h PRN Cheri Mcdermott MD   650 mg at 10/09/23 0823    acetaminophen (Tylenol) tablet 650 mg  650 mg oral q4h PRN Cheri Mcdermott MD   325 mg at 10/09/23 0825    albuterol 2.5 mg /3 mL (0.083 %) nebulizer solution 2.5 mg  2.5 mg nebulization q4h PRN Cheri Mcdermott MD        alum-mag hydroxide-simeth (Mylanta) 200-200-20 mg/5 mL oral suspension 30 mL  30 mL oral q6h PRN Cheri Mcdermott MD        atorvastatin (Lipitor) tablet 40 mg  40 mg oral Nightly Cheri Mcdermott MD   40 mg at 10/08/23 2016    carvedilol (Coreg) tablet 3.125 mg  3.125 mg oral BID Cheri Mcdermott MD   3.125 mg at 10/09/23 0816    cyanocobalamin (Vitamin B-12) tablet 2,500 mcg  2,500 mcg oral Daily Cheri Mcdermott MD   2,500 mcg at 10/09/23 0815    dextromethorphan-guaifenesin (Robitussin DM)  mg/5 mL oral liquid 5 mL  5 mL oral q4h PRN Cheri Mcdermott MD        dextrose 10 % in water (D10W) infusion  0.3 g/kg/hr intravenous Once PRN Cheri Mcdermott MD        dextrose 50 % injection 25 g  25 g intravenous q15 min PRN Cheri Mcdermott MD        furosemide (Lasix) injection 40 mg  40 mg intravenous q12h Cheri Mcdermott MD   40 mg at 10/09/23 1345    glucagon (Glucagen) injection 1 mg  1 mg intramuscular q15 min PRN Cheri Mcdermott MD        HYDROcodone-acetaminophen (Norco) 5-325 mg per tablet 1 tablet  1 tablet oral BID PRN Cheri Mcdermott MD   1 tablet at 10/08/23 2213    insulin lispro (HumaLOG) injection 0-5 Units  0-5 Units subcutaneous TID with meals Cheri Mcdermott MD   3 Units at 10/09/23 1708    ipratropium-albuteroL (Duo-Neb) 0.5-2.5 mg/3 mL nebulizer solution 3 mL  3 mL nebulization 4x daily Cheri Mcdermott  MD   3 mL at 10/09/23 1543    ketoconazole (NIZOral) 2 % cream   Topical Daily Cheri Mcdermott MD        lisinopril tablet 2.5 mg  2.5 mg oral Daily Cheri Mcdermott MD   2.5 mg at 10/09/23 0817    melatonin tablet 3 mg  3 mg oral Daily Cheri Mcdermott MD   3 mg at 10/08/23 2017    meloxicam (Mobic) tablet 7.5 mg  7.5 mg oral Daily PRN Cheri Mcdermott MD        methylPREDNISolone sod succinate (PF) (SOLU-Medrol) 40 mg/mL injection 40 mg  40 mg intravenous q8h Cheri Mcdermott MD   40 mg at 10/09/23 1708    montelukast (Singulair) tablet 10 mg  10 mg oral Daily Cheri Mcdermott MD   10 mg at 10/09/23 0816    [Held by provider] nitroglycerin (Nitrostat) SL tablet 0.4 mg  0.4 mg sublingual q5 min PRN Cheri Mcdermott MD        ondansetron (Zofran) injection 4 mg  4 mg intravenous q8h PRN Cheri Mcdermott MD        oxygen (O2) therapy   inhalation Continuous PRN - O2/gases Cheri Mcdermott MD   Start at 10/09/23 0702    piperacillin-tazobactam-dextrose (Zosyn) IV 3.375 g  3.375 g intravenous q8h Cheri Mcdermott MD 12.5 mL/hr at 10/09/23 1300 3.375 g at 10/09/23 1300    And    sodium chloride 0.9% infusion  10 mL/hr intravenous q8h Cheri Mcdermott MD 10 mL/hr at 10/09/23 1300 10 mL/hr at 10/09/23 1300    polyethylene glycol (Glycolax, Miralax) packet 17 g  17 g oral Daily Cheri Mcdermott MD   17 g at 10/08/23 1004    sodium chloride 0.9 % bolus 1,000 mL  1,000 mL intravenous Once Cheri Mcdermott MD        [Held by provider] sodium chloride 0.9% infusion  100 mL/hr intravenous Continuous Cheri Mcdermott  mL/hr at 10/08/23 0049 100 mL/hr at 10/08/23 0049    traMADol (Ultram) tablet 50 mg  50 mg oral q8h PRN Marc Olvera MD   50 mg at 10/08/23 1011        Labs  Lab Results   Component Value Date    WBC 18.0 (H) 10/09/2023    HGB 10.2 (L) 10/09/2023    HCT 32.4 (L) 10/09/2023     (H) 10/09/2023     10/09/2023     Lab Results   Component Value Date    HGBA1C 6.5 (H)  08/17/2023     Lab Results   Component Value Date    GLUCOSE 247 (H) 10/09/2023    CALCIUM 8.6 10/09/2023     10/09/2023    K 4.1 10/09/2023    CO2 21 10/09/2023     10/09/2023    BUN 27 (H) 10/09/2023    CREATININE 1.00 10/09/2023           Principal Problem:    Sepsis due to pneumonia (CMS/[facility])      Assessment and Plan   #Possible sepsis secondary to left lower extremity cellulitis/infected wound  #Fever, leukocytosis, tachycardia secondary to the above     Admit to medical floor  Telemetry monitoring  Sinus tachycardia noted with fever and leukocytosis  Tylenol as needed for fever  Patient received 1 L NS bolus  We will give another liter bolus and start NS  Clinically patient is dry no significant fluid overload noted  Started on vancomycin, Zosyn and Zithromax  Seen during culture  Send procalcitonin  ID consult  Wound care consult for left lower extremity wound  Local wound care  Send cultures from left lower extremity wound  Follow-up blood culture result  Patient is tachycardic but not hypotensive if patient becomes hypotensive might need care upgraded to ICU     #Acute hypoxic respiratory failure  #COPD exacerbation  #Possible pneumonia  Former smoker     Started on steroid Solu-Medrol IV 40 mg every 8 hours  Duo nebulization every 4 hours  Nasal cannula oxygen titrate down as tolerated     #Chronic heart failure,     Patient reports taking diuretics at home  Received 1 dose of IV diuretics  Based on my exam patient is on the dry side not clinically fluid overloaded  We will hold off further diuresis  Hold off home dose of Lasix  Rehydrate and monitor for fluid overload  Echocardiogram  #Essential hypertension  #Hyperlipidemia  #Chronic venous stasis  #Chronic back pain     Resume home medications  Hold off on antihypertensives since patient's blood pressure is borderline low      DVT prophylaxis on Lovenox  CODE STATUS full code     10/9/2023  Patient was seen and examined at  bedside  Echocardiogram showed EF of 25%  Started on Lasix 40 mg twice daily  Cardio consult  Monitor intake output  Daily weight  Continue antibiotics  ID is on board  Follow culture result  Fever has resolved     LOS: 2 days

## 2023-10-09 NOTE — DISCHARGE INSTRUCTIONS
HEART FAILURE EDUCATION:  1. Weigh yourself daily and record on your weight log.  2. If you gain more than 2 or 3 pounds overnight, call your cardiologist.  3. Follow a low sodium diet. No more than 2000 mg in one day, or more than 650 mg per meal.  4. Limit total fluids to no more than 8 cups (or 2 liters) per day - this includes all fluids (water, coffee, juice, milk, tea, etc.)  5. Monitor your blood pressure daily and record on your weight log.  6. Call to schedule your follow-up appointments when you get home if they were not already scheduled for you.  7. Keep your follow-up appointments! Bring your weight log with you so the doctors can see your weight trend and blood pressure readings.  8. Be sure to  any new prescriptions and take them as directed. If unsure of the medications, be sure to call your cardiologist.  9. Stay as active as you can tolerate.   10. If you notice subtle change of symptoms (slight increase in swelling, slight shortness of breath, a new intolerance to laying flat, a new cough), be sure to call your cardiologist.

## 2023-10-09 NOTE — CONSULTS
Infectious Disease    Patient Name: Isa Jack  Date: 10/8/2023  YOB: 1950  Medical Record Number: [id number]        Chief Complaint   Patient presents with    Weakness, Gen         History of Present Illness:    Patient presents with increasing pain and swelling RLE. She had chronic ulcer and with a complaint of increased discharge from the left lower extremity wound, f. Associated ever, increased shortness of breath.  chronic wound over the left lower extremity for which she does a dressing at home . Gets care [facility]. Has been hospitalized there in past, nothing recent. Said she has had worsening drainage leg before and usually gets oral antibiotics. This time oral therapy didn't work         Review of Systems: All other ROS reviewed and are negative other than as stated in HPI            Social History     Tobacco Use    Smoking status: Former     Types: Cigarettes     Quit date: 2019     Years since quittin.7    Smokeless tobacco: Never   Substance Use Topics    Alcohol use: Never    Drug use: Never         Past Medical History:   Diagnosis Date    Diabetes mellitus (CMS/[facility])     Hyperlipemia     Hypertension     Morbid (severe) obesity due to excess calories (CMS/[facility]) 10/28/2022    Class 3 severe obesity due to excess calories with serious comorbidity and body mass index (BMI) of 40.0 to 44.9 in adult    Non-pressure chronic ulcer of left ankle with unspecified severity (CMS/[facility]) 2020    Ulcer of left ankle           Past Surgical History:   Procedure Laterality Date    OTHER SURGICAL HISTORY  2019    No history of surgery           Current Facility-Administered Medications:     acetaminophen (Tylenol) tablet 650 mg, 650 mg, oral, q4h PRN, Cheri Mcdermott MD, 650 mg at 10/08/23 0039    acetaminophen (Tylenol) tablet 650 mg, 650 mg, oral, q4h PRN, Cheri Mcdermott MD    albuterol 2.5 mg /3 mL (0.083 %) nebulizer solution 2.5 mg, 2.5 mg, nebulization, q4h PRN,  Cheri Mcdermott MD    alum-mag hydroxide-simeth (Mylanta) 200-200-20 mg/5 mL oral suspension 30 mL, 30 mL, oral, q6h PRN, Cheri Mcdermott MD    atorvastatin (Lipitor) tablet 40 mg, 40 mg, oral, Nightly, Cheri Mcdermott MD, 40 mg at 10/08/23 2016    azithromycin (Zithromax) in sodium chloride 0.9 % 250 mL  mg, 500 mg, intravenous, Daily, Cheri Mcdermott MD, Stopped at 10/08/23 1000    carvedilol (Coreg) tablet 3.125 mg, 3.125 mg, oral, BID, Cheri Mcdermott MD, 3.125 mg at 10/08/23 2017    cyanocobalamin (Vitamin B-12) tablet 2,500 mcg, 2,500 mcg, oral, Daily, Cheri Mcdermott MD, 2,500 mcg at 10/08/23 1148    dextromethorphan-guaifenesin (Robitussin DM)  mg/5 mL oral liquid 5 mL, 5 mL, oral, q4h PRN, Cheri Mcdermott MD    dextrose 10 % in water (D10W) infusion, 0.3 g/kg/hr, intravenous, Once PRN, Cheri Mcdermott MD    dextrose 50 % injection 25 g, 25 g, intravenous, q15 min PRN, Cheri Mcdermott MD    furosemide (Lasix) tablet 20 mg, 20 mg, oral, BID, Cheri Mcdermott MD, 20 mg at 10/08/23 2017    glucagon (Glucagen) injection 1 mg, 1 mg, intramuscular, q15 min PRN, Cheri Mcdermott MD    HYDROcodone-acetaminophen (Norco) 5-325 mg per tablet 1 tablet, 1 tablet, oral, BID PRN, Cheri Mcdermott MD, 1 tablet at 10/08/23 2213    insulin lispro (HumaLOG) injection 0-5 Units, 0-5 Units, subcutaneous, TID with meals, Cheri Mcdermott MD, 3 Units at 10/08/23 1724    ipratropium-albuteroL (Duo-Neb) 0.5-2.5 mg/3 mL nebulizer solution 3 mL, 3 mL, nebulization, q4h, Cheri Mcdermott MD, 3 mL at 10/08/23 1241    lisinopril tablet 2.5 mg, 2.5 mg, oral, Daily, Cheri Mcdermott MD, 2.5 mg at 10/08/23 2021    melatonin tablet 3 mg, 3 mg, oral, Daily, Cheri Mcdermott MD, 3 mg at 10/08/23 2017    meloxicam (Mobic) tablet 7.5 mg, 7.5 mg, oral, Daily PRN, Cheri Mcdermott MD    methylPREDNISolone sod succinate (PF) (SOLU-Medrol) 40 mg/mL injection 40 mg, 40 mg, intravenous, q6h,  "Cheri Mcdermott MD, 40 mg at 10/08/23 2214    montelukast (Singulair) tablet 10 mg, 10 mg, oral, Daily, Cheri Mcdermott MD, 10 mg at 10/08/23 1148    [Held by provider] nitroglycerin (Nitrostat) SL tablet 0.4 mg, 0.4 mg, sublingual, q5 min PRN, Cheri Mcdermott MD    ondansetron (Zofran) injection 4 mg, 4 mg, intravenous, q8h PRN, Cheri Mcdermott MD    oxygen (O2) therapy, , inhalation, Continuous PRN - O2/gases, Cheri Mcdermott MD, 4 L/min at 10/08/23 1248    perflutren lipid microspheres (Definity) injection 0.5 mL, 0.5 mL, intravenous, Once in imaging, Cheri Mcdermott MD    piperacillin-tazobactam-dextrose (Zosyn) IV 3.375 g, 3.375 g, intravenous, q8h, Stopped at 10/08/23 2011 **AND** sodium chloride 0.9% infusion, 10 mL/hr, intravenous, q8h, Cheri Mcdermott MD, Last Rate: 10 mL/hr at 10/08/23 1702, 10 mL/hr at 10/08/23 1702    polyethylene glycol (Glycolax, Miralax) packet 17 g, 17 g, oral, Daily, Cheri Mcdermott MD, 17 g at 10/08/23 1004    sodium chloride 0.9 % bolus 1,000 mL, 1,000 mL, intravenous, Once, Cheri Mcdermott MD    [Held by provider] sodium chloride 0.9% infusion, 100 mL/hr, intravenous, Continuous, Cheri Mcdermott MD, Last Rate: 100 mL/hr at 10/08/23 0049, 100 mL/hr at 10/08/23 0049    traMADol (Ultram) tablet 50 mg, 50 mg, oral, q8h PRN, Marc Olvera MD, 50 mg at 10/08/23 1011    vancomycin in dextrose 5 % (Vancocin) IVPB 1 g, 1 g, intravenous, q24h, Cheri Mcdermott MD, Last Rate: 200 mL/hr at 10/08/23 1612, 1 g at 10/08/23 1612     Allergies   Allergen Reactions    Iodine Hives    Adhesive Tape-Silicones Rash    Gadolinium-Containing Contrast Media Rash          No family history on file.      Physical Exam:    Blood pressure 125/64, pulse 102, temperature 36.9 °C (98.4 °F), resp. rate 18, height 1.549 m (5' 0.98\"), weight 103 kg (227 lb 15.3 oz), SpO2 96 %.  General: Patient appears ok at the present time. NAD  Skin: no new rashes  HEENT:  Neck is supple, No " subconjunctival hemorrhages, no oral exudates  Heart: S1 S2  Lungs: clear bilaterally  Abdomen: soft, ND, NTTP,  Back :no CVA tenderness  Extrem: No edema, non tender  Neuro exam: CN II-XII intact  Psych: cooperative    Labs:  I have reviewed all lab results by electronic record, including most recent CBC, metabolic panel, and pertinent abnormalities were addressed from an infectious disease perspective.  Trends are being monitored over time.    Lab Results   Component Value Date    WBC 16.8 (H) 10/08/2023    HGB 9.5 (L) 10/08/2023    HCT 30.2 (L) 10/08/2023     (H) 10/08/2023     10/08/2023     Lab Results   Component Value Date    GLUCOSE 275 (H) 10/08/2023    CALCIUM 8.0 (L) 10/08/2023     (L) 10/08/2023    K 3.9 10/08/2023    CO2 21 10/08/2023     10/08/2023    BUN 23 10/08/2023    CREATININE 0.97 10/08/2023       Radiology:  I have reviewed imaging results per electronic record and most pertinent abnormalities are being addressed from an infectious disease standpoint.            ASSESSMENT:  Problem List Items Addressed This Visit          Infectious Diseases    * (Principal) Sepsis due to pneumonia (CMS/HCC) - Primary     Other Visit Diagnoses       Acute pulmonary edema (CMS/HCC)        Relevant Orders    Transthoracic Echo (TTE) Complete         Venous stasis ulcer infected  Cellulitis  fever      PLAN:   Continue same therapy , culture wound   Picture of pneumonia not clear on ct can stop azithromycin

## 2023-10-09 NOTE — CONSULTS
Vancomycin Dosing by Pharmacy- Cessation of Therapy    Consult to pharmacy for vancomycin dosing has been discontinued by the prescriber, pharmacy will sign off at this time.    Please call pharmacy if there are further questions or re-enter a consult if vancomycin is resumed.     Julissa Jose, PharmD

## 2023-10-09 NOTE — PROGRESS NOTES
"Vancomycin Dosing by Pharmacy- FOLLOW UP    Isa Jack is a 72 y.o. year old female who Pharmacy has been consulted for vancomycin dosing for cellulitis, skin and soft tissue. Based on the patient's indication and renal status this patient is being dosed based on a goal AUC of 400-600.     Renal function is currently stable.    Current vancomycin dose: 1000 mg given every 24 hours    Estimated vancomycin AUC on current dose: 358 mg/L.hr     Visit Vitals  /62   Pulse 97   Temp 37.2 °C (99 °F)   Resp 21        Lab Results   Component Value Date    CREATININE 0.97 10/08/2023    CREATININE 0.96 10/08/2023    CREATININE 0.72 10/07/2023    CREATININE CANCELED 04/22/2023    CREATININE 0.80 10/04/2021    CREATININE 0.92 08/08/2019    CREATININE 0.79 10/22/2018        Patient weight is No results found for: \"PTWEIGHT\"    No results found for: \"CULTURE\"     I/O last 3 completed shifts:  In: 1260 (12.2 mL/kg) [P.O.:100; I.V.:110 (1.1 mL/kg); IV Piggyback:1050]  Out: 350 (3.4 mL/kg) [Urine:350 (0.1 mL/kg/hr)]  Weight: 103.4 kg   [unfilled]    No results found for: \"PATIENTTEMP\"     Assessment/Plan    Below goal AUC. Orders placed for new vancomcyin regimen of 1250 mg every 24 hours to begin at 1600 on 10/9/23.     This dosing regimen is predicted by [employer] to result in the following pharmacokinetic parameters:  Regimen: 1250 mg IV every 24 hours.  Start time: 16:12 on 10/09/2023  Exposure target: AUC24 (range)400-600 mg/L.hr   AUC24,ss: 447 mg/L.hr  Probability of AUC24 > 400: 99 %  Ctrough,ss: 6.4 mg/L  Probability of Ctrough,ss > 20: 0 %  Probability of nephrotoxicity (Lodise BURKE 2009): 4 %    The next level will be obtained on 10/11/23 at AM labs. May be obtained sooner if clinically indicated.   Will continue to monitor renal function daily while on vancomycin and order serum creatinine at least every 48 hours if not already ordered.  Follow for continued vancomycin needs, clinical response, and " signs/symptoms of toxicity.       Damaris Rick, PharmD

## 2023-10-09 NOTE — CONSULTS
"Nutrition Assessment Note  Assessment    Assessment:  Reason for Assessment  Reason for Assessment: Admission nursing screening    History:  Food and Nutrient History  Energy Intake: Good > 75 %  Food and Nutrient History: Patient reports appetite is fair. States she gets meals catered to her at home. Usually receives 14 meals per week that consist of a meat, vegetable, fruit, and sometimes a starch. Also gets 14 juice and chocolate milk cartons per week. Has an evening snack every night. Currently eating lunch - appears to have good intake.  Vitamin/Herbal Supplement Use: B12 per home meds list    Allergies   Allergen Reactions    Iodine Hives    Adhesive Tape-Silicones Rash    Gadolinium-Containing Contrast Media Rash      Dietary Orders (From admission, onward)       Start     Ordered    10/09/23 1252  Adult diet Carb Controlled; 75 gram carb/meal, 45 gram Carb evening snack; 70 gm fat, 2 - 3 gm Sodium  Diet effective now        Question Answer Comment   Diet type Carb Controlled    Carb diet selection: 75 gram carb/meal, 45 gram Carb evening snack    Other restriction(s): 70 gm fat    Other restriction(s): 2 - 3 gm Sodium        10/09/23 1252    10/07/23 2258  May Participate in Room Service With Assistance  Once        Question:  .  Answer:  Yes    10/07/23 2259                  Lab Results   Component Value Date    RZVAODSL03 124 (L) 10/04/2021      Glucose   Date/Time Value Ref Range Status   10/09/2023 04:21  (H) 74 - 99 mg/dL Final   10/08/2023 09:12  (H) 74 - 99 mg/dL Final   10/08/2023 07:09  (H) 74 - 99 mg/dL Final   10/07/2023 11:59  (H) 74 - 99 mg/dL Final         Anthropometrics:  Height: 154.9 cm (5' 0.98\")  Weight: 103 kg (227 lb 15.3 oz)  BMI (Calculated): 43.09    Weight Change  Weight History / % Weight Change: Pt reports weight gain from fluid. States usual dry weight is ~200#. Per EMR: 95.1 kg on 4/20/23.  Significant Weight Loss: No  Significant Weight Gain: Fluid " "related    IBW/kg (Dietitian Calculated): 47.7 kg     Energy Needs:  Calculated Energy Needs Using Equations  Height: 154.9 cm (5' 0.98\")  Weight Used for Equation Calculations: 103 kg (227 lb 15.3 oz)  [facility] Equation (Overweight or Obese Patients): 1476  Equation Chosen to Use by RD: [facility]  Activity Factor: 1.2  Total Energy Needs: 1771    Estimated Protein Needs  Total Protein Estimated Needs (g): 82 g  Total Protein Estimated Needs (g/kg): 0.8 g/kg  Method for Estimating Needs: ABW    Estimated Fluid Needs  Total Fluid Estimated Needs (mL): 1771 mL  Method for Estimating Needs: 1 ml/kcal    Nutrition Focused Physical Findings:  Subcutaneous Fat Loss  Orbital Fat Pads: Defer (Not indicated; pt eating lunch)    Muscle Wasting  Temporalis: Defer (Not indicated; pt eating lunch)    Edema  Edema: +3 moderate  Edema Location: BLE, per nursing flowsheets 10/8    Physical Findings (Nutrition Deficiency/Toxicity)  Skin: Positive (bruising/blisters per nursing assessment)    Diagnosis   Diagnosis:  Malnutrition Diagnosis  Patient has Malnutrition Diagnosis: No    Patient has Nutrition Diagnosis: Yes  Nutrition Diagnosis 1: Increased nutrient needs  Diagnosis Status (1): New  Related to (1): low serum B12 levels  As Evidenced by (1): B12 = 124 pg/ml this admission     Nutrition Diagnosis 2: Altered nutrition related to laboratory values  Diagnosis Status (2): New  Related to (2): steroid  As Evidenced by (2): BG levels >200 mg/dl this admission     Interventions/Recommendations   Interventions/Recommendations:   Food and/or Nutrient Delivery Interventions  Interventions: Meals and snacks, Vitamin supplement therapy    Meals and Snacks: Carbohydrate-modified diet  Goal: Add carbohydrate controlled diet restriction    Vitamin Supplement Therapy: Other (Comment) (B12)  Goal: Continue B12 supplement    Education Documentation  Nutrition/Diet, taught by Etelvina Price RD at 10/9/2023 12:53 PM.  Learner: " Patient  Readiness: Acceptance  Method: Explanation  Response: Verbalizes Understanding  Comment: Answered pt's questions regarding csardiac diet.      Monitoring and Evaluation   Monitoring/Evaluation:  Food and Nutrient Related History  Energy Intake: Estimated energy intake  Criteria: Pt consumes >75% of meals    Anthropometrics: Body Composition/Growth/Weight History  Weight: Measured weight  Criteria: Decreases weight from fluid    Biochemical Data, Medical Tests and Procedures   Glucose/Endocrine Profile: Glucose, casual  Criteria: BG within desirable range    Vitamin Profile: Other (Comment) (Serum B12)  Criteria: B12 levels WNL    Follow Up  Time Spent (min): 45 minutes  Last Date of Nutrition Visit: 10/09/23  Nutrition Follow-Up Needed?: 5-7 days

## 2023-10-09 NOTE — PROGRESS NOTES
Physical Therapy    Physical Therapy Treatment    Patient Name: Isa Jack  MRN: [id number]  Today's Date: 10/9/2023  Time Calculation  Start Time: 1102  Stop Time: 1130  Time Calculation (min): 28 min       Assessment/Plan   End of Session Patient Position: Bed, 4 rail up, Alarm on (Bed placed in chair position. Set up with lunch that arrived. Call light, phone, and traytable within reach.)  PT Plan  Inpatient/Swing Bed or Outpatient: Inpatient  PT Plan  Treatment/Interventions: Bed mobility, Transfer training, Gait training, Stair training, Balance training, Therapeutic exercise, Therapeutic activity, Home exercise program, Endurance training, Strengthening  PT Plan: Skilled PT  PT Frequency: 3 times per week  PT Discharge Recommendations: Moderate intensity level of continued care, Low intensity level of continued care (low to mod 24 hrs)  PT Recommended Transfer Status: Assist x1 (with FWW)      General Visit Information:   PT  Visit  PT Received On: 10/09/23  General  Family/Caregiver Present: No  Prior to Session Communication: Bedside nurse  Patient Position Received: Bed, 4 rail up ((L)LE elevated on pillow)  General Comment:  ((L)LE wrapped with ACE bandage(knee to ankle).)  Subjective     Precautions:  Precautions  Medical Precautions: Fall precautions    Vital Signs:  Vital Signs  Heart Rate:  (93bpm during session.)  SpO2:  (98% on 3L O2 via NC.)  Objective     Pain:  Pain Assessment  Pain Assessment:  (c/o generalized knee pain(B), but did not give a numeric pain rating. Audible crepitus with knee flexion/extension exercises. Nursing aware.)    Treatments:  Therapeutic Exercise  Therapeutic Exercise Performed:  (Instructed and assisted(PRN) patient in supine ther ex. AROM BLE with AP/QS/GS x12 and AAROM BLE with Heelslides x10(R)/x3(L) and Abd-add x12. (L)knee pain limiting her tolerance with heelslide exercises.)        Bed Mobility  Bed Mobility:  (Patient declined OOB/EOB transfer this session 2°  fatigue and going for a test soon. Patient agreeable to have bed placed in chair position to keep her upright for safely eating her lunch that arrived during session.)    Outcome Measures:  [facility] Basic Mobility  Turning from your back to your side while in a flat bed without using bedrails: A lot  Moving from lying on your back to sitting on the side of a flat bed without using bedrails: A lot  Moving to and from bed to chair (including a wheelchair): A lot  Standing up from a chair using your arms (e.g. wheelchair or bedside chair): A lot  To walk in hospital room: A lot  Climbing 3-5 steps with railing: Total  Basic Mobility - Total Score: 11    Education Documentation  Mobility Training, taught by Nadja Kern PTA at 10/9/2023  1:41 PM.  Learner: Patient  Readiness: Acceptance  Method: Explanation, Demonstration  Response: Verbalizes Understanding, Needs Reinforcement         Encounter Problems       Encounter Problems (Active)       Pain - Adult          Weakness/ impaired mobility        Pt will demonstrate mod I  with bed mobility to edge of bed.   (Progressing)       Start:  10/08/23    Expected End:  10/22/23            Pt will demonstrate mod I  with sit to stand/chair transfers with FWW.   (Progressing)       Start:  10/08/23    Expected End:  10/22/23            Pt will ambulate 40 feet with FWW SBA.   (Not Progressing)       Start:  10/08/23    Expected End:  10/22/23            Pt to demo improved BLE strength by being able to complete supine/seated thera ex 2x20 BLEs with 4 or less rest breaks .   (Progressing)       Start:  10/08/23    Expected End:  10/22/23

## 2023-10-09 NOTE — PROGRESS NOTES
Infectious Diseases Inpatient Progress Note          HISTORY OF PRESENT ILLNESS:  Follow up sepsis, left leg cellulitis, acute CHF and COPD exacerbation with questionable pneumonia, acute respiratory failure with hypoxia on IV vancomycin and Zosyn, well tolerated. Patient denies any cough.  Has persistent shortness of breath.  Severe left leg pain specially when touched.   Complaining of rash under breasts and groin area.   Currently on 3 L nasal cannula  Current Medications:    atorvastatin, 40 mg, oral, Nightly  carvedilol, 3.125 mg, oral, BID  cyanocobalamin, 2,500 mcg, oral, Daily  furosemide, 20 mg, oral, BID  insulin lispro, 0-5 Units, subcutaneous, TID with meals  ipratropium-albuteroL, 3 mL, nebulization, 4x daily  lisinopril, 2.5 mg, oral, Daily  melatonin, 3 mg, oral, Daily  methylPREDNISolone sodium succinate (PF), 40 mg, intravenous, q6h  montelukast, 10 mg, oral, Daily  perflutren lipid microspheres, 0.5 mL, intravenous, Once in imaging  piperacillin-tazobactam, 3.375 g, intravenous, q8h   And  sodium chloride 0.9%, 10 mL/hr, intravenous, q8h  polyethylene glycol, 17 g, oral, Daily  sodium chloride, 1,000 mL, intravenous, Once  vancomycin, 1,250 mg, intravenous, q24h        Allergies:  Iodine, Adhesive tape-silicones, and Gadolinium-containing contrast media      Review of Systems  14 system review is negative other than HPI  Positive rash and itching under the breasts and groin area with lack of improvement with topical treatment.   Physical Exam    Heart Rate:  []   Temp:  [36.8 °C (98.2 °F)-37.2 °C (99 °F)]   Resp:  [18-24]   BP: (119-128)/(62-72)   SpO2:  [94 %-99 %]    Vitals:    10/08/23 2313 10/09/23 0702 10/09/23 0745 10/09/23 1106   BP: 128/72  119/62    BP Location: Right arm  Left arm    Patient Position: Lying  Sitting    Pulse: 99  97    Resp: 21  21    Temp: 36.8 °C (98.2 °F)  37.2 °C (99 °F)    TempSrc: Temporal  Tympanic    SpO2: 97% 94% 96% 94%   Weight:       Height:          General Appearance: alert and oriented to person, place and time, well-developed and well-nourished, in no acute distress  On 3 L nasal cannula.  Does not use oxygen at home  Head: normocephalic and atraumatic  Eyes: anicteric sclerae  ENT: oropharynx clear and moist with normal mucous membranes. No oral thrush  Lungs: normal respiratory effort, bilateral end expiratory wheezes  Heart normal S1-S2 no murmur  Abdomen: soft, no tenderness, large pannus  No leg edema  Left leg decreased erythema, positive ulcerations most noticeable over posterior aspect, positive tenderness  Positive fungal rash under breasts and groins  Pure wick with dark yellow urine  DATA:    Lab Results   Component Value Date    WBC 18.0 (H) 10/09/2023    HGB 10.2 (L) 10/09/2023    HCT 32.4 (L) 10/09/2023     (H) 10/09/2023     10/09/2023     Lab Results   Component Value Date    CREATININE 1.00 10/09/2023    BUN 27 (H) 10/09/2023     10/09/2023    K 4.1 10/09/2023     10/09/2023    CO2 21 10/09/2023       Hepatic Function Panel:  Lab Results   Component Value Date    ALKPHOS 70 10/07/2023    ALT 11 10/07/2023    AST 17 10/07/2023    PROT 7.4 10/07/2023    BILITOT 0.6 10/07/2023       Microbiology:   Blood cultures collected on admission are negative so far  Wound cultures are pending  Imaging:   CT chest wo IV contrast    Result Date: 10/7/2023  Interpreted By:  Bassem Holbrook, STUDY: CT CHEST WO IV CONTRAST;  10/7/2023 3:51 pm   INDICATION: Signs/Symptoms:sob, tachy, sepsis, likely pneumonia on cxr.   COMPARISON: None.   ACCESSION NUMBER(S): [id number]   ORDERING CLINICIAN: SOLEDAD VOSS   TECHNIQUE: Helical data acquisition of the chest was obtained  without IV contrast.  Images were reformatted in axial, coronal, and sagittal planes.   FINDINGS: Exam limited due to motion artifact and body habitus..   LUNGS and AIRWAYS: Respiratory motion can obscure findings. Diffusely scattered interstitial edema. Mild bibasilar  atelectasis. Possible trace pleural effusions. Central airways are patent. No bronchiectasis.   MEDIASTINUM and MARIANGEL, LOWER NECK AND AXILLA: The visualized thyroid gland is grossly unremarkable.   A few prominent mediastinal lymph nodes are noted for example measuring 12 mm short axis in the lower right paratracheal region and 13 mm in the subcarinal region.   Esophagus is not dilated.   HEART and VESSELS: Severe cardiomegaly.   No significant pericardial effusion.   Severe coronary atherosclerosis.   Severe thoracic aortic atherosclerosis without aneurysm.   UPPER ABDOMEN: The visualized subdiaphragmatic structures demonstrate no acute findings on noncontrast images.   CHEST WALL and OSSEOUS STRUCTURES: No suspicious osseous lesions. Multilevel degenerative changes of the thoracic spine.       1.  Severe cardiomegaly with interstitial edema and trace pleural effusions. 2. Mild mediastinal lymphadenopathy perhaps reactive. Consider follow-up in 3-6 months to document stability.     Signed by: Bassem Holbrook 10/7/2023 4:15 PM Dictation workstation:   [id number]    XR chest 1 view    Result Date: 10/7/2023  Interpreted By:  Hector Barron, STUDY: XR CHEST 1 VIEW;  10/7/2023 12:47 pm   INDICATION: Signs/Symptoms:weakness.   COMPARISON: 09/29/2017.   ACCESSION NUMBER(S): [id number]   ORDERING CLINICIAN: SOLEDAD VOSS   FINDINGS: CARDIOMEDIASTINAL SILHOUETTE: Patient is slightly rotated to the left. Cardiomegaly and dense aortic calcification again seen.   LUNGS: There is increased irregular interstitial thickening and patchy multifocal infiltrates bilaterally greatest in the perihilar regions which may be related to vascular congestion/edema. Infection not excluded. Small effusions not excluded. No pneumothorax is seen.   ABDOMEN: No remarkable upper abdominal findings.   BONES: Degenerative changes of the shoulders are partially visualized along with mild endplate spurring of the spine.       1.  Increased irregular  interstitial thickening bilaterally with patchy multifocal infiltrates greatest in the perihilar regions which may be related to vascular congestion/edema. Infection not excluded.       MACRO: None.   Signed by: Hector Barron 10/7/2023 12:53 PM Dictation workstation:   [id number]         IMPRESSION:    Sepsis  L Leg cellulitis  Chronic venous stasis ulcers of L leg  Acute CHF/COPD exacerbation  Acute respiratory failure with hypoxia    Patient Active Problem List   Diagnosis    Adnexal mass    Asthma in adult, unspecified asthma severity, uncomplicated    Cellulitis    Chronic congestive heart failure (CMS/HCC)    Chronic pain syndrome    Coronary artery disease involving native heart with angina pectoris (CMS/HCC)    DJD (degenerative joint disease)    Endometrial hyperplasia    Essential hypertension    HLD (hyperlipidemia)    Pernicious anemia    Physical debility    Vitamin B12 deficiency    Skin ulcer of ankle (CMS/HCC)    Type 2 diabetes mellitus, without long-term current use of insulin (CMS/HCC)    Sepsis due to pneumonia (CMS/HCC)       PLAN:  DC IV vancomycin  Continue IV Zosyn for now.  May change to oral antibiotics on discharge  Follow-up blood cultures  Agree with diuresis  May consider steroid tapering as patient is improving  Oxygen support and weaning  Local wound care  Follow-up CBC BMP  Interdry for fungal rash    Discussed with patient and other   RN  Leann Cristina MD

## 2023-10-10 ENCOUNTER — APPOINTMENT (OUTPATIENT)
Dept: CARDIOLOGY | Facility: HOSPITAL | Age: 73
DRG: 871 | End: 2023-10-10
Payer: COMMERCIAL

## 2023-10-10 LAB
ATRIAL RATE: 131 BPM
ATRIAL RATE: 138 BPM
BACTERIA SPEC CULT: ABNORMAL
ERYTHROCYTE [DISTWIDTH] IN BLOOD BY AUTOMATED COUNT: 13.5 % (ref 11.5–14.5)
GLUCOSE BLD MANUAL STRIP-MCNC: 216 MG/DL (ref 74–99)
GLUCOSE BLD MANUAL STRIP-MCNC: 251 MG/DL (ref 74–99)
GLUCOSE BLD MANUAL STRIP-MCNC: 284 MG/DL (ref 74–99)
GLUCOSE BLD MANUAL STRIP-MCNC: 299 MG/DL (ref 74–99)
GRAM STN SPEC: ABNORMAL
GRAM STN SPEC: ABNORMAL
HCT VFR BLD AUTO: 33.1 % (ref 36–46)
HGB BLD-MCNC: 10.7 G/DL (ref 12–16)
MCH RBC QN AUTO: 31.8 PG (ref 26–34)
MCHC RBC AUTO-ENTMCNC: 32.3 G/DL (ref 32–36)
MCV RBC AUTO: 99 FL (ref 80–100)
NRBC BLD-RTO: 0 /100 WBCS (ref 0–0)
P AXIS: 70 DEGREES
P AXIS: 73 DEGREES
P OFFSET: 170 MS
P OFFSET: 175 MS
P ONSET: 121 MS
P ONSET: 127 MS
PLATELET # BLD AUTO: 215 X10*3/UL (ref 150–450)
PMV BLD AUTO: 10.8 FL (ref 7.5–11.5)
PR INTERVAL: 182 MS
PR INTERVAL: 200 MS
Q ONSET: 218 MS
Q ONSET: 221 MS
QRS COUNT: 22 BEATS
QRS COUNT: 22 BEATS
QRS DURATION: 78 MS
QRS DURATION: 86 MS
QT INTERVAL: 238 MS
QT INTERVAL: 354 MS
QTC CALCULATION(BAZETT): 360 MS
QTC CALCULATION(BAZETT): 522 MS
QTC FREDERICIA: 314 MS
QTC FREDERICIA: 459 MS
R AXIS: -6 DEGREES
R AXIS: 2 DEGREES
RBC # BLD AUTO: 3.36 X10*6/UL (ref 4–5.2)
T AXIS: -3 DEGREES
T AXIS: 51 DEGREES
T OFFSET: 340 MS
T OFFSET: 395 MS
VENTRICULAR RATE: 131 BPM
VENTRICULAR RATE: 138 BPM
WBC # BLD AUTO: 14.4 X10*3/UL (ref 4.4–11.3)

## 2023-10-10 PROCEDURE — 82947 ASSAY GLUCOSE BLOOD QUANT: CPT

## 2023-10-10 PROCEDURE — 94640 AIRWAY INHALATION TREATMENT: CPT

## 2023-10-10 PROCEDURE — 93010 ELECTROCARDIOGRAM REPORT: CPT | Performed by: INTERNAL MEDICINE

## 2023-10-10 PROCEDURE — 85027 COMPLETE CBC AUTOMATED: CPT | Performed by: STUDENT IN AN ORGANIZED HEALTH CARE EDUCATION/TRAINING PROGRAM

## 2023-10-10 PROCEDURE — 2500000004 HC RX 250 GENERAL PHARMACY W/ HCPCS (ALT 636 FOR OP/ED): Performed by: STUDENT IN AN ORGANIZED HEALTH CARE EDUCATION/TRAINING PROGRAM

## 2023-10-10 PROCEDURE — 2500000001 HC RX 250 WO HCPCS SELF ADMINISTERED DRUGS (ALT 637 FOR MEDICARE OP): Performed by: STUDENT IN AN ORGANIZED HEALTH CARE EDUCATION/TRAINING PROGRAM

## 2023-10-10 PROCEDURE — 2500000002 HC RX 250 W HCPCS SELF ADMINISTERED DRUGS (ALT 637 FOR MEDICARE OP, ALT 636 FOR OP/ED): Performed by: STUDENT IN AN ORGANIZED HEALTH CARE EDUCATION/TRAINING PROGRAM

## 2023-10-10 PROCEDURE — 99223 1ST HOSP IP/OBS HIGH 75: CPT | Performed by: INTERNAL MEDICINE

## 2023-10-10 PROCEDURE — 93005 ELECTROCARDIOGRAM TRACING: CPT

## 2023-10-10 PROCEDURE — 36415 COLL VENOUS BLD VENIPUNCTURE: CPT | Performed by: STUDENT IN AN ORGANIZED HEALTH CARE EDUCATION/TRAINING PROGRAM

## 2023-10-10 PROCEDURE — 2500000001 HC RX 250 WO HCPCS SELF ADMINISTERED DRUGS (ALT 637 FOR MEDICARE OP): Performed by: INTERNAL MEDICINE

## 2023-10-10 PROCEDURE — 1200000002 HC GENERAL ROOM WITH TELEMETRY DAILY

## 2023-10-10 PROCEDURE — 99232 SBSQ HOSP IP/OBS MODERATE 35: CPT | Performed by: STUDENT IN AN ORGANIZED HEALTH CARE EDUCATION/TRAINING PROGRAM

## 2023-10-10 RX ORDER — MUPIROCIN 20 MG/G
OINTMENT TOPICAL 3 TIMES DAILY
Status: DISCONTINUED | OUTPATIENT
Start: 2023-10-10 | End: 2023-10-14 | Stop reason: HOSPADM

## 2023-10-10 RX ORDER — NYSTATIN 100000 [USP'U]/G
POWDER TOPICAL 2 TIMES DAILY
Status: DISCONTINUED | OUTPATIENT
Start: 2023-10-10 | End: 2023-10-14 | Stop reason: HOSPADM

## 2023-10-10 RX ADMIN — SODIUM CHLORIDE 10 ML/HR: 9 INJECTION, SOLUTION INTRAVENOUS at 20:13

## 2023-10-10 RX ADMIN — IPRATROPIUM BROMIDE AND ALBUTEROL SULFATE 3 ML: 2.5; .5 SOLUTION RESPIRATORY (INHALATION) at 15:36

## 2023-10-10 RX ADMIN — HYDROCODONE BITARTRATE AND ACETAMINOPHEN 1 TABLET: 5; 325 TABLET ORAL at 20:04

## 2023-10-10 RX ADMIN — FUROSEMIDE 40 MG: 10 INJECTION, SOLUTION INTRAMUSCULAR; INTRAVENOUS at 12:23

## 2023-10-10 RX ADMIN — PIPERACILLIN SODIUM AND TAZOBACTAM SODIUM 3.38 G: 3; .375 INJECTION, SOLUTION INTRAVENOUS at 04:40

## 2023-10-10 RX ADMIN — IPRATROPIUM BROMIDE AND ALBUTEROL SULFATE 3 ML: 2.5; .5 SOLUTION RESPIRATORY (INHALATION) at 11:51

## 2023-10-10 RX ADMIN — PIPERACILLIN SODIUM AND TAZOBACTAM SODIUM 3.38 G: 3; .375 INJECTION, SOLUTION INTRAVENOUS at 20:11

## 2023-10-10 RX ADMIN — MONTELUKAST SODIUM 10 MG: 10 TABLET, FILM COATED ORAL at 08:30

## 2023-10-10 RX ADMIN — NYSTATIN: 100000 POWDER TOPICAL at 20:07

## 2023-10-10 RX ADMIN — MUPIROCIN: 20 OINTMENT TOPICAL at 21:00

## 2023-10-10 RX ADMIN — METHYLPREDNISOLONE SODIUM SUCCINATE 40 MG: 40 INJECTION, POWDER, FOR SOLUTION INTRAMUSCULAR; INTRAVENOUS at 08:30

## 2023-10-10 RX ADMIN — FUROSEMIDE 40 MG: 10 INJECTION, SOLUTION INTRAMUSCULAR; INTRAVENOUS at 02:16

## 2023-10-10 RX ADMIN — IPRATROPIUM BROMIDE AND ALBUTEROL SULFATE 3 ML: 2.5; .5 SOLUTION RESPIRATORY (INHALATION) at 06:58

## 2023-10-10 RX ADMIN — ATORVASTATIN CALCIUM 40 MG: 20 TABLET, FILM COATED ORAL at 20:04

## 2023-10-10 RX ADMIN — FUROSEMIDE 40 MG: 10 INJECTION, SOLUTION INTRAMUSCULAR; INTRAVENOUS at 12:25

## 2023-10-10 RX ADMIN — METHYLPREDNISOLONE SODIUM SUCCINATE 40 MG: 40 INJECTION, POWDER, FOR SOLUTION INTRAMUSCULAR; INTRAVENOUS at 02:15

## 2023-10-10 RX ADMIN — KETOCONAZOLE: 20 CREAM TOPICAL at 03:22

## 2023-10-10 RX ADMIN — NYSTATIN: 100000 POWDER TOPICAL at 09:00

## 2023-10-10 RX ADMIN — PIPERACILLIN SODIUM AND TAZOBACTAM SODIUM 3.38 G: 3; .375 INJECTION, SOLUTION INTRAVENOUS at 12:19

## 2023-10-10 RX ADMIN — INSULIN LISPRO 3 UNITS: 100 INJECTION, SOLUTION INTRAVENOUS; SUBCUTANEOUS at 11:09

## 2023-10-10 RX ADMIN — CYANOCOBALAMIN TAB 500 MCG 2500 MCG: 500 TAB at 08:29

## 2023-10-10 RX ADMIN — LISINOPRIL 2.5 MG: 5 TABLET ORAL at 08:29

## 2023-10-10 RX ADMIN — INSULIN LISPRO 3 UNITS: 100 INJECTION, SOLUTION INTRAVENOUS; SUBCUTANEOUS at 07:00

## 2023-10-10 RX ADMIN — IPRATROPIUM BROMIDE AND ALBUTEROL SULFATE 3 ML: 2.5; .5 SOLUTION RESPIRATORY (INHALATION) at 20:01

## 2023-10-10 RX ADMIN — Medication 3 MG: at 20:04

## 2023-10-10 RX ADMIN — CARVEDILOL 3.12 MG: 3.12 TABLET, FILM COATED ORAL at 08:29

## 2023-10-10 RX ADMIN — SODIUM CHLORIDE 10 ML/HR: 9 INJECTION, SOLUTION INTRAVENOUS at 13:00

## 2023-10-10 RX ADMIN — SODIUM CHLORIDE 10 ML/HR: 9 INJECTION, SOLUTION INTRAVENOUS at 04:41

## 2023-10-10 RX ADMIN — CARVEDILOL 3.12 MG: 3.12 TABLET, FILM COATED ORAL at 20:04

## 2023-10-10 RX ADMIN — KETOCONAZOLE: 20 CREAM TOPICAL at 09:00

## 2023-10-10 RX ADMIN — MUPIROCIN: 20 OINTMENT TOPICAL at 13:32

## 2023-10-10 RX ADMIN — INSULIN LISPRO 3 UNITS: 100 INJECTION, SOLUTION INTRAVENOUS; SUBCUTANEOUS at 16:31

## 2023-10-10 ASSESSMENT — COGNITIVE AND FUNCTIONAL STATUS - GENERAL
DAILY ACTIVITIY SCORE: 11
CLIMB 3 TO 5 STEPS WITH RAILING: TOTAL
DRESSING REGULAR UPPER BODY CLOTHING: TOTAL
TOILETING: TOTAL
MOBILITY SCORE: 11
MOVING TO AND FROM BED TO CHAIR: A LOT
PERSONAL GROOMING: A LITTLE
TURNING FROM BACK TO SIDE WHILE IN FLAT BAD: A LOT
HELP NEEDED FOR BATHING: TOTAL
MOVING FROM LYING ON BACK TO SITTING ON SIDE OF FLAT BED WITH BEDRAILS: A LOT
STANDING UP FROM CHAIR USING ARMS: A LOT
DRESSING REGULAR LOWER BODY CLOTHING: TOTAL
WALKING IN HOSPITAL ROOM: A LOT

## 2023-10-10 ASSESSMENT — PAIN - FUNCTIONAL ASSESSMENT
PAIN_FUNCTIONAL_ASSESSMENT: 0-10

## 2023-10-10 ASSESSMENT — PAIN SCALES - GENERAL
PAINLEVEL_OUTOF10: 0 - NO PAIN
PAINLEVEL_OUTOF10: 3
PAINLEVEL_OUTOF10: 8

## 2023-10-10 NOTE — CONSULTS
Cardiology Consult Note      Date:   10/10/2023  Patient name:  Isa Jack  Date of admission:  10/7/2023 11:27 AM  MRN:   [id number]  YOB: 1950  Time of Consult:  10:41 AM    Consulting Cardiologist: Dr. Richie Webber      Primary Cardiologist:    Referring Provider:      Admission Diagnosis:     Sepsis due to pneumonia (CMS/[facility])      History of Present Illness:      Isa Jack is a 72 y.o.  female patient who is being at the request of Dr. Brandon for inpatient consultation of CHF. She was admitted on 10/7/2023.  Patient evidently came to the hospital because of worsening cellulitis of her left leg.  She was placed on antibiotics.  She is a morbidly obese female of 72 years of age.  In the course of her interview she describes having had 2 or 3 heart catheterizations in the past when she lived in [place] with the [facility].  She was told that she had some type of blockage and should have surgery but because of the passing of her sister at the time that her sister had heart surgery she declined.  She is been on medical management ever since.  She has seen various other physicians but does not have an ongoing cardiologist for several years.  Her primary care doctor at the [facility] had made arrangements for her to have a cardiology consult in the near future in the outpatient setting.  A bedside echo was done here which did show significant cardiomyopathy and significant valvular regurgitation and pulmonary hypertension.  See the report below for details.  Patient's not having any angina at this time but does have some on occasion.  She does have chronic shortness of breath.  She is fairly immobile due to her morbid obesity and cellulitis.  She denies any symptoms to suggest unstable angina.  EKGs have shown sinus rhythm and sinus tachycardia with nonspecific ST changes and occasional PVC.  She does not have any troponins on the chart.  He is  diabetic and blood sugars are elevated.  She is mildly anemic.  Creatinine is stable at 1.0.      Allergies:     Allergies   Allergen Reactions    Iodine Hives    Adhesive Tape-Silicones Rash    Gadolinium-Containing Contrast Media Rash         Past Medical History:     Past Medical History:   Diagnosis Date    Diabetes mellitus (CMS/[facility])     Hyperlipemia     Hypertension     Morbid (severe) obesity due to excess calories (CMS/[facility]) 10/28/2022    Class 3 severe obesity due to excess calories with serious comorbidity and body mass index (BMI) of 40.0 to 44.9 in adult    Non-pressure chronic ulcer of left ankle with unspecified severity (CMS/[facility]) 2020    Ulcer of left ankle   Coronary artery disease  Chronic congestive heart failure  Cardiomyopathy  COPD  Past Surgical History:     Cataract surgery  Possible cholecystectomy    Family History:     Sister with coronary heart disease  Hypertension diabetes in the family      Social History:     Social History     Tobacco Use    Smoking status: Former     Types: Cigarettes     Quit date:      Years since quittin.7    Smokeless tobacco: Never   Substance Use Topics    Alcohol use: Never    Drug use: Never       CURRENT MEDICATIONS    atorvastatin, 40 mg, oral, Nightly  carvedilol, 3.125 mg, oral, BID  cyanocobalamin, 2,500 mcg, oral, Daily  furosemide, 40 mg, intravenous, q12h  insulin lispro, 0-5 Units, subcutaneous, TID with meals  ipratropium-albuteroL, 3 mL, nebulization, 4x daily  ketoconazole, , Topical, Daily  lisinopril, 2.5 mg, oral, Daily  melatonin, 3 mg, oral, Daily  methylPREDNISolone sodium succinate (PF), 40 mg, intravenous, q8h  montelukast, 10 mg, oral, Daily  nystatin, , Topical, BID  piperacillin-tazobactam, 3.375 g, intravenous, q8h   And  sodium chloride 0.9%, 10 mL/hr, intravenous, q8h  polyethylene glycol, 17 g, oral, Daily  sodium chloride, 1,000 mL, intravenous, Once      [Held by provider] sodium chloride 0.9%, 100 mL/hr, Last Rate:  "100 mL/hr (10/08/23 0049)      Current Outpatient Medications   Medication Instructions    acetaminophen (TYLENOL 8 HOUR) 650 mg, oral, Every 12 hours PRN    albuterol (ProAir HFA) 90 mcg/actuation inhaler 2 puffs, inhalation, Every 4 hours PRN    albuterol 90 mcg/actuation inhaler 2 puffs, inhalation, Every 4 hours PRN, OK to substitute in class    albuterol 2.5 mg, nebulization, Every 4 hours PRN    albuterol 2.5 mg, nebulization, Every 4 hours PRN    atorvastatin (LIPITOR) 40 mg, oral, Daily    atorvastatin (LIPITOR) 40 mg, oral, Nightly    carvedilol (COREG) 6.25 mg, oral, 2 times daily    carvedilol (COREG) 6.25 mg, oral, 2 times daily with meals    cefadroxil (DURICEF) 1,000 mg, oral, 2 times daily    clotrimazole (Lotrimin) 1 % cream 1 Application, Topical, 2 times daily, Apply to affected areas 2-3 times daily    cyanocobalamin, vitamin B-12, 2,500 mcg tablet, sublingual 1 each, sublingual, Daily RT    elastic bandage (BAND-AID ACTIVE FLEX TOP) USE AS DIRECTED. apply 1 4x4\" bandage to open wound once daily    elastic bandage bandage USE AS DIRECTED.    furosemide (LASIX) 40 mg, oral, 2 times daily    furosemide (LASIX) 40 mg, oral, 2 times daily    HYDROcodone-acetaminophen (Norco) 5-325 mg tablet 1 tablet, oral, 2 times daily PRN    HYDROcodone-acetaminophen (Norco) 5-325 mg tablet 1 tablet, oral, 2 times daily PRN    HYDROcodone-acetaminophen (Norco) 5-325 mg tablet 1 tablet, oral, 2 times daily    lisinopril 2.5 mg, oral, Daily    meloxicam (MOBIC) 7.5 mg, oral, Daily PRN    metFORMIN (GLUCOPHAGE) 1,000 mg, oral, 2 times daily    metFORMIN (GLUCOPHAGE) 1,000 mg, oral, 2 times daily    methadone (DOLOPHINE) 5 mg, oral, 2 times daily    montelukast (SINGULAIR) 10 mg, oral, Daily    montelukast (SINGULAIR) 10 mg, oral, Nightly    mupirocin (Bactroban) 2 % ointment 1 Application, Topical, 2 times daily PRN    naloxone (NARCAN) 4 mg, nasal, As needed    nitroglycerin (NITROSTAT) 0.4 mg, sublingual, Every 5 min " PRN    nitroglycerin (NITROSTAT) 0.4 mg, sublingual, Every 5 min PRN    OneTouch Ultra Test strip 2 times daily    potassium chloride CR (Klor-Con M20) 20 mEq ER tablet 20 mEq, oral, 2 times daily    potassium chloride CR 20 mEq ER tablet 20 mEq, oral, 2 times daily        Review of Systems:      12 point review of systems was obtained in detail and is negative other than that detailed above.    Vital Signs:     Vitals:    10/09/23 2052 10/09/23 2253 10/10/23 0658 10/10/23 0700   BP:  125/67  173/83   BP Location:  Left arm  Right arm   Patient Position:  Lying  Sitting   Pulse:  90  96   Resp:  20  12   Temp:  36.2 °C (97.2 °F)  37 °C (98.6 °F)   TempSrc:  Temporal  Temporal   SpO2: 97% 93% 96% 95%   Weight:       Height:           Intake/Output Summary (Last 24 hours) at 10/10/2023 1041  Last data filed at 10/10/2023 0700  Gross per 24 hour   Intake --   Output 1100 ml   Net -1100 ml       Wt Readings from Last 4 Encounters:   10/09/23 103 kg (227 lb 15.3 oz)   04/20/23 95.1 kg (209 lb 9.6 oz)   10/28/22 65.9 kg (145 lb 6 oz)   09/27/21 103 kg (226 lb 2 oz)       Physical Examination:     GENERAL APPEARANCE: Well developed, well nourished, in no acute distress.  Patient is morbidly obese  CHEST: Symmetric and non-tender.  INTEGUMENT: Skin warm and dry, without gross excoriationis or lesions.  HEENT: No gross abnormalities of conjunctiva,oral mucosa, patient is edentulous  NECK: Supple, no JVD, no bruit. Thyroid not palpable. Carotid upstrokes normal.  NEURO/PSHCY: Alert and oriented x3; appropriate behavior and responses and responses, grossly normal cerebellar function with normal balance and coordination  LUNGS: Diminished breath sounds in the bases with scattered rhonchi, no wheezing or rales  HEART: Heart sounds are regular, S1 and S2 are normal, soft systolic murmur at the left and right sternal border and apex, no diastolic murmur, PMI is laterally displaced.  No gallops clicks or rubs.  ABDOMEN: Soft,  "nontender, no palpable hepatosplenomegaly, no mases, no bruits. Abdominal aorta not noted to be enlarged.  MUSCULOSKELETAL: Ambulatory with normal tandem gait.  EXTREMITIES: Bilateral chronic edema with chronic cellulitis left leg is wrapped  PERIPHERAL VASCULAR: Pulses present and equally palpable; 2+ throughout. No femoral bruits.      Lab:     CBC:   Lab Results   Component Value Date    WBC 14.4 (H) 10/10/2023    RBC 3.36 (L) 10/10/2023    HGB 10.7 (L) 10/10/2023    HCT 33.1 (L) 10/10/2023     10/10/2023        CMP:    Lab Results   Component Value Date     10/09/2023    K 4.1 10/09/2023     10/09/2023    CO2 21 10/09/2023    BUN 27 (H) 10/09/2023    CREATININE 1.00 10/09/2023    GLUCOSE 247 (H) 10/09/2023    CALCIUM 8.6 10/09/2023       Magnesium:    Lab Results   Component Value Date    MG 1.68 10/07/2023       Lipid Profile:    No results found for: \"CHLPL\", \"TRIG\", \"HDL\", \"LDLCALC\", \"LDLDIRECT\"    TSH:    Lab Results   Component Value Date    TSH 0.84 10/07/2023       BNP:   Lab Results   Component Value Date     (H) 10/07/2023        PT/INR:    Lab Results   Component Value Date    PROTIME 10.7 10/07/2023    INR 1.0 10/07/2023       HgBA1c:    Lab Results   Component Value Date    HGBA1C 6.5 (H) 08/17/2023       BMP:  Lab Results   Component Value Date     10/09/2023     (L) 10/08/2023     (L) 10/08/2023    K 4.1 10/09/2023    K 3.9 10/08/2023    K 3.8 10/08/2023     10/09/2023     10/08/2023     10/08/2023    CO2 21 10/09/2023    CO2 21 10/08/2023    CO2 21 10/08/2023    BUN 27 (H) 10/09/2023    BUN 23 10/08/2023    BUN 22 10/08/2023    CREATININE 1.00 10/09/2023    CREATININE 0.97 10/08/2023    CREATININE 0.96 10/08/2023       CBC:  Lab Results   Component Value Date    WBC 14.4 (H) 10/10/2023    WBC 18.0 (H) 10/09/2023    WBC 16.8 (H) 10/08/2023    RBC 3.36 (L) 10/10/2023    RBC 3.21 (L) 10/09/2023    RBC 2.99 (L) 10/08/2023    HGB 10.7 (L) " 10/10/2023    HGB 10.2 (L) 10/09/2023    HGB 9.5 (L) 10/08/2023    HCT 33.1 (L) 10/10/2023    HCT 32.4 (L) 10/09/2023    HCT 30.2 (L) 10/08/2023    MCV 99 10/10/2023     (H) 10/09/2023     (H) 10/08/2023    MCH 31.8 10/10/2023    MCH 31.8 10/09/2023    MCH 31.8 10/08/2023    MCHC 32.3 10/10/2023    MCHC 31.5 (L) 10/09/2023    MCHC 31.5 (L) 10/08/2023    RDW 13.5 10/10/2023    RDW 13.7 10/09/2023    RDW 13.8 10/08/2023     10/10/2023     10/09/2023     10/08/2023    MPV 10.8 10/10/2023    MPV 12.0 (H) 10/09/2023    MPV 11.2 10/08/2023       Cardiac Enzymes:    Lab Results   Component Value Date    TROPHS 55 (HH) 10/07/2023    TROPHS 46 (H) 10/07/2023    TROPHS 20 (H) 10/07/2023       Hepatic Function Panel:    Lab Results   Component Value Date    ALKPHOS 70 10/07/2023    ALT 11 10/07/2023    AST 17 10/07/2023    PROT 7.4 10/07/2023    BILITOT 0.6 10/07/2023       Diagnostic Studies:     CT chest wo IV contrast    Result Date: 10/7/2023  Interpreted By:  Bassem Holbrook, STUDY: CT CHEST WO IV CONTRAST;  10/7/2023 3:51 pm   INDICATION: Signs/Symptoms:sob, tachy, sepsis, likely pneumonia on cxr.   COMPARISON: None.   ACCESSION NUMBER(S): [id number]   ORDERING CLINICIAN: SOLEDAD VOSS   TECHNIQUE: Helical data acquisition of the chest was obtained  without IV contrast.  Images were reformatted in axial, coronal, and sagittal planes.   FINDINGS: Exam limited due to motion artifact and body habitus..   LUNGS and AIRWAYS: Respiratory motion can obscure findings. Diffusely scattered interstitial edema. Mild bibasilar atelectasis. Possible trace pleural effusions. Central airways are patent. No bronchiectasis.   MEDIASTINUM and MARIANGEL, LOWER NECK AND AXILLA: The visualized thyroid gland is grossly unremarkable.   A few prominent mediastinal lymph nodes are noted for example measuring 12 mm short axis in the lower right paratracheal region and 13 mm in the subcarinal region.   Esophagus is not  dilated.   HEART and VESSELS: Severe cardiomegaly.   No significant pericardial effusion.   Severe coronary atherosclerosis.   Severe thoracic aortic atherosclerosis without aneurysm.   UPPER ABDOMEN: The visualized subdiaphragmatic structures demonstrate no acute findings on noncontrast images.   CHEST WALL and OSSEOUS STRUCTURES: No suspicious osseous lesions. Multilevel degenerative changes of the thoracic spine.       1.  Severe cardiomegaly with interstitial edema and trace pleural effusions. 2. Mild mediastinal lymphadenopathy perhaps reactive. Consider follow-up in 3-6 months to document stability.     Signed by: Bassem Holbrook 10/7/2023 4:15 PM Dictation workstation:   [id number]    XR chest 1 view    Result Date: 10/7/2023  Interpreted By:  Hector Barron, STUDY: XR CHEST 1 VIEW;  10/7/2023 12:47 pm   INDICATION: Signs/Symptoms:weakness.   COMPARISON: 09/29/2017.   ACCESSION NUMBER(S): [id number]   ORDERING CLINICIAN: SOLEDAD WIRE   FINDINGS: CARDIOMEDIASTINAL SILHOUETTE: Patient is slightly rotated to the left. Cardiomegaly and dense aortic calcification again seen.   LUNGS: There is increased irregular interstitial thickening and patchy multifocal infiltrates bilaterally greatest in the perihilar regions which may be related to vascular congestion/edema. Infection not excluded. Small effusions not excluded. No pneumothorax is seen.   ABDOMEN: No remarkable upper abdominal findings.   BONES: Degenerative changes of the shoulders are partially visualized along with mild endplate spurring of the spine.       1.  Increased irregular interstitial thickening bilaterally with patchy multifocal infiltrates greatest in the perihilar regions which may be related to vascular congestion/edema. Infection not excluded.       MACRO: None.   Signed by: Hector Barron 10/7/2023 12:53 PM Dictation workstation:   [id number]              [place] [address]   Tel [phone]  Fax [phone]     TRANSTHORACIC ECHOCARDIOGRAM REPORT        Patient Name:      DINA LANG     Reading Physician:    [id number] Richie Webber DO  Study Date:        10/9/2023            Ordering Provider:    [id number] DAVALOS  MRN/PID:           [id number]             Fellow:  Accession#:        [id number]         Nurse:  Date of Birth/Age: 1950 / 72      Sonographer:          Elaine Colon RDCS                     years  Gender:            F                    Additional Staff:  Height:            152.40 cm            Admit Date:           10/7/2023  Weight:            102.97 kg            Admission Status:     Inpatient -                                                                Routine  BSA:               1.97 m2              Department Location:  [facility]                                                                Echo Lab  Blood Pressure: 128 /72 mmHg     Study Type:    TRANSTHORACIC ECHO (TTE) COMPLETE  Diagnosis/ICD: Acute pulmonary edema-J81.0  Indication:    Acute Pulmonary Edema  CPT Codes:     Echo Complete w Full Doppler-43467     Patient History:  Smoker:            Former.  Diabetes:          Yes  Pertinent History: CAD, CHF, HTN, Hyperlipidemia and Asthma, COPD, Shortness of                     Breath.     Study Detail: The following Echo studies were performed: 2D, M-Mode, Doppler and                color flow. Technically challenging study due to poor acoustic                windows, body habitus, patient lying in supine position and                prominent lung artifact. Definity used as a contrast agent for                endocardial border definition. Total contrast used for this                procedure was 3 mL via IV push.        PHYSICIAN INTERPRETATION:  Left Ventricle: Left ventricular systolic function is moderately to severely  decreased, with an estimated ejection fraction of 25-30%. There is global hypokinesis of the left ventricle with minor regional variations. The left ventricular cavity size is normal. Spectral Doppler shows a pseudonormal pattern of left ventricular diastolic filling.  Left Atrium: The left atrium is normal in size.  Right Ventricle: The right ventricle is normal in size. There is normal right ventricular global systolic function.  Right Atrium: The right atrium is normal in size.  Aortic Valve: The aortic valve appears structurally normal. The aortic valve appears tricuspid. There is no evidence of aortic valve stenosis.  There is no evidence of aortic valve regurgitation. The peak instantaneous gradient of the aortic valve is 12.2 mmHg. The mean gradient of the aortic valve is 7.0 mmHg.  Mitral Valve: The mitral valve is normal in structure. There is no evidence of mitral valve stenosis. The doppler estimated mean and peak diastolic pressure gradients are 5.0 mmHg and 16.3 mmHg respectively. There is normal mitral valve leaflet mobility. There is mild mitral annular calcification. There is moderate to severe mitral valve regurgitation.  Tricuspid Valve: The tricuspid valve is structurally normal. There is normal tricuspid valve leaflet mobility. There is mild tricuspid regurgitation.  Pulmonic Valve: The pulmonic valve is structurally normal. There is no indication of pulmonic valve regurgitation.  Pericardium: There is no pericardial effusion noted.  Aorta: The aortic root is normal.  Pulmonary Artery: Pulmonary hypertension is present. The tricuspid regurgitant velocity is 2.90 m/s, and with an estimated right atrial pressure of 20 mmHg, the estimated pulmonary artery pressure is moderate to severely elevated with the RVSP at 53.6 mmHg.  Systemic Veins: The inferior vena cava appears moderately dilated.  In comparison to the previous echocardiogram(s): There are no prior studies on this patient for comparison  purposes.        CONCLUSIONS:   1. Left ventricular systolic function is moderately to severely decreased with a 25-30% estimated ejection fraction.   2. Spectral Doppler shows a pseudonormal pattern of left ventricular diastolic filling.   3. There is no evidence of mitral valve stenosis.   4. Moderate to severe mitral valve regurgitation.   5. Mild tricuspid regurgitation is visualized.   6. Aortic valve stenosis is not present.   7. Moderate to severely elevated pulmonary artery pressure.   8. Pulmonary hypertension is present.   9. The inferior vena cava appears moderately dilated.  10. There is global hypokinesis of the left ventricle with minor regional variations.     RECOMMENDATIONS:  Technically suboptimal and limited study, therefore accuracy of above interpretation could be substantially diminished. Clinical correlation is advised. Consider additional imaging modalities if clinically indicated.  Radiology:     Transthoracic Echo (TTE) Complete   Final Result      CT chest wo IV contrast   Final Result   1.  Severe cardiomegaly with interstitial edema and trace pleural   effusions.   2. Mild mediastinal lymphadenopathy perhaps reactive. Consider   follow-up in 3-6 months to document stability.             Signed by: Bassem Holbrook 10/7/2023 4:15 PM   Dictation workstation:   [id number]      XR chest 1 view   Final Result   1.  Increased irregular interstitial thickening bilaterally with   patchy multifocal infiltrates greatest in the perihilar regions which   may be related to vascular congestion/edema. Infection not excluded.                  MACRO:   None.        Signed by: Hector Barron 10/7/2023 12:53 PM   Dictation workstation:   [id number]          Problem List:     Patient Active Problem List   Diagnosis    Adnexal mass    Asthma in adult, unspecified asthma severity, uncomplicated    Cellulitis    Chronic congestive heart failure (CMS/HCC)    Chronic pain syndrome    Coronary artery disease  involving native heart with angina pectoris (CMS/HCC)    DJD (degenerative joint disease)    Endometrial hyperplasia    Essential hypertension    HLD (hyperlipidemia)    Pernicious anemia    Physical debility    Vitamin B12 deficiency    Skin ulcer of ankle (CMS/HCC)    Type 2 diabetes mellitus, without long-term current use of insulin (CMS/HCC)    Sepsis due to pneumonia (CMS/HCC)     72-year-old female seen evaluate the bedside in the telemetry unit.    Bedside examination evaluation interview were performed by me.    Chart was reviewed in detail including lab, EKG, x-rays none to date available.  Also reviewed any outside records that were currently available and discussed with the patient and staff.    Pression:  Atherosclerotic heart disease functional class II  Cardiomyopathy likely ischemic  Pulmonary hypertension  Mitral and tricuspid regurgitation  No active angina  Possible pneumonia  Cellulitis  Diabetes  COPD  Hypertension  Hyperlipidemia      Assessment:           Plan:   We will maximally medicate patient switching lisinopril to Entresto  Continue beta-blocker and diuretic  Patient absolutely declines any invasive testing or surgical considerations  Consider additional medical therapy after making the above changes  Continue antibiotic  Eventually will arrange follow-up with our office if the patient so chooses alternatively she may return to the care of [facility] physicians.  We will continue to follow.      Thank you for the opportunity to participate in the care of your patient.  Please do not hesitate to call if you have any questions.    Electronically signed by Richie Webber DO, on 10/10/2023 at 10:41 AM

## 2023-10-10 NOTE — NURSING NOTE
"Late entry: Patient awake all night, crying frequently, patient stated \"People are in the hallway conspiring against me because they accused me of sneaking vinay crackers yesterday and dug in the garbage can for the wrappers.\" Patient alert to self, place and situation. Patient stated she does not need the supervisor to come in and talk to her and that she will address it with the doctor.  "

## 2023-10-10 NOTE — PROGRESS NOTES
"Isa Jack is a 72 y.o. female on day 3 of admission presenting with Sepsis due to pneumonia (CMS/HCC).    Subjective   Patient seen and examined.  She was upset about a few things that had been happening.  Says that she waited after reading the called out for 30 minutes but nobody responded.  She is also worried about her blood pressure running high.  Wants to go home by Friday.  Denies any fevers or chills, does feel short of breath, denies being on oxygen.  No chest pain, nausea or vomiting.       Objective     Physical Exam  HENT:      Head: Normocephalic and atraumatic.      Nose: No congestion or rhinorrhea.      Mouth/Throat:      Mouth: Mucous membranes are moist.   Eyes:      Extraocular Movements: Extraocular movements intact.      Pupils: Pupils are equal, round, and reactive to light.   Cardiovascular:      Rate and Rhythm: Normal rate and regular rhythm.      Pulses: Normal pulses.      Heart sounds: Normal heart sounds. No murmur heard.  Pulmonary:      Effort: No respiratory distress.      Breath sounds: Wheezing present. No rales.   Abdominal:      General: Abdomen is flat. Bowel sounds are normal. There is no distension.      Palpations: Abdomen is soft.   Musculoskeletal:         General: Swelling present.      Cervical back: Normal range of motion and neck supple. No rigidity.      Right lower leg: Edema present.      Left lower leg: Edema present.   Skin:     Findings: Erythema and lesion present.   Neurological:      General: No focal deficit present.      Mental Status: She is alert and oriented to person, place, and time  Last Recorded Vitals  Blood pressure 173/83, pulse 96, temperature 37 °C (98.6 °F), temperature source Temporal, resp. rate 12, height 1.549 m (5' 0.98\"), weight 103 kg (227 lb 15.3 oz), SpO2 95 %.  Intake/Output last 3 Shifts:  I/O last 3 completed shifts:  In: 925 (8.9 mL/kg) [P.O.:875; IV Piggyback:50]  Out: 350 (3.4 mL/kg) [Urine:350 (0.1 mL/kg/hr)]  Weight: 103.4 " kg     Relevant Results           This patient currently has cardiac telemetry ordered; if you would like to modify or discontinue the telemetry order, click here to go to the orders activity to modify/discontinue the order.                 Assessment/Plan   Principal Problem:    Sepsis due to pneumonia (CMS/HCC)  Left lower extremity cellulitis/infected wound    We will continue vanc, Zosyn and Zithromax  Seems like BMP was not sent  Strep pneumo and Legionella were negative procalcitonin was high at 15.29  Wound culture is growing Pseudomonas susceptibilities pending  Seems like she is in fluid overload due to the fluid she received  Has an EF of 25%  Lasix has been resumed which she will continue  Cardiology consulted  Patient refuses any further work-up for her ischemic heart disease  I will discontinue Solu-Medrol, continue DuoNebs, nasal cannula  Cardiology to manage further heart failure medications  Monitor blood pressure, resume antihypertensives  DVT prophylaxis  Send labs for tomorrow  ID has been consulted               Don Santacruz MD

## 2023-10-10 NOTE — PROGRESS NOTES
Occupational Therapy                 Therapy Communication Note    Patient Name: Isa Jack  MRN: [id number]  Today's Date: 10/10/2023     Discipline: Occupational Therapy    Missed Visit Reason:      Missed Time: Attempt    Comment: attempted to see pt this date however pt stating she is not feeling well and does not want to get out of bed. RN notified

## 2023-10-10 NOTE — PROGRESS NOTES
Infectious Diseases Inpatient Progress Note          HISTORY OF PRESENT ILLNESS:  Follow up sepsis, left leg cellulitis, acute CHF and COPD exacerbation with questionable pneumonia, acute respiratory failure with hypoxia on IV Zosyn, well tolerated. Patient denies any cough.  Has decreased shortness of breath and bilateral leg swelling.   Mild cough  Positive diarrhea  Positive bilateral legs pain  Decreasing rash under breasts and groin area.   Currently on 3 L nasal cannula  Current Medications:    atorvastatin, 40 mg, oral, Nightly  carvedilol, 3.125 mg, oral, BID  cyanocobalamin, 2,500 mcg, oral, Daily  furosemide, 40 mg, intravenous, q12h  insulin lispro, 0-5 Units, subcutaneous, TID with meals  ipratropium-albuteroL, 3 mL, nebulization, 4x daily  ketoconazole, , Topical, Daily  lisinopril, 2.5 mg, oral, Daily  melatonin, 3 mg, oral, Daily  montelukast, 10 mg, oral, Daily  mupirocin, , Topical, TID  nystatin, , Topical, BID  piperacillin-tazobactam, 3.375 g, intravenous, q8h   And  sodium chloride 0.9%, 10 mL/hr, intravenous, q8h  polyethylene glycol, 17 g, oral, Daily  sodium chloride, 1,000 mL, intravenous, Once        Allergies:  Iodine, Adhesive tape-silicones, and Gadolinium-containing contrast media      Review of Systems  14 system review is negative other than HPI  Concerned about severe coronary artery disease and poor cardiac status  Physical Exam    Heart Rate:  [90-96]   Temp:  [36.2 °C (97.2 °F)-37.3 °C (99.1 °F)]   Resp:  [12-20]   BP: (125-173)/(67-83)   SpO2:  [93 %-99 %]    Vitals:    10/09/23 2253 10/10/23 0658 10/10/23 0700 10/10/23 1151   BP: 125/67  173/83    BP Location: Left arm  Right arm    Patient Position: Lying  Sitting    Pulse: 90  96    Resp: 20  12    Temp: 36.2 °C (97.2 °F)  37 °C (98.6 °F)    TempSrc: Temporal  Temporal    SpO2: 93% 96% 95% 97%   Weight:       Height:         General Appearance: alert and oriented to person, place and time, well-developed and well-nourished, in  "no acute distress  On 3 L nasal cannula.  Does not use oxygen at home  Head: normocephalic and atraumatic  Eyes: anicteric sclerae  ENT: oropharynx clear and moist with normal mucous membranes. No oral thrush  Lungs: normal respiratory effort, bilateral rales and end expiratory wheezes  Heart normal S1-S2 no murmur  Abdomen: soft, no tenderness, large pannus  No leg edema  Left leg decreased erythema, healing ulcerations most noticeable over posterior aspect, decreased tenderness  Diuresing  Pure wick with dark yellow urine  DATA:    Lab Results   Component Value Date    WBC 14.4 (H) 10/10/2023    HGB 10.7 (L) 10/10/2023    HCT 33.1 (L) 10/10/2023    MCV 99 10/10/2023     10/10/2023     Lab Results   Component Value Date    CREATININE 1.00 10/09/2023    BUN 27 (H) 10/09/2023     10/09/2023    K 4.1 10/09/2023     10/09/2023    CO2 21 10/09/2023       Hepatic Function Panel:  No results found for: \"ALKPHOS\", \"ALT\", \"AST\", \"PROT\", \"BILITOT\", \"BILIDIR\"      Microbiology:   Blood cultures collected on admission are negative so far  Wound cultures are pending  Imaging:   CT chest wo IV contrast    Result Date: 10/7/2023  Interpreted By:  Bassem Holbrook, STUDY: CT CHEST WO IV CONTRAST;  10/7/2023 3:51 pm   INDICATION: Signs/Symptoms:sob, tachy, sepsis, likely pneumonia on cxr.   COMPARISON: None.   ACCESSION NUMBER(S): [id number]   ORDERING CLINICIAN: SOLEDAD VOSS   TECHNIQUE: Helical data acquisition of the chest was obtained  without IV contrast.  Images were reformatted in axial, coronal, and sagittal planes.   FINDINGS: Exam limited due to motion artifact and body habitus..   LUNGS and AIRWAYS: Respiratory motion can obscure findings. Diffusely scattered interstitial edema. Mild bibasilar atelectasis. Possible trace pleural effusions. Central airways are patent. No bronchiectasis.   MEDIASTINUM and MARIANGEL, LOWER NECK AND AXILLA: The visualized thyroid gland is grossly unremarkable.   A few prominent " mediastinal lymph nodes are noted for example measuring 12 mm short axis in the lower right paratracheal region and 13 mm in the subcarinal region.   Esophagus is not dilated.   HEART and VESSELS: Severe cardiomegaly.   No significant pericardial effusion.   Severe coronary atherosclerosis.   Severe thoracic aortic atherosclerosis without aneurysm.   UPPER ABDOMEN: The visualized subdiaphragmatic structures demonstrate no acute findings on noncontrast images.   CHEST WALL and OSSEOUS STRUCTURES: No suspicious osseous lesions. Multilevel degenerative changes of the thoracic spine.       1.  Severe cardiomegaly with interstitial edema and trace pleural effusions. 2. Mild mediastinal lymphadenopathy perhaps reactive. Consider follow-up in 3-6 months to document stability.     Signed by: Bassem Holbrook 10/7/2023 4:15 PM Dictation workstation:   [id number]    XR chest 1 view    Result Date: 10/7/2023  Interpreted By:  Hector Barron, STUDY: XR CHEST 1 VIEW;  10/7/2023 12:47 pm   INDICATION: Signs/Symptoms:weakness.   COMPARISON: 09/29/2017.   ACCESSION NUMBER(S): [id number]   ORDERING CLINICIAN: SOLEDAD VOSS   FINDINGS: CARDIOMEDIASTINAL SILHOUETTE: Patient is slightly rotated to the left. Cardiomegaly and dense aortic calcification again seen.   LUNGS: There is increased irregular interstitial thickening and patchy multifocal infiltrates bilaterally greatest in the perihilar regions which may be related to vascular congestion/edema. Infection not excluded. Small effusions not excluded. No pneumothorax is seen.   ABDOMEN: No remarkable upper abdominal findings.   BONES: Degenerative changes of the shoulders are partially visualized along with mild endplate spurring of the spine.       1.  Increased irregular interstitial thickening bilaterally with patchy multifocal infiltrates greatest in the perihilar regions which may be related to vascular congestion/edema. Infection not excluded.       MACRO: None.   Signed by:  Hector Sfiligoj 10/7/2023 12:53 PM Dictation workstation:   [id number]       Wound culture with Pseudomonas aeruginosa  0 Result Notes  Tissue/Wound Culture/Smear (2+) Few Pseudomonas aeruginosa Abnormal    Identification and susceptibility testing to follow            Gram Stain  Abnormal   No polymorphonuclear leukocytes seen      (2+) Few Gram negative bacilli              Resulting Agency: [facility]           Specimen Collected: 10/08/23 00:37 Last Resulted: 10/09/23 14:31               IMPRESSION:    Sepsis, resolved  L Leg cellulitis, improving  Infected venous stasis ulcers of L leg with Pseudomonas aeruginosa, healing  Acute CHF/COPD exacerbation  Acute respiratory failure with hypoxia    Patient Active Problem List   Diagnosis    Adnexal mass    Asthma in adult, unspecified asthma severity, uncomplicated    Cellulitis    Chronic congestive heart failure (CMS/HCC)    Chronic pain syndrome    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  Check culture and sensitivity prior to discharge  Follow-up with cardiology for CHF exacerbation and treatment  Agree with diuresis  May consider steroid tapering as patient is improving  Oxygen support and weaning  Local wound care  Follow-up CBC BMP  Interdry for fungal rash    Discussed with patient and other   Wound care nurse and RN  Leann Cristina MD

## 2023-10-10 NOTE — PROGRESS NOTES
Physical Therapy                 Therapy Communication Note    Patient Name: Isa Jack  MRN: [id number]  Today's Date: 10/10/2023     Discipline: Physical Therapy    Missed Visit Reason:      Missed Time: Attempt    Comment:(10:20) Patient declined therapy 2° having another rough night and morning. Nursing aware.

## 2023-10-11 LAB
ANION GAP SERPL CALC-SCNC: 13 MMOL/L (ref 10–20)
ANION GAP SERPL CALC-SCNC: 14 MMOL/L (ref 10–20)
BACTERIA BLD CULT: NORMAL
BACTERIA BLD CULT: NORMAL
BUN SERPL-MCNC: 26 MG/DL (ref 6–23)
BUN SERPL-MCNC: 27 MG/DL (ref 6–23)
CALCIUM SERPL-MCNC: 9.2 MG/DL (ref 8.6–10.3)
CALCIUM SERPL-MCNC: 9.4 MG/DL (ref 8.6–10.3)
CHLORIDE SERPL-SCNC: 98 MMOL/L (ref 98–107)
CHLORIDE SERPL-SCNC: 99 MMOL/L (ref 98–107)
CO2 SERPL-SCNC: 31 MMOL/L (ref 21–32)
CO2 SERPL-SCNC: 32 MMOL/L (ref 21–32)
CREAT SERPL-MCNC: 0.91 MG/DL (ref 0.5–1.05)
CREAT SERPL-MCNC: 0.95 MG/DL (ref 0.5–1.05)
ERYTHROCYTE [DISTWIDTH] IN BLOOD BY AUTOMATED COUNT: 13.3 % (ref 11.5–14.5)
GFR SERPL CREATININE-BSD FRML MDRD: 63 ML/MIN/1.73M*2
GFR SERPL CREATININE-BSD FRML MDRD: 67 ML/MIN/1.73M*2
GLUCOSE BLD MANUAL STRIP-MCNC: 184 MG/DL (ref 74–99)
GLUCOSE BLD MANUAL STRIP-MCNC: 185 MG/DL (ref 74–99)
GLUCOSE BLD MANUAL STRIP-MCNC: 199 MG/DL (ref 74–99)
GLUCOSE BLD MANUAL STRIP-MCNC: 211 MG/DL (ref 74–99)
GLUCOSE SERPL-MCNC: 170 MG/DL (ref 74–99)
GLUCOSE SERPL-MCNC: 182 MG/DL (ref 74–99)
HCT VFR BLD AUTO: 36 % (ref 36–46)
HGB BLD-MCNC: 11.6 G/DL (ref 12–16)
HOLD SPECIMEN: NORMAL
MCH RBC QN AUTO: 31.7 PG (ref 26–34)
MCHC RBC AUTO-ENTMCNC: 32.2 G/DL (ref 32–36)
MCV RBC AUTO: 98 FL (ref 80–100)
NRBC BLD-RTO: 0 /100 WBCS (ref 0–0)
PLATELET # BLD AUTO: 228 X10*3/UL (ref 150–450)
PMV BLD AUTO: 11.3 FL (ref 7.5–11.5)
POTASSIUM SERPL-SCNC: 3.3 MMOL/L (ref 3.5–5.3)
POTASSIUM SERPL-SCNC: 3.8 MMOL/L (ref 3.5–5.3)
RBC # BLD AUTO: 3.66 X10*6/UL (ref 4–5.2)
SODIUM SERPL-SCNC: 140 MMOL/L (ref 136–145)
SODIUM SERPL-SCNC: 140 MMOL/L (ref 136–145)
WBC # BLD AUTO: 13.7 X10*3/UL (ref 4.4–11.3)

## 2023-10-11 PROCEDURE — 99232 SBSQ HOSP IP/OBS MODERATE 35: CPT | Performed by: STUDENT IN AN ORGANIZED HEALTH CARE EDUCATION/TRAINING PROGRAM

## 2023-10-11 PROCEDURE — 36415 COLL VENOUS BLD VENIPUNCTURE: CPT | Performed by: STUDENT IN AN ORGANIZED HEALTH CARE EDUCATION/TRAINING PROGRAM

## 2023-10-11 PROCEDURE — 2500000004 HC RX 250 GENERAL PHARMACY W/ HCPCS (ALT 636 FOR OP/ED): Performed by: STUDENT IN AN ORGANIZED HEALTH CARE EDUCATION/TRAINING PROGRAM

## 2023-10-11 PROCEDURE — 1200000002 HC GENERAL ROOM WITH TELEMETRY DAILY

## 2023-10-11 PROCEDURE — 97530 THERAPEUTIC ACTIVITIES: CPT | Mod: GP,CQ

## 2023-10-11 PROCEDURE — 94640 AIRWAY INHALATION TREATMENT: CPT

## 2023-10-11 PROCEDURE — 82947 ASSAY GLUCOSE BLOOD QUANT: CPT

## 2023-10-11 PROCEDURE — 2500000001 HC RX 250 WO HCPCS SELF ADMINISTERED DRUGS (ALT 637 FOR MEDICARE OP): Performed by: STUDENT IN AN ORGANIZED HEALTH CARE EDUCATION/TRAINING PROGRAM

## 2023-10-11 PROCEDURE — 97530 THERAPEUTIC ACTIVITIES: CPT | Mod: GO,CO

## 2023-10-11 PROCEDURE — 2500000002 HC RX 250 W HCPCS SELF ADMINISTERED DRUGS (ALT 637 FOR MEDICARE OP, ALT 636 FOR OP/ED): Performed by: STUDENT IN AN ORGANIZED HEALTH CARE EDUCATION/TRAINING PROGRAM

## 2023-10-11 PROCEDURE — 85027 COMPLETE CBC AUTOMATED: CPT | Performed by: STUDENT IN AN ORGANIZED HEALTH CARE EDUCATION/TRAINING PROGRAM

## 2023-10-11 PROCEDURE — 82374 ASSAY BLOOD CARBON DIOXIDE: CPT | Performed by: STUDENT IN AN ORGANIZED HEALTH CARE EDUCATION/TRAINING PROGRAM

## 2023-10-11 PROCEDURE — 80048 BASIC METABOLIC PNL TOTAL CA: CPT | Performed by: STUDENT IN AN ORGANIZED HEALTH CARE EDUCATION/TRAINING PROGRAM

## 2023-10-11 PROCEDURE — 97535 SELF CARE MNGMENT TRAINING: CPT | Mod: GO,CO

## 2023-10-11 PROCEDURE — 2500000004 HC RX 250 GENERAL PHARMACY W/ HCPCS (ALT 636 FOR OP/ED): Performed by: NURSE PRACTITIONER

## 2023-10-11 PROCEDURE — 2500000001 HC RX 250 WO HCPCS SELF ADMINISTERED DRUGS (ALT 637 FOR MEDICARE OP): Performed by: INTERNAL MEDICINE

## 2023-10-11 RX ORDER — POTASSIUM CHLORIDE 20 MEQ/1
20 TABLET, EXTENDED RELEASE ORAL ONCE
Status: COMPLETED | OUTPATIENT
Start: 2023-10-11 | End: 2023-10-11

## 2023-10-11 RX ORDER — POTASSIUM CHLORIDE 20 MEQ/1
40 TABLET, EXTENDED RELEASE ORAL ONCE
Status: COMPLETED | OUTPATIENT
Start: 2023-10-11 | End: 2023-10-11

## 2023-10-11 RX ADMIN — KETOCONAZOLE: 20 CREAM TOPICAL at 09:12

## 2023-10-11 RX ADMIN — POLYETHYLENE GLYCOL 3350 17 G: 17 POWDER, FOR SOLUTION ORAL at 09:12

## 2023-10-11 RX ADMIN — SODIUM CHLORIDE 10 ML/HR: 9 INJECTION, SOLUTION INTRAVENOUS at 20:02

## 2023-10-11 RX ADMIN — NYSTATIN: 100000 POWDER TOPICAL at 20:09

## 2023-10-11 RX ADMIN — MUPIROCIN: 20 OINTMENT TOPICAL at 12:09

## 2023-10-11 RX ADMIN — INSULIN LISPRO 1 UNITS: 100 INJECTION, SOLUTION INTRAVENOUS; SUBCUTANEOUS at 07:00

## 2023-10-11 RX ADMIN — ATORVASTATIN CALCIUM 40 MG: 20 TABLET, FILM COATED ORAL at 20:02

## 2023-10-11 RX ADMIN — CARVEDILOL 3.12 MG: 3.12 TABLET, FILM COATED ORAL at 09:11

## 2023-10-11 RX ADMIN — FUROSEMIDE 40 MG: 10 INJECTION, SOLUTION INTRAMUSCULAR; INTRAVENOUS at 13:30

## 2023-10-11 RX ADMIN — MUPIROCIN: 20 OINTMENT TOPICAL at 20:09

## 2023-10-11 RX ADMIN — IPRATROPIUM BROMIDE AND ALBUTEROL SULFATE 3 ML: 2.5; .5 SOLUTION RESPIRATORY (INHALATION) at 10:34

## 2023-10-11 RX ADMIN — PIPERACILLIN SODIUM AND TAZOBACTAM SODIUM 3.38 G: 3; .375 INJECTION, SOLUTION INTRAVENOUS at 20:02

## 2023-10-11 RX ADMIN — NYSTATIN: 100000 POWDER TOPICAL at 09:12

## 2023-10-11 RX ADMIN — PIPERACILLIN SODIUM AND TAZOBACTAM SODIUM 3.38 G: 3; .375 INJECTION, SOLUTION INTRAVENOUS at 05:38

## 2023-10-11 RX ADMIN — MUPIROCIN: 20 OINTMENT TOPICAL at 17:10

## 2023-10-11 RX ADMIN — TRAMADOL HYDROCHLORIDE 50 MG: 50 TABLET, COATED ORAL at 17:18

## 2023-10-11 RX ADMIN — IPRATROPIUM BROMIDE AND ALBUTEROL SULFATE 3 ML: 2.5; .5 SOLUTION RESPIRATORY (INHALATION) at 07:16

## 2023-10-11 RX ADMIN — Medication 3 MG: at 20:01

## 2023-10-11 RX ADMIN — POTASSIUM CHLORIDE 20 MEQ: 1500 TABLET, EXTENDED RELEASE ORAL at 11:30

## 2023-10-11 RX ADMIN — INSULIN LISPRO 1 UNITS: 100 INJECTION, SOLUTION INTRAVENOUS; SUBCUTANEOUS at 12:04

## 2023-10-11 RX ADMIN — SODIUM CHLORIDE 10 ML/HR: 9 INJECTION, SOLUTION INTRAVENOUS at 05:00

## 2023-10-11 RX ADMIN — FUROSEMIDE 40 MG: 10 INJECTION, SOLUTION INTRAMUSCULAR; INTRAVENOUS at 00:49

## 2023-10-11 RX ADMIN — CYANOCOBALAMIN TAB 500 MCG 2500 MCG: 500 TAB at 09:11

## 2023-10-11 RX ADMIN — PIPERACILLIN SODIUM AND TAZOBACTAM SODIUM 3.38 G: 3; .375 INJECTION, SOLUTION INTRAVENOUS at 13:27

## 2023-10-11 RX ADMIN — SODIUM CHLORIDE 10 ML/HR: 9 INJECTION, SOLUTION INTRAVENOUS at 13:26

## 2023-10-11 RX ADMIN — IPRATROPIUM BROMIDE AND ALBUTEROL SULFATE 3 ML: 2.5; .5 SOLUTION RESPIRATORY (INHALATION) at 16:23

## 2023-10-11 RX ADMIN — POTASSIUM CHLORIDE 40 MEQ: 1500 TABLET, EXTENDED RELEASE ORAL at 09:34

## 2023-10-11 RX ADMIN — CARVEDILOL 3.12 MG: 3.12 TABLET, FILM COATED ORAL at 20:02

## 2023-10-11 RX ADMIN — LISINOPRIL 2.5 MG: 5 TABLET ORAL at 09:11

## 2023-10-11 RX ADMIN — MELOXICAM 7.5 MG: 7.5 TABLET ORAL at 09:34

## 2023-10-11 RX ADMIN — IPRATROPIUM BROMIDE AND ALBUTEROL SULFATE 3 ML: 2.5; .5 SOLUTION RESPIRATORY (INHALATION) at 19:41

## 2023-10-11 RX ADMIN — Medication 3 L/MIN: at 16:23

## 2023-10-11 RX ADMIN — MONTELUKAST SODIUM 10 MG: 10 TABLET, FILM COATED ORAL at 09:11

## 2023-10-11 RX ADMIN — INSULIN LISPRO 2 UNITS: 100 INJECTION, SOLUTION INTRAVENOUS; SUBCUTANEOUS at 17:08

## 2023-10-11 RX ADMIN — ALUMINUM HYDROXIDE, MAGNESIUM HYDROXIDE, AND DIMETHICONE 30 ML: 200; 20; 200 SUSPENSION ORAL at 20:02

## 2023-10-11 ASSESSMENT — PAIN SCALES - PAIN ASSESSMENT IN ADVANCED DEMENTIA (PAINAD)
TOTALSCORE: 0
BODYLANGUAGE: RELAXED
FACIALEXPRESSION: SMILING OR INEXPRESSIVE
BREATHING: NORMAL
BREATHING: NORMAL
CONSOLABILITY: NO NEED TO CONSOLE

## 2023-10-11 ASSESSMENT — COGNITIVE AND FUNCTIONAL STATUS - GENERAL
MOVING FROM LYING ON BACK TO SITTING ON SIDE OF FLAT BED WITH BEDRAILS: A LOT
WALKING IN HOSPITAL ROOM: A LOT
HELP NEEDED FOR BATHING: A LOT
TURNING FROM BACK TO SIDE WHILE IN FLAT BAD: A LOT
TURNING FROM BACK TO SIDE WHILE IN FLAT BAD: A LOT
PERSONAL GROOMING: A LOT
HELP NEEDED FOR BATHING: TOTAL
HELP NEEDED FOR BATHING: A LOT
MOBILITY SCORE: 11
CLIMB 3 TO 5 STEPS WITH RAILING: TOTAL
STANDING UP FROM CHAIR USING ARMS: A LITTLE
DAILY ACTIVITIY SCORE: 14
DAILY ACTIVITIY SCORE: 11
PERSONAL GROOMING: A LITTLE
TURNING FROM BACK TO SIDE WHILE IN FLAT BAD: A LOT
DRESSING REGULAR LOWER BODY CLOTHING: TOTAL
MOVING TO AND FROM BED TO CHAIR: A LOT
WALKING IN HOSPITAL ROOM: A LOT
MOBILITY SCORE: 11
TOILETING: A LOT
DRESSING REGULAR LOWER BODY CLOTHING: A LOT
WALKING IN HOSPITAL ROOM: A LOT
CLIMB 3 TO 5 STEPS WITH RAILING: TOTAL
MOVING TO AND FROM BED TO CHAIR: A LITTLE
DRESSING REGULAR UPPER BODY CLOTHING: A LITTLE
TOILETING: TOTAL
PERSONAL GROOMING: A LOT
MOVING TO AND FROM BED TO CHAIR: A LOT
TOILETING: A LOT
STANDING UP FROM CHAIR USING ARMS: A LOT
MOVING FROM LYING ON BACK TO SITTING ON SIDE OF FLAT BED WITH BEDRAILS: A LOT
MOBILITY SCORE: 13
DAILY ACTIVITIY SCORE: 14
STANDING UP FROM CHAIR USING ARMS: A LOT
EATING MEALS: A LITTLE
DRESSING REGULAR UPPER BODY CLOTHING: TOTAL
EATING MEALS: A LITTLE
DRESSING REGULAR UPPER BODY CLOTHING: A LITTLE
CLIMB 3 TO 5 STEPS WITH RAILING: TOTAL
DRESSING REGULAR LOWER BODY CLOTHING: A LOT
MOVING FROM LYING ON BACK TO SITTING ON SIDE OF FLAT BED WITH BEDRAILS: A LOT

## 2023-10-11 ASSESSMENT — PAIN - FUNCTIONAL ASSESSMENT
PAIN_FUNCTIONAL_ASSESSMENT: 0-10

## 2023-10-11 ASSESSMENT — PAIN SCALES - GENERAL
PAINLEVEL_OUTOF10: 0 - NO PAIN
PAINLEVEL_OUTOF10: 8
PAINLEVEL_OUTOF10: 6
PAINLEVEL_OUTOF10: 0 - NO PAIN
PAINLEVEL_OUTOF10: 2

## 2023-10-11 ASSESSMENT — PAIN SCALES - WONG BAKER
WONGBAKER_NUMERICALRESPONSE: HURTS EVEN MORE
WONGBAKER_NUMERICALRESPONSE: HURTS LITTLE BIT
WONGBAKER_NUMERICALRESPONSE: HURTS EVEN MORE

## 2023-10-11 ASSESSMENT — PAIN DESCRIPTION - DESCRIPTORS: DESCRIPTORS: ACHING;SORE

## 2023-10-11 ASSESSMENT — ACTIVITIES OF DAILY LIVING (ADL): HOME_MANAGEMENT_TIME_ENTRY: 13

## 2023-10-11 NOTE — CONSULTS
Cardiology Consult Note      Date:   10/11/2023  Patient name:  Isa Jack  Date of admission:  10/7/2023 11:27 AM  MRN:   [id number]  YOB: 1950  Time of Consult:  9:09 AM    Consulting Cardiologist: Dr. Richie Webber      Primary Cardiologist:    Referring Provider:      Admission Diagnosis:     Sepsis due to pneumonia (CMS/[facility])      History of Present Illness:      Isa Jack is a 72 y.o.  female patient who is being at the request of Dr. Brandon for inpatient consultation of CHF. She was admitted on 10/7/2023.  Patient evidently came to the hospital because of worsening cellulitis of her left leg.  She was placed on antibiotics.  She is a morbidly obese female of 72 years of age.  In the course of her interview she describes having had 2 or 3 heart catheterizations in the past when she lived in [place] with the [facility].  She was told that she had some type of blockage and should have surgery but because of the passing of her sister at the time that her sister had heart surgery she declined.  She is been on medical management ever since.  She has seen various other physicians but does not have an ongoing cardiologist for several years.  Her primary care doctor at the [facility] had made arrangements for her to have a cardiology consult in the near future in the outpatient setting.  A bedside echo was done here which did show significant cardiomyopathy and significant valvular regurgitation and pulmonary hypertension.  See the report below for details.  Patient's not having any angina at this time but does have some on occasion.  She does have chronic shortness of breath.  She is fairly immobile due to her morbid obesity and cellulitis.  She denies any symptoms to suggest unstable angina.  EKGs have shown sinus rhythm and sinus tachycardia with nonspecific ST changes and occasional PVC.  She does not have any troponins on the chart.  He is  diabetic and blood sugars are elevated.  She is mildly anemic.  Creatinine is stable at 1.0.      Allergies:     Allergies   Allergen Reactions    Iodine Hives    Adhesive Tape-Silicones Rash    Gadolinium-Containing Contrast Media Rash         Past Medical History:     Past Medical History:   Diagnosis Date    Diabetes mellitus (CMS/[facility])     Hyperlipemia     Hypertension     Morbid (severe) obesity due to excess calories (CMS/[facility]) 10/28/2022    Class 3 severe obesity due to excess calories with serious comorbidity and body mass index (BMI) of 40.0 to 44.9 in adult    Non-pressure chronic ulcer of left ankle with unspecified severity (CMS/[facility]) 2020    Ulcer of left ankle   Coronary artery disease  Chronic congestive heart failure  Cardiomyopathy  COPD  Past Surgical History:     Cataract surgery  Possible cholecystectomy    Family History:     Sister with coronary heart disease  Hypertension diabetes in the family      Social History:     Social History     Tobacco Use    Smoking status: Former     Types: Cigarettes     Quit date:      Years since quittin.7    Smokeless tobacco: Never   Substance Use Topics    Alcohol use: Never    Drug use: Never       CURRENT MEDICATIONS    atorvastatin, 40 mg, oral, Nightly  carvedilol, 3.125 mg, oral, BID  cyanocobalamin, 2,500 mcg, oral, Daily  furosemide, 40 mg, intravenous, q12h  insulin lispro, 0-5 Units, subcutaneous, TID with meals  ipratropium-albuteroL, 3 mL, nebulization, 4x daily  ketoconazole, , Topical, Daily  lisinopril, 2.5 mg, oral, Daily  melatonin, 3 mg, oral, Daily  montelukast, 10 mg, oral, Daily  mupirocin, , Topical, TID  nystatin, , Topical, BID  piperacillin-tazobactam, 3.375 g, intravenous, q8h   And  sodium chloride 0.9%, 10 mL/hr, intravenous, q8h  polyethylene glycol, 17 g, oral, Daily  sodium chloride, 1,000 mL, intravenous, Once      [Held by provider] sodium chloride 0.9%, 100 mL/hr, Last Rate: 100 mL/hr (10/11/23 0038)      Current  "Outpatient Medications   Medication Instructions    acetaminophen (TYLENOL 8 HOUR) 650 mg, oral, Every 12 hours PRN    albuterol (ProAir HFA) 90 mcg/actuation inhaler 2 puffs, inhalation, Every 4 hours PRN    albuterol 90 mcg/actuation inhaler 2 puffs, inhalation, Every 4 hours PRN, OK to substitute in class    albuterol 2.5 mg, nebulization, Every 4 hours PRN    albuterol 2.5 mg, nebulization, Every 4 hours PRN    atorvastatin (LIPITOR) 40 mg, oral, Daily    atorvastatin (LIPITOR) 40 mg, oral, Nightly    carvedilol (COREG) 6.25 mg, oral, 2 times daily    carvedilol (COREG) 6.25 mg, oral, 2 times daily with meals    cefadroxil (DURICEF) 1,000 mg, oral, 2 times daily    clotrimazole (Lotrimin) 1 % cream 1 Application, Topical, 2 times daily, Apply to affected areas 2-3 times daily    cyanocobalamin, vitamin B-12, 2,500 mcg tablet, sublingual 1 each, sublingual, Daily RT    elastic bandage (BAND-AID ACTIVE FLEX TOP) USE AS DIRECTED. apply 1 4x4\" bandage to open wound once daily    elastic bandage bandage USE AS DIRECTED.    furosemide (LASIX) 40 mg, oral, 2 times daily    furosemide (LASIX) 40 mg, oral, 2 times daily    HYDROcodone-acetaminophen (Norco) 5-325 mg tablet 1 tablet, oral, 2 times daily PRN    HYDROcodone-acetaminophen (Norco) 5-325 mg tablet 1 tablet, oral, 2 times daily PRN    HYDROcodone-acetaminophen (Norco) 5-325 mg tablet 1 tablet, oral, 2 times daily    lisinopril 2.5 mg, oral, Daily    meloxicam (MOBIC) 7.5 mg, oral, Daily PRN    metFORMIN (GLUCOPHAGE) 1,000 mg, oral, 2 times daily    metFORMIN (GLUCOPHAGE) 1,000 mg, oral, 2 times daily    methadone (DOLOPHINE) 5 mg, oral, 2 times daily    montelukast (SINGULAIR) 10 mg, oral, Daily    montelukast (SINGULAIR) 10 mg, oral, Nightly    mupirocin (Bactroban) 2 % ointment 1 Application, Topical, 2 times daily PRN    naloxone (NARCAN) 4 mg, nasal, As needed    nitroglycerin (NITROSTAT) 0.4 mg, sublingual, Every 5 min PRN    nitroglycerin (NITROSTAT) 0.4 " mg, sublingual, Every 5 min PRN    OneTouch Ultra Test strip 2 times daily    potassium chloride CR (Klor-Con M20) 20 mEq ER tablet 20 mEq, oral, 2 times daily    potassium chloride CR 20 mEq ER tablet 20 mEq, oral, 2 times daily        Review of Systems:      12 point review of systems was obtained in detail and is negative other than that detailed above.    Vital Signs:     Vitals:    10/10/23 2347 10/11/23 0233 10/11/23 0716 10/11/23 0759   BP: 157/75 136/77  (!) 175/127   BP Location: Left arm   Left arm   Patient Position: Lying   Sitting   Pulse: 78   105   Resp: 17   12   Temp: 36.5 °C (97.7 °F)   37.4 °C (99.3 °F)   TempSrc: Temporal   Temporal   SpO2: 95%  99% 100%   Weight:       Height:           Intake/Output Summary (Last 24 hours) at 10/11/2023 0909  Last data filed at 10/11/2023 0038  Gross per 24 hour   Intake 2350.01 ml   Output 1500 ml   Net 850.01 ml         Wt Readings from Last 4 Encounters:   10/09/23 103 kg (227 lb 15.3 oz)   04/20/23 95.1 kg (209 lb 9.6 oz)   10/28/22 65.9 kg (145 lb 6 oz)   09/27/21 103 kg (226 lb 2 oz)       Physical Examination:     GENERAL APPEARANCE: Well developed, well nourished, in no acute distress.  Patient is morbidly obese  CHEST: Symmetric and non-tender.  INTEGUMENT: Skin warm and dry, without gross excoriationis or lesions.  HEENT: No gross abnormalities of conjunctiva,oral mucosa, patient is edentulous  NECK: Supple, no JVD, no bruit. Thyroid not palpable. Carotid upstrokes normal.  NEURO/PSHCY: Alert and oriented x3; appropriate behavior and responses and responses, grossly normal cerebellar function with normal balance and coordination  LUNGS: Diminished breath sounds in the bases with scattered rhonchi, no wheezing or rales  HEART: Heart sounds are regular, S1 and S2 are normal, soft systolic murmur at the left and right sternal border and apex, no diastolic murmur, PMI is laterally displaced.  No gallops clicks or rubs.  ABDOMEN: Soft, nontender, no  palpable hepatosplenomegaly, no mases, no bruits. Abdominal aorta not noted to be enlarged.  MUSCULOSKELETAL: Ambulatory with normal tandem gait.  EXTREMITIES: Bilateral chronic edema with chronic cellulitis left leg is wrapped  PERIPHERAL VASCULAR: Pulses present and equally palpable; 2+ throughout. No femoral bruits.      Lab:     CBC:   Lab Results   Component Value Date    WBC 13.7 (H) 10/11/2023    RBC 3.66 (L) 10/11/2023    HGB 11.6 (L) 10/11/2023    HCT 36.0 10/11/2023     10/11/2023        CMP:    Lab Results   Component Value Date     10/11/2023    K 3.3 (L) 10/11/2023    CL 98 10/11/2023    CO2 31 10/11/2023    BUN 27 (H) 10/11/2023    CREATININE 0.91 10/11/2023    GLUCOSE 170 (H) 10/11/2023    CALCIUM 9.4 10/11/2023             BNP:   Lab Results   Component Value Date     (H) 10/07/2023          HgBA1c:    Lab Results   Component Value Date    HGBA1C 6.5 (H) 08/17/2023       BMP:  Lab Results   Component Value Date     10/11/2023     10/09/2023     (L) 10/08/2023    K 3.3 (L) 10/11/2023    K 4.1 10/09/2023    K 3.9 10/08/2023    CL 98 10/11/2023     10/09/2023     10/08/2023    CO2 31 10/11/2023    CO2 21 10/09/2023    CO2 21 10/08/2023    BUN 27 (H) 10/11/2023    BUN 27 (H) 10/09/2023    BUN 23 10/08/2023    CREATININE 0.91 10/11/2023    CREATININE 1.00 10/09/2023    CREATININE 0.97 10/08/2023       CBC:  Lab Results   Component Value Date    WBC 13.7 (H) 10/11/2023    WBC 14.4 (H) 10/10/2023    WBC 18.0 (H) 10/09/2023    RBC 3.66 (L) 10/11/2023    RBC 3.36 (L) 10/10/2023    RBC 3.21 (L) 10/09/2023    HGB 11.6 (L) 10/11/2023    HGB 10.7 (L) 10/10/2023    HGB 10.2 (L) 10/09/2023    HCT 36.0 10/11/2023    HCT 33.1 (L) 10/10/2023    HCT 32.4 (L) 10/09/2023    MCV 98 10/11/2023    MCV 99 10/10/2023     (H) 10/09/2023    MCH 31.7 10/11/2023    MCH 31.8 10/10/2023    MCH 31.8 10/09/2023    MCHC 32.2 10/11/2023    MCHC 32.3 10/10/2023    MCHC 31.5 (L)  10/09/2023    RDW 13.3 10/11/2023    RDW 13.5 10/10/2023    RDW 13.7 10/09/2023     10/11/2023     10/10/2023     10/09/2023    MPV 11.3 10/11/2023    MPV 10.8 10/10/2023    MPV 12.0 (H) 10/09/2023       Cardiac Enzymes:    Lab Results   Component Value Date    TROPHS 55 (HH) 10/07/2023    TROPHS 46 (H) 10/07/2023    TROPHS 20 (H) 10/07/2023           Diagnostic Studies:     CT chest wo IV contrast    Result Date: 10/7/2023  Interpreted By:  Bassem Holbrook, STUDY: CT CHEST WO IV CONTRAST;  10/7/2023 3:51 pm   INDICATION: Signs/Symptoms:sob, tachy, sepsis, likely pneumonia on cxr.   COMPARISON: None.   ACCESSION NUMBER(S): [id number]   ORDERING CLINICIAN: SOLEDAD VOSS   TECHNIQUE: Helical data acquisition of the chest was obtained  without IV contrast.  Images were reformatted in axial, coronal, and sagittal planes.   FINDINGS: Exam limited due to motion artifact and body habitus..   LUNGS and AIRWAYS: Respiratory motion can obscure findings. Diffusely scattered interstitial edema. Mild bibasilar atelectasis. Possible trace pleural effusions. Central airways are patent. No bronchiectasis.   MEDIASTINUM and MARIANGEL, LOWER NECK AND AXILLA: The visualized thyroid gland is grossly unremarkable.   A few prominent mediastinal lymph nodes are noted for example measuring 12 mm short axis in the lower right paratracheal region and 13 mm in the subcarinal region.   Esophagus is not dilated.   HEART and VESSELS: Severe cardiomegaly.   No significant pericardial effusion.   Severe coronary atherosclerosis.   Severe thoracic aortic atherosclerosis without aneurysm.   UPPER ABDOMEN: The visualized subdiaphragmatic structures demonstrate no acute findings on noncontrast images.   CHEST WALL and OSSEOUS STRUCTURES: No suspicious osseous lesions. Multilevel degenerative changes of the thoracic spine.       1.  Severe cardiomegaly with interstitial edema and trace pleural effusions. 2. Mild mediastinal  lymphadenopathy perhaps reactive. Consider follow-up in 3-6 months to document stability.     Signed by: Bassem Holbrook 10/7/2023 4:15 PM Dictation workstation:   [id number]    XR chest 1 view    Result Date: 10/7/2023  Interpreted By:  Hector Barron, STUDY: XR CHEST 1 VIEW;  10/7/2023 12:47 pm   INDICATION: Signs/Symptoms:weakness.   COMPARISON: 09/29/2017.   ACCESSION NUMBER(S): [id number]   ORDERING CLINICIAN: SOLEDAD VOSS   FINDINGS: CARDIOMEDIASTINAL SILHOUETTE: Patient is slightly rotated to the left. Cardiomegaly and dense aortic calcification again seen.   LUNGS: There is increased irregular interstitial thickening and patchy multifocal infiltrates bilaterally greatest in the perihilar regions which may be related to vascular congestion/edema. Infection not excluded. Small effusions not excluded. No pneumothorax is seen.   ABDOMEN: No remarkable upper abdominal findings.   BONES: Degenerative changes of the shoulders are partially visualized along with mild endplate spurring of the spine.       1.  Increased irregular interstitial thickening bilaterally with patchy multifocal infiltrates greatest in the perihilar regions which may be related to vascular congestion/edema. Infection not excluded.       MACRO: None.   Signed by: Hector Barron 10/7/2023 12:53 PM Dictation workstation:   [id number]              [place] [address]   Tel [phone] Fax [phone]     TRANSTHORACIC ECHOCARDIOGRAM REPORT        Patient Name:      DINA Johnson Physician:    [id number] Richie Webber DO  Study Date:        10/9/2023            Ordering Provider:    [id number] DAVALOS  MRN/PID:           [id number]             Fellow:  Accession#:        [id number]         Nurse:  Date of Birth/Age: 1950 / 72      Sonographer:           Elaine Dziak RDCS                     years  Gender:            F                    Additional Staff:  Height:            152.40 cm            Admit Date:           10/7/2023  Weight:            102.97 kg            Admission Status:     Inpatient -                                                                Routine  BSA:               1.97 m2              Department Location:  [facility]                                                                Echo Lab  Blood Pressure: 128 /72 mmHg     Study Type:    TRANSTHORACIC ECHO (TTE) COMPLETE  Diagnosis/ICD: Acute pulmonary edema-J81.0  Indication:    Acute Pulmonary Edema  CPT Codes:     Echo Complete w Full Doppler-19830     Patient History:  Smoker:            Former.  Diabetes:          Yes  Pertinent History: CAD, CHF, HTN, Hyperlipidemia and Asthma, COPD, Shortness of                     Breath.     Study Detail: The following Echo studies were performed: 2D, M-Mode, Doppler and                color flow. Technically challenging study due to poor acoustic                windows, body habitus, patient lying in supine position and                prominent lung artifact. Definity used as a contrast agent for                endocardial border definition. Total contrast used for this                procedure was 3 mL via IV push.        PHYSICIAN INTERPRETATION:  Left Ventricle: Left ventricular systolic function is moderately to severely decreased, with an estimated ejection fraction of 25-30%. There is global hypokinesis of the left ventricle with minor regional variations. The left ventricular cavity size is normal. Spectral Doppler shows a pseudonormal pattern of left ventricular diastolic filling.  Left Atrium: The left atrium is normal in size.  Right Ventricle: The right ventricle is normal in size. There is normal right ventricular global systolic function.  Right Atrium: The right atrium is normal in size.  Aortic Valve: The aortic valve appears  structurally normal. The aortic valve appears tricuspid. There is no evidence of aortic valve stenosis.  There is no evidence of aortic valve regurgitation. The peak instantaneous gradient of the aortic valve is 12.2 mmHg. The mean gradient of the aortic valve is 7.0 mmHg.  Mitral Valve: The mitral valve is normal in structure. There is no evidence of mitral valve stenosis. The doppler estimated mean and peak diastolic pressure gradients are 5.0 mmHg and 16.3 mmHg respectively. There is normal mitral valve leaflet mobility. There is mild mitral annular calcification. There is moderate to severe mitral valve regurgitation.  Tricuspid Valve: The tricuspid valve is structurally normal. There is normal tricuspid valve leaflet mobility. There is mild tricuspid regurgitation.  Pulmonic Valve: The pulmonic valve is structurally normal. There is no indication of pulmonic valve regurgitation.  Pericardium: There is no pericardial effusion noted.  Aorta: The aortic root is normal.  Pulmonary Artery: Pulmonary hypertension is present. The tricuspid regurgitant velocity is 2.90 m/s, and with an estimated right atrial pressure of 20 mmHg, the estimated pulmonary artery pressure is moderate to severely elevated with the RVSP at 53.6 mmHg.  Systemic Veins: The inferior vena cava appears moderately dilated.  In comparison to the previous echocardiogram(s): There are no prior studies on this patient for comparison purposes.        CONCLUSIONS:   1. Left ventricular systolic function is moderately to severely decreased with a 25-30% estimated ejection fraction.   2. Spectral Doppler shows a pseudonormal pattern of left ventricular diastolic filling.   3. There is no evidence of mitral valve stenosis.   4. Moderate to severe mitral valve regurgitation.   5. Mild tricuspid regurgitation is visualized.   6. Aortic valve stenosis is not present.   7. Moderate to severely elevated pulmonary artery pressure.   8. Pulmonary hypertension is  present.   9. The inferior vena cava appears moderately dilated.  10. There is global hypokinesis of the left ventricle with minor regional variations.     RECOMMENDATIONS:  Technically suboptimal and limited study, therefore accuracy of above interpretation could be substantially diminished. Clinical correlation is advised. Consider additional imaging modalities if clinically indicated.  Radiology:     Transthoracic Echo (TTE) Complete   Final Result      CT chest wo IV contrast   Final Result   1.  Severe cardiomegaly with interstitial edema and trace pleural   effusions.   2. Mild mediastinal lymphadenopathy perhaps reactive. Consider   follow-up in 3-6 months to document stability.             Signed by: Bassem Holbrook 10/7/2023 4:15 PM   Dictation workstation:   [id number]      XR chest 1 view   Final Result   1.  Increased irregular interstitial thickening bilaterally with   patchy multifocal infiltrates greatest in the perihilar regions which   may be related to vascular congestion/edema. Infection not excluded.                  MACRO:   None.        Signed by: Hector Barron 10/7/2023 12:53 PM   Dictation workstation:   [id number]          Problem List:     Patient Active Problem List   Diagnosis    Adnexal mass    Asthma in adult, unspecified asthma severity, uncomplicated    Cellulitis    Chronic congestive heart failure (CMS/HCC)    Chronic pain syndrome    Coronary artery disease involving native heart with angina pectoris (CMS/HCC)    DJD (degenerative joint disease)    Endometrial hyperplasia    Essential hypertension    HLD (hyperlipidemia)    Pernicious anemia    Physical debility    Vitamin B12 deficiency    Skin ulcer of ankle (CMS/HCC)    Type 2 diabetes mellitus, without long-term current use of insulin (CMS/HCC)    Sepsis due to pneumonia (CMS/HCC)     72-year-old female seen evaluate the bedside in the telemetry unit.    Bedside examination evaluation interview were performed by me.    Chart  was reviewed in detail including lab, EKG, x-rays none to date available.  Also reviewed any outside records that were currently available and discussed with the patient and staff.    Pression:  Atherosclerotic heart disease functional class II  Cardiomyopathy likely ischemic  Pulmonary hypertension  Mitral and tricuspid regurgitation  No active angina  Possible pneumonia  Cellulitis  Diabetes  COPD  Hypertension  Hyperlipidemia      Assessment:           Plan:   We will maximally medicate patient switching lisinopril to Entresto  Continue beta-blocker and diuretic  Patient absolutely declines any invasive testing or surgical considerations  Consider additional medical therapy after making the above changes  Continue antibiotic  Eventually will arrange follow-up with our office if the patient so chooses alternatively she may return to the care of [facility] physicians.  We will continue to follow.      Thank you for the opportunity to participate in the care of your patient.  Please do not hesitate to call if you have any questions.    10/11  Alert and oriented x3.  Follows commands.  Supplemental O2, keep SPO2 greater than 92%  Continue beta-blockers and diuretics.  Monitor electrolytes, keep potassium greater than 4 and magnesium greater than 2  Fluid balance list is positive however patient reports that she has been urinating all night with the canister has been emptied 3-4 times.  So unclear what her current accurate I's and O's are.  Continue Entresto, maximize therapy  Antibiotics per primary care service  SSI  Patient to follow-up with [facility] or [facility] at her discretion.  If she chooses [facility] we will schedule her appointment.    Iam Bowden [facility]  Adult Gerontology Acute Care Nurse Practitioner  [facility]  [phone]    Of note, this documentation is completed using the Dragon  Dictation system (voice recognition software). There may be spelling and/or grammatical errors that were not corrected prior to final submission.    Further recommendations made by Dr. Webber    Patient seen and evaluated at the bedside in conjunction with Iam Bowden RN, CNP.    Bedside examination evaluation and further assessment was performed by me.    Chart was reviewed in detail discussed with the patient including testing results medications and plan follow-up.    Impression and and plan as noted.    Ischemic cardiomyopathy: Primary cardiovascular problem in finding  Patient Active Problem List   Diagnosis    Adnexal mass    Asthma in adult, unspecified asthma severity, uncomplicated    Cellulitis    Chronic congestive heart failure (CMS/HCC)    Chronic pain syndrome    Coronary artery disease involving native heart with angina pectoris (CMS/HCC)    DJD (degenerative joint disease)    Endometrial hyperplasia    Essential hypertension    HLD (hyperlipidemia)    Pernicious anemia    Physical debility    Vitamin B12 deficiency    Skin ulcer of ankle (CMS/HCC)    Type 2 diabetes mellitus, without long-term current use of insulin (CMS/HCC)    Sepsis due to pneumonia (CMS/HCC)     Recommendation:  Patient clearly again states that she declines any invasive testing or surgical considerations despite being offered that in the past by other physicians  Continue on Entresto  Continue other meds as before  We will follow for 24 hours  If stable will sign off tomorrow  Have offered outpatient follow-up should she choose to follow here as opposed to and other facilities  Call if any problems          Electronically signed by EROS Knight-CNP, on 10/11/2023 at 9:09 AM

## 2023-10-11 NOTE — PROGRESS NOTES
Physical Therapy    Physical Therapy    Physical Therapy Treatment    Patient Name: Isa Jack  MRN: [id number]  Today's Date: 10/11/2023  Time Calculation  Start Time: 0820  Stop Time: 0858  Time Calculation (min): 38 min       Assessment/Plan   End of Session Communication: Bedside nurse  End of Session Patient Position: Up in chair, Alarm on (Reclined in the chair. Call light, phone, and traytable withing reach.)  PT Plan  Inpatient/Swing Bed or Outpatient: Inpatient  PT Plan  Treatment/Interventions: Bed mobility, Transfer training, Gait training, Stair training, Balance training, Therapeutic exercise, Therapeutic activity, Home exercise program, Endurance training, Strengthening  PT Plan: Skilled PT  PT Frequency: 3 times per week  PT Discharge Recommendations: Moderate intensity level of continued care, Low intensity level of continued care (low to mod 24 hrs)  PT Recommended Transfer Status: Assist x1 (with FWW)      General Visit Information:   PT  Visit  PT Received On: 10/11/23  General  Family/Caregiver Present: No  Co-Treatment: OT  Co-Treatment Reason: Co-treat with OT to maximize patient and staff safety with transfers/amb.  Prior to Session Communication: Bedside nurse  Patient Position Received: Bed, 4 rail up, Alarm on  General Comment: Patient reports (B)LE edema is improving with all of the fluid that's been removed.  Subjective     Precautions:  Precautions  Medical Precautions: Fall precautions    Vital Signs:  Vital Signs  Heart Rate:  (93bpm during session.)  SpO2:  (98% on 3L O2 via NC.)    Objective     Pain:  Pain Assessment  Pain Assessment:  (c/o generalized knee pain(B), but did not give a numeric pain rating. Audible crepitus with knee flexion/extension exercises. Nursing aware.)    Treatments:    Bed Mobility  Bed Mobility: Yes  Bed Mobility 1  Bed Mobility 1: Supine to sitting  Level of Assistance 1: Minimum assistance  Bed Mobility Comments 1: HOB ~40°. Hand over hand (A) to  reach for bed rails for support. Improved ability to move LEs without increasing pain.  Ambulation/Gait Training  Ambulation/Gait Training Performed: Yes  Ambulation/Gait Training 1  Surface 1: Level tile  Device 1: Rolling walker  Assistance 1: Minimum assistance (x2)  Comments/Distance (ft) 1: ~3ft x2. WBOS. Decreased step height/length B. Slightly forward flexed posture. x3 LOB with activities; Min(A) to steady her balance.  Transfers  Transfer: Yes  Transfer 1  Technique 1: Sit to stand, Stand to sit  Transfer Device 1:  (ww)  Transfer Level of Assistance 1: Minimum assistance  Trials/Comments 1: Benefits from firm hand rests for support. Retropulsive at times.          Outcome Measures:  [facility] Basic Mobility  Turning from your back to your side while in a flat bed without using bedrails: A lot  Moving from lying on your back to sitting on the side of a flat bed without using bedrails: A lot  Moving to and from bed to chair (including a wheelchair): A little  Standing up from a chair using your arms (e.g. wheelchair or bedside chair): A little  To walk in hospital room: A lot  Climbing 3-5 steps with railing: Total  Basic Mobility - Total Score: 13      Education Documentation  Mobility Training, taught by Nadja Kern PTA at 10/11/2023  3:25 PM.  Learner: Patient  Readiness: Acceptance  Method: Explanation, Demonstration  Response: Verbalizes Understanding, Needs Reinforcement         Encounter Problems       Encounter Problems (Active)       Pain - Adult          Weakness/ impaired mobility        Pt will demonstrate mod I  with bed mobility to edge of bed.   (Progressing)       Start:  10/08/23    Expected End:  10/22/23            Pt will demonstrate mod I  with sit to stand/chair transfers with FWW.   (Progressing)       Start:  10/08/23    Expected End:  10/22/23            Pt will ambulate 40 feet with FWW SBA.   (Not Progressing)       Start:  10/08/23    Expected End:  10/22/23            Pt to demo  improved BLE strength by being able to complete supine/seated thera ex 2x20 BLEs with 4 or less rest breaks .   (Progressing)       Start:  10/08/23    Expected End:  10/22/23

## 2023-10-11 NOTE — PROGRESS NOTES
Discussed with patient's son. Andrea, in detail regarding need for placement d/t PT/OT scores. Patient at this time has some reservations with going to a SNF, however son advised me to continue with referral to [facility] [place] and he will continue to encourage patient. Referral was sent, accepted at [facility], precert started.    Detail Level: Detailed General Sunscreen Counseling: I recommended a broad spectrum sunscreen with a SPF of 30 or higher.  I explained that SPF 30 sunscreens block approximately 97 percent of the sun's harmful rays.  Sunscreens should be applied at least 15 minutes prior to expected sun exposure and then every 2 hours after that as long as sun exposure continues. If swimming or exercising sunscreen should be reapplied every 45 minutes to an hour after getting wet or sweating.  One ounce, or the equivalent of a shot glass full of sunscreen, is adequate to protect the skin not covered by a bathing suit. I also recommended a lip balm with a sunscreen as well. Sun protective clothing can be used in lieu of sunscreen but must be worn the entire time you are exposed to the sun's rays.

## 2023-10-11 NOTE — CARE PLAN
Problem: Nutrition  Goal: Less than 5 days NPO/clear liquids  Outcome: Progressing  Goal: Consume prescribed supplement  Outcome: Progressing  Goal: Adequate PO fluid intake  Outcome: Progressing  Goal: Nutrition support goals are met within 48 hrs  Outcome: Progressing  Goal: Nutrition support is meeting 75% of nutrient needs  Outcome: Progressing  Goal: Tube feed tolerance  Outcome: Progressing  Goal: BG  mg/dL  Outcome: Progressing  Goal: Lab values WNL  Outcome: Progressing  Goal: Electrolytes WNL  Outcome: Progressing  Goal: Promote healing  Outcome: Progressing  Goal: Maintain stable weight  Outcome: Progressing  Goal: Reduce weight from edema/fluid  Outcome: Progressing  Goal: Gradual weight gain  Outcome: Progressing  Goal: Improve ostomy output  Outcome: Progressing     Problem: Fall/Injury  Goal: Not fall by end of shift  Outcome: Progressing  Goal: Be free from injury by end of the shift  Outcome: Progressing  Goal: Verbalize understanding of personal risk factors for fall in the hospital  Outcome: Progressing  Goal: Verbalize understanding of risk factor reduction measures to prevent injury from fall in the home  Outcome: Progressing  Goal: Use assistive devices by end of the shift  Outcome: Progressing  Goal: Pace activities to prevent fatigue by end of the shift  Outcome: Progressing     Problem: Pain  Goal: Takes deep breaths with improved pain control throughout the shift  Outcome: Progressing  Goal: Turns in bed with improved pain control throughout the shift  Outcome: Progressing  Goal: Walks with improved pain control throughout the shift  Outcome: Progressing  Goal: Performs ADL's with improved pain control throughout shift  Outcome: Progressing  Goal: Participates in PT with improved pain control throughout the shift  Outcome: Progressing  Goal: Free from opioid side effects throughout the shift  Outcome: Progressing  Goal: Free from acute confusion related to pain meds throughout the  shift  Outcome: Progressing     Problem: Diabetes  Goal: Achieve decreasing blood glucose levels by end of shift  Outcome: Progressing  Goal: Increase stability of blood glucose readings by end of shift  Outcome: Progressing  Goal: Decrease in ketones present in urine by end of shift  Outcome: Progressing  Goal: Maintain electrolyte levels within acceptable range throughout shift  Outcome: Progressing  Goal: Maintain glucose levels >70mg/dl to <250mg/dl throughout shift  Outcome: Progressing  Goal: No changes in neurological exam by end of shift  Outcome: Progressing  Goal: Learn about and adhere to nutrition recommendations by end of shift  Outcome: Progressing  Goal: Vital signs within normal range for age by end of shift  Outcome: Progressing  Goal: Increase self care and/or family involovement by end of shift  Outcome: Progressing  Goal: Receive DSME education by end of shift  Outcome: Progressing     Problem: Pain - Adult  Goal: Verbalizes/displays adequate comfort level or baseline comfort level  Outcome: Progressing     Problem: Safety - Adult  Goal: Free from fall injury  Outcome: Progressing     Problem: Discharge Planning  Goal: Discharge to home or other facility with appropriate resources  Outcome: Progressing     Problem: Chronic Conditions and Co-morbidities  Goal: Patient's chronic conditions and co-morbidity symptoms are monitored and maintained or improved  Outcome: Progressing     Problem: Skin  Goal: Decreased wound size/increased tissue granulation at next dressing change  Outcome: Progressing  Goal: Participates in plan/prevention/treatment measures  Outcome: Progressing  Goal: Prevent/manage excess moisture  Outcome: Progressing  Goal: Prevent/minimize sheer/friction injuries  Outcome: Progressing  Goal: Promote/optimize nutrition  Outcome: Progressing  Goal: Promote skin healing  Outcome: Progressing     Problem: Heart Failure  Goal: Improved gas exchange this shift  Outcome:  Progressing  Goal: Improved urinary output this shift  Outcome: Progressing  Goal: Reduction in peripheral edema within 24 hours  Outcome: Progressing  Goal: Report improvement of dyspnea/breathlessness this shift  Outcome: Progressing  Goal: Weight from fluid excess reduced over 2-3 days, then stabilize  Outcome: Progressing  Goal: Increase self care and/or family involvement in 24 hours  Outcome: Progressing   The patient's goals for the shift include Patient will be free of pain this shift    The clinical goals for the shift include Blood sugar will be wnl    Over the shift, the patient did not make progress toward the following goals. Barriers to progression include Pain Score: 3   Recommendations to address these barriers include pain mgmt.

## 2023-10-11 NOTE — PROGRESS NOTES
Occupational Therapy    OT Treatment    Patient Name: Isa Jack  MRN: [id number]  Today's Date: 10/11/2023  Time Calculation  Start Time: 0820  Stop Time: 0858  Time Calculation (min): 38 min       Assessment:        Plan:  OT Frequency: 3 times per week       Subjective   General:  OT Received On: 10/11/23  Co-Treatment: PT  Prior to Session Communication: Bedside nurse  Patient Position Received: Bed, 3 rail up, Alarm on  Vital Signs:     Pain:  Pain Assessment  Pain Assessment: 0-10  Pain Score: 0 - No pain    Objective    Activities of Daily Living:          LE Dressing  LE Dressing: Yes  Sock Level of Assistance: Dependent (donned)  Adult Briefs Level of Assistance:  (donned shorts with max a.  pants mgmt while in stance mod a)  LE Dressing Where Assessed: Bedsied commode    Toileting  Toileting Level of Assistance: Minimum assistance  Where Assessed: Bedside commode (standing)  Functional Standing Tolerance:  Functional Standing Tolerance Comments: patient stood for a total of 5 mins this date with fair-/poor+ balance with 2-0 ue support as needed during functonal ambulation/transfers and ADL tasks  Bed Mobility/Transfers: Bed Mobility 1  Bed Mobility 1: Supine to sitting  Level of Assistance 1: Minimum assistance    Transfers  Transfer: Yes  Transfer 1  Technique 1: Sit to stand  Transfer Device 1: Walker  Transfer Level of Assistance 1: Minimum assistance, +2  Transfers 2  Transfer From 2: Bed to  Transfer to 2: Commode-standard  Transfer Device 2: Walker  Transfer Level of Assistance 2: Minimum assistance, +2  Transfers 3  Transfer From 3: Commode-standard to  Transfer to 3: Chair with arms  Transfer Device 3: Walker  Transfer Level of Assistance 3: Minimum assistance, +2    Toilet Transfers  Toilet Transfer to: Extra wide bedside commode  Toilet Transfers: Minimal assistance (x2)           Outcome Measures:[facility] Daily Activity  Putting on and taking off regular lower body clothing: A lot  Bathing  (including washing, rinsing, drying): A lot  Putting on and taking off regular upper body clothing: A little  Toileting, which includes using toilet, bedpan or urinal: A lot  Taking care of personal grooming such as brushing teeth: A lot  Eating Meals: A little  Daily Activity - Total Score: 14        Education Documentation  ADL Training, taught by NELIA Ko at 10/11/2023  2:52 PM.  Learner: Patient  Readiness: Acceptance  Method: Explanation  Response: Verbalizes Understanding    Education Comments  No comments found.        OP EDUCATION:  Education  Individual(s) Educated: Patient  Equipment:  (AE training)    Goals:  Encounter Problems       Encounter Problems (Active)       OT Problem       PATIENT WILL MAINTAIN DYNAMIC STANDING BALANCE DURING ADLs (Not met)       Start:  10/08/23    Expected End:  10/22/23    Resolved:  10/11/23    Updated to: PATIENT WILL MAINTAIN GOOD DYNAMIC STANDING BALANCE DURING ADLs    Update reason: updating goal         PATIENT WILL MAINTAIN DYNAMIC SEATED BALANCE DURING ADLs (Not met)       Start:  10/08/23    Expected End:  10/22/23    Resolved:  10/11/23    Updated to: PATIENT WILL MAINTAIN GOOD DYNAMIC SEATED BALANCE DURING ADLs    Update reason: updating goal          PATIENT WILL DEMONSTRATE INDEPENDENCE WITH UPPER BODY donning and doffing all UE clothes (Progressing)       Start:  10/08/23    Expected End:  10/22/23            PATIENT WILL DEMONSTRATE INDEPENDENCE WITH LOWER BODY donning and doffing all LE clothes  (Progressing)       Start:  10/08/23    Expected End:  10/22/23            PATIENT WILL BE INDEPENDENT IN USE OF reacher IN ORDER TO DRESS ON OWN (Progressing)       Start:  10/08/23    Expected End:  10/22/23            PATIENT WILL MAINTAIN GOOD DYNAMIC STANDING BALANCE DURING ADLs (Progressing)       Start:  10/11/23    Expected End:  10/22/23                Pt. will complete all transfers at Mod I level  (Progressing)       Start:  10/11/23    Expected  End:  10/22/23                PATIENT WILL MAINTAIN GOOD DYNAMIC SEATED BALANCE DURING ADLs (Progressing)       Start:  10/11/23    Expected End:  10/22/23

## 2023-10-11 NOTE — CARE PLAN
Updated Plan of Care.  10/11    Problem: OT Problem  Goal: PATIENT WILL DEMONSTRATE INDEPENDENCE WITH UPPER BODY donning and doffing all UE clothes  Outcome: Progressing  Goal: PATIENT WILL DEMONSTRATE INDEPENDENCE WITH LOWER BODY donning and doffing all LE clothes   Outcome: Progressing  Goal: PATIENT WILL BE INDEPENDENT IN USE OF reacher IN ORDER TO DRESS ON OWN  Outcome: Progressing  Goal: PATIENT WILL MAINTAIN GOOD DYNAMIC STANDING BALANCE DURING ADLs  Outcome: Progressing  Goal: Pt. will complete all transfers at Mod I level   Outcome: Progressing  Goal: PATIENT WILL MAINTAIN GOOD DYNAMIC SEATED BALANCE DURING ADLs  Outcome: Progressing

## 2023-10-11 NOTE — PROGRESS NOTES
Infectious Diseases Inpatient Progress Note          HISTORY OF PRESENT ILLNESS:  Follow up sepsis, left leg cellulitis, acute CHF and COPD exacerbation with questionable pneumonia, acute respiratory failure with hypoxia on IV Zosyn, well tolerated. Patient denies any cough.  Has decreased shortness of breath and bilateral leg swelling.   Mild cough  Positive diarrhea  Positive bilateral legs pain and swelling  Decreasing rash under breasts and groin area.   Currently on 3 L nasal cannula  Emotional today.  Has been diuresing a lot and had leaking of urine in bed last night.  Upset because she did not get cleaned quickly.   Current Medications:    atorvastatin, 40 mg, oral, Nightly  carvedilol, 3.125 mg, oral, BID  cyanocobalamin, 2,500 mcg, oral, Daily  furosemide, 40 mg, intravenous, q12h  insulin lispro, 0-5 Units, subcutaneous, TID with meals  ipratropium-albuteroL, 3 mL, nebulization, 4x daily  ketoconazole, , Topical, Daily  melatonin, 3 mg, oral, Daily  montelukast, 10 mg, oral, Daily  mupirocin, , Topical, TID  nystatin, , Topical, BID  piperacillin-tazobactam, 3.375 g, intravenous, q8h   And  sodium chloride 0.9%, 10 mL/hr, intravenous, q8h  polyethylene glycol, 17 g, oral, Daily  potassium chloride CR, 20 mEq, oral, Once  [START ON 10/12/2023] sacubitriL-valsartan, 1 tablet, oral, BID  sodium chloride, 1,000 mL, intravenous, Once        Allergies:  Iodine, Adhesive tape-silicones, and Gadolinium-containing contrast media      Review of Systems  14 system review is negative other than HPI  Concerned about severe coronary artery disease and poor cardiac status  Physical Exam    Heart Rate:  []   Temp:  [36.5 °C (97.7 °F)-37.7 °C (99.9 °F)]   Resp:  [12-17]   BP: (133-175)/()   SpO2:  [95 %-100 %]    Vitals:    10/11/23 0233 10/11/23 0716 10/11/23 0759 10/11/23 1031   BP: 136/77  (!) 175/127    BP Location:   Left arm    Patient Position:   Sitting    Pulse:   105    Resp:   12    Temp:   37.4 °C  "(99.3 °F)    TempSrc:   Temporal    SpO2:  99% 100% 98%   Weight:       Height:         General Appearance: alert and oriented to person, place and time, well-developed and well-nourished, in no acute distress  On 3 L nasal cannula.  Does not use oxygen at home  Head: normocephalic and atraumatic  Eyes: anicteric sclerae  ENT: oropharynx clear and moist with normal mucous membranes. No oral thrush  Lungs: normal respiratory effort, bilateral rales and end expiratory wheezes  Heart normal S1-S2 no murmur  Abdomen: soft, no tenderness, large pannus  +1-2 B leg edema  Left leg with dressing  Diuresing  Pure wick with clear urine  DATA:    Lab Results   Component Value Date    WBC 13.7 (H) 10/11/2023    HGB 11.6 (L) 10/11/2023    HCT 36.0 10/11/2023    MCV 98 10/11/2023     10/11/2023     Lab Results   Component Value Date    CREATININE 0.95 10/11/2023    BUN 26 (H) 10/11/2023     10/11/2023    K 3.8 10/11/2023    CL 99 10/11/2023    CO2 32 10/11/2023       Hepatic Function Panel:  No results found for: \"ALKPHOS\", \"ALT\", \"AST\", \"PROT\", \"BILITOT\", \"BILIDIR\"      Microbiology:   Blood cultures collected on admission are negative so far  Wound cultures  Susceptibility data from last 90 days.  Collected Specimen Info Organism Aztreonam Cefepime Ceftazidime Ciprofloxacin Gentamicin Levofloxacin Piperacillin/Tazobactam Tobramycin   10/08/23 Tissue/Biopsy from Skin Lesion Pseudomonas aeruginosa S S S S S S S S       Imaging:   CT chest wo IV contrast    Result Date: 10/7/2023  Interpreted By:  Bassem Holbrook, STUDY: CT CHEST WO IV CONTRAST;  10/7/2023 3:51 pm   INDICATION: Signs/Symptoms:sob, tachy, sepsis, likely pneumonia on cxr.   COMPARISON: None.   ACCESSION NUMBER(S): [id number]   ORDERING CLINICIAN: SOLEDAD VOSS   TECHNIQUE: Helical data acquisition of the chest was obtained  without IV contrast.  Images were reformatted in axial, coronal, and sagittal planes.   FINDINGS: Exam limited due to motion artifact " and body habitus..   LUNGS and AIRWAYS: Respiratory motion can obscure findings. Diffusely scattered interstitial edema. Mild bibasilar atelectasis. Possible trace pleural effusions. Central airways are patent. No bronchiectasis.   MEDIASTINUM and MARIANGEL, LOWER NECK AND AXILLA: The visualized thyroid gland is grossly unremarkable.   A few prominent mediastinal lymph nodes are noted for example measuring 12 mm short axis in the lower right paratracheal region and 13 mm in the subcarinal region.   Esophagus is not dilated.   HEART and VESSELS: Severe cardiomegaly.   No significant pericardial effusion.   Severe coronary atherosclerosis.   Severe thoracic aortic atherosclerosis without aneurysm.   UPPER ABDOMEN: The visualized subdiaphragmatic structures demonstrate no acute findings on noncontrast images.   CHEST WALL and OSSEOUS STRUCTURES: No suspicious osseous lesions. Multilevel degenerative changes of the thoracic spine.       1.  Severe cardiomegaly with interstitial edema and trace pleural effusions. 2. Mild mediastinal lymphadenopathy perhaps reactive. Consider follow-up in 3-6 months to document stability.     Signed by: Bassem Holbrook 10/7/2023 4:15 PM Dictation workstation:   [id number]    XR chest 1 view    Result Date: 10/7/2023  Interpreted By:  Hector Barron, STUDY: XR CHEST 1 VIEW;  10/7/2023 12:47 pm   INDICATION: Signs/Symptoms:weakness.   COMPARISON: 09/29/2017.   ACCESSION NUMBER(S): [id number]   ORDERING CLINICIAN: SOLEDAD VOSS   FINDINGS: CARDIOMEDIASTINAL SILHOUETTE: Patient is slightly rotated to the left. Cardiomegaly and dense aortic calcification again seen.   LUNGS: There is increased irregular interstitial thickening and patchy multifocal infiltrates bilaterally greatest in the perihilar regions which may be related to vascular congestion/edema. Infection not excluded. Small effusions not excluded. No pneumothorax is seen.   ABDOMEN: No remarkable upper abdominal findings.   BONES:  Degenerative changes of the shoulders are partially visualized along with mild endplate spurring of the spine.       1.  Increased irregular interstitial thickening bilaterally with patchy multifocal infiltrates greatest in the perihilar regions which may be related to vascular congestion/edema. Infection not excluded.       MACRO: None.   Signed by: Hector Barron 10/7/2023 12:53 PM Dictation workstation:   [id number]       Wound culture with Pseudomonas aeruginosa  0 Result Notes  Tissue/Wound Culture/Smear (2+) Few Pseudomonas aeruginosa Abnormal    Identification and susceptibility testing to follow            Gram Stain  Abnormal   No polymorphonuclear leukocytes seen      (2+) Few Gram negative bacilli              Resulting Agency: [facility]           Specimen Collected: 10/08/23 00:37 Last Resulted: 10/09/23 14:31               IMPRESSION:    Sepsis, resolved  L Leg cellulitis, improving  Infected venous stasis ulcers of L leg with Pseudomonas aeruginosa, healing  Acute CHF/COPD exacerbation  Acute respiratory failure with hypoxia    Patient Active Problem List   Diagnosis    Adnexal mass    Asthma in adult, unspecified asthma severity, uncomplicated    Cellulitis    Chronic congestive heart failure (CMS/HCC)    Chronic pain syndrome    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    Change to oral antibiotics on discharge  Follow-up with cardiology for CHF exacerbation and treatment  Agree with diuresis  Oxygen support and weaning  Local wound care  Follow-up CBC BMP  Topical antifungal cream with  ketoconazole    Discussed with patient and other  and RN  Leann Cristina MD

## 2023-10-11 NOTE — PROGRESS NOTES
"Isa Jack is a 72 y.o. female on day 4 of admission presenting with Sepsis due to pneumonia (CMS/HCC).    Subjective   Patient seen and examined.  She is upset about the care she is receiving in the hospital.  Says that nobody answered call light last night.  Continues to complain of shortness of breath but is getting better.  No fevers, says that she is now passing urine, has been laying in the chair which is uncomfortable.       Objective     Physical Exam  HENT:      Head: Normocephalic and atraumatic.      Nose: No congestion or rhinorrhea.      Mouth/Throat:      Mouth: Mucous membranes are moist.   Eyes:      Extraocular Movements: Extraocular movements intact.      Pupils: Pupils are equal, round, and reactive to light.   Cardiovascular:      Rate and Rhythm: Normal rate and regular rhythm.      Pulses: Normal pulses.      Heart sounds: Normal heart sounds. No murmur heard.  Pulmonary:      Effort: No respiratory distress.      Breath sounds: Wheezing present. No rales.   Abdominal:      General: Abdomen is flat. Bowel sounds are normal. There is no distension.      Palpations: Abdomen is soft.   Musculoskeletal:         General: Swelling present.      Cervical back: Normal range of motion and neck supple. No rigidity.      Right lower leg: Edema present.      Left lower leg: Edema present.   Last Recorded Vitals  Blood pressure (!) 175/127, pulse 105, temperature 37.4 °C (99.3 °F), temperature source Temporal, resp. rate 12, height 1.549 m (5' 0.98\"), weight 103 kg (227 lb 15.3 oz), SpO2 98 %.  Intake/Output last 3 Shifts:  I/O last 3 completed shifts:  In: 2350 (22.7 mL/kg) [P.O.:775; I.V.:1575 (15.2 mL/kg)]  Out: 2600 (25.1 mL/kg) [Urine:2600 (0.7 mL/kg/hr)]  Weight: 103.4 kg     Relevant Results           This patient currently has cardiac telemetry ordered; if you would like to modify or discontinue the telemetry order, click here to go to the orders activity to modify/discontinue the " order.                 Assessment/Plan   Principal Problem:    Sepsis due to pneumonia (CMS/HCC)    Left lower extremity cellulitis/infected wound     Blood cultures have remained negative  Wound culture is growing Pseudomonas which is pansensitive  Vancomycin and azithromycin discontinued  We will continue Zosyn as long as she is in the hospital  We will replete potassium, recheck tomorrow  Started on Entresto by cardiology  Creatinine has been stable  Continue diuresis with Lasix  Strep pneumo and Legionella were negative procalcitonin was high at 15.29  Has an EF of 25%  Patient refuses any further work-up for her ischemic heart disease  Solu-Medrol discontinued continue DuoNebs, nasal cannula  Monitor blood pressure, resume antihypertensives  DVT prophylaxis  Send labs for tomorrow  Appreciate ID input  Patient has poor mobility, she was recommended rehab placement but she has been refusing it  Continue PT OT                Don Santacruz MD

## 2023-10-12 ENCOUNTER — APPOINTMENT (OUTPATIENT)
Dept: CARDIOLOGY | Facility: HOSPITAL | Age: 73
DRG: 871 | End: 2023-10-12
Payer: COMMERCIAL

## 2023-10-12 LAB
ANION GAP SERPL CALC-SCNC: 11 MMOL/L (ref 10–20)
BUN SERPL-MCNC: 30 MG/DL (ref 6–23)
CALCIUM SERPL-MCNC: 8.9 MG/DL (ref 8.6–10.3)
CHLORIDE SERPL-SCNC: 97 MMOL/L (ref 98–107)
CO2 SERPL-SCNC: 35 MMOL/L (ref 21–32)
CREAT SERPL-MCNC: 0.92 MG/DL (ref 0.5–1.05)
ERYTHROCYTE [DISTWIDTH] IN BLOOD BY AUTOMATED COUNT: 13.3 % (ref 11.5–14.5)
GFR SERPL CREATININE-BSD FRML MDRD: 66 ML/MIN/1.73M*2
GLUCOSE BLD MANUAL STRIP-MCNC: 173 MG/DL (ref 74–99)
GLUCOSE BLD MANUAL STRIP-MCNC: 178 MG/DL (ref 74–99)
GLUCOSE BLD MANUAL STRIP-MCNC: 191 MG/DL (ref 74–99)
GLUCOSE BLD MANUAL STRIP-MCNC: 240 MG/DL (ref 74–99)
GLUCOSE SERPL-MCNC: 181 MG/DL (ref 74–99)
HCT VFR BLD AUTO: 35.4 % (ref 36–46)
HGB BLD-MCNC: 11.3 G/DL (ref 12–16)
MCH RBC QN AUTO: 31.7 PG (ref 26–34)
MCHC RBC AUTO-ENTMCNC: 31.9 G/DL (ref 32–36)
MCV RBC AUTO: 99 FL (ref 80–100)
NRBC BLD-RTO: 0 /100 WBCS (ref 0–0)
PLATELET # BLD AUTO: 267 X10*3/UL (ref 150–450)
PMV BLD AUTO: 10.6 FL (ref 7.5–11.5)
POTASSIUM SERPL-SCNC: 3.5 MMOL/L (ref 3.5–5.3)
RBC # BLD AUTO: 3.57 X10*6/UL (ref 4–5.2)
SODIUM SERPL-SCNC: 139 MMOL/L (ref 136–145)
WBC # BLD AUTO: 9.5 X10*3/UL (ref 4.4–11.3)

## 2023-10-12 PROCEDURE — 94640 AIRWAY INHALATION TREATMENT: CPT

## 2023-10-12 PROCEDURE — 36415 COLL VENOUS BLD VENIPUNCTURE: CPT | Performed by: STUDENT IN AN ORGANIZED HEALTH CARE EDUCATION/TRAINING PROGRAM

## 2023-10-12 PROCEDURE — 2500000005 HC RX 250 GENERAL PHARMACY W/O HCPCS: Performed by: STUDENT IN AN ORGANIZED HEALTH CARE EDUCATION/TRAINING PROGRAM

## 2023-10-12 PROCEDURE — 80048 BASIC METABOLIC PNL TOTAL CA: CPT | Performed by: STUDENT IN AN ORGANIZED HEALTH CARE EDUCATION/TRAINING PROGRAM

## 2023-10-12 PROCEDURE — 99232 SBSQ HOSP IP/OBS MODERATE 35: CPT | Performed by: NURSE PRACTITIONER

## 2023-10-12 PROCEDURE — 2500000004 HC RX 250 GENERAL PHARMACY W/ HCPCS (ALT 636 FOR OP/ED): Performed by: STUDENT IN AN ORGANIZED HEALTH CARE EDUCATION/TRAINING PROGRAM

## 2023-10-12 PROCEDURE — 2500000001 HC RX 250 WO HCPCS SELF ADMINISTERED DRUGS (ALT 637 FOR MEDICARE OP): Performed by: NURSE PRACTITIONER

## 2023-10-12 PROCEDURE — 93005 ELECTROCARDIOGRAM TRACING: CPT

## 2023-10-12 PROCEDURE — 85027 COMPLETE CBC AUTOMATED: CPT | Performed by: STUDENT IN AN ORGANIZED HEALTH CARE EDUCATION/TRAINING PROGRAM

## 2023-10-12 PROCEDURE — 97535 SELF CARE MNGMENT TRAINING: CPT | Mod: GO,59

## 2023-10-12 PROCEDURE — 2500000001 HC RX 250 WO HCPCS SELF ADMINISTERED DRUGS (ALT 637 FOR MEDICARE OP): Performed by: STUDENT IN AN ORGANIZED HEALTH CARE EDUCATION/TRAINING PROGRAM

## 2023-10-12 PROCEDURE — 99233 SBSQ HOSP IP/OBS HIGH 50: CPT | Performed by: INTERNAL MEDICINE

## 2023-10-12 PROCEDURE — 82947 ASSAY GLUCOSE BLOOD QUANT: CPT

## 2023-10-12 PROCEDURE — 93010 ELECTROCARDIOGRAM REPORT: CPT | Performed by: STUDENT IN AN ORGANIZED HEALTH CARE EDUCATION/TRAINING PROGRAM

## 2023-10-12 PROCEDURE — 1200000002 HC GENERAL ROOM WITH TELEMETRY DAILY

## 2023-10-12 PROCEDURE — 97530 THERAPEUTIC ACTIVITIES: CPT | Mod: GO,59

## 2023-10-12 PROCEDURE — 2500000002 HC RX 250 W HCPCS SELF ADMINISTERED DRUGS (ALT 637 FOR MEDICARE OP, ALT 636 FOR OP/ED): Performed by: STUDENT IN AN ORGANIZED HEALTH CARE EDUCATION/TRAINING PROGRAM

## 2023-10-12 PROCEDURE — 99232 SBSQ HOSP IP/OBS MODERATE 35: CPT | Performed by: STUDENT IN AN ORGANIZED HEALTH CARE EDUCATION/TRAINING PROGRAM

## 2023-10-12 RX ORDER — LEVOFLOXACIN 500 MG/1
750 TABLET, FILM COATED ORAL DAILY
Qty: 11 TABLET | Refills: 0 | Status: SHIPPED | OUTPATIENT
Start: 2023-10-12 | End: 2023-10-13 | Stop reason: HOSPADM

## 2023-10-12 RX ORDER — DIGOXIN 0.25 MG/ML
125 INJECTION INTRAMUSCULAR; INTRAVENOUS DAILY
Status: DISCONTINUED | OUTPATIENT
Start: 2023-10-14 | End: 2023-10-14

## 2023-10-12 RX ORDER — METOPROLOL TARTRATE 1 MG/ML
5 INJECTION, SOLUTION INTRAVENOUS EVERY 6 HOURS PRN
Status: DISCONTINUED | OUTPATIENT
Start: 2023-10-12 | End: 2023-10-14 | Stop reason: HOSPADM

## 2023-10-12 RX ORDER — CARVEDILOL 6.25 MG/1
6.25 TABLET ORAL 2 TIMES DAILY
Status: DISCONTINUED | OUTPATIENT
Start: 2023-10-12 | End: 2023-10-14 | Stop reason: HOSPADM

## 2023-10-12 RX ORDER — METOPROLOL TARTRATE 1 MG/ML
5 INJECTION, SOLUTION INTRAVENOUS ONCE
Status: COMPLETED | OUTPATIENT
Start: 2023-10-12 | End: 2023-10-12

## 2023-10-12 RX ORDER — DIGOXIN 0.25 MG/ML
500 INJECTION INTRAMUSCULAR; INTRAVENOUS ONCE
Status: COMPLETED | OUTPATIENT
Start: 2023-10-12 | End: 2023-10-12

## 2023-10-12 RX ORDER — ALPRAZOLAM 0.5 MG/1
0.5 TABLET ORAL 2 TIMES DAILY PRN
Status: DISCONTINUED | OUTPATIENT
Start: 2023-10-12 | End: 2023-10-14 | Stop reason: HOSPADM

## 2023-10-12 RX ORDER — CARVEDILOL 3.12 MG/1
3.12 TABLET ORAL ONCE
Status: COMPLETED | OUTPATIENT
Start: 2023-10-12 | End: 2023-10-12

## 2023-10-12 RX ORDER — DIGOXIN 0.25 MG/ML
250 INJECTION INTRAMUSCULAR; INTRAVENOUS ONCE
Status: COMPLETED | OUTPATIENT
Start: 2023-10-13 | End: 2023-10-13

## 2023-10-12 RX ADMIN — APIXABAN 5 MG: 5 TABLET, FILM COATED ORAL at 19:38

## 2023-10-12 RX ADMIN — PIPERACILLIN SODIUM AND TAZOBACTAM SODIUM 3.38 G: 3; .375 INJECTION, SOLUTION INTRAVENOUS at 20:30

## 2023-10-12 RX ADMIN — SODIUM CHLORIDE 10 ML/HR: 9 INJECTION, SOLUTION INTRAVENOUS at 13:50

## 2023-10-12 RX ADMIN — IPRATROPIUM BROMIDE AND ALBUTEROL SULFATE 3 ML: 2.5; .5 SOLUTION RESPIRATORY (INHALATION) at 10:38

## 2023-10-12 RX ADMIN — SODIUM CHLORIDE 10 ML/HR: 9 INJECTION, SOLUTION INTRAVENOUS at 05:03

## 2023-10-12 RX ADMIN — DIGOXIN 500 MCG: 0.25 INJECTION INTRAMUSCULAR; INTRAVENOUS at 19:39

## 2023-10-12 RX ADMIN — CARVEDILOL 3.12 MG: 3.12 TABLET, FILM COATED ORAL at 09:06

## 2023-10-12 RX ADMIN — KETOCONAZOLE: 20 CREAM TOPICAL at 09:07

## 2023-10-12 RX ADMIN — SODIUM CHLORIDE 10 ML/HR: 9 INJECTION, SOLUTION INTRAVENOUS at 20:30

## 2023-10-12 RX ADMIN — CYANOCOBALAMIN TAB 500 MCG 2500 MCG: 500 TAB at 09:12

## 2023-10-12 RX ADMIN — SACUBITRIL AND VALSARTAN 1 TABLET: 24; 26 TABLET, FILM COATED ORAL at 20:29

## 2023-10-12 RX ADMIN — PIPERACILLIN SODIUM AND TAZOBACTAM SODIUM 3.38 G: 3; .375 INJECTION, SOLUTION INTRAVENOUS at 13:49

## 2023-10-12 RX ADMIN — NYSTATIN: 100000 POWDER TOPICAL at 09:08

## 2023-10-12 RX ADMIN — FUROSEMIDE 40 MG: 10 INJECTION, SOLUTION INTRAMUSCULAR; INTRAVENOUS at 13:49

## 2023-10-12 RX ADMIN — ACETAMINOPHEN 650 MG: 325 TABLET ORAL at 09:06

## 2023-10-12 RX ADMIN — CARVEDILOL 3.12 MG: 3.12 TABLET, FILM COATED ORAL at 15:15

## 2023-10-12 RX ADMIN — ALPRAZOLAM 0.5 MG: 0.5 TABLET ORAL at 16:21

## 2023-10-12 RX ADMIN — INSULIN LISPRO 1 UNITS: 100 INJECTION, SOLUTION INTRAVENOUS; SUBCUTANEOUS at 07:32

## 2023-10-12 RX ADMIN — INSULIN LISPRO 1 UNITS: 100 INJECTION, SOLUTION INTRAVENOUS; SUBCUTANEOUS at 11:37

## 2023-10-12 RX ADMIN — IPRATROPIUM BROMIDE AND ALBUTEROL SULFATE 3 ML: 2.5; .5 SOLUTION RESPIRATORY (INHALATION) at 20:13

## 2023-10-12 RX ADMIN — METOPROLOL TARTRATE 5 MG: 5 INJECTION INTRAVENOUS at 18:52

## 2023-10-12 RX ADMIN — IPRATROPIUM BROMIDE AND ALBUTEROL SULFATE 3 ML: 2.5; .5 SOLUTION RESPIRATORY (INHALATION) at 06:54

## 2023-10-12 RX ADMIN — NYSTATIN: 100000 POWDER TOPICAL at 21:00

## 2023-10-12 RX ADMIN — MONTELUKAST SODIUM 10 MG: 10 TABLET, FILM COATED ORAL at 09:06

## 2023-10-12 RX ADMIN — MUPIROCIN: 20 OINTMENT TOPICAL at 09:08

## 2023-10-12 RX ADMIN — MUPIROCIN: 20 OINTMENT TOPICAL at 21:00

## 2023-10-12 RX ADMIN — MUPIROCIN: 20 OINTMENT TOPICAL at 18:31

## 2023-10-12 RX ADMIN — PIPERACILLIN SODIUM AND TAZOBACTAM SODIUM 3.38 G: 3; .375 INJECTION, SOLUTION INTRAVENOUS at 05:03

## 2023-10-12 RX ADMIN — FUROSEMIDE 40 MG: 10 INJECTION, SOLUTION INTRAMUSCULAR; INTRAVENOUS at 00:55

## 2023-10-12 RX ADMIN — METOPROLOL TARTRATE 5 MG: 5 INJECTION INTRAVENOUS at 15:12

## 2023-10-12 RX ADMIN — INSULIN LISPRO 1 UNITS: 100 INJECTION, SOLUTION INTRAVENOUS; SUBCUTANEOUS at 18:30

## 2023-10-12 RX ADMIN — ATORVASTATIN CALCIUM 40 MG: 20 TABLET, FILM COATED ORAL at 20:29

## 2023-10-12 ASSESSMENT — PAIN SCALES - GENERAL
PAINLEVEL_OUTOF10: 3
PAINLEVEL_OUTOF10: 0 - NO PAIN
PAINLEVEL_OUTOF10: 0 - NO PAIN

## 2023-10-12 ASSESSMENT — COGNITIVE AND FUNCTIONAL STATUS - GENERAL
DAILY ACTIVITIY SCORE: 16
PERSONAL GROOMING: A LITTLE
CLIMB 3 TO 5 STEPS WITH RAILING: A LOT
DAILY ACTIVITIY SCORE: 16
WALKING IN HOSPITAL ROOM: A LOT
MOBILITY SCORE: 12
MOVING FROM LYING ON BACK TO SITTING ON SIDE OF FLAT BED WITH BEDRAILS: A LOT
DRESSING REGULAR UPPER BODY CLOTHING: A LITTLE
STANDING UP FROM CHAIR USING ARMS: A LOT
STANDING UP FROM CHAIR USING ARMS: A LOT
PERSONAL GROOMING: A LITTLE
DRESSING REGULAR LOWER BODY CLOTHING: A LOT
TOILETING: TOTAL
HELP NEEDED FOR BATHING: A LITTLE
MOBILITY SCORE: 11
DRESSING REGULAR UPPER BODY CLOTHING: A LITTLE
DRESSING REGULAR LOWER BODY CLOTHING: A LOT
TURNING FROM BACK TO SIDE WHILE IN FLAT BAD: A LOT
MOVING FROM LYING ON BACK TO SITTING ON SIDE OF FLAT BED WITH BEDRAILS: A LOT
CLIMB 3 TO 5 STEPS WITH RAILING: TOTAL
MOVING TO AND FROM BED TO CHAIR: A LOT
TOILETING: TOTAL
HELP NEEDED FOR BATHING: A LITTLE
WALKING IN HOSPITAL ROOM: A LOT
TURNING FROM BACK TO SIDE WHILE IN FLAT BAD: A LOT
MOVING TO AND FROM BED TO CHAIR: A LOT

## 2023-10-12 ASSESSMENT — PAIN SCALES - PAIN ASSESSMENT IN ADVANCED DEMENTIA (PAINAD)
BODYLANGUAGE: RELAXED
BREATHING: NORMAL
FACIALEXPRESSION: SMILING OR INEXPRESSIVE
CONSOLABILITY: NO NEED TO CONSOLE
TOTALSCORE: 0

## 2023-10-12 ASSESSMENT — PAIN - FUNCTIONAL ASSESSMENT
PAIN_FUNCTIONAL_ASSESSMENT: 0-10
PAIN_FUNCTIONAL_ASSESSMENT: 0-10

## 2023-10-12 ASSESSMENT — PAIN SCALES - WONG BAKER: WONGBAKER_NUMERICALRESPONSE: NO HURT

## 2023-10-12 ASSESSMENT — PAIN DESCRIPTION - DESCRIPTORS: DESCRIPTORS: ACHING

## 2023-10-12 ASSESSMENT — ACTIVITIES OF DAILY LIVING (ADL): HOME_MANAGEMENT_TIME_ENTRY: 12

## 2023-10-12 NOTE — PROGRESS NOTES
Patient has been accepted at [facility], still waiting on insurance authorization. Will advise nurse and patient when we receive authorization.

## 2023-10-12 NOTE — PROGRESS NOTES
Patient authorization complete for transport to [facility]. Attempted to call son Hector, could not leave message, no mailbox set up. Patient sleeping at this time, will notify here when she awakes of transfer this evening.

## 2023-10-12 NOTE — PROGRESS NOTES
Occupational Therapy    OT Treatment    Patient Name: Isa Jack  MRN: [id number]  Today's Date: 10/12/2023  Time Calculation  Start Time: 0927  Stop Time: 0950  Time Calculation (min): 23 min         Assessment:  Prognosis: Good    Plan:   Continue OT POC       Subjective   General:  OT Received On: 10/12/23  Prior to Session Communication: Bedside nurse  Patient Position Received:  (patient seated in recliner to start session, agreeable to OT session this date; patient seated in recliner at end of session with all needs met and call bell within reach)  Vital Signs:  Vital Signs  SpO2:  (3L O2 via NC throughout session)  Pain:  Pain Assessment  Pain Assessment: 0-10  Pain Score: 0 - No pain    Objective    Activities of Daily Living: Feeding  Feeding Comments: MOD I while seated    Grooming  Grooming Comments: patient standing at sink to complete hair care with CGA for dynamic standing balance with unilateral UE support      LE Dressing  LE Dressing Comments: patient seated in recliner and requiring assist to thread (B) LEs around ankles however CGA to manage pants up over hips with CGA and FWW assist for steadying    Toileting  Toileting Comments: total assist 2/2 purwick in place    Bed Mobility/Transfers:    Transfers  Transfer: Yes  Transfer 1  Trials/Comments 1: sit <> stand from recliner to standing to complete household distance mobility with CGA and FWW asssit however limited with distances 2/2 lines/tubes in place; patient requiring MIN cues for FWW safety  Transfers 2  Trials/Comments 2: block trials of functional transfers x5 reps x3 sets with FWW assist and emphasis on hand placement/body mechanics; overall tolerance well without significant fatigue       Outcome Measures:[facility] Daily Activity  Putting on and taking off regular lower body clothing: A lot  Bathing (including washing, rinsing, drying): A little  Putting on and taking off regular upper body clothing: A little  Toileting, which includes  using toilet, bedpan or urinal: Total  Taking care of personal grooming such as brushing teeth: A little  Eating Meals: None  Daily Activity - Total Score: 16    Education Documentation  ADL Training, taught by Paola Pelaez OT at 10/12/2023 12:34 PM.  Learner: Patient  Readiness: Acceptance  Method: Explanation  Response: Verbalizes Understanding    Education Comments  No comments found.      OP EDUCATION:  Education  Individual(s) Educated: Patient  Education Provided: Ergonomics and postural realignment, Joint protection and energy conservation  Equipment:  (AE training)  Patient Response to Education: Patient/Caregiver Verbalized Understanding of Information    Goals:  Encounter Problems       Encounter Problems (Active)       OT Problem       PATIENT WILL MAINTAIN DYNAMIC STANDING BALANCE DURING ADLs (Not met)       Start:  10/08/23    Expected End:  10/22/23    Resolved:  10/11/23    Updated to: PATIENT WILL MAINTAIN GOOD DYNAMIC STANDING BALANCE DURING ADLs    Update reason: updating goal         PATIENT WILL MAINTAIN DYNAMIC SEATED BALANCE DURING ADLs (Not met)       Start:  10/08/23    Expected End:  10/22/23    Resolved:  10/11/23    Updated to: PATIENT WILL MAINTAIN GOOD DYNAMIC SEATED BALANCE DURING ADLs    Update reason: updating goal          PATIENT WILL DEMONSTRATE INDEPENDENCE WITH UPPER BODY donning and doffing all UE clothes (Progressing)       Start:  10/08/23    Expected End:  10/22/23            PATIENT WILL DEMONSTRATE INDEPENDENCE WITH LOWER BODY donning and doffing all LE clothes  (Progressing)       Start:  10/08/23    Expected End:  10/22/23            PATIENT WILL BE INDEPENDENT IN USE OF reacher IN ORDER TO DRESS ON OWN (Progressing)       Start:  10/08/23    Expected End:  10/22/23                        Pt. will complete all transfers at Mod I level  (Progressing)       Start:  10/11/23    Expected End:  10/22/23

## 2023-10-12 NOTE — PROGRESS NOTES
Cardiology Progress Note  Patient: Isa Jack  Unit/Bed: 1008/1008-A  YOB: 1950  MRN: [id number]  Acct: [id number]   Admitting Diagnosis: Acute pulmonary edema (CMS/HCC) [J81.0]  Sepsis due to pneumonia (CMS/HCC) [J18.9, A41.9]  Date:  10/7/2023  Hospital Day: 5  Attending: Don Santacruz MD   Rounding VANI/Cardiologist:  Richie Webber DO,   Primary Cardiologist: SARITHA    Complaint:  Chief Complaint   Patient presents with    Weakness, Gen        SUBJECTIVE    No issues overnight.  Still diuresing well.    Denies any current chest pain or shortness of breath        VITALS   Visit Vitals  /68 (BP Location: Left arm, Patient Position: Sitting)   Pulse 76   Temp 36.6 °C (97.8 °F) (Temporal)   Resp 18        I/O:    Intake/Output Summary (Last 24 hours) at 10/12/2023 1022  Last data filed at 10/12/2023 0900  Gross per 24 hour   Intake 375 ml   Output 1500 ml   Net -1125 ml        Allergies:  Allergies   Allergen Reactions    Iodine Hives    Adhesive Tape-Silicones Rash    Gadolinium-Containing Contrast Media Rash        PHYSICAL EXAM   Physical Exam  Vitals and nursing note reviewed.   HENT:      Head: Normocephalic.      Mouth/Throat:      Mouth: Mucous membranes are moist.   Eyes:      Pupils: Pupils are equal, round, and reactive to light.   Cardiovascular:      Rate and Rhythm: Normal rate and regular rhythm.   Pulmonary:      Effort: Pulmonary effort is normal.      Breath sounds: Wheezing present.   Musculoskeletal:         General: Swelling present.      Cervical back: Normal range of motion.   Skin:     General: Skin is warm and dry.      Capillary Refill: Capillary refill takes less than 2 seconds.   Neurological:      General: No focal deficit present.      Mental Status: She is alert.   Psychiatric:         Mood and Affect: Mood normal.           LABS   CBC:   Results from last 7 days   Lab Units 10/12/23  0434 10/11/23  0619 10/10/23  0553   WBC AUTO x10*3/uL 9.5 13.7*  14.4*   RBC AUTO x10*6/uL 3.57* 3.66* 3.36*   HEMOGLOBIN g/dL 11.3* 11.6* 10.7*   HEMATOCRIT % 35.4* 36.0 33.1*   MCV fL 99 98 99   MCH pg 31.7 31.7 31.8   MCHC g/dL 31.9* 32.2 32.3   RDW % 13.3 13.3 13.5   PLATELETS AUTO x10*3/uL 267 228 215   MPV fL 10.6 11.3 10.8     CMP:    Results from last 7 days   Lab Units 10/12/23  0434 10/11/23  1145 10/11/23  0619 10/08/23  0709 10/07/23  1159   SODIUM mmol/L 139 140 140   < > 139   POTASSIUM mmol/L 3.5 3.8 3.3*   < > 4.4   CHLORIDE mmol/L 97* 99 98   < > 103   CO2 mmol/L 35* 32 31   < > 20*   BUN mg/dL 30* 26* 27*   < > 17   CREATININE mg/dL 0.92 0.95 0.91   < > 0.72   GLUCOSE mg/dL 181* 182* 170*   < > 212*   PROTEIN TOTAL g/dL  --   --   --   --  7.4   CALCIUM mg/dL 8.9 9.2 9.4   < > 9.1   BILIRUBIN TOTAL mg/dL  --   --   --   --  0.6   ALK PHOS U/L  --   --   --   --  70   AST U/L  --   --   --   --  17   ALT U/L  --   --   --   --  11    < > = values in this interval not displayed.     BMP:    Results from last 7 days   Lab Units 10/12/23  0434 10/11/23  1145 10/11/23  0619   SODIUM mmol/L 139 140 140   POTASSIUM mmol/L 3.5 3.8 3.3*   CHLORIDE mmol/L 97* 99 98   CO2 mmol/L 35* 32 31   BUN mg/dL 30* 26* 27*   CREATININE mg/dL 0.92 0.95 0.91   CALCIUM mg/dL 8.9 9.2 9.4   GLUCOSE mg/dL 181* 182* 170*     Magnesium:  Results from last 7 days   Lab Units 10/07/23  1159   MAGNESIUM mg/dL 1.68     Troponin:    Results from last 7 days   Lab Units 10/07/23  1928 10/07/23  1530 10/07/23  1159   TROPHS ng/L 55* 46* 20*     BNP:   Results from last 7 days   Lab Units 10/07/23  1530   BNP pg/mL 301*     Lipid Panel:           Current Facility-Administered Medications:     acetaminophen (Tylenol) tablet 650 mg, 650 mg, oral, q4h PRN, Cheri Mcdermott MD, 650 mg at 10/09/23 0823    acetaminophen (Tylenol) tablet 650 mg, 650 mg, oral, q4h PRN, Cheri Mcdermott MD, 650 mg at 10/12/23 0906    albuterol 2.5 mg /3 mL (0.083 %) nebulizer solution 2.5 mg, 2.5 mg, nebulization, q4h  PRN, Cheri Mcdermott MD    alum-mag hydroxide-simeth (Mylanta) 200-200-20 mg/5 mL oral suspension 30 mL, 30 mL, oral, q6h PRN, Cheri Mcdermott MD, 30 mL at 10/11/23 2002    atorvastatin (Lipitor) tablet 40 mg, 40 mg, oral, Nightly, Cheri Mcdermott MD, 40 mg at 10/11/23 2002    carvedilol (Coreg) tablet 3.125 mg, 3.125 mg, oral, BID, Cheri Mcdermott MD, 3.125 mg at 10/12/23 0906    cyanocobalamin (Vitamin B-12) tablet 2,500 mcg, 2,500 mcg, oral, Daily, Cheri Mcdermott MD, 2,500 mcg at 10/12/23 0912    dextromethorphan-guaifenesin (Robitussin DM)  mg/5 mL oral liquid 5 mL, 5 mL, oral, q4h PRN, Cheri Mcdermott MD    dextrose 10 % in water (D10W) infusion, 0.3 g/kg/hr, intravenous, Once PRN, Cheri Mcdermott MD    dextrose 50 % injection 25 g, 25 g, intravenous, q15 min PRN, Cheri Mcdermott MD    furosemide (Lasix) injection 40 mg, 40 mg, intravenous, q12h, Cheri Mcdermott MD, 40 mg at 10/12/23 0055    glucagon (Glucagen) injection 1 mg, 1 mg, intramuscular, q15 min PRN, Cheri Mcdermott MD    HYDROcodone-acetaminophen (Norco) 5-325 mg per tablet 1 tablet, 1 tablet, oral, BID PRN, Cheri Mcdermott MD, 1 tablet at 10/10/23 2004    insulin lispro (HumaLOG) injection 0-5 Units, 0-5 Units, subcutaneous, TID with meals, Cheri Mcdermott MD, 1 Units at 10/12/23 0732    ipratropium-albuteroL (Duo-Neb) 0.5-2.5 mg/3 mL nebulizer solution 3 mL, 3 mL, nebulization, 4x daily, Cheri Mcdermott MD, 3 mL at 10/12/23 0654    ketoconazole (NIZOral) 2 % cream, , Topical, Daily, Cheri Mcdermott MD, Given at 10/12/23 0907    melatonin tablet 3 mg, 3 mg, oral, Daily, Cheri Mcdermott MD, 3 mg at 10/11/23 2001    meloxicam (Mobic) tablet 7.5 mg, 7.5 mg, oral, Daily PRN, Cheri Mcdermott MD, 7.5 mg at 10/11/23 0934    montelukast (Singulair) tablet 10 mg, 10 mg, oral, Daily, Cheri Mcdermott MD, 10 mg at 10/12/23 0906    mupirocin (Bactroban) 2 % ointment, , Topical, TID, Don Santacruz MD,  Given at 10/12/23 0908    [Held by provider] nitroglycerin (Nitrostat) SL tablet 0.4 mg, 0.4 mg, sublingual, q5 min PRN, Cheri Mcdermott MD    nystatin (Mycostatin) 100,000 unit/gram powder, , Topical, BID, Amanda White MD, Given at 10/12/23 0908    ondansetron (Zofran) injection 4 mg, 4 mg, intravenous, q8h PRN, Cheri Mcdermott MD    oxygen (O2) therapy, , inhalation, Continuous PRN - O2/gases, Cheri Mcdermott MD, 3 L/min at 10/12/23 0654    piperacillin-tazobactam-dextrose (Zosyn) IV 3.375 g, 3.375 g, intravenous, q8h, Stopped at 10/12/23 0912 **AND** sodium chloride 0.9% infusion, 10 mL/hr, intravenous, q8h, Cheri Mcdermott MD, Last Rate: 10 mL/hr at 10/12/23 0503, 10 mL/hr at 10/12/23 0503    polyethylene glycol (Glycolax, Miralax) packet 17 g, 17 g, oral, Daily, Cheri Mcdermott MD, 17 g at 10/11/23 0912    sacubitriL-valsartan (Entresto) 24-26 mg per tablet 1 tablet, 1 tablet, oral, BID, Iam Bowden, APRN-CNP    sodium chloride 0.9 % bolus 1,000 mL, 1,000 mL, intravenous, Once, Cheri Mcdermott MD    [Held by provider] sodium chloride 0.9% infusion, 100 mL/hr, intravenous, Continuous, Cheri Mcdermott MD, Last Rate: 100 mL/hr at 10/11/23 0038, 100 mL/hr at 10/11/23 0038    traMADol (Ultram) tablet 50 mg, 50 mg, oral, q8h PRN, Marc Olvera MD, 50 mg at 10/11/23 1718    CT chest wo IV contrast    Result Date: 10/7/2023  Interpreted By:  Bassem Holbrook, STUDY: CT CHEST WO IV CONTRAST;  10/7/2023 3:51 pm   INDICATION: Signs/Symptoms:sob, tachy, sepsis, likely pneumonia on cxr.   COMPARISON: None.   ACCESSION NUMBER(S): [id number]   ORDERING CLINICIAN: SOLEDAD VOSS   TECHNIQUE: Helical data acquisition of the chest was obtained  without IV contrast.  Images were reformatted in axial, coronal, and sagittal planes.   FINDINGS: Exam limited due to motion artifact and body habitus..   LUNGS and AIRWAYS: Respiratory motion can obscure findings. Diffusely scattered interstitial edema. Mild  bibasilar atelectasis. Possible trace pleural effusions. Central airways are patent. No bronchiectasis.   MEDIASTINUM and MARIANGEL, LOWER NECK AND AXILLA: The visualized thyroid gland is grossly unremarkable.   A few prominent mediastinal lymph nodes are noted for example measuring 12 mm short axis in the lower right paratracheal region and 13 mm in the subcarinal region.   Esophagus is not dilated.   HEART and VESSELS: Severe cardiomegaly.   No significant pericardial effusion.   Severe coronary atherosclerosis.   Severe thoracic aortic atherosclerosis without aneurysm.   UPPER ABDOMEN: The visualized subdiaphragmatic structures demonstrate no acute findings on noncontrast images.   CHEST WALL and OSSEOUS STRUCTURES: No suspicious osseous lesions. Multilevel degenerative changes of the thoracic spine.       1.  Severe cardiomegaly with interstitial edema and trace pleural effusions. 2. Mild mediastinal lymphadenopathy perhaps reactive. Consider follow-up in 3-6 months to document stability.     Signed by: Bassem Holbrook 10/7/2023 4:15 PM Dictation workstation:   [id number]    XR chest 1 view    Result Date: 10/7/2023  Interpreted By:  Hector Barron, STUDY: XR CHEST 1 VIEW;  10/7/2023 12:47 pm   INDICATION: Signs/Symptoms:weakness.   COMPARISON: 09/29/2017.   ACCESSION NUMBER(S): [id number]   ORDERING CLINICIAN: SOLEDAD VOSS   FINDINGS: CARDIOMEDIASTINAL SILHOUETTE: Patient is slightly rotated to the left. Cardiomegaly and dense aortic calcification again seen.   LUNGS: There is increased irregular interstitial thickening and patchy multifocal infiltrates bilaterally greatest in the perihilar regions which may be related to vascular congestion/edema. Infection not excluded. Small effusions not excluded. No pneumothorax is seen.   ABDOMEN: No remarkable upper abdominal findings.   BONES: Degenerative changes of the shoulders are partially visualized along with mild endplate spurring of the spine.       1.  Increased  irregular interstitial thickening bilaterally with patchy multifocal infiltrates greatest in the perihilar regions which may be related to vascular congestion/edema. Infection not excluded.       MACRO: None.   Signed by: Hector Barron 10/7/2023 12:53 PM Dictation workstation:   [id number]                    Encounter Date: 10/07/23   ECG 12 lead   Result Value    Ventricular Rate 131    Atrial Rate 131    TX Interval 182    QRS Duration 86    QT Interval 354    QTC Calculation(Bazett) 522    P Axis 73    R Axis -6    T Axis 51    QRS Count 22    Q Onset 218    P Onset 127    P Offset 170    T Offset 395    QTC Fredericia 459    Narrative    Sinus tachycardia  Otherwise normal ECG    Confirmed by Richie Webber ([id number]) on 10/10/2023 10:12:27 AM        Tele Monitoring: Normal sinus rhythm    Assessment     We will maximally medicate patient switching lisinopril to Entresto  Continue beta-blocker and diuretic  Patient absolutely declines any invasive testing or surgical considerations  Consider additional medical therapy after making the above changes  Continue antibiotic  Eventually will arrange follow-up with our office if the patient so chooses alternatively she may return to the care of [facility] physicians.  We will continue to follow.        Thank you for the opportunity to participate in the care of your patient.  Please do not hesitate to call if you have any questions.     10/11  Alert and oriented x3.  Follows commands.  Supplemental O2, keep SPO2 greater than 92%  Continue beta-blockers and diuretics.  Monitor electrolytes, keep potassium greater than 4 and magnesium greater than 2  Fluid balance list is positive however patient reports that she has been urinating all night with the canister has been emptied 3-4 times.  So unclear what her current accurate I's and O's are.  Continue Entresto, maximize therapy  Antibiotics per primary care service  SSI  Patient to follow-up with [facility]  heart or [facility] at her discretion.  If she chooses [facility] we will schedule her appointment.     Iam Bowden AGACNP-BC  Adult Gerontology Acute Care Nurse Practitioner  [facility]  [phone]     Of note, this documentation is completed using the Dragon Dictation system (voice recognition software). There may be spelling and/or grammatical errors that were not corrected prior to final submission.     Further recommendations made by Dr. Webber     Patient seen and evaluated at the bedside in conjunction with Iam Bowden, RN, CNP.     Bedside examination evaluation and further assessment was performed by me.     Chart was reviewed in detail discussed with the patient including testing results medications and plan follow-up.     Impression and and plan as noted.     Ischemic cardiomyopathy: Primary cardiovascular problem in finding      Patient Active Problem List   Diagnosis    Adnexal mass    Asthma in adult, unspecified asthma severity, uncomplicated    Cellulitis    Chronic congestive heart failure (CMS/HCC)    Chronic pain syndrome    Coronary artery disease involving native heart with angina pectoris (CMS/HCC)    DJD (degenerative joint disease)    Endometrial hyperplasia    Essential hypertension    HLD (hyperlipidemia)    Pernicious anemia    Physical debility    Vitamin B12 deficiency    Skin ulcer of ankle (CMS/HCC)    Type 2 diabetes mellitus, without long-term current use of insulin (CMS/HCC)    Sepsis due to pneumonia (CMS/HCC)      Recommendation:  Patient clearly again states that she declines any invasive testing or surgical considerations despite being offered that in the past by other physicians  Continue on Entresto  Continue other meds as before  We will follow for 24 hours  If stable will sign off tomorrow  Have offered outpatient follow-up should she choose to follow here as opposed to and other  facilities  Call if any problems        10/12  Patient remains alert and oriented x3.  Follows commands.  Mentation appropriate.  Supplemental O2, keep SPO2 greater than 92%  Fluid balance negative, continue Lasix  Monitor electrolytes, keep potassium greater than 4 and magnesium greater than 2  Continue on Entresto  Patient still undecided whether she is going to follow-up with [facility] or [facility].  Offered to make her appointment with [facility] however she wants to wait to talk to her family.  Cardiology to sign off  If she does decide to go to [facility] we will make a follow-up appointment prior to discharge I notified the nurse to let us know.    Iam Bowden [facility]  Adult Gerontology Acute Care Nurse Practitioner  [facility]  [phone]    Of note, this documentation is completed using the Dragon Dictation system (voice recognition software). There may be spelling and/or grammatical errors that were not corrected prior to final submission.    Patient seen and evaluated at the bedside in conjunction with Iam Bowden RN, CNP.    Bedside examination evaluation performed    Chart was reviewed in detail and discussed again with the patient and the nursing staff.    Impression and plan as noted.    Patient continues to decline any further cardiovascular testing such as catheterization or consideration for any intervention or surgery.  She is tolerating her current medications which are suitable for her cardiomyopathy.  She is not having angina.  She still has not made a decision as to whether or not she wants to follow with her for heart center or return to her primary care doctor and then be referred to cardiology physician in their system which was previously arranged but not completed.  At this juncture we will leave that up to the patient.  We have provided information should she choose to come to see us which  would be advisable in the next week or 2.  Please call if any other issues or problems occur at this time.              Electronically signed by Richie Webber DO on 10/12/2023 at 10:22 AM

## 2023-10-12 NOTE — DISCHARGE SUMMARY
"Discharge Diagnosis  Sepsis due to pneumonia (CMS/[facility])    Issues Requiring Follow-Up  Cellulitis    Test Results Pending At Discharge  Pending Labs       Order Current Status    Extra Tubes In process    Extra Tubes In process    Extra Tubes In process    Extra Tubes In process    Extra Tubes In process    Extra Tubes In process    Extra Tubes In process    Extra Urine Gray Tube In process    Lavender Top In process    PST Top In process    PST Top In process    SST TOP In process    SST TOP In process    SST TOP In process    SST TOP In process    Urinalysis with Reflex Microscopic and Culture In process            Hospital Course   72-year-old obese female with past medical history of congestive heart failure/ischemic cardiomyopathy who was admitted with left lower extremity cellulitis/infected wound.  Blood cultures have remained negative, wound culture growing Pseudomonas which is pansensitive.  She was treated with Zosyn while in the hospital at discharge I will change her to Levaquin for 1 week.  She has been diuresing with Lasix, creatinine has been stable, hypoxemia is improving, was started on Entresto which we will continue as an outpatient.  She has an EF of 25% and refused further work-up, she has been following up with PT and OT and will need rehab, she will be discharged to an SNF.  Solu-Medrol was discontinued as it \"cause worsening fluid status.  No fevers, no leukocytosis.  Medically ready to be discharged.    Pertinent Physical Exam At Time of Discharge  Physical Exam  Eyes:      Extraocular Movements: Extraocular movements intact.      Pupils: Pupils are equal, round, and reactive to light.   Cardiovascular:      Rate and Rhythm: Normal rate and regular rhythm.      Pulses: Normal pulses.      Heart sounds: Normal heart sounds. No murmur heard.  Pulmonary:      Effort: No respiratory distress.      Breath sounds: Wheezing present. No rales.   Abdominal:      General: Abdomen is flat. Bowel sounds " are normal. There is no distension.      Palpations: Abdomen is soft.   Musculoskeletal:         General: Swelling present.      Cervical back: Normal range of motion and neck supple. No rigidity.      Right lower leg: Edema present.      Left lower leg: Edema present.  Left lower leg redness seems to have improved, the ulcers on the back seem to be healing quite well  Home Medications     Medication List      CONTINUE taking these medications     acetaminophen 650 mg ER tablet; Commonly known as: Tylenol 8 HOUR   * albuterol 2.5 mg /3 mL (0.083 %) nebulizer solution   * ProAir HFA 90 mcg/actuation inhaler; Generic drug: albuterol   * albuterol 2.5 mg /3 mL (0.083 %) nebulizer solution; Take 3 mL (2.5   mg) by nebulization every 4 hours if needed for shortness of breath or   wheezing.   * albuterol 90 mcg/actuation inhaler; Inhale 2 puffs every 4 hours if   needed for shortness of breath or wheezing. OK to substitute in class   * Lipitor 40 mg tablet; Generic drug: atorvastatin   * atorvastatin 40 mg tablet; Commonly known as: Lipitor; Take 1 tablet   (40 mg) by mouth once daily.   * Coreg 6.25 mg tablet; Generic drug: carvedilol   * carvedilol 6.25 mg tablet; Commonly known as: Coreg; Take 1 tablet   (6.25 mg) by mouth 2 times a day.   clotrimazole 1 % cream; Commonly known as: Lotrimin; Apply 1 Application   topically 2 times a day. Apply to affected areas 2-3 times daily   cyanocobalamin (vitamin B-12) 2,500 mcg tablet, sublingual SL tablet;   Place 1 each under the tongue once daily.   * BAND-AID ACTIVE FLEX TOP   * elastic bandage bandage   * Lasix 40 mg tablet; Generic drug: furosemide   * furosemide 40 mg tablet; Commonly known as: Lasix; Take 1 tablet (40   mg) by mouth in the morning and 1 tablet (40 mg) before bedtime.   * HYDROcodone-acetaminophen 5-325 mg tablet; Commonly known as: Norco   * HYDROcodone-acetaminophen 5-325 mg tablet; Commonly known as: Norco;   Take 1 tablet by mouth 2 times a day as  needed for severe pain (7 - 10).   Do not start before May 5, 2023.   * HYDROcodone-acetaminophen 5-325 mg tablet; Commonly known as: Norco;   Take 1 tablet by mouth 2 times a day as needed for severe pain (7 - 10).   Do not start before Irlanda 3, 2023.   lisinopril 2.5 mg tablet; Take 1 tablet (2.5 mg) by mouth once daily.   meloxicam 7.5 mg tablet; Commonly known as: Mobic   * metFORMIN 1,000 mg tablet; Commonly known as: Glucophage   * metFORMIN 1,000 mg tablet; Commonly known as: Glucophage; Take 1   tablet (1,000 mg) by mouth in the morning and 1 tablet (1,000 mg) before   bedtime.   methadone 5 mg tablet; Commonly known as: Dolophine   * montelukast 10 mg tablet; Commonly known as: Singulair   * montelukast 10 mg tablet; Commonly known as: Singulair; Take 1 tablet   (10 mg) by mouth once daily.   mupirocin 2 % ointment; Commonly known as: Bactroban; Apply 1   Application topically 2 times a day as needed (infection).   naloxone 4 mg/0.1 mL nasal spray; Commonly known as: Narcan; Administer   1 spray (4 mg) into affected nostril(s) if needed for opioid reversal.   * Nitrostat 0.4 mg SL tablet; Generic drug: nitroglycerin   * nitroglycerin 0.4 mg SL tablet; Commonly known as: Nitrostat; Place 1   tablet (0.4 mg) under the tongue every 5 minutes if needed for chest pain   (chest pain).   OneTouch Ultra Test strip; Generic drug: blood sugar diagnostic   * potassium chloride CR 20 mEq ER tablet; Commonly known as: Klor-Con   M20   * potassium chloride CR 20 mEq ER tablet; Commonly known as: Klor-Con   M20; Take 1 tablet (20 mEq) by mouth 2 times a day.  * This list has 23 medication(s) that are the same as other medications   prescribed for you. Read the directions carefully, and ask your doctor or   other care provider to review them with you.     STOP taking these medications     cefadroxil 500 mg capsule; Commonly known as: Duricef       Outpatient Follow-Up  Future Appointments   Date Time Provider Department  [place]   4/22/2024 10:00 AM Yamini Beckwith DO [id number] [place]       Don Santacruz MD

## 2023-10-12 NOTE — PROGRESS NOTES
Infectious Diseases Inpatient Progress Note          HISTORY OF PRESENT ILLNESS:  Follow up sepsis, left leg cellulitis, acute CHF and COPD exacerbation with questionable pneumonia, acute respiratory failure with hypoxia on IV Zosyn, well tolerated. Patient denies any cough.    Patient feels much better today   She is ready to be discharged tomorrow to a skilled nursing facility   has decreased shortness of breath and bilateral leg swelling.   Decreased leg pain and swelling  Mild cough  Positive diarrhea  Persistent rash under breasts and groin areas.   Currently on 3 L nasal cannula     Current Medications:    atorvastatin, 40 mg, oral, Nightly  carvedilol, 3.125 mg, oral, BID  cyanocobalamin, 2,500 mcg, oral, Daily  furosemide, 40 mg, intravenous, q12h  insulin lispro, 0-5 Units, subcutaneous, TID with meals  ipratropium-albuteroL, 3 mL, nebulization, 4x daily  ketoconazole, , Topical, Daily  melatonin, 3 mg, oral, Daily  montelukast, 10 mg, oral, Daily  mupirocin, , Topical, TID  nystatin, , Topical, BID  piperacillin-tazobactam, 3.375 g, intravenous, q8h   And  sodium chloride 0.9%, 10 mL/hr, intravenous, q8h  polyethylene glycol, 17 g, oral, Daily  sacubitriL-valsartan, 1 tablet, oral, BID  sodium chloride, 1,000 mL, intravenous, Once        Allergies:  Iodine, Adhesive tape-silicones, and Gadolinium-containing contrast media      Review of Systems  14 system review is negative other than HPI  Concerned about severe coronary artery disease and poor cardiac status  Physical Exam    Heart Rate:  [70-87]   Temp:  [35.9 °C (96.6 °F)-36.6 °C (97.8 °F)]   Resp:  [18]   BP: (122-148)/(62-72)   SpO2:  [94 %-99 %]    Vitals:    10/11/23 2320 10/12/23 0055 10/12/23 0654 10/12/23 0752   BP: 122/66 128/62  148/68   BP Location: Left arm Left arm  Left arm   Patient Position: Lying   Sitting   Pulse: 70   76   Resp:    18   Temp: 36.2 °C (97.2 °F)   36.6 °C (97.8 °F)   TempSrc: Temporal   Temporal   SpO2: 94% 95% 96% 97%  "  Weight:       Height:         General Appearance: alert and oriented to person, place and time, well-developed and well-nourished, in no acute distress  On 3 L nasal cannula.  Does not use oxygen at home  Head: normocephalic and atraumatic  Eyes: anicteric sclerae  ENT: oropharynx clear and moist with normal mucous membranes. No oral thrush  Lungs: normal respiratory effort, resolved wheezes  Heart normal S1-S2 no murmur  Abdomen: soft, no tenderness, large pannus  +1 B leg edema  Decreased fungal rash in the groins and under breasts  Left leg with dressing  No erythema or warmth  Diuresing  Pure wick with clear urine  DATA:    Lab Results   Component Value Date    WBC 9.5 10/12/2023    HGB 11.3 (L) 10/12/2023    HCT 35.4 (L) 10/12/2023    MCV 99 10/12/2023     10/12/2023     Lab Results   Component Value Date    CREATININE 0.92 10/12/2023    BUN 30 (H) 10/12/2023     10/12/2023    K 3.5 10/12/2023    CL 97 (L) 10/12/2023    CO2 35 (H) 10/12/2023       Hepatic Function Panel:  No results found for: \"ALKPHOS\", \"ALT\", \"AST\", \"PROT\", \"BILITOT\", \"BILIDIR\"      Microbiology:   Blood cultures collected on admission are negative so far  Wound cultures  Susceptibility data from last 90 days.  Collected Specimen Info Organism Aztreonam Cefepime Ceftazidime Ciprofloxacin Gentamicin Levofloxacin Piperacillin/Tazobactam Tobramycin   10/08/23 Tissue/Biopsy from Skin Lesion Pseudomonas aeruginosa S S S S S S S S         Imaging:   CT chest wo IV contrast    Result Date: 10/7/2023  Interpreted By:  Bassem Holbrook, STUDY: CT CHEST WO IV CONTRAST;  10/7/2023 3:51 pm   INDICATION: Signs/Symptoms:sob, tachy, sepsis, likely pneumonia on cxr.   COMPARISON: None.   ACCESSION NUMBER(S): [id number]   ORDERING CLINICIAN: SOLEDAD VOSS   TECHNIQUE: Helical data acquisition of the chest was obtained  without IV contrast.  Images were reformatted in axial, coronal, and sagittal planes.   FINDINGS: Exam limited due to motion " artifact and body habitus..   LUNGS and AIRWAYS: Respiratory motion can obscure findings. Diffusely scattered interstitial edema. Mild bibasilar atelectasis. Possible trace pleural effusions. Central airways are patent. No bronchiectasis.   MEDIASTINUM and MARIANGEL, LOWER NECK AND AXILLA: The visualized thyroid gland is grossly unremarkable.   A few prominent mediastinal lymph nodes are noted for example measuring 12 mm short axis in the lower right paratracheal region and 13 mm in the subcarinal region.   Esophagus is not dilated.   HEART and VESSELS: Severe cardiomegaly.   No significant pericardial effusion.   Severe coronary atherosclerosis.   Severe thoracic aortic atherosclerosis without aneurysm.   UPPER ABDOMEN: The visualized subdiaphragmatic structures demonstrate no acute findings on noncontrast images.   CHEST WALL and OSSEOUS STRUCTURES: No suspicious osseous lesions. Multilevel degenerative changes of the thoracic spine.       1.  Severe cardiomegaly with interstitial edema and trace pleural effusions. 2. Mild mediastinal lymphadenopathy perhaps reactive. Consider follow-up in 3-6 months to document stability.     Signed by: Bassem Holbrook 10/7/2023 4:15 PM Dictation workstation:   [id number]    XR chest 1 view    Result Date: 10/7/2023  Interpreted By:  Hector Barron, STUDY: XR CHEST 1 VIEW;  10/7/2023 12:47 pm   INDICATION: Signs/Symptoms:weakness.   COMPARISON: 09/29/2017.   ACCESSION NUMBER(S): [id number]   ORDERING CLINICIAN: SOLEDAD VOSS   FINDINGS: CARDIOMEDIASTINAL SILHOUETTE: Patient is slightly rotated to the left. Cardiomegaly and dense aortic calcification again seen.   LUNGS: There is increased irregular interstitial thickening and patchy multifocal infiltrates bilaterally greatest in the perihilar regions which may be related to vascular congestion/edema. Infection not excluded. Small effusions not excluded. No pneumothorax is seen.   ABDOMEN: No remarkable upper abdominal findings.    BONES: Degenerative changes of the shoulders are partially visualized along with mild endplate spurring of the spine.       1.  Increased irregular interstitial thickening bilaterally with patchy multifocal infiltrates greatest in the perihilar regions which may be related to vascular congestion/edema. Infection not excluded.       MACRO: None.   Signed by: Hector Barron 10/7/2023 12:53 PM Dictation workstation:   [id number]       Wound culture with Pseudomonas aeruginosa  0 Result Notes  Tissue/Wound Culture/Smear (2+) Few Pseudomonas aeruginosa Abnormal    Identification and susceptibility testing to follow            Gram Stain  Abnormal   No polymorphonuclear leukocytes seen      (2+) Few Gram negative bacilli              Resulting Agency: [facility]           Specimen Collected: 10/08/23 00:37 Last Resulted: 10/09/23 14:31               IMPRESSION:    Sepsis, resolved  L Leg cellulitis, improving  Infected venous stasis ulcers of L leg with Pseudomonas aeruginosa, healing  Acute CHF/COPD exacerbation  Acute respiratory failure with hypoxia    Patient Active Problem List   Diagnosis    Adnexal mass    Asthma in adult, unspecified asthma severity, uncomplicated    Cellulitis    Chronic congestive heart failure (CMS/HCC)    Chronic pain syndrome    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    Change to Keflex 500 mg p.o. 3 times daily for 3 more days postdischarge  Salt and fluid restriction over emphasized  Follow-up with cardiology for CHF exacerbation and treatment  Agree with diuresis  Oxygen support and  weaning  Local wound care  Follow-up CBC BMP  Topical antifungal cream with ketoconazole    Discussed with wound care team patient and other  and RN  Leann Cristina MD

## 2023-10-12 NOTE — NURSING NOTE
Met with patient for education reinforcement. Discussed CHF, signs and symptoms, and when to call cardiologist. Reinforced the importance of following a Low Sodium Diet and monitoring daily weight, lower leg edema, and shortness of breath. Reviewed importance of taking prescribed medications after discharge. Reinforced importance of following up with cardiologist within a week of discharge. Reminded patient that they had my contact information should any questions or concerns arise.

## 2023-10-12 NOTE — NURSING NOTE
At 1435, informed Doctor Noam of patient heart rate. New orders placed.  At 1552, informed Doctor Noam of patient's heart rate.

## 2023-10-12 NOTE — CARE PLAN
Problem: Nutrition  Goal: Less than 5 days NPO/clear liquids  Outcome: Progressing  Goal: Consume prescribed supplement  Outcome: Progressing  Goal: Adequate PO fluid intake  Outcome: Progressing  Goal: Nutrition support goals are met within 48 hrs  Outcome: Progressing  Goal: Nutrition support is meeting 75% of nutrient needs  Outcome: Progressing  Goal: Tube feed tolerance  Outcome: Progressing  Goal: BG  mg/dL  Outcome: Progressing  Goal: Lab values WNL  Outcome: Progressing  Goal: Electrolytes WNL  Outcome: Progressing  Goal: Promote healing  Outcome: Progressing  Goal: Maintain stable weight  Outcome: Progressing  Goal: Reduce weight from edema/fluid  Outcome: Progressing  Goal: Gradual weight gain  Outcome: Progressing  Goal: Improve ostomy output  Outcome: Progressing     Problem: Fall/Injury  Goal: Not fall by end of shift  Outcome: Progressing  Goal: Be free from injury by end of the shift  Outcome: Progressing  Goal: Verbalize understanding of personal risk factors for fall in the hospital  Outcome: Progressing  Goal: Verbalize understanding of risk factor reduction measures to prevent injury from fall in the home  Outcome: Progressing  Goal: Use assistive devices by end of the shift  Outcome: Progressing  Goal: Pace activities to prevent fatigue by end of the shift  Outcome: Progressing     Problem: Pain  Goal: Takes deep breaths with improved pain control throughout the shift  Outcome: Progressing  Goal: Turns in bed with improved pain control throughout the shift  Outcome: Progressing  Goal: Walks with improved pain control throughout the shift  Outcome: Progressing  Goal: Performs ADL's with improved pain control throughout shift  Outcome: Progressing  Goal: Participates in PT with improved pain control throughout the shift  Outcome: Progressing  Goal: Free from opioid side effects throughout the shift  Outcome: Progressing  Goal: Free from acute confusion related to pain meds throughout the  shift  Outcome: Progressing     Problem: Diabetes  Goal: Achieve decreasing blood glucose levels by end of shift  Outcome: Progressing  Goal: Increase stability of blood glucose readings by end of shift  Outcome: Progressing  Goal: Decrease in ketones present in urine by end of shift  Outcome: Progressing  Goal: Maintain electrolyte levels within acceptable range throughout shift  Outcome: Progressing  Goal: Maintain glucose levels >70mg/dl to <250mg/dl throughout shift  Outcome: Progressing  Goal: No changes in neurological exam by end of shift  Outcome: Progressing  Goal: Learn about and adhere to nutrition recommendations by end of shift  Outcome: Progressing  Goal: Vital signs within normal range for age by end of shift  Outcome: Progressing  Goal: Increase self care and/or family involovement by end of shift  Outcome: Progressing  Goal: Receive DSME education by end of shift  Outcome: Progressing     Problem: Pain - Adult  Goal: Verbalizes/displays adequate comfort level or baseline comfort level  Outcome: Progressing     Problem: Safety - Adult  Goal: Free from fall injury  Outcome: Progressing     Problem: Discharge Planning  Goal: Discharge to home or other facility with appropriate resources  Outcome: Progressing     Problem: Chronic Conditions and Co-morbidities  Goal: Patient's chronic conditions and co-morbidity symptoms are monitored and maintained or improved  Outcome: Progressing     Problem: Skin  Goal: Decreased wound size/increased tissue granulation at next dressing change  Outcome: Progressing  Goal: Participates in plan/prevention/treatment measures  Outcome: Progressing  Goal: Prevent/manage excess moisture  Outcome: Progressing  Goal: Prevent/minimize sheer/friction injuries  Outcome: Progressing  Goal: Promote/optimize nutrition  Outcome: Progressing  Goal: Promote skin healing  Outcome: Progressing     Problem: Heart Failure  Goal: Improved gas exchange this shift  Outcome:  Progressing  Goal: Improved urinary output this shift  Outcome: Progressing  Goal: Reduction in peripheral edema within 24 hours  Outcome: Progressing  Goal: Report improvement of dyspnea/breathlessness this shift  Outcome: Progressing  Goal: Weight from fluid excess reduced over 2-3 days, then stabilize  Outcome: Progressing  Goal: Increase self care and/or family involvement in 24 hours  Outcome: Progressing   The patient's goals for the shift include maintain safety    The clinical goals for the shift include PAIN TOLERANCE, DEC SWELLING    Over the shift, the patient did  make progress toward the following goals. Barriers to progression include edema. Recommendations to address these barriers include elevation, implement wound treatment orders

## 2023-10-12 NOTE — PROGRESS NOTES
Patient started back on medication for heart rate, PCP is holding discharge for another day d/t increased heart rate with activity. Will reassess tomorrow.

## 2023-10-13 PROBLEM — I25.5 CARDIOMYOPATHY, ISCHEMIC: Status: ACTIVE | Noted: 2023-10-13

## 2023-10-13 LAB
ANION GAP SERPL CALC-SCNC: 11 MMOL/L (ref 10–20)
ATRIAL RATE: 89 BPM
BUN SERPL-MCNC: 31 MG/DL (ref 6–23)
CALCIUM SERPL-MCNC: 8.6 MG/DL (ref 8.6–10.3)
CHLORIDE SERPL-SCNC: 96 MMOL/L (ref 98–107)
CO2 SERPL-SCNC: 33 MMOL/L (ref 21–32)
CREAT SERPL-MCNC: 1.06 MG/DL (ref 0.5–1.05)
ERYTHROCYTE [DISTWIDTH] IN BLOOD BY AUTOMATED COUNT: 13.2 % (ref 11.5–14.5)
GFR SERPL CREATININE-BSD FRML MDRD: 56 ML/MIN/1.73M*2
GLUCOSE BLD MANUAL STRIP-MCNC: 175 MG/DL (ref 74–99)
GLUCOSE BLD MANUAL STRIP-MCNC: 185 MG/DL (ref 74–99)
GLUCOSE BLD MANUAL STRIP-MCNC: 190 MG/DL (ref 74–99)
GLUCOSE BLD MANUAL STRIP-MCNC: 207 MG/DL (ref 74–99)
GLUCOSE SERPL-MCNC: 177 MG/DL (ref 74–99)
HCT VFR BLD AUTO: 38.1 % (ref 36–46)
HGB BLD-MCNC: 12.2 G/DL (ref 12–16)
MAGNESIUM SERPL-MCNC: 1.94 MG/DL (ref 1.6–2.4)
MCH RBC QN AUTO: 31.9 PG (ref 26–34)
MCHC RBC AUTO-ENTMCNC: 32 G/DL (ref 32–36)
MCV RBC AUTO: 100 FL (ref 80–100)
NRBC BLD-RTO: 0 /100 WBCS (ref 0–0)
PLATELET # BLD AUTO: 321 X10*3/UL (ref 150–450)
PMV BLD AUTO: 10.3 FL (ref 7.5–11.5)
POTASSIUM SERPL-SCNC: 3.4 MMOL/L (ref 3.5–5.3)
Q ONSET: 219 MS
QRS COUNT: 23 BEATS
QRS DURATION: 94 MS
QT INTERVAL: 312 MS
QTC CALCULATION(BAZETT): 469 MS
QTC FREDERICIA: 409 MS
R AXIS: -11 DEGREES
RBC # BLD AUTO: 3.83 X10*6/UL (ref 4–5.2)
SODIUM SERPL-SCNC: 137 MMOL/L (ref 136–145)
T AXIS: 253 DEGREES
T OFFSET: 375 MS
VENTRICULAR RATE: 136 BPM
WBC # BLD AUTO: 13.6 X10*3/UL (ref 4.4–11.3)

## 2023-10-13 PROCEDURE — 99231 SBSQ HOSP IP/OBS SF/LOW 25: CPT | Performed by: STUDENT IN AN ORGANIZED HEALTH CARE EDUCATION/TRAINING PROGRAM

## 2023-10-13 PROCEDURE — 1200000002 HC GENERAL ROOM WITH TELEMETRY DAILY

## 2023-10-13 PROCEDURE — 83735 ASSAY OF MAGNESIUM: CPT | Performed by: NURSE PRACTITIONER

## 2023-10-13 PROCEDURE — 2500000002 HC RX 250 W HCPCS SELF ADMINISTERED DRUGS (ALT 637 FOR MEDICARE OP, ALT 636 FOR OP/ED): Performed by: STUDENT IN AN ORGANIZED HEALTH CARE EDUCATION/TRAINING PROGRAM

## 2023-10-13 PROCEDURE — 80048 BASIC METABOLIC PNL TOTAL CA: CPT | Performed by: STUDENT IN AN ORGANIZED HEALTH CARE EDUCATION/TRAINING PROGRAM

## 2023-10-13 PROCEDURE — 2500000004 HC RX 250 GENERAL PHARMACY W/ HCPCS (ALT 636 FOR OP/ED): Performed by: NURSE PRACTITIONER

## 2023-10-13 PROCEDURE — 36415 COLL VENOUS BLD VENIPUNCTURE: CPT | Performed by: NURSE PRACTITIONER

## 2023-10-13 PROCEDURE — 82947 ASSAY GLUCOSE BLOOD QUANT: CPT

## 2023-10-13 PROCEDURE — 2500000004 HC RX 250 GENERAL PHARMACY W/ HCPCS (ALT 636 FOR OP/ED): Performed by: STUDENT IN AN ORGANIZED HEALTH CARE EDUCATION/TRAINING PROGRAM

## 2023-10-13 PROCEDURE — 2500000001 HC RX 250 WO HCPCS SELF ADMINISTERED DRUGS (ALT 637 FOR MEDICARE OP): Performed by: STUDENT IN AN ORGANIZED HEALTH CARE EDUCATION/TRAINING PROGRAM

## 2023-10-13 PROCEDURE — 2500000001 HC RX 250 WO HCPCS SELF ADMINISTERED DRUGS (ALT 637 FOR MEDICARE OP): Performed by: NURSE PRACTITIONER

## 2023-10-13 PROCEDURE — 99233 SBSQ HOSP IP/OBS HIGH 50: CPT | Performed by: INTERNAL MEDICINE

## 2023-10-13 PROCEDURE — 94640 AIRWAY INHALATION TREATMENT: CPT

## 2023-10-13 PROCEDURE — 85027 COMPLETE CBC AUTOMATED: CPT | Performed by: STUDENT IN AN ORGANIZED HEALTH CARE EDUCATION/TRAINING PROGRAM

## 2023-10-13 PROCEDURE — 36415 COLL VENOUS BLD VENIPUNCTURE: CPT | Performed by: STUDENT IN AN ORGANIZED HEALTH CARE EDUCATION/TRAINING PROGRAM

## 2023-10-13 PROCEDURE — 2500000001 HC RX 250 WO HCPCS SELF ADMINISTERED DRUGS (ALT 637 FOR MEDICARE OP): Performed by: INTERNAL MEDICINE

## 2023-10-13 PROCEDURE — 93010 ELECTROCARDIOGRAM REPORT: CPT | Performed by: INTERNAL MEDICINE

## 2023-10-13 RX ORDER — MAGNESIUM SULFATE HEPTAHYDRATE 40 MG/ML
2 INJECTION, SOLUTION INTRAVENOUS ONCE
Status: COMPLETED | OUTPATIENT
Start: 2023-10-13 | End: 2023-10-13

## 2023-10-13 RX ORDER — POTASSIUM CHLORIDE 20 MEQ/1
40 TABLET, EXTENDED RELEASE ORAL ONCE
Status: COMPLETED | OUTPATIENT
Start: 2023-10-13 | End: 2023-10-13

## 2023-10-13 RX ORDER — DIGOXIN 125 MCG
125 TABLET ORAL DAILY
Qty: 30 TABLET | Refills: 0 | Status: SHIPPED | OUTPATIENT
Start: 2023-10-13 | End: 2023-11-12

## 2023-10-13 RX ORDER — LANOLIN ALCOHOL/MO/W.PET/CERES
400 CREAM (GRAM) TOPICAL DAILY
Status: DISCONTINUED | OUTPATIENT
Start: 2023-10-13 | End: 2023-10-14 | Stop reason: HOSPADM

## 2023-10-13 RX ADMIN — TRAMADOL HYDROCHLORIDE 50 MG: 50 TABLET, COATED ORAL at 22:37

## 2023-10-13 RX ADMIN — CYANOCOBALAMIN TAB 500 MCG 2500 MCG: 500 TAB at 08:37

## 2023-10-13 RX ADMIN — SODIUM CHLORIDE 10 ML/HR: 9 INJECTION, SOLUTION INTRAVENOUS at 05:05

## 2023-10-13 RX ADMIN — IPRATROPIUM BROMIDE AND ALBUTEROL SULFATE 3 ML: 2.5; .5 SOLUTION RESPIRATORY (INHALATION) at 07:13

## 2023-10-13 RX ADMIN — CARVEDILOL 6.25 MG: 6.25 TABLET, FILM COATED ORAL at 08:37

## 2023-10-13 RX ADMIN — MUPIROCIN: 20 OINTMENT TOPICAL at 15:02

## 2023-10-13 RX ADMIN — KETOCONAZOLE: 20 CREAM TOPICAL at 08:38

## 2023-10-13 RX ADMIN — Medication 3 MG: at 22:37

## 2023-10-13 RX ADMIN — CARVEDILOL 6.25 MG: 6.25 TABLET, FILM COATED ORAL at 21:00

## 2023-10-13 RX ADMIN — INSULIN LISPRO 1 UNITS: 100 INJECTION, SOLUTION INTRAVENOUS; SUBCUTANEOUS at 07:24

## 2023-10-13 RX ADMIN — INSULIN LISPRO 2 UNITS: 100 INJECTION, SOLUTION INTRAVENOUS; SUBCUTANEOUS at 16:46

## 2023-10-13 RX ADMIN — SACUBITRIL AND VALSARTAN 1 TABLET: 24; 26 TABLET, FILM COATED ORAL at 08:38

## 2023-10-13 RX ADMIN — SODIUM CHLORIDE 10 ML/HR: 9 INJECTION, SOLUTION INTRAVENOUS at 12:52

## 2023-10-13 RX ADMIN — PIPERACILLIN SODIUM AND TAZOBACTAM SODIUM 3.38 G: 3; .375 INJECTION, SOLUTION INTRAVENOUS at 12:51

## 2023-10-13 RX ADMIN — MUPIROCIN: 20 OINTMENT TOPICAL at 08:38

## 2023-10-13 RX ADMIN — ATORVASTATIN CALCIUM 40 MG: 20 TABLET, FILM COATED ORAL at 22:37

## 2023-10-13 RX ADMIN — MONTELUKAST SODIUM 10 MG: 10 TABLET, FILM COATED ORAL at 08:38

## 2023-10-13 RX ADMIN — MAGNESIUM OXIDE 400 MG (241.3 MG MAGNESIUM) TABLET 400 MG: TABLET at 11:27

## 2023-10-13 RX ADMIN — PIPERACILLIN SODIUM AND TAZOBACTAM SODIUM 3.38 G: 3; .375 INJECTION, SOLUTION INTRAVENOUS at 05:05

## 2023-10-13 RX ADMIN — FUROSEMIDE 40 MG: 10 INJECTION, SOLUTION INTRAMUSCULAR; INTRAVENOUS at 12:52

## 2023-10-13 RX ADMIN — TRAMADOL HYDROCHLORIDE 50 MG: 50 TABLET, COATED ORAL at 06:30

## 2023-10-13 RX ADMIN — SACUBITRIL AND VALSARTAN 1 TABLET: 24; 26 TABLET, FILM COATED ORAL at 22:38

## 2023-10-13 RX ADMIN — INSULIN LISPRO 1 UNITS: 100 INJECTION, SOLUTION INTRAVENOUS; SUBCUTANEOUS at 11:28

## 2023-10-13 RX ADMIN — MAGNESIUM SULFATE HEPTAHYDRATE 2 G: 40 INJECTION, SOLUTION INTRAVENOUS at 11:27

## 2023-10-13 RX ADMIN — DIGOXIN 250 MCG: 0.25 INJECTION INTRAMUSCULAR; INTRAVENOUS at 01:30

## 2023-10-13 RX ADMIN — IPRATROPIUM BROMIDE AND ALBUTEROL SULFATE 3 ML: 2.5; .5 SOLUTION RESPIRATORY (INHALATION) at 19:36

## 2023-10-13 RX ADMIN — NYSTATIN: 100000 POWDER TOPICAL at 08:38

## 2023-10-13 RX ADMIN — APIXABAN 5 MG: 5 TABLET, FILM COATED ORAL at 16:45

## 2023-10-13 RX ADMIN — POTASSIUM CHLORIDE 40 MEQ: 1500 TABLET, EXTENDED RELEASE ORAL at 11:27

## 2023-10-13 RX ADMIN — APIXABAN 5 MG: 5 TABLET, FILM COATED ORAL at 08:37

## 2023-10-13 ASSESSMENT — PAIN SCALES - PAIN ASSESSMENT IN ADVANCED DEMENTIA (PAINAD)
BREATHING: NORMAL
FACIALEXPRESSION: SMILING OR INEXPRESSIVE
CONSOLABILITY: NO NEED TO CONSOLE
TOTALSCORE: 0
BODYLANGUAGE: RELAXED

## 2023-10-13 ASSESSMENT — COGNITIVE AND FUNCTIONAL STATUS - GENERAL
DRESSING REGULAR LOWER BODY CLOTHING: A LITTLE
MOVING TO AND FROM BED TO CHAIR: A LOT
DAILY ACTIVITIY SCORE: 20
CLIMB 3 TO 5 STEPS WITH RAILING: A LOT
STANDING UP FROM CHAIR USING ARMS: A LITTLE
DRESSING REGULAR UPPER BODY CLOTHING: A LITTLE
HELP NEEDED FOR BATHING: A LITTLE
MOBILITY SCORE: 15
WALKING IN HOSPITAL ROOM: A LOT
MOVING FROM LYING ON BACK TO SITTING ON SIDE OF FLAT BED WITH BEDRAILS: A LITTLE
TURNING FROM BACK TO SIDE WHILE IN FLAT BAD: A LITTLE
TOILETING: A LITTLE

## 2023-10-13 ASSESSMENT — PAIN SCALES - GENERAL
PAINLEVEL_OUTOF10: 0 - NO PAIN
PAINLEVEL_OUTOF10: 8
PAINLEVEL_OUTOF10: 9

## 2023-10-13 ASSESSMENT — PAIN SCALES - WONG BAKER
WONGBAKER_NUMERICALRESPONSE: NO HURT
WONGBAKER_NUMERICALRESPONSE: NO HURT

## 2023-10-13 ASSESSMENT — PAIN - FUNCTIONAL ASSESSMENT
PAIN_FUNCTIONAL_ASSESSMENT: 0-10
PAIN_FUNCTIONAL_ASSESSMENT: 0-10

## 2023-10-13 ASSESSMENT — PAIN DESCRIPTION - DESCRIPTORS
DESCRIPTORS: SORE
DESCRIPTORS: SORE

## 2023-10-13 NOTE — CARE PLAN
Problem: Nutrition  Goal: Less than 5 days NPO/clear liquids  Outcome: Progressing  Goal: Consume prescribed supplement  Outcome: Progressing  Goal: Adequate PO fluid intake  Outcome: Progressing  Goal: Nutrition support goals are met within 48 hrs  Outcome: Progressing  Goal: Nutrition support is meeting 75% of nutrient needs  Outcome: Progressing  Goal: Tube feed tolerance  Outcome: Progressing  Goal: BG  mg/dL  Outcome: Progressing  Goal: Lab values WNL  Outcome: Progressing  Goal: Electrolytes WNL  Outcome: Progressing  Goal: Promote healing  Outcome: Progressing  Goal: Maintain stable weight  Outcome: Progressing  Goal: Reduce weight from edema/fluid  Outcome: Progressing  Goal: Gradual weight gain  Outcome: Progressing  Goal: Improve ostomy output  Outcome: Progressing     Problem: Fall/Injury  Goal: Not fall by end of shift  Outcome: Progressing  Goal: Be free from injury by end of the shift  Outcome: Progressing  Goal: Verbalize understanding of personal risk factors for fall in the hospital  Outcome: Progressing  Goal: Verbalize understanding of risk factor reduction measures to prevent injury from fall in the home  Outcome: Progressing  Goal: Use assistive devices by end of the shift  Outcome: Progressing  Goal: Pace activities to prevent fatigue by end of the shift  Outcome: Progressing     Problem: Pain  Goal: Takes deep breaths with improved pain control throughout the shift  Outcome: Progressing  Goal: Turns in bed with improved pain control throughout the shift  Outcome: Progressing  Goal: Walks with improved pain control throughout the shift  Outcome: Progressing  Goal: Performs ADL's with improved pain control throughout shift  Outcome: Progressing  Goal: Participates in PT with improved pain control throughout the shift  Outcome: Progressing  Goal: Free from opioid side effects throughout the shift  Outcome: Progressing  Goal: Free from acute confusion related to pain meds throughout the  shift  Outcome: Progressing     Problem: Diabetes  Goal: Achieve decreasing blood glucose levels by end of shift  Outcome: Progressing  Goal: Increase stability of blood glucose readings by end of shift  Outcome: Progressing  Goal: Decrease in ketones present in urine by end of shift  Outcome: Progressing  Goal: Maintain electrolyte levels within acceptable range throughout shift  Outcome: Progressing  Goal: Maintain glucose levels >70mg/dl to <250mg/dl throughout shift  Outcome: Progressing  Goal: No changes in neurological exam by end of shift  Outcome: Progressing  Goal: Learn about and adhere to nutrition recommendations by end of shift  Outcome: Progressing  Goal: Vital signs within normal range for age by end of shift  Outcome: Progressing  Goal: Increase self care and/or family involovement by end of shift  Outcome: Progressing  Goal: Receive DSME education by end of shift  Outcome: Progressing     Problem: Pain - Adult  Goal: Verbalizes/displays adequate comfort level or baseline comfort level  Outcome: Progressing     Problem: Safety - Adult  Goal: Free from fall injury  Outcome: Progressing     Problem: Discharge Planning  Goal: Discharge to home or other facility with appropriate resources  Outcome: Progressing     Problem: Chronic Conditions and Co-morbidities  Goal: Patient's chronic conditions and co-morbidity symptoms are monitored and maintained or improved  Outcome: Progressing     Problem: Skin  Goal: Decreased wound size/increased tissue granulation at next dressing change  Outcome: Progressing  Goal: Participates in plan/prevention/treatment measures  Outcome: Progressing  Goal: Prevent/manage excess moisture  Outcome: Progressing  Goal: Prevent/minimize sheer/friction injuries  Outcome: Progressing  Goal: Promote/optimize nutrition  Outcome: Progressing  Goal: Promote skin healing  Outcome: Progressing     Problem: Heart Failure  Goal: Improved gas exchange this shift  Outcome:  Progressing  Goal: Improved urinary output this shift  Outcome: Progressing  Goal: Reduction in peripheral edema within 24 hours  Outcome: Progressing  Goal: Report improvement of dyspnea/breathlessness this shift  Outcome: Progressing  Goal: Weight from fluid excess reduced over 2-3 days, then stabilize  Outcome: Progressing  Goal: Increase self care and/or family involvement in 24 hours  Outcome: Progressing   The patient's goals for the shift include maintain safety    The clinical goals for the shift include hr controlled    Over the shift, the patient did  make progress toward the following goals. Barriers to progression include AFIB. Recommendations to address these barriers include continue to monitor telemetry, implement orders as ordered.

## 2023-10-13 NOTE — PROGRESS NOTES
Cardiology Progress Note  Patient: Isa Jack  Unit/Bed: 1008/1008-A  YOB: 1950  MRN: [id number]  Acct: [id number]   Admitting Diagnosis: Acute pulmonary edema (CMS/HCC) [J81.0]  Sepsis due to pneumonia (CMS/HCC) [J18.9, A41.9]  Date:  10/7/2023  Hospital Day: 6  Attending: Don Santacruz MD   Rounding VANI/Cardiologist:  Richie Webber DO,   Primary Cardiologist: SARITHA    Complaint:  Chief Complaint   Patient presents with    Weakness, Gen        SUBJECTIVE    No significant cardiovascular symptoms at this time.  Tolerating medications.  Planning to go to rehab today.        VITALS   Visit Vitals  /83   Pulse 104   Temp 36.9 °C (98.4 °F)   Resp 16        I/O:    Intake/Output Summary (Last 24 hours) at 10/13/2023 1123  Last data filed at 10/13/2023 0907  Gross per 24 hour   Intake 775 ml   Output 500 ml   Net 275 ml        Allergies:  Allergies   Allergen Reactions    Iodine Hives    Adhesive Tape-Silicones Rash    Gadolinium-Containing Contrast Media Rash        PHYSICAL EXAM   Physical Exam  Vitals and nursing note reviewed.   HENT:      Head: Normocephalic.      Mouth/Throat:      Mouth: Mucous membranes are moist.   Eyes:      Pupils: Pupils are equal, round, and reactive to light.   Cardiovascular:      Rate and Rhythm: Normal rate and regular rhythm.   Pulmonary:      Effort: Pulmonary effort is normal.      Breath sounds: Wheezing present.   Musculoskeletal:         General: Swelling present.      Cervical back: Normal range of motion.   Skin:     General: Skin is warm and dry.      Capillary Refill: Capillary refill takes less than 2 seconds.   Neurological:      General: No focal deficit present.      Mental Status: She is alert.   Psychiatric:         Mood and Affect: Mood normal.           LABS   CBC:   Results from last 7 days   Lab Units 10/13/23  0446 10/12/23  0434 10/11/23  0619   WBC AUTO x10*3/uL 13.6* 9.5 13.7*   RBC AUTO x10*6/uL 3.83* 3.57* 3.66*   HEMOGLOBIN  g/dL 12.2 11.3* 11.6*   HEMATOCRIT % 38.1 35.4* 36.0   MCV fL 100 99 98   MCH pg 31.9 31.7 31.7   MCHC g/dL 32.0 31.9* 32.2   RDW % 13.2 13.3 13.3   PLATELETS AUTO x10*3/uL 321 267 228   MPV fL 10.3 10.6 11.3     CMP:    Results from last 7 days   Lab Units 10/13/23  0446 10/12/23  0434 10/11/23  1145 10/08/23  0709 10/07/23  1159   SODIUM mmol/L 137 139 140   < > 139   POTASSIUM mmol/L 3.4* 3.5 3.8   < > 4.4   CHLORIDE mmol/L 96* 97* 99   < > 103   CO2 mmol/L 33* 35* 32   < > 20*   BUN mg/dL 31* 30* 26*   < > 17   CREATININE mg/dL 1.06* 0.92 0.95   < > 0.72   GLUCOSE mg/dL 177* 181* 182*   < > 212*   PROTEIN TOTAL g/dL  --   --   --   --  7.4   CALCIUM mg/dL 8.6 8.9 9.2   < > 9.1   BILIRUBIN TOTAL mg/dL  --   --   --   --  0.6   ALK PHOS U/L  --   --   --   --  70   AST U/L  --   --   --   --  17   ALT U/L  --   --   --   --  11    < > = values in this interval not displayed.     BMP:    Results from last 7 days   Lab Units 10/13/23  0446 10/12/23  0434 10/11/23  1145   SODIUM mmol/L 137 139 140   POTASSIUM mmol/L 3.4* 3.5 3.8   CHLORIDE mmol/L 96* 97* 99   CO2 mmol/L 33* 35* 32   BUN mg/dL 31* 30* 26*   CREATININE mg/dL 1.06* 0.92 0.95   CALCIUM mg/dL 8.6 8.9 9.2   GLUCOSE mg/dL 177* 181* 182*     Magnesium:  Results from last 7 days   Lab Units 10/13/23  1012 10/07/23  1159   MAGNESIUM mg/dL 1.94 1.68     Troponin:    Results from last 7 days   Lab Units 10/07/23  1928 10/07/23  1530 10/07/23  1159   TROPHS ng/L 55* 46* 20*     BNP:   Results from last 7 days   Lab Units 10/07/23  1530   BNP pg/mL 301*     Lipid Panel:           Current Facility-Administered Medications:     acetaminophen (Tylenol) tablet 650 mg, 650 mg, oral, q4h PRN, Cheri Mcdermott MD, 650 mg at 10/09/23 0823    acetaminophen (Tylenol) tablet 650 mg, 650 mg, oral, q4h PRN, Cheri Mcdermott MD, 650 mg at 10/12/23 0906    albuterol 2.5 mg /3 mL (0.083 %) nebulizer solution 2.5 mg, 2.5 mg, nebulization, q4h PRN, Cheri Mcdermott MD     ALPRAZolam (Xanax) tablet 0.5 mg, 0.5 mg, oral, BID PRN, Don Santacruz MD, 0.5 mg at 10/12/23 1621    alum-mag hydroxide-simeth (Mylanta) 200-200-20 mg/5 mL oral suspension 30 mL, 30 mL, oral, q6h PRN, Cheri Mcdermott MD, 30 mL at 10/11/23 2002    apixaban (Eliquis) tablet 5 mg, 5 mg, oral, q12h, Don Santacruz MD, 5 mg at 10/13/23 0837    atorvastatin (Lipitor) tablet 40 mg, 40 mg, oral, Nightly, Cheri Mcdermott MD, 40 mg at 10/12/23 2029    carvedilol (Coreg) tablet 6.25 mg, 6.25 mg, oral, BID, Don Santacruz MD, 6.25 mg at 10/13/23 0837    cyanocobalamin (Vitamin B-12) tablet 2,500 mcg, 2,500 mcg, oral, Daily, Cheri Mcdermott MD, 2,500 mcg at 10/13/23 0837    dextromethorphan-guaifenesin (Robitussin DM)  mg/5 mL oral liquid 5 mL, 5 mL, oral, q4h PRN, Cheri Mcdermott MD    dextrose 10 % in water (D10W) infusion, 0.3 g/kg/hr, intravenous, Once PRN, Cheri Mcdermott MD    dextrose 50 % injection 25 g, 25 g, intravenous, q15 min PRN, Cheri Mcdermott MD    [COMPLETED] digoxin (Lanoxin) injection 500 mcg, 500 mcg, intravenous, Once, 500 mcg at 10/12/23 1939 **FOLLOWED BY** [COMPLETED] digoxin (Lanoxin) injection 250 mcg, 250 mcg, intravenous, Once, 250 mcg at 10/13/23 0130 **FOLLOWED BY** [START ON 10/14/2023] digoxin (Lanoxin) injection 125 mcg, 125 mcg, intravenous, Daily, Don Santacruz MD    furosemide (Lasix) injection 40 mg, 40 mg, intravenous, q12h, Cheri Mcdermott MD, 40 mg at 10/12/23 1349    glucagon (Glucagen) injection 1 mg, 1 mg, intramuscular, q15 min PRN, Cheri Mcdermott MD    HYDROcodone-acetaminophen (Norco) 5-325 mg per tablet 1 tablet, 1 tablet, oral, BID PRN, Cheri Mcdermott MD, 1 tablet at 10/10/23 2004    insulin lispro (HumaLOG) injection 0-5 Units, 0-5 Units, subcutaneous, TID with meals, Cheri Mcdermott MD, 1 Units at 10/13/23 0724    ipratropium-albuteroL (Duo-Neb) 0.5-2.5 mg/3 mL nebulizer solution 3 mL, 3 mL, nebulization, 4x daily, Cheri  MD Baldemar, 3 mL at 10/13/23 0713    ketoconazole (NIZOral) 2 % cream, , Topical, Daily, Cheri Mcdermott MD, Given at 10/13/23 0838    magnesium oxide (Mag-Ox) tablet 400 mg, 400 mg, oral, Daily, Marium Lennon APRN-CNP    magnesium sulfate IV 2 g, 2 g, intravenous, Once, EROS Guerrier-CNP    melatonin tablet 3 mg, 3 mg, oral, Daily, Cheri Mcdermott MD, 3 mg at 10/11/23 2001    meloxicam (Mobic) tablet 7.5 mg, 7.5 mg, oral, Daily PRN, Cheri Mcdermott MD, 7.5 mg at 10/11/23 0934    metoprolol tartrate (Lopressor) injection 5 mg, 5 mg, intravenous, q6h PRN, Don Santacruz MD, 5 mg at 10/12/23 1852    montelukast (Singulair) tablet 10 mg, 10 mg, oral, Daily, Cheri Mcdermott MD, 10 mg at 10/13/23 0838    mupirocin (Bactroban) 2 % ointment, , Topical, TID, Don Santacruz MD, Given at 10/13/23 0838    [Held by provider] nitroglycerin (Nitrostat) SL tablet 0.4 mg, 0.4 mg, sublingual, q5 min PRN, Cheri Mcdermott MD    nystatin (Mycostatin) 100,000 unit/gram powder, , Topical, BID, Amanda White MD, Given at 10/13/23 0838    ondansetron (Zofran) injection 4 mg, 4 mg, intravenous, q8h PRN, Cheri Mcdermott MD    oxygen (O2) therapy, , inhalation, Continuous PRN - O2/gases, Cheri Mcdermott MD, 3 L/min at 10/13/23 0713    piperacillin-tazobactam-dextrose (Zosyn) IV 3.375 g, 3.375 g, intravenous, q8h, Last Rate: 12.5 mL/hr at 10/13/23 0505, 3.375 g at 10/13/23 0505 **AND** sodium chloride 0.9% infusion, 10 mL/hr, intravenous, q8h, Cheri Mcdermott MD, Last Rate: 10 mL/hr at 10/13/23 0505, 10 mL/hr at 10/13/23 0505    polyethylene glycol (Glycolax, Miralax) packet 17 g, 17 g, oral, Daily, Cheri Mcdermott MD, 17 g at 10/11/23 0912    potassium chloride CR (Klor-Con M20) ER tablet 40 mEq, 40 mEq, oral, Once, Marium Lennon APRN-CNP    sacubitriL-valsartan (Entresto) 24-26 mg per tablet 1 tablet, 1 tablet, oral, BID, Iam Bowden APRN-CNP, 1 tablet at 10/13/23 0838    sodium chloride 0.9 % bolus  1,000 mL, 1,000 mL, intravenous, Once, Cheri Mcdermott MD    [Held by provider] sodium chloride 0.9% infusion, 100 mL/hr, intravenous, Continuous, Cheri Mcdermott MD, Last Rate: 100 mL/hr at 10/11/23 0038, 100 mL/hr at 10/11/23 0038    traMADol (Ultram) tablet 50 mg, 50 mg, oral, q8h PRN, Marc Olvera MD, 50 mg at 10/13/23 0630    CT chest wo IV contrast    Result Date: 10/7/2023  Interpreted By:  Bassem Holbrook, STUDY: CT CHEST WO IV CONTRAST;  10/7/2023 3:51 pm   INDICATION: Signs/Symptoms:sob, tachy, sepsis, likely pneumonia on cxr.   COMPARISON: None.   ACCESSION NUMBER(S): [id number]   ORDERING CLINICIAN: SOLEDAD WIRE   TECHNIQUE: Helical data acquisition of the chest was obtained  without IV contrast.  Images were reformatted in axial, coronal, and sagittal planes.   FINDINGS: Exam limited due to motion artifact and body habitus..   LUNGS and AIRWAYS: Respiratory motion can obscure findings. Diffusely scattered interstitial edema. Mild bibasilar atelectasis. Possible trace pleural effusions. Central airways are patent. No bronchiectasis.   MEDIASTINUM and MARIANGEL, LOWER NECK AND AXILLA: The visualized thyroid gland is grossly unremarkable.   A few prominent mediastinal lymph nodes are noted for example measuring 12 mm short axis in the lower right paratracheal region and 13 mm in the subcarinal region.   Esophagus is not dilated.   HEART and VESSELS: Severe cardiomegaly.   No significant pericardial effusion.   Severe coronary atherosclerosis.   Severe thoracic aortic atherosclerosis without aneurysm.   UPPER ABDOMEN: The visualized subdiaphragmatic structures demonstrate no acute findings on noncontrast images.   CHEST WALL and OSSEOUS STRUCTURES: No suspicious osseous lesions. Multilevel degenerative changes of the thoracic spine.       1.  Severe cardiomegaly with interstitial edema and trace pleural effusions. 2. Mild mediastinal lymphadenopathy perhaps reactive. Consider follow-up in 3-6 months to  document stability.     Signed by: Bassem Holbrook 10/7/2023 4:15 PM Dictation workstation:   [id number]    XR chest 1 view    Result Date: 10/7/2023  Interpreted By:  Hector Barron, STUDY: XR CHEST 1 VIEW;  10/7/2023 12:47 pm   INDICATION: Signs/Symptoms:weakness.   COMPARISON: 09/29/2017.   ACCESSION NUMBER(S): [id number]   ORDERING CLINICIAN: SOLEDAD WIRE   FINDINGS: CARDIOMEDIASTINAL SILHOUETTE: Patient is slightly rotated to the left. Cardiomegaly and dense aortic calcification again seen.   LUNGS: There is increased irregular interstitial thickening and patchy multifocal infiltrates bilaterally greatest in the perihilar regions which may be related to vascular congestion/edema. Infection not excluded. Small effusions not excluded. No pneumothorax is seen.   ABDOMEN: No remarkable upper abdominal findings.   BONES: Degenerative changes of the shoulders are partially visualized along with mild endplate spurring of the spine.       1.  Increased irregular interstitial thickening bilaterally with patchy multifocal infiltrates greatest in the perihilar regions which may be related to vascular congestion/edema. Infection not excluded.       MACRO: None.   Signed by: Hector Barron 10/7/2023 12:53 PM Dictation workstation:   [id number]                    Encounter Date: 10/07/23   ECG 12 lead   Result Value    Ventricular Rate 131    Atrial Rate 131    OK Interval 182    QRS Duration 86    QT Interval 354    QTC Calculation(Bazett) 522    P Axis 73    R Axis -6    T Axis 51    QRS Count 22    Q Onset 218    P Onset 127    P Offset 170    T Offset 395    QTC Fredericia 459    Narrative    Sinus tachycardia  Otherwise normal ECG    Confirmed by Richie Webber ([id number]) on 10/10/2023 10:12:27 AM        Tele Monitoring: Normal sinus rhythm    Assessment     We will maximally medicate patient switching lisinopril to Entresto  Continue beta-blocker and diuretic  Patient absolutely declines any invasive testing or  surgical considerations  Consider additional medical therapy after making the above changes  Continue antibiotic  Eventually will arrange follow-up with our office if the patient so chooses alternatively she may return to the care of [facility] physicians.  We will continue to follow.        Thank you for the opportunity to participate in the care of your patient.  Please do not hesitate to call if you have any questions.     10/11  Alert and oriented x3.  Follows commands.  Supplemental O2, keep SPO2 greater than 92%  Continue beta-blockers and diuretics.  Monitor electrolytes, keep potassium greater than 4 and magnesium greater than 2  Fluid balance list is positive however patient reports that she has been urinating all night with the canister has been emptied 3-4 times.  So unclear what her current accurate I's and O's are.  Continue Entresto, maximize therapy  Antibiotics per primary care service  SSI  Patient to follow-up with [facility] or [facility] at her discretion.  If she chooses [facility] we will schedule her appointment.     Iam Bowden [facility]  Adult Gerontology Acute Care Nurse Practitioner  [facility]  [phone]     Of note, this documentation is completed using the Dragon Dictation system (voice recognition software). There may be spelling and/or grammatical errors that were not corrected prior to final submission.     Further recommendations made by Dr. Webber     Patient seen and evaluated at the bedside in conjunction with Iam Bowden, RN, CNP.     Bedside examination evaluation and further assessment was performed by me.     Chart was reviewed in detail discussed with the patient including testing results medications and plan follow-up.     Impression and and plan as noted.     Ischemic cardiomyopathy: Primary cardiovascular problem in finding      Patient Active Problem List    Diagnosis    Adnexal mass    Asthma in adult, unspecified asthma severity, uncomplicated    Cellulitis    Chronic congestive heart failure (CMS/HCC)    Chronic pain syndrome    Coronary artery disease involving native heart with angina pectoris (CMS/HCC)    DJD (degenerative joint disease)    Endometrial hyperplasia    Essential hypertension    HLD (hyperlipidemia)    Pernicious anemia    Physical debility    Vitamin B12 deficiency    Skin ulcer of ankle (CMS/HCC)    Type 2 diabetes mellitus, without long-term current use of insulin (CMS/[facility], [facility])    Sepsis due to pneumonia (CMS/[facility] located [facility])      Recommendation:  Patient clearly again states that she declines any invasive testing or surgical considerations despite being offered that in the past by other physicians  Continue on Entresto  Continue other meds as before  We will follow for 24 hours  If stable will sign off tomorrow  Have offered outpatient follow-up should she choose to follow here as opposed to and other facilities  Call if any problems        10/12  Patient remains alert and oriented x3.  Follows commands.  Mentation appropriate.  Supplemental O2, keep SPO2 greater than 92%  Fluid balance negative, continue Lasix  Monitor electrolytes, keep potassium greater than 4 and magnesium greater than 2  Continue on Entresto  Patient still undecided whether she is going to follow-up with [facility] or [facility].  Offered to make her appointment with [facility] however she wants to wait to talk to her family.  Cardiology to sign off  If she does decide to go to [facility] we will make a follow-up appointment prior to discharge I notified the nurse to let us know.    Iam Bowden [facility]  Adult Gerontology Acute Care Nurse Practitioner  [facility]  [phone]    Of note, this documentation is completed using the Dragon Dictation system (voice recognition software). There may be  spelling and/or grammatical errors that were not corrected prior to final submission.    Patient seen and evaluated at the bedside in conjunction with Iam Bowden RN, CNP.    Bedside examination evaluation performed    Chart was reviewed in detail and discussed again with the patient and the nursing staff.    Impression and plan as noted.    Patient continues to decline any further cardiovascular testing such as catheterization or consideration for any intervention or surgery.  She is tolerating her current medications which are suitable for her cardiomyopathy.  She is not having angina.  She still has not made a decision as to whether or not she wants to follow with her for heart center or return to her primary care doctor and then be referred to cardiology physician in their system which was previously arranged but not completed.  At this juncture we will leave that up to the patient.  We have provided information should she choose to come to see us which would be advisable in the next week or 2.  Please call if any other issues or problems occur at this time.              Electronically signed by Richie Webber DO on 10/13/2023 at 11:23 AM    10/13/2023:  Patient seen and evaluated in the telemetry unit.    Bedside examination evaluation were performed by me.    Chart was reviewed in detail discussed with the patient at length.    Impression and plan as noted.    Principal Problem:    Sepsis due to pneumonia (CMS/HCC)  Active Problems:    Chronic congestive heart failure (CMS/HCC)    Coronary artery disease involving native heart with angina pectoris (CMS/HCC)    Essential hypertension    HLD (hyperlipidemia)    Cardiomyopathy, ischemic       Recommendation:  Patient is going to rehab today.  Patient is still deciding on whether or not she will come to [facility] for follow-up or go to the [facility].  She has not seen a cardiologist there.  She can continue current meds as listed.  She  can follow-up with us in a few weeks if this patient agrees to do so.  Patient again has made it quite clear that she declines any invasive testing or consideration for surgical interventions and will only address her cardiac issues with medical therapy.  We will sign off at this time.

## 2023-10-13 NOTE — PROGRESS NOTES
Physical Therapy                 Therapy Communication Note    Patient Name: Isa Jack  MRN: [id number]  Today's Date: 10/13/2023     Discipline: Physical Therapy    Missed Visit Reason:      Missed Time: Attempt    Comment:  Pt. Tx. Held per nursing request secondary to  pt. elevated heart rate.

## 2023-10-13 NOTE — PROGRESS NOTES
Infectious Diseases Inpatient Progress Note          HISTORY OF PRESENT ILLNESS:  Follow up sepsis, left leg cellulitis, acute CHF and COPD exacerbation with questionable pneumonia, acute respiratory failure with hypoxia on IV Zosyn, well tolerated. Patient denies any cough.    Patient feels much better today.  She is ready to be discharged to a skilled nursing facility today  has decreased shortness of breath and bilateral leg swelling.   Decreased leg pain and swelling  Mild cough  Decreased diarrhea  Decreased rash under breasts and groin areas.   Currently on 3 L nasal cannula     Current Medications:    apixaban, 5 mg, oral, q12h  atorvastatin, 40 mg, oral, Nightly  carvedilol, 6.25 mg, oral, BID  cyanocobalamin, 2,500 mcg, oral, Daily  [START ON 10/14/2023] digoxin, 125 mcg, intravenous, Daily  furosemide, 40 mg, intravenous, q12h  insulin lispro, 0-5 Units, subcutaneous, TID with meals  ipratropium-albuteroL, 3 mL, nebulization, 4x daily  ketoconazole, , Topical, Daily  magnesium oxide, 400 mg, oral, Daily  melatonin, 3 mg, oral, Daily  montelukast, 10 mg, oral, Daily  mupirocin, , Topical, TID  nystatin, , Topical, BID  piperacillin-tazobactam, 3.375 g, intravenous, q8h   And  sodium chloride 0.9%, 10 mL/hr, intravenous, q8h  polyethylene glycol, 17 g, oral, Daily  sacubitriL-valsartan, 1 tablet, oral, BID  sodium chloride, 1,000 mL, intravenous, Once        Allergies:  Iodine, Adhesive tape-silicones, and Gadolinium-containing contrast media      Review of Systems  14 system review is negative other than HPI  Concerned about severe coronary artery disease and poor cardiac status  Physical Exam    Heart Rate:  []   Temp:  [35.8 °C (96.4 °F)-36.9 °C (98.4 °F)]   Resp:  [16-17]   BP: (118-147)/(66-83)   SpO2:  [93 %-98 %]    Vitals:    10/13/23 0130 10/13/23 0713 10/13/23 0721 10/13/23 1424   BP:   129/83 129/66   BP Location:       Patient Position:       Pulse: (!) 123  104 91   Resp:       Temp:    "36.9 °C (98.4 °F) 36.6 °C (97.9 °F)   TempSrc:       SpO2:  98% 97% 97%   Weight:       Height:         General Appearance: alert and oriented to person, place and time, well-developed and well-nourished, in no acute distress  On 3 L nasal cannula.  Does not use oxygen at home  Head: normocephalic and atraumatic  Eyes: anicteric sclerae  ENT: oropharynx clear and moist with normal mucous membranes. No oral thrush  Lungs: normal respiratory effort, resolved wheezes  Heart normal S1-S2 no murmur  Abdomen: soft, no tenderness, large pannus  +1 B leg edema  Resolving fungal rash in the groins and under breasts  Left leg with dressing  No erythema or warmth  Diuresing  Pure wick with clear urine  DATA:    Lab Results   Component Value Date    WBC 13.6 (H) 10/13/2023    HGB 12.2 10/13/2023    HCT 38.1 10/13/2023     10/13/2023     10/13/2023     Lab Results   Component Value Date    CREATININE 1.06 (H) 10/13/2023    BUN 31 (H) 10/13/2023     10/13/2023    K 3.4 (L) 10/13/2023    CL 96 (L) 10/13/2023    CO2 33 (H) 10/13/2023       Hepatic Function Panel:  No results found for: \"ALKPHOS\", \"ALT\", \"AST\", \"PROT\", \"BILITOT\", \"BILIDIR\"      Microbiology:   Blood cultures collected on admission are negative so far  Wound cultures  Susceptibility data from last 90 days.  Collected Specimen Info Organism Aztreonam Cefepime Ceftazidime Ciprofloxacin Gentamicin Levofloxacin Piperacillin/Tazobactam Tobramycin   10/08/23 Tissue/Biopsy from Skin Lesion Pseudomonas aeruginosa S S S S S S S S         Imaging:   CT chest wo IV contrast    Result Date: 10/7/2023  Interpreted By:  Bassem Holbrook, STUDY: CT CHEST WO IV CONTRAST;  10/7/2023 3:51 pm   INDICATION: Signs/Symptoms:sob, tachy, sepsis, likely pneumonia on cxr.   COMPARISON: None.   ACCESSION NUMBER(S): [id number]   ORDERING CLINICIAN: SOLEDAD VOSS   TECHNIQUE: Helical data acquisition of the chest was obtained  without IV contrast.  Images were reformatted in " axial, coronal, and sagittal planes.   FINDINGS: Exam limited due to motion artifact and body habitus..   LUNGS and AIRWAYS: Respiratory motion can obscure findings. Diffusely scattered interstitial edema. Mild bibasilar atelectasis. Possible trace pleural effusions. Central airways are patent. No bronchiectasis.   MEDIASTINUM and MARIANGEL, LOWER NECK AND AXILLA: The visualized thyroid gland is grossly unremarkable.   A few prominent mediastinal lymph nodes are noted for example measuring 12 mm short axis in the lower right paratracheal region and 13 mm in the subcarinal region.   Esophagus is not dilated.   HEART and VESSELS: Severe cardiomegaly.   No significant pericardial effusion.   Severe coronary atherosclerosis.   Severe thoracic aortic atherosclerosis without aneurysm.   UPPER ABDOMEN: The visualized subdiaphragmatic structures demonstrate no acute findings on noncontrast images.   CHEST WALL and OSSEOUS STRUCTURES: No suspicious osseous lesions. Multilevel degenerative changes of the thoracic spine.       1.  Severe cardiomegaly with interstitial edema and trace pleural effusions. 2. Mild mediastinal lymphadenopathy perhaps reactive. Consider follow-up in 3-6 months to document stability.     Signed by: Bassem Holbrook 10/7/2023 4:15 PM Dictation workstation:   [id number]    XR chest 1 view    Result Date: 10/7/2023  Interpreted By:  Hector Barron, STUDY: XR CHEST 1 VIEW;  10/7/2023 12:47 pm   INDICATION: Signs/Symptoms:weakness.   COMPARISON: 09/29/2017.   ACCESSION NUMBER(S): [id number]   ORDERING CLINICIAN: SOLEDAD VOSS   FINDINGS: CARDIOMEDIASTINAL SILHOUETTE: Patient is slightly rotated to the left. Cardiomegaly and dense aortic calcification again seen.   LUNGS: There is increased irregular interstitial thickening and patchy multifocal infiltrates bilaterally greatest in the perihilar regions which may be related to vascular congestion/edema. Infection not excluded. Small effusions not excluded. No  pneumothorax is seen.   ABDOMEN: No remarkable upper abdominal findings.   BONES: Degenerative changes of the shoulders are partially visualized along with mild endplate spurring of the spine.       1.  Increased irregular interstitial thickening bilaterally with patchy multifocal infiltrates greatest in the perihilar regions which may be related to vascular congestion/edema. Infection not excluded.       MACRO: None.   Signed by: Hector Barron 10/7/2023 12:53 PM Dictation workstation:   [id number]       Wound culture with Pseudomonas aeruginosa  0 Result Notes  Tissue/Wound Culture/Smear (2+) Few Pseudomonas aeruginosa Abnormal    Identification and susceptibility testing to follow            Gram Stain  Abnormal   No polymorphonuclear leukocytes seen      (2+) Few Gram negative bacilli              Resulting Agency: [facility]           Specimen Collected: 10/08/23 00:37 Last Resulted: 10/09/23 14:31               IMPRESSION:    Sepsis, resolved  L Leg cellulitis, improving  Infected venous stasis ulcers of L leg with Pseudomonas aeruginosa, healing  Acute CHF/COPD exacerbation  Acute respiratory failure with hypoxia    Patient Active Problem List   Diagnosis    Adnexal mass    Asthma in adult, unspecified asthma severity, uncomplicated    Cellulitis    Chronic congestive heart failure (CMS/HCC)    Chronic pain syndrome    Coronary artery disease involving native heart with angina pectoris (CMS/HCC)    DJD (degenerative joint disease)    Endometrial hyperplasia    Essential hypertension    HLD (hyperlipidemia)    Pernicious anemia    Physical debility    Vitamin B12 deficiency    Skin ulcer of ankle (CMS/HCC)    Type 2 diabetes mellitus, without long-term current use of insulin (CMS/HCC)    Sepsis due to pneumonia (CMS/HCC)    Cardiomyopathy, ischemic       PLAN:  DC IV Zosyn   Cancelled p.o. Levaquin because of risk of arrhythmia in a patient with cardiomyopathy as discussed with patient  May discharge on no  antibiotics since patient is clinically much improved with diuresis  Topical antifungal cream with ketoconazole  Follow-up as needed    Discussed with patient and RN    Leann Cristina MD

## 2023-10-13 NOTE — PROGRESS NOTES
Patient is being transferred to [facility]. Attempted to call son, Hector, no voice mail set up. Patient is alert and oriented, states she will advise him of transfer.

## 2023-10-13 NOTE — PROGRESS NOTES
"Isa Jack is a 72 y.o. female on day 6 of admission presenting with Sepsis due to pneumonia (CMS/HCC).    Subjective   Patient seen and examined.  Plan was to discharge her yesterday but she became tachycardic and discharge was canceled, EKG showed atrial fibrillation with RVR.  She does complain of some occasional palpitations, no fevers or chills, no nausea or vomiting.  Breathing is stable.       Objective     Physical Exam  Eyes:      Extraocular Movements: Extraocular movements intact.      Pupils: Pupils are equal, round, and reactive to light.   Cardiovascular:      Rate and Rhythm: Normal rate and irregular rhythm.      Pulses: Normal pulses.      Heart sounds: Normal heart sounds. No murmur heard.  Pulmonary:      Effort: No respiratory distress.      Breath sounds: Wheezing present. No rales.   Abdominal:      General: Abdomen is flat. Bowel sounds are normal. There is no distension.      Palpations: Abdomen is soft.   Musculoskeletal:         General: Swelling present.      Cervical back: Normal range of motion and neck supple. No rigidity.      Right lower leg: Edema present.      Left lower leg: Edema present.  Left lower leg redness seems to have   Last Recorded Vitals  Blood pressure 129/83, pulse 104, temperature 36.9 °C (98.4 °F), resp. rate 16, height 1.549 m (5' 0.98\"), weight 103 kg (227 lb 15.3 oz), SpO2 97 %.  Intake/Output last 3 Shifts:  I/O last 3 completed shifts:  In: 375 (3.6 mL/kg) [P.O.:375]  Out: 2000 (19.3 mL/kg) [Urine:2000 (0.5 mL/kg/hr)]  Weight: 103.4 kg     Relevant Results           This patient currently has cardiac telemetry ordered; if you would like to modify or discontinue the telemetry order, click here to go to the orders activity to modify/discontinue the order.                 Assessment/Plan   Principal Problem:    Sepsis due to pneumonia (CMS/HCC)  Active Problems:    Chronic congestive heart failure (CMS/HCC)    Coronary artery disease involving native heart " with angina pectoris (CMS/HCC)    Essential hypertension    HLD (hyperlipidemia)    Cardiomyopathy, ischemic    Patient was discharged yesterday but she became tachycardic  EKG showed A-fib with RVR  It is new for her  She was loaded with digoxin since then her heart rate has improved  She will be started on Eliquis as well  I did have discussion with her regarding the risks and benefits of anticoagulation and she is agreeable to it  EP has been consulted but they have yet to see the patient  She refused further work-up for her EF of 25%  She was to follow-up with her own cardiologist as an outpatient  Cellulitis is improving  We will switch to p.o. antibiotics at discharge   Continue current medical management as long as she is in the hospital  Med rec updated  Medically ready to be discharged  DVT prophylaxis addressed          Don Santacruz MD

## 2023-10-13 NOTE — PROGRESS NOTES
Occupational Therapy                 Therapy Communication Note    Patient Name: Isa Jack  MRN: [id number]  Today's Date: 10/13/2023     Discipline: Occupational Therapy    Missed Visit Reason:      Missed Time: Attempt    Comment: attempted to see pt this date however pt HR elevated RN deferred tx.

## 2023-10-14 VITALS
WEIGHT: 227.96 LBS | HEART RATE: 108 BPM | HEIGHT: 61 IN | RESPIRATION RATE: 18 BRPM | BODY MASS INDEX: 43.04 KG/M2 | OXYGEN SATURATION: 98 % | DIASTOLIC BLOOD PRESSURE: 68 MMHG | TEMPERATURE: 98.4 F | SYSTOLIC BLOOD PRESSURE: 123 MMHG

## 2023-10-14 LAB
ANION GAP SERPL CALC-SCNC: 11 MMOL/L (ref 10–20)
BUN SERPL-MCNC: 30 MG/DL (ref 6–23)
CALCIUM SERPL-MCNC: 8.8 MG/DL (ref 8.6–10.3)
CHLORIDE SERPL-SCNC: 96 MMOL/L (ref 98–107)
CO2 SERPL-SCNC: 33 MMOL/L (ref 21–32)
CREAT SERPL-MCNC: 0.87 MG/DL (ref 0.5–1.05)
ERYTHROCYTE [DISTWIDTH] IN BLOOD BY AUTOMATED COUNT: 13.3 % (ref 11.5–14.5)
GFR SERPL CREATININE-BSD FRML MDRD: 70 ML/MIN/1.73M*2
GLUCOSE BLD MANUAL STRIP-MCNC: 202 MG/DL (ref 74–99)
GLUCOSE SERPL-MCNC: 201 MG/DL (ref 74–99)
HCT VFR BLD AUTO: 41 % (ref 36–46)
HGB BLD-MCNC: 12.9 G/DL (ref 12–16)
MCH RBC QN AUTO: 31.6 PG (ref 26–34)
MCHC RBC AUTO-ENTMCNC: 31.5 G/DL (ref 32–36)
MCV RBC AUTO: 101 FL (ref 80–100)
NRBC BLD-RTO: 0 /100 WBCS (ref 0–0)
PLATELET # BLD AUTO: 303 X10*3/UL (ref 150–450)
PMV BLD AUTO: 10.9 FL (ref 7.5–11.5)
POTASSIUM SERPL-SCNC: 4.1 MMOL/L (ref 3.5–5.3)
RBC # BLD AUTO: 4.08 X10*6/UL (ref 4–5.2)
SODIUM SERPL-SCNC: 136 MMOL/L (ref 136–145)
WBC # BLD AUTO: 11.9 X10*3/UL (ref 4.4–11.3)

## 2023-10-14 PROCEDURE — 99238 HOSP IP/OBS DSCHRG MGMT 30/<: CPT | Performed by: STUDENT IN AN ORGANIZED HEALTH CARE EDUCATION/TRAINING PROGRAM

## 2023-10-14 PROCEDURE — 82947 ASSAY GLUCOSE BLOOD QUANT: CPT

## 2023-10-14 PROCEDURE — 2500000004 HC RX 250 GENERAL PHARMACY W/ HCPCS (ALT 636 FOR OP/ED): Performed by: NURSE PRACTITIONER

## 2023-10-14 PROCEDURE — 36415 COLL VENOUS BLD VENIPUNCTURE: CPT | Performed by: STUDENT IN AN ORGANIZED HEALTH CARE EDUCATION/TRAINING PROGRAM

## 2023-10-14 PROCEDURE — 2500000001 HC RX 250 WO HCPCS SELF ADMINISTERED DRUGS (ALT 637 FOR MEDICARE OP): Performed by: STUDENT IN AN ORGANIZED HEALTH CARE EDUCATION/TRAINING PROGRAM

## 2023-10-14 PROCEDURE — 85027 COMPLETE CBC AUTOMATED: CPT | Performed by: STUDENT IN AN ORGANIZED HEALTH CARE EDUCATION/TRAINING PROGRAM

## 2023-10-14 PROCEDURE — 2500000001 HC RX 250 WO HCPCS SELF ADMINISTERED DRUGS (ALT 637 FOR MEDICARE OP): Performed by: NURSE PRACTITIONER

## 2023-10-14 PROCEDURE — 2500000002 HC RX 250 W HCPCS SELF ADMINISTERED DRUGS (ALT 637 FOR MEDICARE OP, ALT 636 FOR OP/ED): Performed by: STUDENT IN AN ORGANIZED HEALTH CARE EDUCATION/TRAINING PROGRAM

## 2023-10-14 PROCEDURE — 80048 BASIC METABOLIC PNL TOTAL CA: CPT | Performed by: STUDENT IN AN ORGANIZED HEALTH CARE EDUCATION/TRAINING PROGRAM

## 2023-10-14 PROCEDURE — 94640 AIRWAY INHALATION TREATMENT: CPT

## 2023-10-14 RX ORDER — DIGOXIN 125 MCG
125 TABLET ORAL DAILY
Status: DISCONTINUED | OUTPATIENT
Start: 2023-10-14 | End: 2023-10-14 | Stop reason: HOSPADM

## 2023-10-14 RX ADMIN — SACUBITRIL AND VALSARTAN 1 TABLET: 24; 26 TABLET, FILM COATED ORAL at 08:19

## 2023-10-14 RX ADMIN — INSULIN LISPRO 2 UNITS: 100 INJECTION, SOLUTION INTRAVENOUS; SUBCUTANEOUS at 07:08

## 2023-10-14 RX ADMIN — KETOCONAZOLE: 20 CREAM TOPICAL at 08:20

## 2023-10-14 RX ADMIN — IPRATROPIUM BROMIDE AND ALBUTEROL SULFATE 3 ML: 2.5; .5 SOLUTION RESPIRATORY (INHALATION) at 07:59

## 2023-10-14 RX ADMIN — MAGNESIUM OXIDE 400 MG (241.3 MG MAGNESIUM) TABLET 400 MG: TABLET at 08:19

## 2023-10-14 RX ADMIN — MUPIROCIN: 20 OINTMENT TOPICAL at 08:20

## 2023-10-14 RX ADMIN — MONTELUKAST SODIUM 10 MG: 10 TABLET, FILM COATED ORAL at 08:19

## 2023-10-14 RX ADMIN — CARVEDILOL 6.25 MG: 6.25 TABLET, FILM COATED ORAL at 08:18

## 2023-10-14 RX ADMIN — APIXABAN 5 MG: 5 TABLET, FILM COATED ORAL at 07:08

## 2023-10-14 RX ADMIN — CYANOCOBALAMIN TAB 500 MCG 2500 MCG: 500 TAB at 07:08

## 2023-10-14 RX ADMIN — NYSTATIN: 100000 POWDER TOPICAL at 10:25

## 2023-10-14 ASSESSMENT — COGNITIVE AND FUNCTIONAL STATUS - GENERAL
STANDING UP FROM CHAIR USING ARMS: A LITTLE
WALKING IN HOSPITAL ROOM: A LOT
WALKING IN HOSPITAL ROOM: A LOT
MOVING FROM LYING ON BACK TO SITTING ON SIDE OF FLAT BED WITH BEDRAILS: A LITTLE
DRESSING REGULAR UPPER BODY CLOTHING: A LITTLE
TOILETING: A LITTLE
MOBILITY SCORE: 15
HELP NEEDED FOR BATHING: A LITTLE
DRESSING REGULAR LOWER BODY CLOTHING: A LITTLE
DRESSING REGULAR UPPER BODY CLOTHING: A LITTLE
MOVING TO AND FROM BED TO CHAIR: A LOT
DRESSING REGULAR LOWER BODY CLOTHING: A LITTLE
MOVING TO AND FROM BED TO CHAIR: A LOT
MOBILITY SCORE: 15
DAILY ACTIVITIY SCORE: 20
CLIMB 3 TO 5 STEPS WITH RAILING: A LOT
DAILY ACTIVITIY SCORE: 20
STANDING UP FROM CHAIR USING ARMS: A LITTLE
TURNING FROM BACK TO SIDE WHILE IN FLAT BAD: A LITTLE
TOILETING: A LITTLE
CLIMB 3 TO 5 STEPS WITH RAILING: A LOT
HELP NEEDED FOR BATHING: A LITTLE
TURNING FROM BACK TO SIDE WHILE IN FLAT BAD: A LITTLE
MOVING FROM LYING ON BACK TO SITTING ON SIDE OF FLAT BED WITH BEDRAILS: A LITTLE

## 2023-10-14 NOTE — PROGRESS NOTES
"Isa Jack is a 72 y.o. female on day 7 of admission presenting with Sepsis due to pneumonia (CMS/HCC).    Subjective   Patient seen and examined.  Denies any fevers, chills, nausea, vomiting.  States that she got little cold in the morning but was cold outside.       Objective     Physical Exam  Eyes:      Extraocular Movements: Extraocular movements intact.      Pupils: Pupils are equal, round, and reactive to light.   Cardiovascular:      Rate and Rhythm: Normal rate and irregular rhythm.      Pulses: Normal pulses.      Heart sounds: Normal heart sounds. No murmur heard.  Pulmonary:      Effort: No respiratory distress.      Breath sounds: Wheezing present. No rales.   Abdominal:      General: Abdomen is flat. Bowel sounds are normal. There is no distension.      Palpations: Abdomen is soft.   Musculoskeletal:         General: Swelling present.      Cervical back: Normal range of motion and neck supple. No rigidity.      Right lower leg: Edema present.      Left lower leg: Edema present.  Left lower leg redness seems to have   Last Recorded Vitals  Blood pressure 123/68, pulse 108, temperature 36.9 °C (98.4 °F), temperature source Temporal, resp. rate 18, height 1.549 m (5' 0.98\"), weight 103 kg (227 lb 15.3 oz), SpO2 98 %.  Intake/Output last 3 Shifts:  I/O last 3 completed shifts:  In: 2210.5 (21.4 mL/kg) [P.O.:1575; I.V.:635.5 (6.1 mL/kg)]  Out: 2700 (26.1 mL/kg) [Urine:2700 (0.7 mL/kg/hr)]  Weight: 103.4 kg     Relevant Results           This patient currently has cardiac telemetry ordered; if you would like to modify or discontinue the telemetry order, click here to go to the orders activity to modify/discontinue the order.                 Assessment/Plan   Principal Problem:    Sepsis due to pneumonia (CMS/HCC)  Active Problems:    Chronic congestive heart failure (CMS/HCC)    Coronary artery disease involving native heart with angina pectoris (CMS/HCC)    Essential hypertension    HLD " (hyperlipidemia)    Cardiomyopathy, ischemic    Patient was medically cleared for discharge yesterday but there was no transport  Transport has been set at 11:30 AM today  She is okay to leave when transport arrives  She was diagnosed with new onset A-fib with RVR  Loaded with digoxin, digoxin added to her discharge regimen  Started on Eliquis as well  Did have a discussion with her regarding the risks and benefits of anticoagulation  She refused further work-up for her chronic congestive heart failure she has an EF of 25%  Cellulitis is improving  As per ID we will DC fluoroquinolones due to risk of arrhythmias  Continue current medical management as long as she is in the hospital  DVT prophylaxis             Don Santacruz MD

## 2023-10-15 NOTE — DOCUMENTATION CLARIFICATION NOTE
"    PATIENT:               DINA LANG  ACCT #:                  [id number]  MRN:                       [id number]  :                       1950  ADMIT DATE:       10/7/2023 11:27 AM  DISCH DATE:        10/14/2023 11:19 AM  RESPONDING PROVIDER #:        [id number]          PROVIDER RESPONSE TEXT:    Sepsis with associated acute organ dysfunction acute on chronic systolic CHF    CDI QUERY TEXT:    [facility]_Sepsis Link Organ Dysfunction        Instruction:    Based on your assessment of the patient and the clinical information, please provide the requested documentation by clicking on the appropriate radio button and enter any additional information if prompted.    Question: Please further clarify if a relationship exists between the Sepsis and acute organ dysfunction    When answering this query, please exercise your independent professional judgment. The fact that a question is being asked, does not imply that any particular answer is desired or expected.    The patient's clinical indicators include:  Clinical Information: 72 yof with sepsis, left leg cellulitis, pneumonia, acute CHF exacerbation, elevated lactate    Clinical Indicators:    Sepsis, left leg cellulitis, pneumonia, acute CHF exacerbation, elevated lactate    10/7 Serum Lactate: 3.2    10/9 ECHO: \"Left ventricular systolic function is moderately to severely decreased with a 25-30% estimated ejection fraction.\"    Treatment: Vancomycin, Azithromycin, Zosyn, IV Lasix, IVF bolus, labs, UA, BC, wound culture, I&O, Cardiology and ID and Nutrition consults, ECHO, EKG, CT Chest, Oxygen, IVF bolus    Risk Factors: 72 yof admit with infection s/p IVF on admit, with hx of CHF, COPD, obesity  Options provided:  -- Sepsis with associated acute organ dysfunction of acute on chronic systolic CHF  -- Sepsis with associated acute organ dysfunction of lactic acidosis  -- Sepsis with associated acute multi-system organ dysfunction of acute on chronic systolic CHF, " lactic acidosis  -- Sepsis with other associated acute organ dysfunction, Please specify additional information below  -- Other - I will add my own diagnosis  -- Refer to Clinical Documentation Reviewer    Query created by: Kelly David on 10/11/2023 11:32 AM      Electronically signed by:  GIULIANO CHENG MD 10/15/2023 10:56 AM

## 2023-12-13 LAB
HOLD SPECIMEN: NORMAL

## 2024-04-22 ENCOUNTER — APPOINTMENT (OUTPATIENT)
Dept: PRIMARY CARE | Facility: CLINIC | Age: 74
End: 2024-04-22
Payer: COMMERCIAL

## 2024-09-04 ENCOUNTER — TELEPHONE (OUTPATIENT)
Dept: PRIMARY CARE | Facility: CLINIC | Age: 74
End: 2024-09-04
Payer: COMMERCIAL

## 2024-09-09 ENCOUNTER — TELEPHONE (OUTPATIENT)
Dept: PRIMARY CARE | Facility: CLINIC | Age: 74
End: 2024-09-09
Payer: COMMERCIAL

## 2025-01-31 ENCOUNTER — OFFICE VISIT (OUTPATIENT)
Dept: GERIATRIC MEDICINE | Age: 75
End: 2025-01-31

## 2025-01-31 DIAGNOSIS — I48.91 ATRIAL FIBRILLATION, UNSPECIFIED TYPE (HCC): ICD-10-CM

## 2025-01-31 DIAGNOSIS — E11.9 TYPE 2 DIABETES MELLITUS WITHOUT COMPLICATION, WITHOUT LONG-TERM CURRENT USE OF INSULIN (HCC): ICD-10-CM

## 2025-01-31 DIAGNOSIS — I10 ESSENTIAL (PRIMARY) HYPERTENSION: ICD-10-CM

## 2025-01-31 DIAGNOSIS — L03.116 CELLULITIS OF LEFT LOWER LIMB: Primary | ICD-10-CM

## 2025-01-31 DIAGNOSIS — I50.22 CHRONIC SYSTOLIC CHF (CONGESTIVE HEART FAILURE) (HCC): Primary | ICD-10-CM

## 2025-01-31 PROBLEM — A41.9 SEPSIS, UNSPECIFIED ORGANISM (HCC): Status: ACTIVE | Noted: 2025-01-31

## 2025-01-31 PROBLEM — J44.9 CHRONIC OBSTRUCTIVE PULMONARY DISEASE, UNSPECIFIED (HCC): Status: ACTIVE | Noted: 2025-01-31

## 2025-01-31 PROBLEM — J45.909 UNSPECIFIED ASTHMA, UNCOMPLICATED: Status: ACTIVE | Noted: 2025-01-31

## 2025-01-31 PROBLEM — I50.9 HEART FAILURE, UNSPECIFIED (HCC): Status: ACTIVE | Noted: 2025-01-31

## 2025-01-31 PROBLEM — I25.10 ATHEROSCLEROTIC HEART DISEASE OF NATIVE CORONARY ARTERY WITHOUT ANGINA PECTORIS: Status: ACTIVE | Noted: 2025-01-31

## 2025-02-03 ENCOUNTER — OFFICE VISIT (OUTPATIENT)
Dept: GERIATRIC MEDICINE | Age: 75
End: 2025-02-03

## 2025-02-03 DIAGNOSIS — E11.9 TYPE 2 DIABETES MELLITUS WITHOUT COMPLICATION, WITHOUT LONG-TERM CURRENT USE OF INSULIN (HCC): ICD-10-CM

## 2025-02-03 DIAGNOSIS — I50.22 CHRONIC SYSTOLIC CHF (CONGESTIVE HEART FAILURE) (HCC): Primary | ICD-10-CM

## 2025-02-03 DIAGNOSIS — I48.91 ATRIAL FIBRILLATION, UNSPECIFIED TYPE (HCC): ICD-10-CM

## 2025-02-03 DIAGNOSIS — I10 ESSENTIAL (PRIMARY) HYPERTENSION: ICD-10-CM

## 2025-02-04 ENCOUNTER — OFFICE VISIT (OUTPATIENT)
Dept: GERIATRIC MEDICINE | Age: 75
End: 2025-02-04

## 2025-02-04 DIAGNOSIS — I48.91 ATRIAL FIBRILLATION, UNSPECIFIED TYPE (HCC): ICD-10-CM

## 2025-02-04 DIAGNOSIS — I10 ESSENTIAL (PRIMARY) HYPERTENSION: ICD-10-CM

## 2025-02-04 DIAGNOSIS — E11.9 TYPE 2 DIABETES MELLITUS WITHOUT COMPLICATION, WITHOUT LONG-TERM CURRENT USE OF INSULIN (HCC): ICD-10-CM

## 2025-02-04 DIAGNOSIS — I50.22 CHRONIC SYSTOLIC CHF (CONGESTIVE HEART FAILURE) (HCC): Primary | ICD-10-CM

## 2025-02-05 NOTE — PROGRESS NOTES
History and Physical      CHIEF COMPLAINT: Lower extremity wounds    History of Present Illness:      A 74 y.o. female who is being seen at [facility] after being hospitalized for worsening lower extremity swelling erythema and wounds.  During her time at the hospital she was treated with IV antibiotics and later went into A-fib with RVR.  She had an echo done which showed an ejection fraction of 14%    REVIEW OF SYSTEMS:  A complete 10 Point review of systems was preformed and negative unless previously stated      PMH:  Congestive heart failure systolic dysfunction  Atrial fibrillation  COPD  Diabetes  Hypertension    Surgical History:    Hysteroscopy    Medications Prior to Admission:    Prior to Admission medications    Not on File       Allergies:    Iodine    Social History:    Patient is 1/4 pack/day smoker, denies alcohol or drug use    Family History:   Father and sister coronary artery disease    PHYSICAL EXAM:      Vitals were reviewed and stable     Gen- appears stated age.  Short  HENT- Head atraumtic, hearing intact, oral mucosa moist.  Edentulous  Eye- + glasses, EOMI, No pale conjunctiva. No scleral icterus   Neck- No thyromegalgy. No JVD.   Heart- RRR no murmur 1+ b/l  LE edema  Lungs- CTA b/l no respiratory distress.  Room air oxygen   Abd- soft, Nontender, BS x 4   Musculoskel- muscle strength 5/5 UE bilaterally.  5/5 lower extremity bilaterally.   Neuro- grossly intact. No acute deficits noted. No sensation disturbances. No facial droop   Skin- intact and dry. No rashes or ulcerations  Psych-  Mood and affect normal. Alert and oriented x 3         LABS:  No results found for: \"NA\", \"K\", \"CL\", \"CO2\", \"BUN\", \"CREATININE\", \"GLUCOSE\", \"CALCIUM\", \"LABGLOM\"   No results found for: \"WBC\", \"HGB\", \"HCT\", \"MCV\", \"PLT\"          ASSESSMENT/ PLAN::        Chronic systolic CHF   Metoprolol   Torsemide   Follow-up cardiology 2025  Fluid restriction  Losartan  A fib

## 2025-02-06 ENCOUNTER — OFFICE VISIT (OUTPATIENT)
Dept: GERIATRIC MEDICINE | Age: 75
End: 2025-02-06

## 2025-02-06 DIAGNOSIS — I10 ESSENTIAL (PRIMARY) HYPERTENSION: ICD-10-CM

## 2025-02-06 DIAGNOSIS — I48.91 ATRIAL FIBRILLATION, UNSPECIFIED TYPE (HCC): ICD-10-CM

## 2025-02-06 DIAGNOSIS — I50.22 CHRONIC SYSTOLIC CHF (CONGESTIVE HEART FAILURE) (HCC): Primary | ICD-10-CM

## 2025-02-06 DIAGNOSIS — E11.9 TYPE 2 DIABETES MELLITUS WITHOUT COMPLICATION, WITHOUT LONG-TERM CURRENT USE OF INSULIN (HCC): ICD-10-CM

## 2025-02-07 ENCOUNTER — OFFICE VISIT (OUTPATIENT)
Dept: GERIATRIC MEDICINE | Age: 75
End: 2025-02-07

## 2025-02-07 DIAGNOSIS — I10 ESSENTIAL (PRIMARY) HYPERTENSION: ICD-10-CM

## 2025-02-07 DIAGNOSIS — E11.9 TYPE 2 DIABETES MELLITUS WITHOUT COMPLICATION, WITHOUT LONG-TERM CURRENT USE OF INSULIN (HCC): ICD-10-CM

## 2025-02-07 DIAGNOSIS — I50.22 CHRONIC SYSTOLIC CHF (CONGESTIVE HEART FAILURE) (HCC): Primary | ICD-10-CM

## 2025-02-07 DIAGNOSIS — I48.91 ATRIAL FIBRILLATION, UNSPECIFIED TYPE (HCC): ICD-10-CM

## 2025-02-08 NOTE — PROGRESS NOTES
SNF PROGRESS NOTE      Cc- Lower extremity wounds/ CHF       Patient is a Phoebe Marina 74 y.o. female who is being seen at [facility] after being hospitalized for worsening lower extremity swelling erythema and wounds. During her time at the hospital she was treated with IV antibiotics and later went into A-fib with RVR. She had an echo done which showed an ejection fraction of 14%     Patient is walking in her room. She is pleasant. She denies any complaints of SOB. She is eating well.         No past medical history on file.  Iodine    VS reviewed    Gen- Alert and oriented x 2-3   Heart- RRR no murmur 1+ b/l LE edema   Lungs- CTA b/l no resp distress RA oxygen   Abd- bs x 4           Assessment and Plan    Chronic systolic CHF   Metoprolol   Torsemide   Follow-up cardiology 2/19/2025  Fluid restriction  Losartan  A fib   Metoprolol   Eliquis  Digoxin  Amiodarone  DM  Metformin  HTN  Metoprolol   Losartan  COPD       DAYRON Sinclair DO     Electronically signed by: Pavithra Gardner DO on 2/7/2025

## 2025-02-08 NOTE — PROGRESS NOTES
SNF PROGRESS NOTE      Cc- Lower extremity wounds/ CHF       Patient is a Phoebe Marina 74 y.o. female who is being seen at [facility] after being hospitalized for worsening lower extremity swelling erythema and wounds. During her time at the hospital she was treated with IV antibiotics and later went into A-fib with RVR. She had an echo done which showed an ejection fraction of 14%       Patient is walking back from the bathroom with her walker. The patient is pleasant. She is on RA.         No past medical history on file.  Iodine    VS reviewed    Gen- Alert and oriented x 2-3   Heart- RRR no murmur 1+ b/l LE edema   Lungs- CTA b/l no resp distress RA oxygen   Abd- bs x 4           Assessment and Plan      Chronic systolic CHF   Metoprolol   Torsemide   Follow-up cardiology 2/19/2025  Fluid restriction  Losartan  A fib   Metoprolol   Eliquis  Digoxin  Amiodarone  DM  Metformin  HTN  Metoprolol   Losartan  COPD     DAYRON Sinclair DO     Electronically signed by: Pavithra Gardner DO on 2/6/2025

## 2025-02-10 ENCOUNTER — OFFICE VISIT (OUTPATIENT)
Dept: GERIATRIC MEDICINE | Age: 75
End: 2025-02-10
Payer: MEDICARE

## 2025-02-10 DIAGNOSIS — E11.9 TYPE 2 DIABETES MELLITUS WITHOUT COMPLICATION, WITHOUT LONG-TERM CURRENT USE OF INSULIN (HCC): ICD-10-CM

## 2025-02-10 DIAGNOSIS — I48.91 ATRIAL FIBRILLATION, UNSPECIFIED TYPE (HCC): ICD-10-CM

## 2025-02-10 DIAGNOSIS — I50.22 CHRONIC SYSTOLIC CHF (CONGESTIVE HEART FAILURE) (HCC): Primary | ICD-10-CM

## 2025-02-10 DIAGNOSIS — I10 ESSENTIAL (PRIMARY) HYPERTENSION: ICD-10-CM

## 2025-02-10 PROCEDURE — 1123F ACP DISCUSS/DSCN MKR DOCD: CPT | Performed by: INTERNAL MEDICINE

## 2025-02-10 PROCEDURE — 99308 SBSQ NF CARE LOW MDM 20: CPT | Performed by: INTERNAL MEDICINE

## 2025-02-11 ENCOUNTER — OFFICE VISIT (OUTPATIENT)
Dept: GERIATRIC MEDICINE | Age: 75
End: 2025-02-11
Payer: MEDICARE

## 2025-02-11 DIAGNOSIS — I50.22 CHRONIC SYSTOLIC CHF (CONGESTIVE HEART FAILURE) (HCC): Primary | ICD-10-CM

## 2025-02-11 DIAGNOSIS — I10 ESSENTIAL (PRIMARY) HYPERTENSION: ICD-10-CM

## 2025-02-11 DIAGNOSIS — E11.9 TYPE 2 DIABETES MELLITUS WITHOUT COMPLICATION, WITHOUT LONG-TERM CURRENT USE OF INSULIN (HCC): ICD-10-CM

## 2025-02-11 DIAGNOSIS — R52 PAIN: Primary | ICD-10-CM

## 2025-02-11 DIAGNOSIS — I48.91 ATRIAL FIBRILLATION, UNSPECIFIED TYPE (HCC): ICD-10-CM

## 2025-02-11 PROCEDURE — 3046F HEMOGLOBIN A1C LEVEL >9.0%: CPT | Performed by: INTERNAL MEDICINE

## 2025-02-11 PROCEDURE — 1123F ACP DISCUSS/DSCN MKR DOCD: CPT | Performed by: INTERNAL MEDICINE

## 2025-02-11 PROCEDURE — 99308 SBSQ NF CARE LOW MDM 20: CPT | Performed by: INTERNAL MEDICINE

## 2025-02-11 RX ORDER — OXYCODONE HYDROCHLORIDE 5 MG/1
5 TABLET ORAL SEE ADMIN INSTRUCTIONS
Qty: 20 TABLET | Refills: 0 | Status: SHIPPED | OUTPATIENT
Start: 2025-02-11 | End: 2025-02-18

## 2025-02-12 NOTE — PROGRESS NOTES
SNF PROGRESS NOTE      Cc- Lower extremity wounds/ CHF       Patient is a Phoebe Marina 74 y.o. female who is being seen at [facility] after being hospitalized for worsening lower extremity swelling erythema and wounds. During her time at the hospital she was treated with IV antibiotics and later went into A-fib with RVR. She had an echo done which showed an ejection fraction of 14% .     Patient is getting her legs done by nursing right now. The patient is pleasant. She is on RA. She is walking around the unit.         No past medical history on file.  Iodine    VS reviewed       Gen- Alert and oriented x 2-3   Heart- RRR no murmur 1+ b/l LE edema   Lungs- CTA b/l no resp distress RA oxygen   Abd- bs x 4            Assessment and Plan    Chronic systolic CHF   Metoprolol   Torsemide   Follow-up cardiology 2/19/2025  Fluid restriction  Losartan  A fib   Metoprolol   Eliquis  Digoxin  Amiodarone  DM  Metformin  HTN  Metoprolol   Losartan  COPD       Discharge planning on Thursday.     DAYRON Sinclair DO     Electronically signed by: Pavithra Gardner DO on 2/11/2025

## 2025-02-12 NOTE — PROGRESS NOTES
SNF PROGRESS NOTE      Cc- Lower extremity wounds/ CHF       Patient is a Phobee Marina 74 y.o. female who is being seen at [facility] after being hospitalized for worsening lower extremity swelling erythema and wounds. During her time at the hospital she was treated with IV antibiotics and later went into A-fib with RVR. She had an echo done which showed an ejection fraction of 14% .    Patient getting wound care changes. She is eating well. She is on RA. She is pleasant. She is walking around the unit.         No past medical history on file.  Iodine    VS reviewed      Gen- Alert and oriented x 2-3   Heart- RRR no murmur 1+ b/l LE edema   Lungs- CTA b/l no resp distress RA oxygen   Abd- bs x 4         Assessment and Plan    Chronic systolic CHF   Metoprolol   Torsemide   Follow-up cardiology 2/19/2025  Fluid restriction  Losartan  A fib   Metoprolol   Eliquis  Digoxin  Amiodarone  DM  Metformin  HTN  Metoprolol   Losartan  COPD       DAYRON Sinclair DO     Electronically signed by: Pavithra Gardner DO on 2/10/2025

## 2025-02-13 ENCOUNTER — OFFICE VISIT (OUTPATIENT)
Dept: GERIATRIC MEDICINE | Age: 75
End: 2025-02-13
Payer: MEDICARE

## 2025-02-13 DIAGNOSIS — E11.9 TYPE 2 DIABETES MELLITUS WITHOUT COMPLICATION, WITHOUT LONG-TERM CURRENT USE OF INSULIN (HCC): ICD-10-CM

## 2025-02-13 DIAGNOSIS — I10 ESSENTIAL (PRIMARY) HYPERTENSION: ICD-10-CM

## 2025-02-13 DIAGNOSIS — I50.22 CHRONIC SYSTOLIC CHF (CONGESTIVE HEART FAILURE) (HCC): Primary | ICD-10-CM

## 2025-02-13 DIAGNOSIS — I48.91 ATRIAL FIBRILLATION, UNSPECIFIED TYPE (HCC): ICD-10-CM

## 2025-02-13 PROCEDURE — 3046F HEMOGLOBIN A1C LEVEL >9.0%: CPT | Performed by: INTERNAL MEDICINE

## 2025-02-13 PROCEDURE — 99316 NF DSCHRG MGMT 30 MIN+: CPT | Performed by: INTERNAL MEDICINE

## 2025-02-16 NOTE — PROGRESS NOTES
SNF PROGRESS NOTE      Cc- Lower extremity wounds/ CHF       Patient is a Phoebe Marina 74 y.o. female who is being seen at [facility] after being hospitalized for worsening lower extremity swelling erythema and wounds. During her time at the hospital she was treated with IV antibiotics and later went into A-fib with RVR. She had an echo done which showed an ejection fraction of 14%     Patient is up and working with therapy. She is pleasant and eating well. She is on RA.         No past medical history on file.  Iodine    VS reviewed    Gen- Alert and oriented x 2-3   Heart- RRR no murmur 1+ b/l LE edema   Lungs- CTA b/l no resp distress RA oxygen   Abd- bs x 4         Assessment and Plan    Chronic systolic CHF   Metoprolol   Torsemide   Follow-up cardiology 2/19/2025  Fluid restriction  Losartan  A fib   Metoprolol   Eliquis  Digoxin  Amiodarone  DM  Metformin  HTN  Metoprolol   Losartan  COPD       DAYRON Sinclair DO     Electronically signed by: Pavithra Gardner DO on 2/4/2025

## 2025-02-17 NOTE — PROGRESS NOTES
Discharge Summary       Discharge Disposition Home with home care    Discharge Condition stable      Discharge time 37 minutes      Medications   Current Outpatient Medications   Medication Sig Dispense Refill    oxyCODONE (ROXICODONE) 5 MG immediate release tablet Take 1 tablet by mouth See Admin Instructions for 7 days. One tab po bid routine  AND  one tab po q 6 hr prn 20 tablet 0     No current facility-administered medications for this visit.       Diet-cardiac    Activity as tolerated        Brief SNF Course--     This is an 74 y.o. female patient was admitted with congestive heart failure complications.  She was on room air during the entirety of her stay.  She had PT OT as well as wound care during her time and she had a pretty unremarkable skilled nursing stay.  She was discharged home with instructions to follow-up with cardiology on 2/19          Discharge Diagnosis :    Chronic systolic CHF   A fib   DM  HTN  COPD         Follow up --     Primary care physician within 1 to 2 weeks  Cardiology 2/19        DAYRON Sinclair DO     Electronically signed by: Pavithra Gardner DO on 2/13/2025

## 2025-05-09 ENCOUNTER — APPOINTMENT (OUTPATIENT)
Dept: RADIOLOGY | Facility: HOSPITAL | Age: 75
DRG: 291 | End: 2025-05-09
Payer: COMMERCIAL

## 2025-05-09 ENCOUNTER — APPOINTMENT (OUTPATIENT)
Dept: CARDIOLOGY | Facility: HOSPITAL | Age: 75
DRG: 291 | End: 2025-05-09
Payer: COMMERCIAL

## 2025-05-09 ENCOUNTER — HOSPITAL ENCOUNTER (INPATIENT)
Facility: HOSPITAL | Age: 75
DRG: 291 | End: 2025-05-09
Attending: EMERGENCY MEDICINE | Admitting: STUDENT IN AN ORGANIZED HEALTH CARE EDUCATION/TRAINING PROGRAM
Payer: COMMERCIAL

## 2025-05-09 DIAGNOSIS — I48.91 ATRIAL FIBRILLATION WITH RAPID VENTRICULAR RESPONSE (MULTI): Primary | ICD-10-CM

## 2025-05-09 DIAGNOSIS — L03.119 CELLULITIS OF LOWER EXTREMITY, UNSPECIFIED LATERALITY: ICD-10-CM

## 2025-05-09 DIAGNOSIS — G89.4 CHRONIC PAIN SYNDROME: ICD-10-CM

## 2025-05-09 LAB
ALBUMIN SERPL BCP-MCNC: 3.7 G/DL (ref 3.4–5)
ALP SERPL-CCNC: 124 U/L (ref 33–136)
ALT SERPL W P-5'-P-CCNC: 272 U/L (ref 7–45)
ANION GAP SERPL CALC-SCNC: 17 MMOL/L (ref 10–20)
AST SERPL W P-5'-P-CCNC: 295 U/L (ref 9–39)
BASOPHILS # BLD AUTO: 0.07 X10*3/UL (ref 0–0.1)
BASOPHILS NFR BLD AUTO: 0.5 %
BILIRUB SERPL-MCNC: 1.7 MG/DL (ref 0–1.2)
BNP SERPL-MCNC: 1814 PG/ML (ref 0–99)
BUN SERPL-MCNC: 32 MG/DL (ref 6–23)
CALCIUM SERPL-MCNC: 9.2 MG/DL (ref 8.6–10.3)
CHLORIDE SERPL-SCNC: 104 MMOL/L (ref 98–107)
CO2 SERPL-SCNC: 17 MMOL/L (ref 21–32)
CREAT SERPL-MCNC: 0.96 MG/DL (ref 0.5–1.05)
CRP SERPL-MCNC: 7.07 MG/DL
EGFRCR SERPLBLD CKD-EPI 2021: 62 ML/MIN/1.73M*2
EOSINOPHIL # BLD AUTO: 0.02 X10*3/UL (ref 0–0.4)
EOSINOPHIL NFR BLD AUTO: 0.1 %
ERYTHROCYTE [DISTWIDTH] IN BLOOD BY AUTOMATED COUNT: 16.9 % (ref 11.5–14.5)
GLUCOSE BLD MANUAL STRIP-MCNC: 281 MG/DL (ref 74–99)
GLUCOSE SERPL-MCNC: 253 MG/DL (ref 74–99)
HCT VFR BLD AUTO: 35.1 % (ref 36–46)
HGB BLD-MCNC: 11 G/DL (ref 12–16)
IMM GRANULOCYTES # BLD AUTO: 0.1 X10*3/UL (ref 0–0.5)
IMM GRANULOCYTES NFR BLD AUTO: 0.7 % (ref 0–0.9)
LYMPHOCYTES # BLD AUTO: 0.88 X10*3/UL (ref 0.8–3)
LYMPHOCYTES NFR BLD AUTO: 5.8 %
MAGNESIUM SERPL-MCNC: 1.97 MG/DL (ref 1.6–2.4)
MCH RBC QN AUTO: 27.9 PG (ref 26–34)
MCHC RBC AUTO-ENTMCNC: 31.3 G/DL (ref 32–36)
MCV RBC AUTO: 89 FL (ref 80–100)
MONOCYTES # BLD AUTO: 1.08 X10*3/UL (ref 0.05–0.8)
MONOCYTES NFR BLD AUTO: 7.1 %
NEUTROPHILS # BLD AUTO: 13.15 X10*3/UL (ref 1.6–5.5)
NEUTROPHILS NFR BLD AUTO: 85.8 %
NRBC BLD-RTO: 0.2 /100 WBCS (ref 0–0)
PLATELET # BLD AUTO: 287 X10*3/UL (ref 150–450)
POTASSIUM SERPL-SCNC: 4.8 MMOL/L (ref 3.5–5.3)
PROT SERPL-MCNC: 6.8 G/DL (ref 6.4–8.2)
RBC # BLD AUTO: 3.94 X10*6/UL (ref 4–5.2)
SODIUM SERPL-SCNC: 133 MMOL/L (ref 136–145)
WBC # BLD AUTO: 15.3 X10*3/UL (ref 4.4–11.3)

## 2025-05-09 PROCEDURE — 82947 ASSAY GLUCOSE BLOOD QUANT: CPT

## 2025-05-09 PROCEDURE — 96374 THER/PROPH/DIAG INJ IV PUSH: CPT

## 2025-05-09 PROCEDURE — 80053 COMPREHEN METABOLIC PANEL: CPT | Performed by: EMERGENCY MEDICINE

## 2025-05-09 PROCEDURE — 36415 COLL VENOUS BLD VENIPUNCTURE: CPT | Performed by: EMERGENCY MEDICINE

## 2025-05-09 PROCEDURE — 99285 EMERGENCY DEPT VISIT HI MDM: CPT | Performed by: EMERGENCY MEDICINE

## 2025-05-09 PROCEDURE — S0109 METHADONE ORAL 5MG: HCPCS | Performed by: STUDENT IN AN ORGANIZED HEALTH CARE EDUCATION/TRAINING PROGRAM

## 2025-05-09 PROCEDURE — 2500000002 HC RX 250 W HCPCS SELF ADMINISTERED DRUGS (ALT 637 FOR MEDICARE OP, ALT 636 FOR OP/ED): Performed by: STUDENT IN AN ORGANIZED HEALTH CARE EDUCATION/TRAINING PROGRAM

## 2025-05-09 PROCEDURE — 96376 TX/PRO/DX INJ SAME DRUG ADON: CPT

## 2025-05-09 PROCEDURE — 87040 BLOOD CULTURE FOR BACTERIA: CPT | Mod: ELYLAB | Performed by: NURSE PRACTITIONER

## 2025-05-09 PROCEDURE — 1200000002 HC GENERAL ROOM WITH TELEMETRY DAILY

## 2025-05-09 PROCEDURE — 93005 ELECTROCARDIOGRAM TRACING: CPT

## 2025-05-09 PROCEDURE — 71045 X-RAY EXAM CHEST 1 VIEW: CPT | Performed by: RADIOLOGY

## 2025-05-09 PROCEDURE — 96375 TX/PRO/DX INJ NEW DRUG ADDON: CPT

## 2025-05-09 PROCEDURE — 85025 COMPLETE CBC W/AUTO DIFF WBC: CPT | Performed by: EMERGENCY MEDICINE

## 2025-05-09 PROCEDURE — 83880 ASSAY OF NATRIURETIC PEPTIDE: CPT | Performed by: EMERGENCY MEDICINE

## 2025-05-09 PROCEDURE — 86140 C-REACTIVE PROTEIN: CPT | Performed by: NURSE PRACTITIONER

## 2025-05-09 PROCEDURE — 36415 COLL VENOUS BLD VENIPUNCTURE: CPT | Performed by: NURSE PRACTITIONER

## 2025-05-09 PROCEDURE — 2500000004 HC RX 250 GENERAL PHARMACY W/ HCPCS (ALT 636 FOR OP/ED): Mod: JZ | Performed by: EMERGENCY MEDICINE

## 2025-05-09 PROCEDURE — 71045 X-RAY EXAM CHEST 1 VIEW: CPT

## 2025-05-09 PROCEDURE — 2500000001 HC RX 250 WO HCPCS SELF ADMINISTERED DRUGS (ALT 637 FOR MEDICARE OP): Performed by: STUDENT IN AN ORGANIZED HEALTH CARE EDUCATION/TRAINING PROGRAM

## 2025-05-09 PROCEDURE — 99223 1ST HOSP IP/OBS HIGH 75: CPT | Performed by: NURSE PRACTITIONER

## 2025-05-09 PROCEDURE — 83735 ASSAY OF MAGNESIUM: CPT | Performed by: STUDENT IN AN ORGANIZED HEALTH CARE EDUCATION/TRAINING PROGRAM

## 2025-05-09 RX ORDER — ONDANSETRON 4 MG/1
4 TABLET, FILM COATED ORAL EVERY 8 HOURS PRN
Status: DISCONTINUED | OUTPATIENT
Start: 2025-05-09 | End: 2025-05-20 | Stop reason: HOSPADM

## 2025-05-09 RX ORDER — PANTOPRAZOLE SODIUM 40 MG/10ML
40 INJECTION, POWDER, LYOPHILIZED, FOR SOLUTION INTRAVENOUS DAILY
Status: DISCONTINUED | OUTPATIENT
Start: 2025-05-10 | End: 2025-05-13

## 2025-05-09 RX ORDER — DIGOXIN 125 MCG
125 TABLET ORAL DAILY
Status: DISCONTINUED | OUTPATIENT
Start: 2025-05-10 | End: 2025-05-12

## 2025-05-09 RX ORDER — ACETAMINOPHEN 160 MG/5ML
650 SOLUTION ORAL EVERY 4 HOURS PRN
Status: DISCONTINUED | OUTPATIENT
Start: 2025-05-09 | End: 2025-05-20 | Stop reason: HOSPADM

## 2025-05-09 RX ORDER — ACETAMINOPHEN 650 MG/1
650 SUPPOSITORY RECTAL EVERY 4 HOURS PRN
Status: DISCONTINUED | OUTPATIENT
Start: 2025-05-09 | End: 2025-05-20 | Stop reason: HOSPADM

## 2025-05-09 RX ORDER — MORPHINE SULFATE 4 MG/ML
4 INJECTION, SOLUTION INTRAMUSCULAR; INTRAVENOUS ONCE
Status: COMPLETED | OUTPATIENT
Start: 2025-05-09 | End: 2025-05-09

## 2025-05-09 RX ORDER — MONTELUKAST SODIUM 10 MG/1
10 TABLET ORAL NIGHTLY
Status: DISCONTINUED | OUTPATIENT
Start: 2025-05-09 | End: 2025-05-20 | Stop reason: HOSPADM

## 2025-05-09 RX ORDER — CARVEDILOL 6.25 MG/1
6.25 TABLET ORAL
Status: DISCONTINUED | OUTPATIENT
Start: 2025-05-10 | End: 2025-05-10

## 2025-05-09 RX ORDER — ACETAMINOPHEN 325 MG/1
650 TABLET ORAL EVERY 4 HOURS PRN
Status: DISCONTINUED | OUTPATIENT
Start: 2025-05-09 | End: 2025-05-20 | Stop reason: HOSPADM

## 2025-05-09 RX ORDER — METOPROLOL TARTRATE 1 MG/ML
5 INJECTION, SOLUTION INTRAVENOUS ONCE
Status: COMPLETED | OUTPATIENT
Start: 2025-05-09 | End: 2025-05-09

## 2025-05-09 RX ORDER — PANTOPRAZOLE SODIUM 40 MG/1
40 TABLET, DELAYED RELEASE ORAL DAILY
Status: DISCONTINUED | OUTPATIENT
Start: 2025-05-10 | End: 2025-05-20 | Stop reason: HOSPADM

## 2025-05-09 RX ORDER — FUROSEMIDE 40 MG/1
40 TABLET ORAL 2 TIMES DAILY
Status: DISCONTINUED | OUTPATIENT
Start: 2025-05-10 | End: 2025-05-10

## 2025-05-09 RX ORDER — ONDANSETRON HYDROCHLORIDE 2 MG/ML
4 INJECTION, SOLUTION INTRAVENOUS ONCE
Status: COMPLETED | OUTPATIENT
Start: 2025-05-09 | End: 2025-05-09

## 2025-05-09 RX ORDER — METHADONE HYDROCHLORIDE 5 MG/1
5 TABLET ORAL 2 TIMES DAILY
Refills: 0 | Status: DISCONTINUED | OUTPATIENT
Start: 2025-05-09 | End: 2025-05-20 | Stop reason: HOSPADM

## 2025-05-09 RX ORDER — ATORVASTATIN CALCIUM 20 MG/1
40 TABLET, FILM COATED ORAL NIGHTLY
Status: DISCONTINUED | OUTPATIENT
Start: 2025-05-09 | End: 2025-05-20 | Stop reason: HOSPADM

## 2025-05-09 RX ORDER — METFORMIN HYDROCHLORIDE 500 MG/1
1000 TABLET ORAL
Status: DISCONTINUED | OUTPATIENT
Start: 2025-05-10 | End: 2025-05-13

## 2025-05-09 RX ORDER — ONDANSETRON HYDROCHLORIDE 2 MG/ML
4 INJECTION, SOLUTION INTRAVENOUS EVERY 8 HOURS PRN
Status: DISCONTINUED | OUTPATIENT
Start: 2025-05-09 | End: 2025-05-20 | Stop reason: HOSPADM

## 2025-05-09 RX ORDER — ALBUTEROL SULFATE 90 UG/1
2 INHALANT RESPIRATORY (INHALATION) EVERY 4 HOURS PRN
Status: DISCONTINUED | OUTPATIENT
Start: 2025-05-09 | End: 2025-05-20 | Stop reason: HOSPADM

## 2025-05-09 RX ORDER — DIGOXIN 0.25 MG/ML
250 INJECTION INTRAMUSCULAR; INTRAVENOUS ONCE
Status: COMPLETED | OUTPATIENT
Start: 2025-05-09 | End: 2025-05-09

## 2025-05-09 RX ORDER — HYDROCODONE BITARTRATE AND ACETAMINOPHEN 5; 325 MG/1; MG/1
1 TABLET ORAL EVERY 6 HOURS PRN
Refills: 0 | Status: DISCONTINUED | OUTPATIENT
Start: 2025-05-09 | End: 2025-05-20 | Stop reason: HOSPADM

## 2025-05-09 RX ORDER — DIGOXIN 0.25 MG/ML
500 INJECTION INTRAMUSCULAR; INTRAVENOUS ONCE
Status: COMPLETED | OUTPATIENT
Start: 2025-05-09 | End: 2025-05-09

## 2025-05-09 RX ORDER — METOPROLOL TARTRATE 1 MG/ML
5 INJECTION, SOLUTION INTRAVENOUS ONCE
Status: DISCONTINUED | OUTPATIENT
Start: 2025-05-09 | End: 2025-05-12

## 2025-05-09 RX ORDER — POTASSIUM CHLORIDE 20 MEQ/1
20 TABLET, EXTENDED RELEASE ORAL DAILY
Status: DISCONTINUED | OUTPATIENT
Start: 2025-05-10 | End: 2025-05-13

## 2025-05-09 RX ADMIN — DIGOXIN 500 MCG: 0.25 INJECTION INTRAMUSCULAR; INTRAVENOUS at 17:19

## 2025-05-09 RX ADMIN — APIXABAN 5 MG: 5 TABLET, FILM COATED ORAL at 22:40

## 2025-05-09 RX ADMIN — MORPHINE SULFATE 4 MG: 4 INJECTION, SOLUTION INTRAMUSCULAR; INTRAVENOUS at 17:19

## 2025-05-09 RX ADMIN — METOPROLOL TARTRATE 5 MG: 5 INJECTION INTRAVENOUS at 17:19

## 2025-05-09 RX ADMIN — ONDANSETRON 4 MG: 2 INJECTION INTRAMUSCULAR; INTRAVENOUS at 17:19

## 2025-05-09 RX ADMIN — HYDROCODONE BITARTRATE AND ACETAMINOPHEN 1 TABLET: 5; 325 TABLET ORAL at 21:43

## 2025-05-09 RX ADMIN — METHADONE HYDROCHLORIDE 5 MG: 5 TABLET ORAL at 22:40

## 2025-05-09 RX ADMIN — MONTELUKAST SODIUM 10 MG: 10 TABLET, FILM COATED ORAL at 22:40

## 2025-05-09 RX ADMIN — DIGOXIN 250 MCG: 0.25 INJECTION INTRAMUSCULAR; INTRAVENOUS at 19:23

## 2025-05-09 ASSESSMENT — PAIN DESCRIPTION - LOCATION: LOCATION: LEG

## 2025-05-09 ASSESSMENT — ACTIVITIES OF DAILY LIVING (ADL)
DRESSING YOURSELF: NEEDS ASSISTANCE
HEARING - RIGHT EAR: FUNCTIONAL
WALKS IN HOME: NEEDS ASSISTANCE
PATIENT'S MEMORY ADEQUATE TO SAFELY COMPLETE DAILY ACTIVITIES?: YES
GROOMING: NEEDS ASSISTANCE
JUDGMENT_ADEQUATE_SAFELY_COMPLETE_DAILY_ACTIVITIES: YES
FEEDING YOURSELF: INDEPENDENT
TOILETING: NEEDS ASSISTANCE
HEARING - LEFT EAR: FUNCTIONAL
ASSISTIVE_DEVICE: CANE;WALKER
BATHING: NEEDS ASSISTANCE
ADEQUATE_TO_COMPLETE_ADL: YES

## 2025-05-09 ASSESSMENT — COLUMBIA-SUICIDE SEVERITY RATING SCALE - C-SSRS
2. HAVE YOU ACTUALLY HAD ANY THOUGHTS OF KILLING YOURSELF?: NO
1. IN THE PAST MONTH, HAVE YOU WISHED YOU WERE DEAD OR WISHED YOU COULD GO TO SLEEP AND NOT WAKE UP?: NO
6. HAVE YOU EVER DONE ANYTHING, STARTED TO DO ANYTHING, OR PREPARED TO DO ANYTHING TO END YOUR LIFE?: NO

## 2025-05-09 ASSESSMENT — COGNITIVE AND FUNCTIONAL STATUS - GENERAL
PERSONAL GROOMING: A LITTLE
MOBILITY SCORE: 14
CLIMB 3 TO 5 STEPS WITH RAILING: A LOT
WALKING IN HOSPITAL ROOM: A LOT
DAILY ACTIVITIY SCORE: 18
PATIENT BASELINE BEDBOUND: NO
TOILETING: A LOT
TURNING FROM BACK TO SIDE WHILE IN FLAT BAD: A LITTLE
DRESSING REGULAR UPPER BODY CLOTHING: A LITTLE
STANDING UP FROM CHAIR USING ARMS: A LOT
MOVING TO AND FROM BED TO CHAIR: A LOT
DRESSING REGULAR LOWER BODY CLOTHING: A LITTLE
HELP NEEDED FOR BATHING: A LITTLE
MOVING FROM LYING ON BACK TO SITTING ON SIDE OF FLAT BED WITH BEDRAILS: A LITTLE

## 2025-05-09 ASSESSMENT — LIFESTYLE VARIABLES
TOTAL SCORE: 0
EVER FELT BAD OR GUILTY ABOUT YOUR DRINKING: NO
HAVE YOU EVER FELT YOU SHOULD CUT DOWN ON YOUR DRINKING: NO
EVER HAD A DRINK FIRST THING IN THE MORNING TO STEADY YOUR NERVES TO GET RID OF A HANGOVER: NO
HAVE PEOPLE ANNOYED YOU BY CRITICIZING YOUR DRINKING: NO

## 2025-05-09 ASSESSMENT — PAIN SCALES - GENERAL
PAINLEVEL_OUTOF10: 7
PAINLEVEL_OUTOF10: 10 - WORST POSSIBLE PAIN

## 2025-05-09 ASSESSMENT — PAIN DESCRIPTION - PAIN TYPE: TYPE: CHRONIC PAIN

## 2025-05-09 ASSESSMENT — PAIN - FUNCTIONAL ASSESSMENT: PAIN_FUNCTIONAL_ASSESSMENT: 0-10

## 2025-05-09 NOTE — H&P
Medical Group History and Physical    ASSESSMENT & PLAN:     Acute on chronic systolic and diastolic congestive heart failure exacerbation  A-fib RVR on Eliquis  - Last echo 03/2025 showing EF 25 to 30%  - BNP 1800s, chest x-ray shows left pleural effusion  - Dig loaded in /250  - resume home cardiac meds  - Consult cardiology  - Consult heart failure navigator  - Zofran for nausea  - Keep K >4 mag >2  - Check digoxin level in a.m.  - daily EKG     Chronic cellulitis  Chronic LLE wound  Possible early sepsis ?  - Blood cultures ordered  - Hypotensive on arrival, although patient was in A-fib RVR [150s] BP improved following IV fluids and rate control = monitor  - Consult wound team - follows outpatient  - Patient has services with HHC and PT/OT  3x/week    VTE Prophylaxis: cont Eliquis    Vy Paul, EROS-CNP    HISTORY OF PRESENT ILLNESS:   Chief Complaint: Not feeling well, nausea and vomiting x 3 days    History Of Present Illness:    Isa Jack is a 74 y.o. female with a significant past medical history of systolic congestive heart failure EF 20%, A-fib on Eliquis, CAD, HTN, HLD, DM2, COPD not on home O2, asthma, chronic cellulitis to BLE with LLE wound followed by wound clinic presenting to [facility] with complaints of general malaise, nausea and vomiting x 3 days.  Patient was found to be in A-fib RVR with HR 140s, had similar presentation in early April secondary to uncontrolled cellulitis necessitating IV antibiotic use and subsequently adjustments to her cardiac meds to prevent episodes of prolonged QTc.    While in ER patient was loaded with digoxin 500/250, BNP 1800s, CXR shows left pleural effusion, last echo 03/2025 showing EF 25-30%; patient reports relief following Zofran for nausea and is seen eating without difficulty.  She has substantial swelling to bilateral lower extremities including LLE wound that is wrapped, see media images.  Unable to state if improving or worsening, pt  is unable to see the wound. Will consult wound for ongoing hospital management    VSS ready for admission to general medicine for acute on chronic systolic and diastolic congestive heart failure exacerbation complicated by atrial fibrillation and with rapid ventricular response.    Review of systems: 10 point review of systems is otherwise negative except as mentioned above.    PAST HISTORIES:       Past Medical History:  Medical Problems       Problem List       * (Principal) Atrial fibrillation with rapid ventricular response (Multi)    Adnexal mass    Asthma in adult, unspecified asthma severity, uncomplicated (HHS-HCC)    Cellulitis    Chronic congestive heart failure    Chronic pain syndrome    Coronary artery disease involving native heart with angina pectoris    DJD (degenerative joint disease)    Endometrial hyperplasia    Essential hypertension    HLD (hyperlipidemia)    Pernicious anemia    Physical debility    Overview Signed 4/20/2023  9:30 AM by Yamini Beckwith, DO   This patient has a mobility limitation that significantly impairs the ability to participate in MRADL without the help of a walker and/or rollator.  There also may be a need for hospital bed for positioning and bedside commode due to mobility limitations.           Vitamin B12 deficiency    Skin ulcer of ankle (Multi)    Type 2 diabetes mellitus, without long-term current use of insulin (Multi)    Sepsis due to pneumonia (Multi)    Cardiomyopathy, ischemic           Past Surgical History:  Surgical History[1]       Social History:  She reports that she quit smoking about 6 years ago. Her smoking use included cigarettes. She has never used smokeless tobacco. She reports that she does not drink alcohol and does not use drugs.    Family History:  Family History[2]     Allergies:  Iodine, Adhesive tape-silicones, and Gadolinium-containing contrast media    OBJECTIVE:       Last Recorded Vitals:  Vitals:    05/09/25 1745 05/09/25 1800 05/09/25  1830 05/09/25 1923   BP: 102/81  100/81    Pulse: (!) 112 (!) 114 (!) 110 (!) 116   Resp: (!) 21 18 17    Temp:       TempSrc:       SpO2: 95% 94% 95%    Weight:       Height:           Last I/O:  No intake/output data recorded.    Physical Exam  Vitals and nursing note reviewed.   Constitutional:       Appearance: Normal appearance. She is obese.   HENT:      Head: Normocephalic and atraumatic.      Nose: Nose normal.      Mouth/Throat:      Mouth: Mucous membranes are moist.      Pharynx: Oropharynx is clear.   Eyes:      Extraocular Movements: Extraocular movements intact.      Conjunctiva/sclera: Conjunctivae normal.      Pupils: Pupils are equal, round, and reactive to light.   Neck:      Comments: No JVD  Cardiovascular:      Rate and Rhythm: Tachycardia present. Rhythm irregular.      Pulses: Normal pulses.      Heart sounds: Normal heart sounds.      Comments: A-fib RVR [140s-100]  Pulmonary:      Effort: Pulmonary effort is normal.      Breath sounds: Normal breath sounds.      Comments: RA, no wheezing or rhonchi, diminished bases bilaterally  Abdominal:      General: Abdomen is flat. Bowel sounds are normal.      Palpations: Abdomen is soft.      Comments: No tenderness or guarding, no CVA tenderness   Genitourinary:     Comments: Not assessed  Musculoskeletal:         General: Normal range of motion.      Cervical back: Normal range of motion and neck supple.      Right lower leg: Edema present.      Left lower leg: Edema present.      Comments: LLE - wound - see nursing documentation   chronic cellulitis BLE   Skin:     General: Skin is warm and dry.      Capillary Refill: Capillary refill takes 2 to 3 seconds.      Coloration: Skin is pale.      Findings: Erythema and lesion present.      Comments: Chronic BLE mild redness, LLE chronic wound -chronic BLE swelling/edema   Neurological:      General: No focal deficit present.      Mental Status: She is alert and oriented to person, place, and time.  Mental status is at baseline.   Psychiatric:         Mood and Affect: Mood normal.         Behavior: Behavior normal.         Thought Content: Thought content normal.         Judgment: Judgment normal.           Scheduled Medications  Scheduled Medications[3]  PRN Medications  PRN Medications[4]  Continuous Medications  Continuous Medications[5]    Outpatient Medications:  Prior to Admission medications    Medication Sig Start Date End Date Taking? Authorizing Provider   albuterol (ProAir HFA) 90 mcg/actuation inhaler Inhale 2 puffs every 4 hours if needed for wheezing or shortness of breath. 9/8/21   Historical Provider, MD   albuterol 2.5 mg /3 mL (0.083 %) nebulizer solution Take 3 mL (2.5 mg) by nebulization every 4 hours if needed for shortness of breath or wheezing. 6/9/23   Yamini Beckwith,    albuterol 2.5 mg /3 mL (0.083 %) nebulizer solution Take 3 mL (2.5 mg) by nebulization every 4 hours if needed for wheezing or shortness of breath. 9/8/21   Historical Provider, MD   albuterol 90 mcg/actuation inhaler Inhale 2 puffs every 4 hours if needed for shortness of breath or wheezing. OK to substitute in class 7/18/23   Yamini Beckwith, DO   apixaban (Eliquis) 5 mg tablet Take 1 tablet (5 mg) by mouth every 12 hours. 10/13/23 11/12/23  Don Santacruz MD   atorvastatin (Lipitor) 40 mg tablet Take 1 tablet (40 mg) by mouth once daily at bedtime. 9/8/21   Historical Provider, MD   carvedilol (Coreg) 6.25 mg tablet Take 1 tablet (6.25 mg) by mouth 2 times a day with meals. 8/8/21   Historical Provider, MD   clotrimazole (Lotrimin) 1 % cream Apply 1 Application topically 2 times a day. Apply to affected areas 2-3 times daily 5/1/23   Yamini Beckwith DO   cyanocobalamin, vitamin B-12, 2,500 mcg tablet, sublingual Place 1 each under the tongue once daily. 4/20/23   Yamini Beckwith DO   digoxin (Lanoxin) 125 MCG tablet Take 1 tablet (125 mcg) by mouth once daily. 10/13/23 11/12/23  Don Santacruz MD   elastic  "bandage (BAND-AID ACTIVE FLEX TOP) USE AS DIRECTED. apply 1 4x4\" bandage to open wound once daily 1/22/20   Historical Provider, MD   elastic bandage bandage USE AS DIRECTED.    Historical Provider, MD   furosemide (Lasix) 40 mg tablet Take 1 tablet (40 mg) by mouth 2 times a day. 9/17/21   Historical Provider, MD   HYDROcodone-acetaminophen (Norco) 5-325 mg tablet Take 1 tablet by mouth 2 times a day as needed for severe pain (7 - 10). Do not start before May 5, 2023. 5/5/23   Yamini Beckwith, DO   HYDROcodone-acetaminophen (Norco) 5-325 mg tablet Take 1 tablet by mouth 2 times a day. 9/8/21   Historical Provider, MD   meloxicam (Mobic) 7.5 mg tablet Take 1 tablet (7.5 mg) by mouth once daily as needed.    Historical Provider, MD   metFORMIN (Glucophage) 1,000 mg tablet Take 1 tablet (1,000 mg) by mouth 2 times a day. 9/8/21   Historical Provider, MD   methadone (Dolophine) 5 mg tablet Take 1 tablet (5 mg) by mouth 2 times a day. 9/8/21   Historical Provider, MD   montelukast (Singulair) 10 mg tablet Take 1 tablet (10 mg) by mouth once daily. 7/18/23   Yamini Beckwith DO   montelukast (Singulair) 10 mg tablet Take 1 tablet (10 mg) by mouth once daily at bedtime. 9/13/21   Historical Provider, MD   mupirocin (Bactroban) 2 % ointment Apply 1 Application topically 2 times a day as needed (infection). 7/18/23   Yamini Beckwith, DO   naloxone (Narcan) 4 mg/0.1 mL nasal spray Administer 1 spray (4 mg) into affected nostril(s) if needed for opioid reversal. 4/6/23   Yamini Beckwith, DO   nitroglycerin (Nitrostat) 0.4 mg SL tablet Place 1 tablet (0.4 mg) under the tongue every 5 minutes if needed for chest pain (chest pain). 4/6/23 4/5/24  Yamini Beckwith, DO   nitroglycerin (Nitrostat) 0.4 mg SL tablet Place 1 tablet (0.4 mg) under the tongue every 5 minutes if needed. 7/3/21   Historical Provider, MD   OneTouch Ultra Test strip 2 times a day.    Historical Provider, MD   potassium chloride CR 20 mEq ER tablet Take 1 tablet " (20 mEq) by mouth 2 times a day. 9/14/21   Historical Provider, MD   sacubitriL-valsartan (Entresto) 24-26 mg tablet Take 1 tablet by mouth 2 times a day. 10/12/23 11/11/23  Don Santacruz MD       LABS AND IMAGING:     Labs:  Results for orders placed or performed during the hospital encounter of 05/09/25 (from the past 24 hours)   CBC and Auto Differential   Result Value Ref Range    WBC 15.3 (H) 4.4 - 11.3 x10*3/uL    nRBC 0.2 (H) 0.0 - 0.0 /100 WBCs    RBC 3.94 (L) 4.00 - 5.20 x10*6/uL    Hemoglobin 11.0 (L) 12.0 - 16.0 g/dL    Hematocrit 35.1 (L) 36.0 - 46.0 %    MCV 89 80 - 100 fL    MCH 27.9 26.0 - 34.0 pg    MCHC 31.3 (L) 32.0 - 36.0 g/dL    RDW 16.9 (H) 11.5 - 14.5 %    Platelets 287 150 - 450 x10*3/uL    Neutrophils % 85.8 40.0 - 80.0 %    Immature Granulocytes %, Automated 0.7 0.0 - 0.9 %    Lymphocytes % 5.8 13.0 - 44.0 %    Monocytes % 7.1 2.0 - 10.0 %    Eosinophils % 0.1 0.0 - 6.0 %    Basophils % 0.5 0.0 - 2.0 %    Neutrophils Absolute 13.15 (H) 1.60 - 5.50 x10*3/uL    Immature Granulocytes Absolute, Automated 0.10 0.00 - 0.50 x10*3/uL    Lymphocytes Absolute 0.88 0.80 - 3.00 x10*3/uL    Monocytes Absolute 1.08 (H) 0.05 - 0.80 x10*3/uL    Eosinophils Absolute 0.02 0.00 - 0.40 x10*3/uL    Basophils Absolute 0.07 0.00 - 0.10 x10*3/uL   Comprehensive metabolic panel   Result Value Ref Range    Glucose 253 (H) 74 - 99 mg/dL    Sodium 133 (L) 136 - 145 mmol/L    Potassium 4.8 3.5 - 5.3 mmol/L    Chloride 104 98 - 107 mmol/L    Bicarbonate 17 (L) 21 - 32 mmol/L    Anion Gap 17 10 - 20 mmol/L    Urea Nitrogen 32 (H) 6 - 23 mg/dL    Creatinine 0.96 0.50 - 1.05 mg/dL    eGFR 62 >60 mL/min/1.73m*2    Calcium 9.2 8.6 - 10.3 mg/dL    Albumin 3.7 3.4 - 5.0 g/dL    Alkaline Phosphatase 124 33 - 136 U/L    Total Protein 6.8 6.4 - 8.2 g/dL     (H) 9 - 39 U/L    Bilirubin, Total 1.7 (H) 0.0 - 1.2 mg/dL     (H) 7 - 45 U/L   B-Type Natriuretic Peptide   Result Value Ref Range    BNP 1,814 (H) 0 - 99  pg/mL   Magnesium   Result Value Ref Range    Magnesium 1.97 1.60 - 2.40 mg/dL   ECG 12 lead   Result Value Ref Range    Ventricular Rate 143 BPM    Atrial Rate 96 BPM    QRS Duration 90 ms    QT Interval 302 ms    QTC Calculation(Bazett) 466 ms    R Axis 51 degrees    T Axis -50 degrees    QRS Count 24 beats    Q Onset 219 ms    T Offset 370 ms    QTC Fredericia 403 ms        Imaging:  XR chest 1 view   Final Result   1.  Left basilar airspace opacification with small left-sided pleural   effusion.             Signed by: Joel Matthews 5/9/2025 5:58 PM   Dictation workstation:   [id number]                [1]   Past Surgical History:  Procedure Laterality Date    OTHER SURGICAL HISTORY  08/07/2019    No history of surgery   [2] No family history on file.  [3] metoprolol, 5 mg, intravenous, Once    [4] [5]

## 2025-05-09 NOTE — ED PROVIDER NOTES
Emergency Department Provider Note       History of Present Illness     History provided by: Patient  Limitations to History: None  External Records Reviewed with Brief Summary: None    HPI:  Chief complaint: Vomiting    History of present illness: Patient is a 74-year-old female with complicated medical history including A-fib currently on Eliquis therapy, chronic cellulitis of the bilateral lower extremities, asthma, hyperlipidemia diabetes presenting to the emergency department with complaints of vomiting.  According to the patient, she has been vomiting over the past 2 days.  The patient states that during this period of time, she has been having general malaise.  The patient states that she has had several episodes of vomiting.  The patient states that she has had no fever during this period of time.  Concern, the patient presents to the emergency department for further evaluation.  The patient states that she is having pain in her bilateral lower extremities particularly on the left however this is her baseline given her history of chronic cellulitis of her lower extremity.    Physical Exam   Triage vitals:  T 36.5 °C (97.7 °F)  HR (!) 159  BP 97/72  RR 20  O2 96 % None (Room air)    Physical Exam  Constitutional:       Appearance: Normal appearance.   HENT:      Head: Normocephalic and atraumatic.      Right Ear: External ear normal.      Left Ear: External ear normal.      Nose: Nose normal.      Mouth/Throat:      Mouth: Mucous membranes are moist.   Eyes:      General: Lids are normal.      Extraocular Movements: Extraocular movements intact.      Pupils: Pupils are equal, round, and reactive to light.   Cardiovascular:      Rate and Rhythm: Tachycardia present. Rhythm irregularly irregular.      Heart sounds: Normal heart sounds.   Pulmonary:      Effort: Pulmonary effort is normal.      Breath sounds: Normal breath sounds.   Abdominal:      General: Abdomen is flat.      Palpations: Abdomen is  "soft.      Tenderness: There is no abdominal tenderness. There is no guarding or rebound.   Musculoskeletal:         General: No deformity. Normal range of motion.      Cervical back: Normal range of motion and neck supple.   Skin:     General: Skin is warm.      Capillary Refill: Capillary refill takes less than 2 seconds.      Coloration: Skin is not jaundiced.      Comments: Patient has mild erythema bilateral lower extremities the patient's left lower extremity is wrapped.   Neurological:      General: No focal deficit present.      Mental Status: She is alert and oriented to person, place, and time.   Psychiatric:         Mood and Affect: Mood normal.         Behavior: Behavior normal.           Medical Decision Making & ED Course   Medical Decision Makin y.o. female ***  ----  {Scoring Tools Utilized:55217}    Differential diagnoses considered include but are not limited to: ***    Social Determinants of Health which Significantly Impact Care: {Social Determinants of Health which Significantly Impact Care:80674} {The following actions were taken to address these social determinants (Optional):65902}    EKG Independent Interpretation: {EKG Independent Interpretation:59352}    Independent Result Review and Interpretation: {Independent Result Review and Interpretation:97158::\"Relevant laboratory and radiographic results were reviewed and independently interpreted by myself.  As necessary, they are commented on in the ED Course.\"}    Chronic conditions affecting the patient's care: {Chronic conditions affecting the patient's care:26435::\"As documented above in MDM\"}    The patient was discussed with the following consultants/services: {The patient was discussed with the following consultants/services:17027}    Care Considerations: {Care Considerations:69119::\"As documented above in MDM\"}    ED Course:       Disposition   {ED Disposition:50777}    Procedures   Procedures    {ED Provider Level " (Optional):67191}    Phu Pierre MD  Emergency Medicine

## 2025-05-10 ENCOUNTER — APPOINTMENT (OUTPATIENT)
Dept: CARDIOLOGY | Facility: HOSPITAL | Age: 75
DRG: 291 | End: 2025-05-10
Payer: COMMERCIAL

## 2025-05-10 LAB
ALBUMIN SERPL BCP-MCNC: 3.5 G/DL (ref 3.4–5)
ALP SERPL-CCNC: 114 U/L (ref 33–136)
ALT SERPL W P-5'-P-CCNC: 220 U/L (ref 7–45)
ANION GAP SERPL CALC-SCNC: 13 MMOL/L (ref 10–20)
AST SERPL W P-5'-P-CCNC: 161 U/L (ref 9–39)
BASOPHILS # BLD AUTO: 0.09 X10*3/UL (ref 0–0.1)
BASOPHILS NFR BLD AUTO: 0.8 %
BILIRUB SERPL-MCNC: 0.8 MG/DL (ref 0–1.2)
BUN SERPL-MCNC: 35 MG/DL (ref 6–23)
CALCIUM SERPL-MCNC: 8.8 MG/DL (ref 8.6–10.3)
CHLORIDE SERPL-SCNC: 102 MMOL/L (ref 98–107)
CO2 SERPL-SCNC: 21 MMOL/L (ref 21–32)
CREAT SERPL-MCNC: 1.15 MG/DL (ref 0.5–1.05)
DIGOXIN SERPL-MCNC: 2.34 NG/ML (ref 0.8–?)
EGFRCR SERPLBLD CKD-EPI 2021: 50 ML/MIN/1.73M*2
EOSINOPHIL # BLD AUTO: 0.16 X10*3/UL (ref 0–0.4)
EOSINOPHIL NFR BLD AUTO: 1.4 %
ERYTHROCYTE [DISTWIDTH] IN BLOOD BY AUTOMATED COUNT: 16.6 % (ref 11.5–14.5)
ERYTHROCYTE [SEDIMENTATION RATE] IN BLOOD BY WESTERGREN METHOD: 22 MM/H (ref 0–30)
GLUCOSE BLD MANUAL STRIP-MCNC: 173 MG/DL (ref 74–99)
GLUCOSE BLD MANUAL STRIP-MCNC: 184 MG/DL (ref 74–99)
GLUCOSE BLD MANUAL STRIP-MCNC: 217 MG/DL (ref 74–99)
GLUCOSE BLD MANUAL STRIP-MCNC: 225 MG/DL (ref 74–99)
GLUCOSE SERPL-MCNC: 224 MG/DL (ref 74–99)
HCT VFR BLD AUTO: 33.8 % (ref 36–46)
HGB BLD-MCNC: 10.5 G/DL (ref 12–16)
IMM GRANULOCYTES # BLD AUTO: 0.05 X10*3/UL (ref 0–0.5)
IMM GRANULOCYTES NFR BLD AUTO: 0.4 % (ref 0–0.9)
LYMPHOCYTES # BLD AUTO: 0.98 X10*3/UL (ref 0.8–3)
LYMPHOCYTES NFR BLD AUTO: 8.7 %
MAGNESIUM SERPL-MCNC: 2.04 MG/DL (ref 1.6–2.4)
MCH RBC QN AUTO: 28.3 PG (ref 26–34)
MCHC RBC AUTO-ENTMCNC: 31.1 G/DL (ref 32–36)
MCV RBC AUTO: 91 FL (ref 80–100)
MONOCYTES # BLD AUTO: 1.18 X10*3/UL (ref 0.05–0.8)
MONOCYTES NFR BLD AUTO: 10.5 %
NEUTROPHILS # BLD AUTO: 8.76 X10*3/UL (ref 1.6–5.5)
NEUTROPHILS NFR BLD AUTO: 78.2 %
NRBC BLD-RTO: 0.4 /100 WBCS (ref 0–0)
PLATELET # BLD AUTO: 236 X10*3/UL (ref 150–450)
POTASSIUM SERPL-SCNC: 4.8 MMOL/L (ref 3.5–5.3)
PROCALCITONIN SERPL-MCNC: 0.17 NG/ML
PROT SERPL-MCNC: 6.6 G/DL (ref 6.4–8.2)
RBC # BLD AUTO: 3.71 X10*6/UL (ref 4–5.2)
SODIUM SERPL-SCNC: 131 MMOL/L (ref 136–145)
WBC # BLD AUTO: 11.2 X10*3/UL (ref 4.4–11.3)

## 2025-05-10 PROCEDURE — 2500000001 HC RX 250 WO HCPCS SELF ADMINISTERED DRUGS (ALT 637 FOR MEDICARE OP): Performed by: STUDENT IN AN ORGANIZED HEALTH CARE EDUCATION/TRAINING PROGRAM

## 2025-05-10 PROCEDURE — 1200000002 HC GENERAL ROOM WITH TELEMETRY DAILY

## 2025-05-10 PROCEDURE — 82947 ASSAY GLUCOSE BLOOD QUANT: CPT

## 2025-05-10 PROCEDURE — 80162 ASSAY OF DIGOXIN TOTAL: CPT | Performed by: NURSE PRACTITIONER

## 2025-05-10 PROCEDURE — 85652 RBC SED RATE AUTOMATED: CPT | Performed by: NURSE PRACTITIONER

## 2025-05-10 PROCEDURE — 2500000004 HC RX 250 GENERAL PHARMACY W/ HCPCS (ALT 636 FOR OP/ED): Mod: JZ | Performed by: STUDENT IN AN ORGANIZED HEALTH CARE EDUCATION/TRAINING PROGRAM

## 2025-05-10 PROCEDURE — 36415 COLL VENOUS BLD VENIPUNCTURE: CPT | Performed by: NURSE PRACTITIONER

## 2025-05-10 PROCEDURE — 85025 COMPLETE CBC W/AUTO DIFF WBC: CPT | Performed by: NURSE PRACTITIONER

## 2025-05-10 PROCEDURE — 93005 ELECTROCARDIOGRAM TRACING: CPT

## 2025-05-10 PROCEDURE — 2500000002 HC RX 250 W HCPCS SELF ADMINISTERED DRUGS (ALT 637 FOR MEDICARE OP, ALT 636 FOR OP/ED): Performed by: STUDENT IN AN ORGANIZED HEALTH CARE EDUCATION/TRAINING PROGRAM

## 2025-05-10 PROCEDURE — 2500000001 HC RX 250 WO HCPCS SELF ADMINISTERED DRUGS (ALT 637 FOR MEDICARE OP): Performed by: NURSE PRACTITIONER

## 2025-05-10 PROCEDURE — 83735 ASSAY OF MAGNESIUM: CPT | Performed by: NURSE PRACTITIONER

## 2025-05-10 PROCEDURE — 87077 CULTURE AEROBIC IDENTIFY: CPT | Mod: ELYLAB | Performed by: STUDENT IN AN ORGANIZED HEALTH CARE EDUCATION/TRAINING PROGRAM

## 2025-05-10 PROCEDURE — 84145 PROCALCITONIN (PCT): CPT | Mod: ELYLAB | Performed by: NURSE PRACTITIONER

## 2025-05-10 PROCEDURE — 80053 COMPREHEN METABOLIC PANEL: CPT | Performed by: NURSE PRACTITIONER

## 2025-05-10 PROCEDURE — 99223 1ST HOSP IP/OBS HIGH 75: CPT | Performed by: INTERNAL MEDICINE

## 2025-05-10 PROCEDURE — S0109 METHADONE ORAL 5MG: HCPCS | Performed by: STUDENT IN AN ORGANIZED HEALTH CARE EDUCATION/TRAINING PROGRAM

## 2025-05-10 RX ORDER — METOPROLOL SUCCINATE 25 MG/1
25 TABLET, EXTENDED RELEASE ORAL 2 TIMES DAILY
Status: DISCONTINUED | OUTPATIENT
Start: 2025-05-10 | End: 2025-05-11

## 2025-05-10 RX ADMIN — FUROSEMIDE 40 MG: 40 TABLET ORAL at 09:22

## 2025-05-10 RX ADMIN — ACETAMINOPHEN 650 MG: 325 TABLET ORAL at 01:44

## 2025-05-10 RX ADMIN — HYDROCODONE BITARTRATE AND ACETAMINOPHEN 1 TABLET: 5; 325 TABLET ORAL at 03:45

## 2025-05-10 RX ADMIN — ACETAMINOPHEN 650 MG: 325 TABLET ORAL at 22:41

## 2025-05-10 RX ADMIN — METFORMIN HYDROCHLORIDE 1000 MG: 500 TABLET, FILM COATED ORAL at 16:49

## 2025-05-10 RX ADMIN — METOPROLOL SUCCINATE 25 MG: 25 TABLET, EXTENDED RELEASE ORAL at 12:09

## 2025-05-10 RX ADMIN — MONTELUKAST SODIUM 10 MG: 10 TABLET, FILM COATED ORAL at 20:58

## 2025-05-10 RX ADMIN — APIXABAN 5 MG: 5 TABLET, FILM COATED ORAL at 20:58

## 2025-05-10 RX ADMIN — METFORMIN HYDROCHLORIDE 1000 MG: 500 TABLET, FILM COATED ORAL at 09:22

## 2025-05-10 RX ADMIN — HYDROCODONE BITARTRATE AND ACETAMINOPHEN 1 TABLET: 5; 325 TABLET ORAL at 17:58

## 2025-05-10 RX ADMIN — APIXABAN 5 MG: 5 TABLET, FILM COATED ORAL at 09:22

## 2025-05-10 RX ADMIN — DOXYCYCLINE 100 MG: 100 INJECTION, POWDER, LYOPHILIZED, FOR SOLUTION INTRAVENOUS at 22:55

## 2025-05-10 RX ADMIN — FUROSEMIDE 10 MG/HR: 10 INJECTION, SOLUTION INTRAMUSCULAR; INTRAVENOUS at 13:36

## 2025-05-10 RX ADMIN — METOPROLOL SUCCINATE 25 MG: 25 TABLET, EXTENDED RELEASE ORAL at 20:58

## 2025-05-10 RX ADMIN — CARVEDILOL 6.25 MG: 6.25 TABLET, FILM COATED ORAL at 09:25

## 2025-05-10 RX ADMIN — METHADONE HYDROCHLORIDE 5 MG: 5 TABLET ORAL at 20:58

## 2025-05-10 RX ADMIN — METHADONE HYDROCHLORIDE 5 MG: 5 TABLET ORAL at 09:22

## 2025-05-10 RX ADMIN — HYDROCODONE BITARTRATE AND ACETAMINOPHEN 1 TABLET: 5; 325 TABLET ORAL at 10:02

## 2025-05-10 RX ADMIN — PANTOPRAZOLE SODIUM 40 MG: 40 TABLET, DELAYED RELEASE ORAL at 05:59

## 2025-05-10 SDOH — HEALTH STABILITY: MENTAL HEALTH: HOW OFTEN DO YOU HAVE A DRINK CONTAINING ALCOHOL?: NEVER

## 2025-05-10 SDOH — ECONOMIC STABILITY: INCOME INSECURITY: IN THE PAST 12 MONTHS HAS THE ELECTRIC, GAS, OIL, OR WATER COMPANY THREATENED TO SHUT OFF SERVICES IN YOUR HOME?: YES

## 2025-05-10 SDOH — ECONOMIC STABILITY: HOUSING INSECURITY: IN THE LAST 12 MONTHS, WAS THERE A TIME WHEN YOU WERE NOT ABLE TO PAY THE MORTGAGE OR RENT ON TIME?: NO

## 2025-05-10 SDOH — SOCIAL STABILITY: SOCIAL INSECURITY: WITHIN THE LAST YEAR, HAVE YOU BEEN HUMILIATED OR EMOTIONALLY ABUSED IN OTHER WAYS BY YOUR PARTNER OR EX-PARTNER?: NO

## 2025-05-10 SDOH — SOCIAL STABILITY: SOCIAL INSECURITY
WITHIN THE LAST YEAR, HAVE YOU BEEN KICKED, HIT, SLAPPED, OR OTHERWISE PHYSICALLY HURT BY YOUR PARTNER OR EX-PARTNER?: NO

## 2025-05-10 SDOH — SOCIAL STABILITY: SOCIAL INSECURITY
WITHIN THE LAST YEAR, HAVE YOU BEEN RAPED OR FORCED TO HAVE ANY KIND OF SEXUAL ACTIVITY BY YOUR PARTNER OR EX-PARTNER?: NO

## 2025-05-10 SDOH — ECONOMIC STABILITY: FOOD INSECURITY: HOW HARD IS IT FOR YOU TO PAY FOR THE VERY BASICS LIKE FOOD, HOUSING, MEDICAL CARE, AND HEATING?: NOT HARD AT ALL

## 2025-05-10 SDOH — ECONOMIC STABILITY: FOOD INSECURITY: WITHIN THE PAST 12 MONTHS, THE FOOD YOU BOUGHT JUST DIDN'T LAST AND YOU DIDN'T HAVE MONEY TO GET MORE.: NEVER TRUE

## 2025-05-10 SDOH — HEALTH STABILITY: MENTAL HEALTH: HOW MANY DRINKS CONTAINING ALCOHOL DO YOU HAVE ON A TYPICAL DAY WHEN YOU ARE DRINKING?: PATIENT DOES NOT DRINK

## 2025-05-10 SDOH — ECONOMIC STABILITY: HOUSING INSECURITY: IN THE PAST 12 MONTHS, HOW MANY TIMES HAVE YOU MOVED WHERE YOU WERE LIVING?: 0

## 2025-05-10 SDOH — SOCIAL STABILITY: SOCIAL INSECURITY: HAVE YOU HAD THOUGHTS OF HARMING ANYONE ELSE?: NO

## 2025-05-10 SDOH — HEALTH STABILITY: MENTAL HEALTH: HOW OFTEN DO YOU HAVE SIX OR MORE DRINKS ON ONE OCCASION?: NEVER

## 2025-05-10 SDOH — ECONOMIC STABILITY: HOUSING INSECURITY: AT ANY TIME IN THE PAST 12 MONTHS, WERE YOU HOMELESS OR LIVING IN A SHELTER (INCLUDING NOW)?: NO

## 2025-05-10 SDOH — SOCIAL STABILITY: SOCIAL INSECURITY: WITHIN THE LAST YEAR, HAVE YOU BEEN AFRAID OF YOUR PARTNER OR EX-PARTNER?: NO

## 2025-05-10 SDOH — SOCIAL STABILITY: SOCIAL INSECURITY: DO YOU FEEL UNSAFE GOING BACK TO THE PLACE WHERE YOU ARE LIVING?: NO

## 2025-05-10 SDOH — ECONOMIC STABILITY: FOOD INSECURITY: WITHIN THE PAST 12 MONTHS, YOU WORRIED THAT YOUR FOOD WOULD RUN OUT BEFORE YOU GOT THE MONEY TO BUY MORE.: NEVER TRUE

## 2025-05-10 SDOH — SOCIAL STABILITY: SOCIAL INSECURITY: ARE YOU OR HAVE YOU BEEN THREATENED OR ABUSED PHYSICALLY, EMOTIONALLY, OR SEXUALLY BY ANYONE?: NO

## 2025-05-10 SDOH — SOCIAL STABILITY: SOCIAL INSECURITY: ARE THERE ANY APPARENT SIGNS OF INJURIES/BEHAVIORS THAT COULD BE RELATED TO ABUSE/NEGLECT?: NO

## 2025-05-10 SDOH — SOCIAL STABILITY: SOCIAL INSECURITY: WERE YOU ABLE TO COMPLETE ALL THE BEHAVIORAL HEALTH SCREENINGS?: YES

## 2025-05-10 SDOH — ECONOMIC STABILITY: TRANSPORTATION INSECURITY: IN THE PAST 12 MONTHS, HAS LACK OF TRANSPORTATION KEPT YOU FROM MEDICAL APPOINTMENTS OR FROM GETTING MEDICATIONS?: NO

## 2025-05-10 SDOH — SOCIAL STABILITY: SOCIAL INSECURITY: DOES ANYONE TRY TO KEEP YOU FROM HAVING/CONTACTING OTHER FRIENDS OR DOING THINGS OUTSIDE YOUR HOME?: NO

## 2025-05-10 SDOH — SOCIAL STABILITY: SOCIAL INSECURITY: HAS ANYONE EVER THREATENED TO HURT YOUR FAMILY OR YOUR PETS?: NO

## 2025-05-10 SDOH — SOCIAL STABILITY: SOCIAL INSECURITY: ABUSE: ADULT

## 2025-05-10 SDOH — SOCIAL STABILITY: SOCIAL INSECURITY: DO YOU FEEL ANYONE HAS EXPLOITED OR TAKEN ADVANTAGE OF YOU FINANCIALLY OR OF YOUR PERSONAL PROPERTY?: NO

## 2025-05-10 SDOH — SOCIAL STABILITY: SOCIAL INSECURITY: HAVE YOU HAD ANY THOUGHTS OF HARMING ANYONE ELSE?: NO

## 2025-05-10 ASSESSMENT — COGNITIVE AND FUNCTIONAL STATUS - GENERAL
DRESSING REGULAR LOWER BODY CLOTHING: A LOT
EATING MEALS: A LITTLE
DAILY ACTIVITIY SCORE: 17
CLIMB 3 TO 5 STEPS WITH RAILING: A LOT
MOVING FROM LYING ON BACK TO SITTING ON SIDE OF FLAT BED WITH BEDRAILS: A LITTLE
TOILETING: A LITTLE
DRESSING REGULAR UPPER BODY CLOTHING: A LITTLE
MOVING TO AND FROM BED TO CHAIR: A LOT
STANDING UP FROM CHAIR USING ARMS: A LOT
WALKING IN HOSPITAL ROOM: A LOT
TURNING FROM BACK TO SIDE WHILE IN FLAT BAD: A LITTLE
HELP NEEDED FOR BATHING: A LITTLE
PERSONAL GROOMING: A LITTLE
MOBILITY SCORE: 14

## 2025-05-10 ASSESSMENT — LIFESTYLE VARIABLES
HOW OFTEN DO YOU HAVE 6 OR MORE DRINKS ON ONE OCCASION: NEVER
AUDIT-C TOTAL SCORE: 0
SKIP TO QUESTIONS 9-10: 1
HOW MANY STANDARD DRINKS CONTAINING ALCOHOL DO YOU HAVE ON A TYPICAL DAY: PATIENT DOES NOT DRINK
HOW OFTEN DO YOU HAVE A DRINK CONTAINING ALCOHOL: NEVER
SKIP TO QUESTIONS 9-10: 1
AUDIT-C TOTAL SCORE: 0
AUDIT-C TOTAL SCORE: 0

## 2025-05-10 ASSESSMENT — ACTIVITIES OF DAILY LIVING (ADL): LACK_OF_TRANSPORTATION: NO

## 2025-05-10 ASSESSMENT — PAIN - FUNCTIONAL ASSESSMENT
PAIN_FUNCTIONAL_ASSESSMENT: 0-10

## 2025-05-10 ASSESSMENT — PAIN DESCRIPTION - DESCRIPTORS: DESCRIPTORS: ACHING;BURNING;CRAMPING

## 2025-05-10 ASSESSMENT — PAIN SCALES - GENERAL
PAINLEVEL_OUTOF10: 9
PAINLEVEL_OUTOF10: 10 - WORST POSSIBLE PAIN
PAINLEVEL_OUTOF10: 7
PAINLEVEL_OUTOF10: 7

## 2025-05-10 ASSESSMENT — PAIN DESCRIPTION - ORIENTATION: ORIENTATION: LEFT

## 2025-05-10 ASSESSMENT — PAIN DESCRIPTION - LOCATION: LOCATION: LEG

## 2025-05-10 NOTE — CARE PLAN
The patient's goals for the shift include  monitor for infection    The clinical goals for the shift include Patient will have a decrease in SOB on exertion    Over the shift, the patient made progress toward the following goals.       Problem: Pain - Adult  Goal: Verbalizes/displays adequate comfort level or baseline comfort level  Outcome: Progressing     Problem: Safety - Adult  Goal: Free from fall injury  Outcome: Progressing     Problem: Discharge Planning  Goal: Discharge to home or other facility with appropriate resources  Outcome: Progressing     Problem: Chronic Conditions and Co-morbidities  Goal: Patient's chronic conditions and co-morbidity symptoms are monitored and maintained or improved  Outcome: Progressing     Problem: Nutrition  Goal: Nutrient intake appropriate for maintaining nutritional needs  Outcome: Progressing     Problem: Arrythmia/Dysrhythmia  Goal: Lab values return to normal range  Outcome: Progressing  Goal: No evidence of post procedure complications  Outcome: Progressing  Goal: Promote self management  Outcome: Progressing  Goal: Serial ECG will return to baseline  Outcome: Progressing  Goal: Verbalize understanding of procedures/devices  Outcome: Progressing  Goal: Vital signs return to baseline  Outcome: Progressing  Goal: Care and maintenance of device (specify)  Outcome: Progressing     Problem: Skin  Goal: Decreased wound size/increased tissue granulation at next dressing change  Outcome: Progressing  Flowsheets (Taken 5/10/2025 0836)  Decreased wound size/increased tissue granulation at next dressing change: Protective dressings over bony prominences  Goal: Participates in plan/prevention/treatment measures  Outcome: Progressing  Flowsheets (Taken 5/10/2025 0836)  Participates in plan/prevention/treatment measures: Increase activity/out of bed for meals  Goal: Prevent/manage excess moisture  Outcome: Progressing  Goal: Prevent/minimize sheer/friction injuries  Outcome:  Progressing  Goal: Promote/optimize nutrition  Outcome: Progressing  Goal: Promote skin healing  Outcome: Progressing

## 2025-05-10 NOTE — CONSULTS
Inpatient consult to Cardiology  Consult performed by: Pato Bowman MD  Consult ordered by: Vy Paul, APRN-CNP  Reason for consult: Atrial fibrillation management  Assessment/Recommendations: Patient was seen, chart reviewed.    I had a lengthy discussion with patient regarding plan to follow for management of cardiology and electrophysiology issues.  Patient has a history of CAD that has not being evaluated for many years.  Patient has been reluctant to go for any procedures recently.  She has a bad experience with her sister that passed away after heart surgery.  She has a history of CAD with a left main disease.  Also has cardiomyopathy with left ventricular ejection fraction of depressed less than 35% and atrial fibrillation.    So far rates are controlled during atrial fibrillation.  I need to hold digoxin for today  Continue rest of therapy including beta-blocker, Entresto, Aldactone.  Continue with anticoagulation therapy with Eliquis  Patient needs to be reevaluated once infection of the lower extremities is controlled.  Patient needs to have an ischemia workup and also possible device implantation.  We will follow her during this admission.  Telemetry  EKG daily    Risk factor modification and lifestyle modification discussed with patient. Diet , exercise and hydration discussed with patient.    Please excuse any errors in grammar or translation related to this dictation.  Voice recognition software was utilized to prepare this document.          History Of Present Illness:    Isa Jack is a 74 y.o. female presenting with general malaise nausea and vomiting.    Patient has a past medical history of systolic heart failure with a left ventricular ejection fraction of 20%, atrial fibrillation long-term anticoagulant therapy with Eliquis, coronary artery disease, hypertension, hyperlipidemia, diabetes mellitus, COPD not on home O2, asthma chronic cellulitis of the lower extremities.  Patient  apparently was seen by the wound clinic in the emergency department she was complaining of generalized malaise nausea and vomiting for 3 days.  Patient was found to emergency department in atrial fibrillation rapid ventricular response..    Looking at her record she was recently admitted at [facility] April 2025 complaining of worsening lower extremity cellulitis.  Wound culture grew MSSA and Achromobacter as well as group B strep  .  CT scan was negative for underlying abscess.  She improved with IV antibiotics vancomycin and Zosyn and transition to oral Bactrim for which digoxin was placed on hold.    Also there is an admission in January 2025 at [facility] for sepsis.  At time also she had episodes of atrial fibrillation with rapid ventricular response.  Her blood pressures were soft.  Unable to titrate AV nilay blocking agents.  She was placed on amiodarone therapy.        While in ER patient was loaded with digoxin 500/250, BNP 1800s, CXR shows left pleural effusion, last echo 03/2025 showing EF 25-30%; patient reports relief following Zofran for nausea and is seen eating without difficulty.  She has substantial swelling to bilateral lower extremities including LLE wound that is wrapped   EKG on arrival shows atrial fibrillation with rapid ventricular response at rate of 143 bpm QRS of 90 ms QT corrected 466 ms.  Rhythm strip shows the same pattern.      ECHO (03/07/2025 11:41 AM) <redacted file path>   CONCLUSIONS:   - Exam indication: Sustained atrial fibrillation   - The left ventricle is normal in size. Left ventricular systolic function is   severely decreased. EF = 29 ± 5% (2D biplane) Left ventricular diastolic function   was not evaluated due to AF.   - The right ventricle is normal in size. Right ventricular systolic function is   normal.   - The left atrial cavity is dilated.   - The right atrial cavity is mildly dilated.   - No significant valvular abnormalities.   - Exam  was compared with the prior  echocardiographic exam performed on 1/20/25.    LVEF is slightly increased. MR is decreased. Otherwise similar findings.     Echocardiogram: 12/13/24  - The left ventricle is normal in size. Left ventricular systolic function is   moderately decreased. EF = 39 ± 5% (2D 4-ch.) with some beat to beat variation   with A-Fib   - The right ventricle is normal in size. Right ventricular systolic function is   normal.   - The left atrial cavity is mildly dilated.   - There is moderate (2+) mitral valve regurgitation. Regurgitant orifice area   (PISA) is 0.21 cm².   - Estimated right ventricular systolic pressure is not reported due to an   insufficient tricuspid regurgitation signal. Estimated right atrial pressure is 8   mmHg based on IVC assessment.        Cardiac Catheterization:  CARDIAC CATH FLA (03/21/2006 12:15 PM) <redacted file path>   FINDINGS:  LMCA (Ostial), Discrete 70% lesion  LAD PATENT Luminal Irregular  CX PATENT Luminal Irregular  RCA PATENT Luminal Irregular  ______________________________________________________________  LEFT VENTRICULAR FUNCTION:  LV EF Status: Contrast: Estimated, LVEF - 60%, LV Wall Motion: Normal  _________________________________________________  PROCEDURAL APPROACH:  Right femoral artery  _____________________________________________________________  PRESSURES:  AO     115/64 (84)  LV     130/9, 28  LVEDP     28  _____________________________________________________________  DIAGNOSIS(ES):  Coronary ATHEROSCLEROSIS, of native coronary artery  414.01 ; case referred to cardiovascular surgeon.  Echocardiogram October 2023    CONCLUSIONS:   1. Left ventricular systolic function is moderately to severely decreased with a 25-30% estimated ejection fraction.   2. Spectral Doppler shows a pseudonormal pattern of left ventricular diastolic filling.   3. There is no evidence of mitral valve stenosis.   4. Moderate to severe mitral valve regurgitation.   5.  Mild tricuspid regurgitation is visualized.   6. Aortic valve stenosis is not present.   7. Moderate to severely elevated pulmonary artery pressure.   8. Pulmonary hypertension is present.   9. The inferior vena cava appears moderately dilated.  10. There is global hypokinesis of the left ventricle with minor regional variations.      Laboratory today shows sodium 131 potassium 4.8 BUN 36 creatinine 1.15 alkaline phosphatase 114   magnesium 2.05 WBC 11.2 hemoglobin 10.5 hematocrit 33.8.  Digoxin level 2.34    Currently she is doing well  Telemetry shows atrial fibrillation, rates controlled between 60-90 bpm      Last Recorded Vitals:  Vitals:    05/09/25 2112 05/09/25 2354 05/10/25 0335 05/10/25 0750   BP: 144/87 129/78 116/74 127/73   BP Location: Right arm Right arm     Patient Position: Lying Lying     Pulse: 110 95  99   Resp: 20 20     Temp: 35.2 °C (95.4 °F) 35.3 °C (95.5 °F) 36 °C (96.8 °F) 36.5 °C (97.7 °F)   TempSrc: Temporal Temporal     SpO2: 95% 93% 98% 93%   Weight:       Height:           Last Labs:  CBC - 5/10/2025:  6:07 AM  11.2 10.5 236    33.8      CMP - 5/10/2025:  6:07 AM  8.8 6.6 161 --- 0.8   _ 3.5 220 114      PTT - No results in last year.  _   _ _     Troponin I, High Sensitivity   Date/Time Value Ref Range Status   10/07/2023 07:28 PM 55 (HH) 0 - 13 ng/L Final   10/07/2023 03:30 PM 46 (H) 0 - 13 ng/L Final   10/07/2023 11:59 AM 20 (H) 0 - 13 ng/L Final     BNP   Date/Time Value Ref Range Status   05/09/2025 05:19 PM 1,814 (H) 0 - 99 pg/mL Final   10/07/2023 03:30  (H) 0 - 99 pg/mL Final     Hemoglobin A1C   Date/Time Value Ref Range Status   03/30/2025 05:47 AM 8.2 (H) 4.3 - 5.6 % Final     Comment:     American Diabetes Association guidelines indicate that patients with HgbA1c in the range 5.7-6.4% are at increased risk for development of diabetes, and intervention by lifestyle modification may be beneficial. HgbA1c greater or equal to 6.5% is considered diagnostic of  "diabetes.   12/12/2024 05:21 AM 9.1 (H) 4.3 - 5.6 % Final     Comment:     American Diabetes Association guidelines indicate that patients with HgbA1c in the range 5.7-6.4% are at increased risk for development of diabetes, and intervention by lifestyle modification may be beneficial. HgbA1c greater or equal to 6.5% is considered diagnostic of diabetes.     VLDL   Date/Time Value Ref Range Status   04/22/2023 11:44 AM CANCELED       Comment:     Result canceled by the ancillary.   10/04/2021 12:00 AM 18 0 - 40 mg/dL Final   08/08/2019 09:36 AM 21 0 - 40 mg/dL Final      Last I/O:  I/O last 3 completed shifts:  In: 400 (4.4 mL/kg) [P.O.:400]  Out: - (0 mL/kg)   Weight: 91.1 kg     Past Cardiology Tests (Last 3 Years):  EKG:  ECG 12 lead 05/09/2025 (Preliminary)      Electrocardiogram, 12-lead PRN ACS symptoms 10/12/2023      ECG 12 lead 10/10/2023      ECG 12 lead 10/10/2023    Echo:  Transthoracic Echo (TTE) Complete 10/09/2023    Ejection Fractions:  No results found for: \"EF\"  Cath:  No results found for this or any previous visit from the past 1095 days.    Stress Test:  No results found for this or any previous visit from the past 1095 days.    Cardiac Imaging:  No results found for this or any previous visit from the past 1095 days.      Past Medical History:  She has a past medical history of Diabetes mellitus (Multi), Hyperlipemia, Hypertension, Morbid (severe) obesity due to excess calories (Multi) (10/28/2022), and Non-pressure chronic ulcer of left ankle with unspecified severity (Multi) (04/27/2020).    Past Surgical History:  She has a past surgical history that includes Other surgical history (08/07/2019).      Social History:  She reports that she quit smoking about 6 years ago. Her smoking use included cigarettes. She has never used smokeless tobacco. She reports that she does not drink alcohol and does not use drugs.    Family History:  Family History[1]     Allergies:  Iodine, Adhesive " "tape-silicones, and Gadolinium-containing contrast media    Inpatient Medications:  Scheduled Medications[2]  PRN Medications[3]  Continuous Medications[4]  Outpatient Medications:  Current Outpatient Medications   Medication Instructions    albuterol (ProAir HFA) 90 mcg/actuation inhaler 2 puffs, Every 4 hours PRN    albuterol 90 mcg/actuation inhaler 2 puffs, inhalation, Every 4 hours PRN, OK to substitute in class    albuterol 2.5 mg, nebulization, Every 4 hours PRN    albuterol 2.5 mg, Every 4 hours PRN    apixaban (ELIQUIS) 5 mg, oral, Every 12 hours    atorvastatin (LIPITOR) 40 mg, Nightly    carvedilol (COREG) 6.25 mg, 2 times daily (morning and late afternoon)    clotrimazole (Lotrimin) 1 % cream 1 Application, Topical, 2 times daily, Apply to affected areas 2-3 times daily    cyanocobalamin, vitamin B-12, 2,500 mcg tablet, sublingual 1 each, sublingual, Daily RT    digoxin (LANOXIN) 125 mcg, oral, Daily    elastic bandage (BAND-AID ACTIVE FLEX TOP) USE AS DIRECTED. apply 1 4x4\" bandage to open wound once daily    elastic bandage bandage USE AS DIRECTED.    furosemide (LASIX) 40 mg, 2 times daily    HYDROcodone-acetaminophen (Norco) 5-325 mg tablet 1 tablet, oral, 2 times daily PRN    HYDROcodone-acetaminophen (Norco) 5-325 mg tablet 1 tablet, 2 times daily    meloxicam (MOBIC) 7.5 mg, Daily PRN    metFORMIN (GLUCOPHAGE) 1,000 mg, 2 times daily    methadone (DOLOPHINE) 5 mg, 2 times daily    montelukast (SINGULAIR) 10 mg, oral, Daily    montelukast (SINGULAIR) 10 mg, Nightly    mupirocin (Bactroban) 2 % ointment 1 Application, Topical, 2 times daily PRN    naloxone (NARCAN) 4 mg, nasal, As needed    nitroglycerin (NITROSTAT) 0.4 mg, sublingual, Every 5 min PRN    nitroglycerin (NITROSTAT) 0.4 mg, sublingual, Every 5 min PRN    OneTouch Ultra Test strip 2 times daily    potassium chloride CR 20 mEq ER tablet 20 mEq, 2 times daily    sacubitriL-valsartan (Entresto) 24-26 mg tablet 1 tablet, oral, 2 times daily "       Physical Exam:  Vitals and nursing note reviewed.   Constitutional:       Appearance: Normal appearance. She is obese.   HENT:      Head: Normocephalic and atraumatic.      Nose: Nose normal.      Mouth/Throat:      Mouth: Mucous membranes are moist.      Pharynx: Oropharynx is clear.   Eyes:      Extraocular Movements: Extraocular movements intact.      Conjunctiva/sclera: Conjunctivae normal.      Pupils: Pupils are equal, round, and reactive to light.   Neck:      Comments: No JVD  Cardiovascular:      Rate and Rhythm: Tachycardia present. Rhythm irregular.      Pulses: Normal pulses.      Heart sounds: Normal heart sounds.      Comments: A-fib RVR [140s-100]  Pulmonary:      Effort: Pulmonary effort is normal.      Breath sounds: Normal breath sounds.      Comments: RA, no wheezing or rhonchi, diminished bases bilaterally  Abdominal:      General: Abdomen is flat. Bowel sounds are normal.      Palpations: Abdomen is soft.      Comments: No tenderness or guarding, no CVA tenderness   Genitourinary:     Comments: Not assessed  Musculoskeletal:         General: Normal range of motion.      Cervical back: Normal range of motion and neck supple.      Right lower leg: Edema present.      Left lower leg: Edema present.      Comments: LLE - wound - see nursing documentation   chronic cellulitis BLE   Skin:     General: Skin is warm and dry.      Capillary Refill: Capillary refill takes 2 to 3 seconds.      Coloration: Skin is pale.      Findings: Erythema and lesion present.      Comments: Chronic BLE mild redness, LLE chronic wound -chronic BLE swelling/edema   Neurological:      General: No focal deficit present.      Mental Status: She is alert and oriented to person, place, and time. Mental status is at baseline.   Psychiatric:         Mood and Affect: Mood normal.         Behavior: Behavior normal.         Thought Content: Thought content normal.         Judgment: Judgment normal.      Assessment/Plan    Clinical impression    1.  Ischemic cardiomyopathy  2.  Congestive heart failure acute on chronic systolic decompensated  3.  History of atrial fibrillation at some point she was on amiodarone therapy.  Currently in episodes of rapid ventricular response  4.  High digoxin level  5.  History of coronary artery disease with history of cardiac catheterization with 70% lesion in the left main as described above.  No reevaluated recently  6.  Chronic cellulitis-chronic left lower extremity:  7.  Questionable sepsis      Peripheral IV 05/09/25 20 G Anterior;Left Forearm (Active)   Line Status Flushed 05/09/25 2141   Number of days: 1       Peripheral IV 05/09/25 20 G Anterior;Right Forearm (Active)   Line Status Flushed 05/09/25 2141   Number of days: 1       Code Status:  Full Code    I spent 60 minutes in the professional and overall care of this patient.        Pato Bowman MD         [1] No family history on file.  [2]   Scheduled medications   Medication Dose Route Frequency    apixaban  5 mg oral BID    [Held by provider] atorvastatin  40 mg oral Nightly    carvedilol  6.25 mg oral BID    digoxin  125 mcg oral Daily    furosemide  40 mg oral BID    metFORMIN  1,000 mg oral BID    methadone  5 mg oral BID    metoprolol  5 mg intravenous Once    montelukast  10 mg oral Nightly    pantoprazole  40 mg oral Daily    Or    pantoprazole  40 mg intravenous Daily    potassium chloride CR  20 mEq oral Daily   [3]   PRN medications   Medication    acetaminophen    Or    acetaminophen    Or    acetaminophen    albuterol    HYDROcodone-acetaminophen    ondansetron    Or    ondansetron   [4]   Continuous Medications   Medication Dose Last Rate

## 2025-05-10 NOTE — PROGRESS NOTES
Isa Jack is a 74 y.o. female on day 1 of admission presenting with Atrial fibrillation with rapid ventricular response (Multi).      Subjective   Patient is complaining of left leg pain.  She is not short of breath.  She is currently on room air.       Objective     Last Recorded Vitals  /73   Pulse 99   Temp 36.5 °C (97.7 °F)   Resp 20   Wt 91.1 kg (200 lb 13.4 oz)   SpO2 93%   Intake/Output last 3 Shifts:    Intake/Output Summary (Last 24 hours) at 5/10/2025 1054  Last data filed at 5/10/2025 0500  Gross per 24 hour   Intake 400 ml   Output --   Net 400 ml       Admission Weight  Weight: 99.8 kg (220 lb) (05/09/25 1708)    Daily Weight  05/09/25 : 91.1 kg (200 lb 13.4 oz)    Image Results  ECG 12 lead  Atrial fibrillation with rapid ventricular response  Low voltage QRS  Nonspecific T wave abnormality  Abnormal ECG  When compared with ECG of 12-OCT-2023 18:37,  Questionable change in QRS axis    See ED provider note for full interpretation and clinical correlation  XR chest 1 view  Narrative: Interpreted By:  Joel Matthews,   STUDY:  XR CHEST 1 VIEW;  5/9/2025 5:46 pm      INDICATION:  Signs/Symptoms:SOB.      COMPARISON:  Chest radiograph 10/07/2023      ACCESSION NUMBER(S):  [id number]      ORDERING CLINICIAN:  ELVIE WHITE      FINDINGS:          CARDIOMEDIASTINAL SILHOUETTE:  Cardiomediastinal silhouette is stable in size and configuration.      LUNGS:  Left basilar airspace opacification small sided pleural effusion. No  pneumothorax.      ABDOMEN:  No remarkable upper abdominal findings.      BONES:  No acute osseous changes.      Impression: 1.  Left basilar airspace opacification with small left-sided pleural  effusion.          Signed by: Joel Matthews 5/9/2025 5:58 PM  Dictation workstation:   [id number]      Physical Exam  Constitutional:       Appearance: Normal appearance.   HENT:      Head: Normocephalic and atraumatic.      Nose: Nose normal.   Eyes:      Extraocular  Movements: Extraocular movements intact.      Pupils: Pupils are equal, round, and reactive to light.   Cardiovascular:      Rate and Rhythm: Normal rate and regular rhythm.      Heart sounds: Normal heart sounds.   Pulmonary:      Effort: Pulmonary effort is normal.      Breath sounds: Normal breath sounds.   Abdominal:      General: Bowel sounds are normal.      Palpations: Abdomen is soft.   Musculoskeletal:      Cervical back: Neck supple.      Right lower leg: Edema present.      Left lower leg: Edema present.   Skin:     General: Skin is warm and dry.   Neurological:      General: No focal deficit present.      Mental Status: She is alert. Mental status is at baseline.   Psychiatric:         Mood and Affect: Mood normal.         Relevant Results               This patient currently has cardiac telemetry ordered; if you would like to modify or discontinue the telemetry order, click here to go to the orders activity to modify/discontinue the order.              Assessment & Plan  Atrial fibrillation with rapid ventricular response (Multi)    A-fib with RVR  Congestive heart failure decompensation acute on chronic systolic in nature  Left leg pain with leg wound  Morbid obesity  History of COPD and asthma  Elevated liver function test    Treatment plan    Patient continues to have volume overload on examination.  Will stop oral Lasix and start Lasix drip for 8 hours.  Monitor blood pressure, kidney function and electrolytes.    Patient heart rate remains elevated, she received IV digoxin in ED.  Digoxin level is elevated.  Hold oral digoxin.  Stop Coreg as her blood pressure is soft.  Start Toprol-XL instead.  Increase Toprol-XL dose accordingly to keep her heart rate under control.    She is not an asthma or COPD exacerbation.    Leg wound appears to be chronic.  I do not think she has active infection.  Will hold on antibiotics at this time.    Elevated liver function test could be related to congestive heart  failure.  Recheck liver function test tomorrow.  Agree to continue holding statin.           Kenzie Rojas MD

## 2025-05-11 ENCOUNTER — APPOINTMENT (OUTPATIENT)
Dept: CARDIOLOGY | Facility: HOSPITAL | Age: 75
DRG: 291 | End: 2025-05-11
Payer: COMMERCIAL

## 2025-05-11 VITALS
RESPIRATION RATE: 20 BRPM | HEART RATE: 87 BPM | DIASTOLIC BLOOD PRESSURE: 72 MMHG | TEMPERATURE: 97.9 F | SYSTOLIC BLOOD PRESSURE: 137 MMHG | OXYGEN SATURATION: 97 % | BODY MASS INDEX: 39.43 KG/M2 | HEIGHT: 60 IN | WEIGHT: 200.84 LBS

## 2025-05-11 LAB
ALBUMIN SERPL BCP-MCNC: 3.5 G/DL (ref 3.4–5)
ALP SERPL-CCNC: 105 U/L (ref 33–136)
ALT SERPL W P-5'-P-CCNC: 156 U/L (ref 7–45)
ANION GAP SERPL CALC-SCNC: 11 MMOL/L (ref 10–20)
AST SERPL W P-5'-P-CCNC: 68 U/L (ref 9–39)
ATRIAL RATE: 122 BPM
ATRIAL RATE: 75 BPM
ATRIAL RATE: 96 BPM
BACTERIA BLD CULT: NORMAL
BASOPHILS # BLD AUTO: 0.06 X10*3/UL (ref 0–0.1)
BASOPHILS NFR BLD AUTO: 0.6 %
BILIRUB DIRECT SERPL-MCNC: 0.2 MG/DL (ref 0–0.3)
BILIRUB SERPL-MCNC: 0.7 MG/DL (ref 0–1.2)
BUN SERPL-MCNC: 41 MG/DL (ref 6–23)
CALCIUM SERPL-MCNC: 8.5 MG/DL (ref 8.6–10.3)
CHLORIDE SERPL-SCNC: 100 MMOL/L (ref 98–107)
CO2 SERPL-SCNC: 21 MMOL/L (ref 21–32)
CREAT SERPL-MCNC: 1.27 MG/DL (ref 0.5–1.05)
EGFRCR SERPLBLD CKD-EPI 2021: 44 ML/MIN/1.73M*2
EOSINOPHIL # BLD AUTO: 0.24 X10*3/UL (ref 0–0.4)
EOSINOPHIL NFR BLD AUTO: 2.3 %
ERYTHROCYTE [DISTWIDTH] IN BLOOD BY AUTOMATED COUNT: 16.4 % (ref 11.5–14.5)
GLUCOSE BLD MANUAL STRIP-MCNC: 181 MG/DL (ref 74–99)
GLUCOSE BLD MANUAL STRIP-MCNC: 186 MG/DL (ref 74–99)
GLUCOSE BLD MANUAL STRIP-MCNC: 186 MG/DL (ref 74–99)
GLUCOSE BLD MANUAL STRIP-MCNC: 192 MG/DL (ref 74–99)
GLUCOSE SERPL-MCNC: 210 MG/DL (ref 74–99)
GRAM STN SPEC: ABNORMAL
GRAM STN SPEC: ABNORMAL
HCT VFR BLD AUTO: 32.6 % (ref 36–46)
HGB BLD-MCNC: 9.9 G/DL (ref 12–16)
IMM GRANULOCYTES # BLD AUTO: 0.09 X10*3/UL (ref 0–0.5)
IMM GRANULOCYTES NFR BLD AUTO: 0.9 % (ref 0–0.9)
LYMPHOCYTES # BLD AUTO: 0.63 X10*3/UL (ref 0.8–3)
LYMPHOCYTES NFR BLD AUTO: 6.1 %
MAGNESIUM SERPL-MCNC: 1.89 MG/DL (ref 1.6–2.4)
MCH RBC QN AUTO: 28.1 PG (ref 26–34)
MCHC RBC AUTO-ENTMCNC: 30.4 G/DL (ref 32–36)
MCV RBC AUTO: 93 FL (ref 80–100)
MONOCYTES # BLD AUTO: 0.87 X10*3/UL (ref 0.05–0.8)
MONOCYTES NFR BLD AUTO: 8.5 %
NEUTROPHILS # BLD AUTO: 8.4 X10*3/UL (ref 1.6–5.5)
NEUTROPHILS NFR BLD AUTO: 81.6 %
NRBC BLD-RTO: 0.4 /100 WBCS (ref 0–0)
PLATELET # BLD AUTO: 213 X10*3/UL (ref 150–450)
POTASSIUM SERPL-SCNC: 4.4 MMOL/L (ref 3.5–5.3)
PROT SERPL-MCNC: 6.4 G/DL (ref 6.4–8.2)
Q ONSET: 216 MS
Q ONSET: 217 MS
Q ONSET: 219 MS
QRS COUNT: 11 BEATS
QRS COUNT: 14 BEATS
QRS COUNT: 24 BEATS
QRS DURATION: 100 MS
QRS DURATION: 104 MS
QRS DURATION: 90 MS
QT INTERVAL: 298 MS
QT INTERVAL: 302 MS
QT INTERVAL: 368 MS
QTC CALCULATION(BAZETT): 354 MS
QTC CALCULATION(BAZETT): 379 MS
QTC CALCULATION(BAZETT): 466 MS
QTC FREDERICIA: 334 MS
QTC FREDERICIA: 375 MS
QTC FREDERICIA: 403 MS
R AXIS: -1 DEGREES
R AXIS: 43 DEGREES
R AXIS: 51 DEGREES
RBC # BLD AUTO: 3.52 X10*6/UL (ref 4–5.2)
SODIUM SERPL-SCNC: 128 MMOL/L (ref 136–145)
T AXIS: -50 DEGREES
T AXIS: 204 DEGREES
T AXIS: 242 DEGREES
T OFFSET: 366 MS
T OFFSET: 370 MS
T OFFSET: 400 MS
VENTRICULAR RATE: 143 BPM
VENTRICULAR RATE: 64 BPM
VENTRICULAR RATE: 85 BPM
WBC # BLD AUTO: 10.3 X10*3/UL (ref 4.4–11.3)

## 2025-05-11 PROCEDURE — 2500000001 HC RX 250 WO HCPCS SELF ADMINISTERED DRUGS (ALT 637 FOR MEDICARE OP): Performed by: STUDENT IN AN ORGANIZED HEALTH CARE EDUCATION/TRAINING PROGRAM

## 2025-05-11 PROCEDURE — 93010 ELECTROCARDIOGRAM REPORT: CPT | Performed by: INTERNAL MEDICINE

## 2025-05-11 PROCEDURE — 1200000002 HC GENERAL ROOM WITH TELEMETRY DAILY

## 2025-05-11 PROCEDURE — 82947 ASSAY GLUCOSE BLOOD QUANT: CPT

## 2025-05-11 PROCEDURE — 93005 ELECTROCARDIOGRAM TRACING: CPT

## 2025-05-11 PROCEDURE — 99232 SBSQ HOSP IP/OBS MODERATE 35: CPT | Performed by: STUDENT IN AN ORGANIZED HEALTH CARE EDUCATION/TRAINING PROGRAM

## 2025-05-11 PROCEDURE — 36415 COLL VENOUS BLD VENIPUNCTURE: CPT | Performed by: STUDENT IN AN ORGANIZED HEALTH CARE EDUCATION/TRAINING PROGRAM

## 2025-05-11 PROCEDURE — 2500000004 HC RX 250 GENERAL PHARMACY W/ HCPCS (ALT 636 FOR OP/ED): Mod: JZ | Performed by: STUDENT IN AN ORGANIZED HEALTH CARE EDUCATION/TRAINING PROGRAM

## 2025-05-11 PROCEDURE — 2500000001 HC RX 250 WO HCPCS SELF ADMINISTERED DRUGS (ALT 637 FOR MEDICARE OP): Performed by: INTERNAL MEDICINE

## 2025-05-11 PROCEDURE — 83735 ASSAY OF MAGNESIUM: CPT | Performed by: NURSE PRACTITIONER

## 2025-05-11 PROCEDURE — 99233 SBSQ HOSP IP/OBS HIGH 50: CPT | Performed by: INTERNAL MEDICINE

## 2025-05-11 PROCEDURE — 2500000004 HC RX 250 GENERAL PHARMACY W/ HCPCS (ALT 636 FOR OP/ED): Mod: JZ | Performed by: NURSE PRACTITIONER

## 2025-05-11 PROCEDURE — 2500000002 HC RX 250 W HCPCS SELF ADMINISTERED DRUGS (ALT 637 FOR MEDICARE OP, ALT 636 FOR OP/ED): Performed by: STUDENT IN AN ORGANIZED HEALTH CARE EDUCATION/TRAINING PROGRAM

## 2025-05-11 PROCEDURE — 80053 COMPREHEN METABOLIC PANEL: CPT | Performed by: NURSE PRACTITIONER

## 2025-05-11 PROCEDURE — 82248 BILIRUBIN DIRECT: CPT | Performed by: STUDENT IN AN ORGANIZED HEALTH CARE EDUCATION/TRAINING PROGRAM

## 2025-05-11 PROCEDURE — S0109 METHADONE ORAL 5MG: HCPCS | Performed by: STUDENT IN AN ORGANIZED HEALTH CARE EDUCATION/TRAINING PROGRAM

## 2025-05-11 PROCEDURE — 2500000001 HC RX 250 WO HCPCS SELF ADMINISTERED DRUGS (ALT 637 FOR MEDICARE OP): Performed by: NURSE PRACTITIONER

## 2025-05-11 PROCEDURE — 85025 COMPLETE CBC W/AUTO DIFF WBC: CPT | Performed by: NURSE PRACTITIONER

## 2025-05-11 RX ORDER — FUROSEMIDE 40 MG/1
40 TABLET ORAL
Status: DISCONTINUED | OUTPATIENT
Start: 2025-05-11 | End: 2025-05-13

## 2025-05-11 RX ORDER — CEPHALEXIN 500 MG/1
500 CAPSULE ORAL EVERY 8 HOURS SCHEDULED
Status: COMPLETED | OUTPATIENT
Start: 2025-05-11 | End: 2025-05-18

## 2025-05-11 RX ORDER — METOPROLOL TARTRATE 50 MG/1
50 TABLET ORAL 2 TIMES DAILY
Status: DISCONTINUED | OUTPATIENT
Start: 2025-05-11 | End: 2025-05-20 | Stop reason: HOSPADM

## 2025-05-11 RX ADMIN — HYDROCODONE BITARTRATE AND ACETAMINOPHEN 1 TABLET: 5; 325 TABLET ORAL at 02:16

## 2025-05-11 RX ADMIN — CEPHALEXIN 500 MG: 500 CAPSULE ORAL at 13:49

## 2025-05-11 RX ADMIN — METFORMIN HYDROCHLORIDE 1000 MG: 500 TABLET, FILM COATED ORAL at 08:31

## 2025-05-11 RX ADMIN — ONDANSETRON 4 MG: 2 INJECTION INTRAMUSCULAR; INTRAVENOUS at 04:22

## 2025-05-11 RX ADMIN — PANTOPRAZOLE SODIUM 40 MG: 40 TABLET, DELAYED RELEASE ORAL at 06:32

## 2025-05-11 RX ADMIN — CEPHALEXIN 500 MG: 500 CAPSULE ORAL at 21:19

## 2025-05-11 RX ADMIN — METHADONE HYDROCHLORIDE 5 MG: 5 TABLET ORAL at 08:21

## 2025-05-11 RX ADMIN — FUROSEMIDE 40 MG: 40 TABLET ORAL at 21:22

## 2025-05-11 RX ADMIN — APIXABAN 5 MG: 5 TABLET, FILM COATED ORAL at 08:21

## 2025-05-11 RX ADMIN — METOPROLOL TARTRATE 50 MG: 50 TABLET, FILM COATED ORAL at 21:19

## 2025-05-11 RX ADMIN — METHADONE HYDROCHLORIDE 5 MG: 5 TABLET ORAL at 21:19

## 2025-05-11 RX ADMIN — MONTELUKAST SODIUM 10 MG: 10 TABLET, FILM COATED ORAL at 21:20

## 2025-05-11 RX ADMIN — APIXABAN 5 MG: 5 TABLET, FILM COATED ORAL at 21:19

## 2025-05-11 RX ADMIN — METFORMIN HYDROCHLORIDE 1000 MG: 500 TABLET, FILM COATED ORAL at 17:38

## 2025-05-11 RX ADMIN — HYDROCODONE BITARTRATE AND ACETAMINOPHEN 1 TABLET: 5; 325 TABLET ORAL at 08:21

## 2025-05-11 RX ADMIN — METOPROLOL SUCCINATE 25 MG: 25 TABLET, EXTENDED RELEASE ORAL at 08:21

## 2025-05-11 RX ADMIN — POTASSIUM CHLORIDE 20 MEQ: 1500 TABLET, EXTENDED RELEASE ORAL at 08:21

## 2025-05-11 RX ADMIN — HYDROCODONE BITARTRATE AND ACETAMINOPHEN 1 TABLET: 5; 325 TABLET ORAL at 21:18

## 2025-05-11 RX ADMIN — ACETAMINOPHEN 650 MG: 325 TABLET ORAL at 04:22

## 2025-05-11 ASSESSMENT — PAIN SCALES - GENERAL
PAINLEVEL_OUTOF10: 6
PAINLEVEL_OUTOF10: 8
PAINLEVEL_OUTOF10: 9
PAINLEVEL_OUTOF10: 6

## 2025-05-11 ASSESSMENT — COGNITIVE AND FUNCTIONAL STATUS - GENERAL
HELP NEEDED FOR BATHING: A LITTLE
MOVING FROM LYING ON BACK TO SITTING ON SIDE OF FLAT BED WITH BEDRAILS: A LITTLE
WALKING IN HOSPITAL ROOM: A LOT
TURNING FROM BACK TO SIDE WHILE IN FLAT BAD: A LOT
MOVING TO AND FROM BED TO CHAIR: A LOT
EATING MEALS: A LITTLE
TOILETING: A LITTLE
MOBILITY SCORE: 13
DRESSING REGULAR LOWER BODY CLOTHING: A LOT
DAILY ACTIVITIY SCORE: 17
CLIMB 3 TO 5 STEPS WITH RAILING: A LOT
STANDING UP FROM CHAIR USING ARMS: A LOT
PERSONAL GROOMING: A LITTLE
DRESSING REGULAR UPPER BODY CLOTHING: A LITTLE

## 2025-05-11 ASSESSMENT — PAIN - FUNCTIONAL ASSESSMENT
PAIN_FUNCTIONAL_ASSESSMENT: 0-10

## 2025-05-11 ASSESSMENT — PAIN DESCRIPTION - DESCRIPTORS
DESCRIPTORS: ACHING
DESCRIPTORS: ACHING

## 2025-05-11 ASSESSMENT — PAIN DESCRIPTION - ORIENTATION: ORIENTATION: LEFT

## 2025-05-11 ASSESSMENT — PAIN DESCRIPTION - LOCATION: LOCATION: LEG

## 2025-05-11 NOTE — CARE PLAN
The patient's goals for the shift include  manage pain    The clinical goals for the shift include Patient will verbalize improved pain control    Over the shift, the patient made progress toward the following goals.       Problem: Pain - Adult  Goal: Verbalizes/displays adequate comfort level or baseline comfort level  Outcome: Progressing     Problem: Safety - Adult  Goal: Free from fall injury  Outcome: Progressing     Problem: Discharge Planning  Goal: Discharge to home or other facility with appropriate resources  Outcome: Progressing     Problem: Chronic Conditions and Co-morbidities  Goal: Patient's chronic conditions and co-morbidity symptoms are monitored and maintained or improved  Outcome: Progressing     Problem: Nutrition  Goal: Nutrient intake appropriate for maintaining nutritional needs  Outcome: Progressing     Problem: Arrythmia/Dysrhythmia  Goal: Lab values return to normal range  Outcome: Progressing  Goal: No evidence of post procedure complications  Outcome: Progressing  Goal: Promote self management  Outcome: Progressing  Goal: Serial ECG will return to baseline  Outcome: Progressing  Goal: Verbalize understanding of procedures/devices  Outcome: Progressing  Goal: Vital signs return to baseline  Outcome: Progressing  Goal: Care and maintenance of device (specify)  Outcome: Progressing     Problem: Skin  Goal: Decreased wound size/increased tissue granulation at next dressing change  Outcome: Progressing  Goal: Participates in plan/prevention/treatment measures  Outcome: Progressing  Flowsheets (Taken 5/11/2025 0815)  Participates in plan/prevention/treatment measures: Increase activity/out of bed for meals  Goal: Prevent/manage excess moisture  Outcome: Progressing  Flowsheets (Taken 5/11/2025 0815)  Prevent/manage excess moisture: Moisturize dry skin  Goal: Prevent/minimize sheer/friction injuries  Outcome: Progressing  Goal: Promote/optimize nutrition  Outcome: Progressing  Goal: Promote  skin healing  Outcome: Progressing

## 2025-05-11 NOTE — PROGRESS NOTES
[facility] ER Progress Note               Rounding Cardiologist:  Pato Bowman MD, MD   Primary Cardiologist: Dr. Pato Bowman    Date:  5/11/2025  Patient:  Isa Jack  YOB: 1950  MRN:  [id number]   Admit Date:  5/9/2025      SUBJECTIVE    Isa Jack was seen and examined today at bedside.    Patient is feeling better  Telemetry shows atrial fibrillation rate controlled between 60-90 bpm    EKG from today shows atrial fibrillation rate of 64 bpm  ms QT corrected 379 ms.  Sodium 128 potassium 4.4 BUN 41 creatinine 1.27  AST 68 magnesium 1.89 WBC 10.3 hemoglobin 9.9 hematocrit 32.6  VITALS     Vitals:    05/10/25 2357 05/11/25 0434 05/11/25 0823 05/11/25 0839   BP: 124/69 117/58 118/80    BP Location: Right arm Left arm Left arm    Patient Position: Lying Lying Sitting    Pulse: 75 59 71    Resp: 20 20 20    Temp: 35.8 °C (96.4 °F) 36.4 °C (97.5 °F) 36.5 °C (97.7 °F)    TempSrc: Temporal Temporal Temporal    SpO2: 99% 93% 99% 96%   Weight:       Height:           Intake/Output Summary (Last 24 hours) at 5/11/2025 1008  Last data filed at 5/11/2025 0600  Gross per 24 hour   Intake 834.13 ml   Output 675 ml   Net 159.13 ml       [unfilled]    PHYSICAL EXAM   Vitals and nursing note reviewed.   Constitutional:       Appearance: Normal appearance. She is obese.   HENT:      Head: Normocephalic and atraumatic.      Nose: Nose normal.      Mouth/Throat:      Mouth: Mucous membranes are moist.      Pharynx: Oropharynx is clear.   Eyes:      Extraocular Movements: Extraocular movements intact.      Conjunctiva/sclera: Conjunctivae normal.      Pupils: Pupils are equal, round, and reactive to light.   Neck:      Comments: No JVD  Cardiovascular:      Rate and Rhythm: Tachycardia present. Rhythm irregular.      Pulses: Normal pulses.      Heart sounds: Normal heart sounds.      Comments:]  Pulmonary:      Effort: Pulmonary effort is normal.      Breath sounds: Normal breath sounds.       Comments: RA, no wheezing or rhonchi, diminished bases bilaterally  Abdominal:      General: Abdomen is flat. Bowel sounds are normal.      Palpations: Abdomen is soft.      Comments: No tenderness or guarding, no CVA tenderness   Genitourinary:     Comments: Not assessed  Musculoskeletal:         General: Normal range of motion.      Cervical back: Normal range of motion and neck supple.      Right lower leg: Edema present.      Left lower leg: Edema present.      Comments: LLE - wound - see nursing documentation   chronic cellulitis BLE   Skin:     General: Skin is warm and dry.      Capillary Refill: Capillary refill takes 2 to 3 seconds.      Coloration: Skin is pale.      Findings: Erythema and lesion present.      Comments: Chronic BLE mild redness, LLE chronic wound -chronic BLE swelling/edema   Neurological:      General: No focal deficit present.      Mental Status: She is alert and oriented to person, place, and time. Mental status is at baseline.   Psychiatric:         Mood and Affect: Mood normal.         Behavior: Behavior normal.         Thought Content: Thought content normal.         Judgment: Judgment normal.     DIAGNOSTIC RESULTS   EKG:     Telemetry: Atrial fibrillation.  Rate controlled .      LAB DATA   BMP:  @LABRCNT(Na:3,K:3,CL:3,CO2:3,Bun:3,Creatinine:3,Glu:3, CA:3,LABGLOM)@    Cardiac Enzymes:  @LABRCNT(CKTOTAL:3,CKMB:3,CKMBINDEX:3,TROPONINI:3)@    CBC:   Lab Results   Component Value Date    WBC 10.3 05/11/2025    RBC 3.52 (L) 05/11/2025    HGB 9.9 (L) 05/11/2025    HCT 32.6 (L) 05/11/2025     05/11/2025       CMP:    Lab Results   Component Value Date     (L) 05/11/2025    K 4.4 05/11/2025     05/11/2025    CO2 21 05/11/2025    BUN 41 (H) 05/11/2025    CREATININE 1.27 (H) 05/11/2025    GLUCOSE 210 (H) 05/11/2025    CALCIUM 8.5 (L) 05/11/2025       Hepatic Function Panel:    Lab Results   Component Value Date    ALKPHOS 105 05/11/2025     (H) 05/11/2025    AST 68  "(H) 05/11/2025    PROT 6.4 05/11/2025    BILITOT 0.7 05/11/2025    BILIDIR 0.2 05/11/2025       Magnesium:    Lab Results   Component Value Date    MG 1.89 05/11/2025       PT/INR:  No results found for: \"PROTIME\", \"INR\"  @LABRCNT(inr:3)@     HgBA1c:  No components found for: \"LABA1C\"    Lipid Profile:  No results found for: \"CHLPL\", \"TRIG\", \"HDL\", \"LDLCALC\", \"LDLDIRECT\"    TSH:  No results found for: \"TSH\"    ABG:  No results found for: \"PH\"    PRO-BNP: No results found for: \"PROBNP\"       RADIOLOGY     XR chest 1 view   Final Result   1.  Left basilar airspace opacification with small left-sided pleural   effusion.             Signed by: Joel Matthews 5/9/2025 5:58 PM   Dictation workstation:   [id number]          @LUMOback@      CURRENT MEDICATIONS    Scheduled Medications[1]  Continuous Medications[2]      ASSESSMENT     Assessment & Plan  Atrial fibrillation with rapid ventricular response (Multi)        Problem List[3]  Clinical impression     1.  Ischemic cardiomyopathy  2.  Congestive heart failure acute on chronic systolic decompensated  3.  History of atrial fibrillation at some point she was on amiodarone therapy.  Currently in episodes of rapid ventricular response  4.  High digoxin level  5.  History of coronary artery disease with history of cardiac catheterization with 70% lesion in the left main as described above.  No reevaluated recently  6.  Chronic cellulitis-chronic left lower extremity:  7.  Questionable sepsis       PLAN   we will continue with current dose of beta-blocker therapy.  Metoprolol succinate was switched to metoprolol tartrate in case we need to adjust medical therapy  Hold digoxin for now.  Renal dysfunction present during this admission    Patient also was in the past using amiodarone therapy.  Her LFTs are increased.  Prefer to avoid this medication during this admission.    Risk factor modification and lifestyle modification discussed with patient. Diet , exercise and " hydration discussed with patient.    Please excuse any errors in grammar or translation related to this dictation.  Voice recognition software was utilized to prepare this document.        Please do not hesitate to call with questions.  Electronically signed by Pato Bowman MD, [facility] on 5/11/2025 at 10:08 AM         [1] apixaban, 5 mg, oral, BID  [Held by provider] atorvastatin, 40 mg, oral, Nightly  [Held by provider] digoxin, 125 mcg, oral, Daily  doxycycline, 100 mg, intravenous, q12h  metFORMIN, 1,000 mg, oral, BID  methadone, 5 mg, oral, BID  metoprolol succinate XL, 25 mg, oral, BID  metoprolol, 5 mg, intravenous, Once  montelukast, 10 mg, oral, Nightly  pantoprazole, 40 mg, oral, Daily   Or  pantoprazole, 40 mg, intravenous, Daily  potassium chloride CR, 20 mEq, oral, Daily  [2]    [3]   Patient Active Problem List  Diagnosis    Adnexal mass    Asthma in adult, unspecified asthma severity, uncomplicated (HHS-HCC)    Cellulitis    Chronic congestive heart failure    Chronic pain syndrome    Coronary artery disease involving native heart with angina pectoris    DJD (degenerative joint disease)    Endometrial hyperplasia    Essential hypertension    HLD (hyperlipidemia)    Pernicious anemia    Physical debility    Vitamin B12 deficiency    Skin ulcer of ankle (Multi)    Type 2 diabetes mellitus, without long-term current use of insulin (Multi)    Sepsis due to pneumonia (Multi)    Cardiomyopathy, ischemic    Atrial fibrillation with rapid ventricular response (Multi)

## 2025-05-11 NOTE — PROGRESS NOTES
Isa Jack is a 74 y.o. female on day 2 of admission presenting with Atrial fibrillation with rapid ventricular response (Multi).      Subjective     Patient feels slightly better.  Overall she is not happy due to her IV access being dysfunctional.  But she is not short of breath.       Objective     Last Recorded Vitals  /64 (Patient Position: Sitting)   Pulse 71   Temp 36.9 °C (98.4 °F)   Resp 18   Wt 91.1 kg (200 lb 13.4 oz)   SpO2 98%   Intake/Output last 3 Shifts:    Intake/Output Summary (Last 24 hours) at 5/11/2025 1202  Last data filed at 5/11/2025 0600  Gross per 24 hour   Intake 834.13 ml   Output 675 ml   Net 159.13 ml       Admission Weight  Weight: 99.8 kg (220 lb) (05/09/25 1708)    Daily Weight  05/09/25 : 91.1 kg (200 lb 13.4 oz)    Image Results  ECG 12 Lead  Atrial fibrillation with premature ventricular or aberrantly conducted complexes  Nonspecific T wave abnormality  Abnormal ECG  When compared with ECG of 10-MAY-2025 09:42, (unconfirmed)  No significant change was found  Confirmed by Pato Bowman ([id number]) on 5/11/2025 10:09:39 AM  ECG 12 Lead  Atrial fibrillation  Low voltage QRS  Nonspecific T wave abnormality  Abnormal ECG  When compared with ECG of 09-MAY-2025 17:06, (unconfirmed)  Vent. rate has decreased BY  58 BPM  Confirmed by Pato Bowman ([id number]) on 5/11/2025 10:09:26 AM  ECG 12 lead  Atrial fibrillation with rapid ventricular response  Low voltage QRS  Nonspecific T wave abnormality  Abnormal ECG  When compared with ECG of 12-OCT-2023 18:37,  Questionable change in QRS axis    See ED provider note for full interpretation and clinical correlation  Confirmed by Pato Bowman ([id number]) on 5/11/2025 10:09:21 AM      Physical Exam  Constitutional:       Appearance: Normal appearance.   HENT:      Head: Normocephalic and atraumatic.      Nose: Nose normal.   Eyes:      Extraocular Movements: Extraocular movements intact.      Pupils: Pupils are equal, round, and reactive to  light.   Cardiovascular:      Rate and Rhythm: Normal rate and regular rhythm.      Heart sounds: Normal heart sounds.   Pulmonary:      Effort: Pulmonary effort is normal.      Breath sounds: Normal breath sounds.   Abdominal:      General: Bowel sounds are normal.      Palpations: Abdomen is soft.   Musculoskeletal:      Cervical back: Neck supple.      Right lower leg: Edema present.      Left lower leg: Edema present.   Skin:     General: Skin is warm and dry.   Neurological:      General: No focal deficit present.      Mental Status: She is alert. Mental status is at baseline.   Psychiatric:         Mood and Affect: Mood normal.         Relevant Results               This patient currently has cardiac telemetry ordered; if you would like to modify or discontinue the telemetry order, click here to go to the orders activity to modify/discontinue the order.              Assessment & Plan  Atrial fibrillation with rapid ventricular response (Multi)    A-fib with RVR  Congestive heart failure decompensation acute on chronic systolic in nature  Left leg pain with leg wound  Morbid obesity  History of COPD and asthma  Elevated liver function test    Treatment plan      Heart rate is well-controlled.  Started on Toprol-XL yesterday.  Per cardiology switch to metoprolol tartrate to adjust the dose if needed.  Recommend to discharge the patient on metoprolol succinate eventually.  Heart rate is well-controlled at this time and digoxin has been discontinued.    She was given Lasix drip for 10 hours yesterday.  Kidney function is slightly off sodium is down.  Lasix drip has been discontinued.  Will resume oral Lasix starting tonight and monitor electrolytes and kidney function.    Also patient takes Entresto at home which will be resumed today.    At this time I will hold her potassium and monitor her potassium daily.  I want to avoid hyperkalemia.  Specially after starting Entresto.    Will start her on oral Keflex for  possible cellulitis.  Her wound is chronic and she needs to continue with wound care.    Liver function test are improving.  This is possibly related to underlying congestive liver.  Also statin has been on hold.    UPDATE: Per pharmacy patient is not on Entresto anymore.  She was on losartan 25 mg daily at home.  Entresto has been discontinued for now             Kenzie Rojas MD

## 2025-05-12 ENCOUNTER — APPOINTMENT (OUTPATIENT)
Dept: CARDIOLOGY | Facility: HOSPITAL | Age: 75
DRG: 291 | End: 2025-05-12
Payer: COMMERCIAL

## 2025-05-12 LAB
ALBUMIN SERPL BCP-MCNC: 3.5 G/DL (ref 3.4–5)
ALP SERPL-CCNC: 103 U/L (ref 33–136)
ALT SERPL W P-5'-P-CCNC: 131 U/L (ref 7–45)
ANION GAP SERPL CALC-SCNC: 13 MMOL/L (ref 10–20)
AST SERPL W P-5'-P-CCNC: 54 U/L (ref 9–39)
ATRIAL RATE: 43 BPM
BASOPHILS # BLD AUTO: 0.05 X10*3/UL (ref 0–0.1)
BASOPHILS NFR BLD AUTO: 0.4 %
BILIRUB SERPL-MCNC: 0.7 MG/DL (ref 0–1.2)
BUN SERPL-MCNC: 46 MG/DL (ref 6–23)
CALCIUM SERPL-MCNC: 8.6 MG/DL (ref 8.6–10.3)
CHLORIDE SERPL-SCNC: 98 MMOL/L (ref 98–107)
CO2 SERPL-SCNC: 21 MMOL/L (ref 21–32)
CREAT SERPL-MCNC: 1.34 MG/DL (ref 0.5–1.05)
EGFRCR SERPLBLD CKD-EPI 2021: 41 ML/MIN/1.73M*2
EOSINOPHIL # BLD AUTO: 0.12 X10*3/UL (ref 0–0.4)
EOSINOPHIL NFR BLD AUTO: 1 %
ERYTHROCYTE [DISTWIDTH] IN BLOOD BY AUTOMATED COUNT: 16.2 % (ref 11.5–14.5)
GLUCOSE BLD MANUAL STRIP-MCNC: 142 MG/DL (ref 74–99)
GLUCOSE BLD MANUAL STRIP-MCNC: 184 MG/DL (ref 74–99)
GLUCOSE BLD MANUAL STRIP-MCNC: 223 MG/DL (ref 74–99)
GLUCOSE SERPL-MCNC: 149 MG/DL (ref 74–99)
HCT VFR BLD AUTO: 34.4 % (ref 36–46)
HGB BLD-MCNC: 10.4 G/DL (ref 12–16)
HOLD SPECIMEN: 293
HOLD SPECIMEN: 293
IMM GRANULOCYTES # BLD AUTO: 0.04 X10*3/UL (ref 0–0.5)
IMM GRANULOCYTES NFR BLD AUTO: 0.3 % (ref 0–0.9)
LYMPHOCYTES # BLD AUTO: 0.65 X10*3/UL (ref 0.8–3)
LYMPHOCYTES NFR BLD AUTO: 5.4 %
MAGNESIUM SERPL-MCNC: 2.05 MG/DL (ref 1.6–2.4)
MCH RBC QN AUTO: 28.2 PG (ref 26–34)
MCHC RBC AUTO-ENTMCNC: 30.2 G/DL (ref 32–36)
MCV RBC AUTO: 93 FL (ref 80–100)
MONOCYTES # BLD AUTO: 0.86 X10*3/UL (ref 0.05–0.8)
MONOCYTES NFR BLD AUTO: 7.1 %
NEUTROPHILS # BLD AUTO: 10.34 X10*3/UL (ref 1.6–5.5)
NEUTROPHILS NFR BLD AUTO: 85.8 %
NRBC BLD-RTO: 0.7 /100 WBCS (ref 0–0)
PLATELET # BLD AUTO: 207 X10*3/UL (ref 150–450)
POTASSIUM SERPL-SCNC: 5.2 MMOL/L (ref 3.5–5.3)
PROT SERPL-MCNC: 6.7 G/DL (ref 6.4–8.2)
Q ONSET: 216 MS
QRS COUNT: 13 BEATS
QRS DURATION: 106 MS
QT INTERVAL: 368 MS
QTC CALCULATION(BAZETT): 424 MS
QTC FREDERICIA: 405 MS
R AXIS: -17 DEGREES
RBC # BLD AUTO: 3.69 X10*6/UL (ref 4–5.2)
SODIUM SERPL-SCNC: 127 MMOL/L (ref 136–145)
T AXIS: 39 DEGREES
T OFFSET: 400 MS
VENTRICULAR RATE: 80 BPM
WBC # BLD AUTO: 12.1 X10*3/UL (ref 4.4–11.3)

## 2025-05-12 PROCEDURE — 2500000001 HC RX 250 WO HCPCS SELF ADMINISTERED DRUGS (ALT 637 FOR MEDICARE OP): Performed by: STUDENT IN AN ORGANIZED HEALTH CARE EDUCATION/TRAINING PROGRAM

## 2025-05-12 PROCEDURE — 1200000002 HC GENERAL ROOM WITH TELEMETRY DAILY

## 2025-05-12 PROCEDURE — 2500000004 HC RX 250 GENERAL PHARMACY W/ HCPCS (ALT 636 FOR OP/ED): Mod: JZ | Performed by: STUDENT IN AN ORGANIZED HEALTH CARE EDUCATION/TRAINING PROGRAM

## 2025-05-12 PROCEDURE — 85025 COMPLETE CBC W/AUTO DIFF WBC: CPT | Performed by: NURSE PRACTITIONER

## 2025-05-12 PROCEDURE — 83735 ASSAY OF MAGNESIUM: CPT | Performed by: NURSE PRACTITIONER

## 2025-05-12 PROCEDURE — 2500000004 HC RX 250 GENERAL PHARMACY W/ HCPCS (ALT 636 FOR OP/ED): Performed by: INTERNAL MEDICINE

## 2025-05-12 PROCEDURE — 99232 SBSQ HOSP IP/OBS MODERATE 35: CPT | Performed by: NURSE PRACTITIONER

## 2025-05-12 PROCEDURE — 97161 PT EVAL LOW COMPLEX 20 MIN: CPT | Mod: GP | Performed by: PHYSICAL THERAPIST

## 2025-05-12 PROCEDURE — S0109 METHADONE ORAL 5MG: HCPCS | Performed by: STUDENT IN AN ORGANIZED HEALTH CARE EDUCATION/TRAINING PROGRAM

## 2025-05-12 PROCEDURE — 2500000001 HC RX 250 WO HCPCS SELF ADMINISTERED DRUGS (ALT 637 FOR MEDICARE OP): Performed by: INTERNAL MEDICINE

## 2025-05-12 PROCEDURE — 93005 ELECTROCARDIOGRAM TRACING: CPT

## 2025-05-12 PROCEDURE — 82947 ASSAY GLUCOSE BLOOD QUANT: CPT

## 2025-05-12 PROCEDURE — 83930 ASSAY OF BLOOD OSMOLALITY: CPT | Mod: ELYLAB | Performed by: INTERNAL MEDICINE

## 2025-05-12 PROCEDURE — 99223 1ST HOSP IP/OBS HIGH 75: CPT | Performed by: INTERNAL MEDICINE

## 2025-05-12 PROCEDURE — 84075 ASSAY ALKALINE PHOSPHATASE: CPT | Performed by: NURSE PRACTITIONER

## 2025-05-12 PROCEDURE — 97165 OT EVAL LOW COMPLEX 30 MIN: CPT | Mod: GO

## 2025-05-12 PROCEDURE — 93010 ELECTROCARDIOGRAM REPORT: CPT | Performed by: INTERNAL MEDICINE

## 2025-05-12 PROCEDURE — 36415 COLL VENOUS BLD VENIPUNCTURE: CPT | Performed by: NURSE PRACTITIONER

## 2025-05-12 PROCEDURE — 2500000001 HC RX 250 WO HCPCS SELF ADMINISTERED DRUGS (ALT 637 FOR MEDICARE OP): Performed by: NURSE PRACTITIONER

## 2025-05-12 PROCEDURE — 99233 SBSQ HOSP IP/OBS HIGH 50: CPT | Performed by: STUDENT IN AN ORGANIZED HEALTH CARE EDUCATION/TRAINING PROGRAM

## 2025-05-12 PROCEDURE — 2500000002 HC RX 250 W HCPCS SELF ADMINISTERED DRUGS (ALT 637 FOR MEDICARE OP, ALT 636 FOR OP/ED): Performed by: STUDENT IN AN ORGANIZED HEALTH CARE EDUCATION/TRAINING PROGRAM

## 2025-05-12 RX ORDER — ZIPRASIDONE MESYLATE 20 MG/ML
5 INJECTION, POWDER, LYOPHILIZED, FOR SOLUTION INTRAMUSCULAR ONCE
Status: COMPLETED | OUTPATIENT
Start: 2025-05-12 | End: 2025-05-12

## 2025-05-12 RX ADMIN — ZIPRASIDONE MESYLATE 5 MG: 20 INJECTION, POWDER, LYOPHILIZED, FOR SOLUTION INTRAMUSCULAR at 23:10

## 2025-05-12 RX ADMIN — HYDROCODONE BITARTRATE AND ACETAMINOPHEN 1 TABLET: 5; 325 TABLET ORAL at 03:23

## 2025-05-12 RX ADMIN — APIXABAN 5 MG: 5 TABLET, FILM COATED ORAL at 09:24

## 2025-05-12 RX ADMIN — HYDROCODONE BITARTRATE AND ACETAMINOPHEN 1 TABLET: 5; 325 TABLET ORAL at 14:38

## 2025-05-12 RX ADMIN — APIXABAN 5 MG: 5 TABLET, FILM COATED ORAL at 20:54

## 2025-05-12 RX ADMIN — METHADONE HYDROCHLORIDE 5 MG: 5 TABLET ORAL at 09:24

## 2025-05-12 RX ADMIN — METOPROLOL TARTRATE 50 MG: 50 TABLET, FILM COATED ORAL at 09:24

## 2025-05-12 RX ADMIN — CEPHALEXIN 500 MG: 500 CAPSULE ORAL at 21:55

## 2025-05-12 RX ADMIN — ACETAMINOPHEN 650 MG: 325 TABLET ORAL at 21:56

## 2025-05-12 RX ADMIN — METHADONE HYDROCHLORIDE 5 MG: 5 TABLET ORAL at 20:54

## 2025-05-12 RX ADMIN — CEPHALEXIN 500 MG: 500 CAPSULE ORAL at 14:38

## 2025-05-12 RX ADMIN — CEPHALEXIN 500 MG: 500 CAPSULE ORAL at 06:55

## 2025-05-12 RX ADMIN — MONTELUKAST SODIUM 10 MG: 10 TABLET, FILM COATED ORAL at 20:55

## 2025-05-12 RX ADMIN — SODIUM CHLORIDE 500 ML: 9 INJECTION, SOLUTION INTRAVENOUS at 15:46

## 2025-05-12 RX ADMIN — PANTOPRAZOLE SODIUM 40 MG: 40 TABLET, DELAYED RELEASE ORAL at 06:55

## 2025-05-12 ASSESSMENT — COGNITIVE AND FUNCTIONAL STATUS - GENERAL
DRESSING REGULAR LOWER BODY CLOTHING: A LITTLE
HELP NEEDED FOR BATHING: A LITTLE
MOVING FROM LYING ON BACK TO SITTING ON SIDE OF FLAT BED WITH BEDRAILS: A LITTLE
MOBILITY SCORE: 17
MOVING TO AND FROM BED TO CHAIR: A LITTLE
MOVING FROM LYING ON BACK TO SITTING ON SIDE OF FLAT BED WITH BEDRAILS: A LOT
PERSONAL GROOMING: A LOT
DRESSING REGULAR UPPER BODY CLOTHING: A LITTLE
CLIMB 3 TO 5 STEPS WITH RAILING: TOTAL
MOVING TO AND FROM BED TO CHAIR: A LOT
DAILY ACTIVITIY SCORE: 18
STANDING UP FROM CHAIR USING ARMS: A LITTLE
CLIMB 3 TO 5 STEPS WITH RAILING: A LOT
EATING MEALS: A LITTLE
HELP NEEDED FOR BATHING: A LOT
DRESSING REGULAR LOWER BODY CLOTHING: TOTAL
TURNING FROM BACK TO SIDE WHILE IN FLAT BAD: A LOT
TURNING FROM BACK TO SIDE WHILE IN FLAT BAD: A LITTLE
MOBILITY SCORE: 10
PERSONAL GROOMING: A LITTLE
DRESSING REGULAR UPPER BODY CLOTHING: A LOT
WALKING IN HOSPITAL ROOM: TOTAL
WALKING IN HOSPITAL ROOM: A LITTLE
TOILETING: A LOT
STANDING UP FROM CHAIR USING ARMS: A LOT
TOILETING: A LOT
DAILY ACTIVITIY SCORE: 12

## 2025-05-12 ASSESSMENT — PAIN - FUNCTIONAL ASSESSMENT
PAIN_FUNCTIONAL_ASSESSMENT: 0-10

## 2025-05-12 ASSESSMENT — ACTIVITIES OF DAILY LIVING (ADL): BATHING_ASSISTANCE: MAXIMAL

## 2025-05-12 ASSESSMENT — PAIN DESCRIPTION - DESCRIPTORS
DESCRIPTORS: ACHING;SHARP
DESCRIPTORS: THROBBING

## 2025-05-12 ASSESSMENT — PAIN DESCRIPTION - LOCATION
LOCATION: LEG
LOCATION: LEG

## 2025-05-12 ASSESSMENT — PAIN SCALES - GENERAL
PAINLEVEL_OUTOF10: 10 - WORST POSSIBLE PAIN
PAINLEVEL_OUTOF10: 7
PAINLEVEL_OUTOF10: 10 - WORST POSSIBLE PAIN
PAINLEVEL_OUTOF10: 8

## 2025-05-12 ASSESSMENT — PAIN DESCRIPTION - ORIENTATION
ORIENTATION: LEFT;LOWER
ORIENTATION: LEFT

## 2025-05-12 NOTE — PROGRESS NOTES
05/12/25 1729   Discharge Planning   Living Arrangements Children   Support Systems Children   Home or Post Acute Services In home services     Pt admitted from home with AFIB RVR.   Recent admission April 2025 at [facility] for sepsis secondary to LLE Cellulitis   Per chart review, Pt resides in home with son and daughter-in-law, 24 hour supervision lives in ranch style home with 3 steps to enter without a handrail, walk in shower, owns lift chair but lift function is broken- orders noted in EPIC for hospital bed per [facility] . Per review,  pt  uses rollator for mobility, needs assistance from son and daughter in law to ascend/descend steps to enter home, does not leave home often,  patient recieves assistance for ADLs, IADLs and medication management,  patient sleeps in recliner chair patient has in home care by RN, home health aide and PT active with  [facility] , receives assistance for bathing and dressing, family does cooking, cleaning and laundry  Therapy evaluations completed [facility] 10/12, recommending continued moderate therapy 3 x weekly .    Attempted x 2 to contact Hector Warner at [phone] and unable to leave voicemail to discuss dc planning needs.  Will continue to follow and re attempt in the a.m.  to touch base with family .

## 2025-05-12 NOTE — CONSULTS
Cardiology Consult Note      Date:   5/12/2025  Patient name:  Isa Jack  Date of admission:  5/9/2025  5:01 PM  MRN:   [id number]  YOB: 1950  Time of Consult:  1:42 PM    Consulting Cardiologist: Dr. Kyle Camargo, APRN, CNP  Primary Cardiologist:  [facility]    Referring Provider: Dr Bowling      Admission Diagnosis:     Atrial fibrillation with rapid ventricular response (Multi)      History of Present Illness:      Isa Jack is a 74 y.o.  female patient who is being at the request of Dr. Bowling for inpatient consultation of CHF. She was admitted on 5/9/2025.  Previous [facility] and [facility] records have been reviewed in detail.    Patient with a history of systolic heart failure, chronic cellulitis, hypertension, CAD, A-fib with RVR, diabetes, COPD, dyslipidemia  Patient states she came into the emergency department due to a wound to her left lower extremity.  She states that she does have congestive heart failure she is got significant extremity edema to her lower legs occasionally she will get leg big blisters that pop and they will drain excessively she states it in her left lower leg that is why her family drove her into the hospital.  She states that she does have significant heart failure along with coronary artery disease she states that she had seen [facility] in the past she had decided she did not want to have any invasive things done with [facility] they told her in 2006 that she had left main disease but she elected not to do any medical management.  She states that she has not seen a cardiologist in a long time over at [facility] but they did recently just do an echo showed an ejection fraction 25 to 30%.  She did have a chest x-ray did show that she is got left pleural effusion she does have 2+ edema to her lower extremities.  Her both legs are currently wrapped with Ace wrapped and does have a white dressing on it.  She was in A-fib with RVR was seen by  electrophysiology but they did ask general cardiology to get involved due to her history of acute on chronic systolic heart failure.  Positive for shortness of breath, peripheral edema, PND  Denies chest pain, fever, chills, orthopnea, claudication    EKG is A-fib rate 80 no acute changes  Magnesium 2.05  Hemoglobin 10.4  Hematocrit 34.4  BNP 1814  Creatinine 1.34  Sodium 127    Cardiology standpoint  CONCLUSIONS:   - Exam indication: Sustained atrial fibrillation   - The left ventricle is normal in size. Left ventricular systolic function is   severely decreased. EF = 29 ± 5% (2D biplane) Left ventricular diastolic function   was not evaluated due to AF.   - The right ventricle is normal in size. Right ventricular systolic function is   normal.   - The left atrial cavity is dilated.   - The right atrial cavity is mildly dilated.   - No significant valvular abnormalities.   - Exam was compared with the prior  echocardiographic exam performed on 1/20/25.     2006 cath  FINDINGS:   LMCA (Ostial), Discrete 70% lesion   LAD PATENT Luminal Irregular   CX PATENT Luminal Irregular   RCA PATENT Luminal Irregular    LVEF is slightly increased. MR is decreased. Otherwise similar findings.       Allergies:     Allergies[1]      Past Medical History:     Medical History[2]    Past Surgical History:     Surgical History[3]    Family History:     Family History[4]    Social History:     Social History[5]    CURRENT INPATIENT MEDICATIONS    Scheduled Medications[6]  Continuous Medications[7]  Current Outpatient Medications   Medication Instructions    albuterol (ProAir HFA) 90 mcg/actuation inhaler 2 puffs, Every 4 hours PRN    albuterol 90 mcg/actuation inhaler 2 puffs, inhalation, Every 4 hours PRN, OK to substitute in class    albuterol 2.5 mg, nebulization, Every 4 hours PRN    albuterol 2.5 mg, Every 4 hours PRN    apixaban (ELIQUIS) 5 mg, oral, Every 12 hours    atorvastatin (LIPITOR) 40 mg, Nightly    carvedilol (COREG)  "6.25 mg, 2 times daily (morning and late afternoon)    clotrimazole (Lotrimin) 1 % cream 1 Application, Topical, 2 times daily, Apply to affected areas 2-3 times daily    cyanocobalamin, vitamin B-12, 2,500 mcg tablet, sublingual 1 each, sublingual, Daily RT    digoxin (LANOXIN) 125 mcg, oral, Daily    elastic bandage (BAND-AID ACTIVE FLEX TOP) USE AS DIRECTED. apply 1 4x4\" bandage to open wound once daily    elastic bandage bandage USE AS DIRECTED.    furosemide (LASIX) 40 mg, 2 times daily    HYDROcodone-acetaminophen (Norco) 5-325 mg tablet 1 tablet, oral, 2 times daily PRN    HYDROcodone-acetaminophen (Norco) 5-325 mg tablet 1 tablet, 2 times daily    meloxicam (MOBIC) 7.5 mg, Daily PRN    metFORMIN (GLUCOPHAGE) 1,000 mg, 2 times daily    methadone (DOLOPHINE) 5 mg, 2 times daily    montelukast (SINGULAIR) 10 mg, oral, Daily    montelukast (SINGULAIR) 10 mg, Nightly    mupirocin (Bactroban) 2 % ointment 1 Application, Topical, 2 times daily PRN    naloxone (NARCAN) 4 mg, nasal, As needed    nitroglycerin (NITROSTAT) 0.4 mg, sublingual, Every 5 min PRN    nitroglycerin (NITROSTAT) 0.4 mg, sublingual, Every 5 min PRN    OneTouch Ultra Test strip 2 times daily    potassium chloride CR 20 mEq ER tablet 20 mEq, 2 times daily    sacubitriL-valsartan (Entresto) 24-26 mg tablet 1 tablet, oral, 2 times daily        Review of Systems:      12 point review of systems was obtained in detail and is negative other than that detailed above.    Vital Signs:     Vitals:    05/11/25 1930 05/11/25 2356 05/12/25 0724 05/12/25 1042   BP: 118/77 137/72 (!) 147/92 114/71   BP Location: Right arm Left arm     Patient Position: Sitting Lying Sitting Sitting   Pulse: 60 87 82 69   Resp: 20 18 18 18   Temp: 36.6 °C (97.9 °F) 36.6 °C (97.9 °F) 35.7 °C (96.3 °F) 36.3 °C (97.3 °F)   TempSrc: Temporal Temporal     SpO2: 96% 97% 99% 100%   Weight:       Height:           Intake/Output Summary (Last 24 hours) at 5/12/2025 1342  Last data filed " at 5/12/2025 0941  Gross per 24 hour   Intake 420 ml   Output 175 ml   Net 245 ml       Wt Readings from Last 4 Encounters:   05/09/25 91.1 kg (200 lb 13.4 oz)   10/09/23 103 kg (227 lb 15.3 oz)   04/20/23 95.1 kg (209 lb 9.6 oz)   10/28/22 65.9 kg (145 lb 6 oz)       Physical Examination:     GENERAL APPEARANCE: Well developed, well nourished, in no acute distress.  CHEST: Symmetric and non-tender.  INTEGUMENT: Skin warm and dry, without gross excoriationis or lesions.  HEENT: No gross abnormalities of conjunctiva, teeth, gums, oral mucosa  NECK: Supple, no JVD, no bruit. Thyroid not palpable. Carotid upstrokes normal.  NEURO/PSHCY: Alert and oriented x3; appropriate behavior and responses and responses, grossly normal cerebellar function with normal balance and coordination  LUNGS: scattered rales  HEART: S1, S2 irregular 1 out of 6 systolic murmur  ABDOMEN: Soft, nontender, no palpable hepatosplenomegaly, no mases, no bruits. Abdominal aorta not noted to be enlarged.  EXTREMITIES: Warm with good color, no clubbing or cyanois. 2 plus edema  PERIPHERAL VASCULAR: Pulses present and equally palpable; 1+ throughout. No femoral bruits.      Lab:     CBC:   Results from last 7 days   Lab Units 05/12/25  0541 05/11/25  0552 05/10/25  0607   WBC AUTO x10*3/uL 12.1* 10.3 11.2   RBC AUTO x10*6/uL 3.69* 3.52* 3.71*   HEMOGLOBIN g/dL 10.4* 9.9* 10.5*   HEMATOCRIT % 34.4* 32.6* 33.8*   MCV fL 93 93 91   MCH pg 28.2 28.1 28.3   MCHC g/dL 30.2* 30.4* 31.1*   RDW % 16.2* 16.4* 16.6*   PLATELETS AUTO x10*3/uL 207 213 236     CMP:    Results from last 7 days   Lab Units 05/12/25  0541 05/11/25  0552 05/10/25  0607   SODIUM mmol/L 127* 128* 131*   POTASSIUM mmol/L 5.2 4.4 4.8   CHLORIDE mmol/L 98 100 102   CO2 mmol/L 21 21 21   BUN mg/dL 46* 41* 35*   CREATININE mg/dL 1.34* 1.27* 1.15*   GLUCOSE mg/dL 149* 210* 224*   PROTEIN TOTAL g/dL 6.7 6.4 6.6   CALCIUM mg/dL 8.6 8.5* 8.8   BILIRUBIN TOTAL mg/dL 0.7 0.7 0.8   ALK PHOS U/L 103  105 114   AST U/L 54* 68* 161*   ALT U/L 131* 156* 220*     BMP:    Results from last 7 days   Lab Units 05/12/25  0541 05/11/25  0552 05/10/25  0607   SODIUM mmol/L 127* 128* 131*   POTASSIUM mmol/L 5.2 4.4 4.8   CHLORIDE mmol/L 98 100 102   CO2 mmol/L 21 21 21   BUN mg/dL 46* 41* 35*   CREATININE mg/dL 1.34* 1.27* 1.15*   CALCIUM mg/dL 8.6 8.5* 8.8   GLUCOSE mg/dL 149* 210* 224*     Magnesium:  Results from last 7 days   Lab Units 05/12/25  0541 05/11/25  0552 05/10/25  0607   MAGNESIUM mg/dL 2.05 1.89 2.04     Troponin:      BNP:   Results from last 7 days   Lab Units 05/09/25  1719   BNP pg/mL 1,814*     Lipid Panel:         Diagnostic Studies:       Radiology:     XR chest 1 view   Final Result   1.  Left basilar airspace opacification with small left-sided pleural   effusion.             Signed by: Joel Matthews 5/9/2025 5:58 PM   Dictation workstation:   [id number]      peripheral IV bedside imaging    (Results Pending)       Problem List:     Problem List[8]    Assessment:   Chronic left lower extremity wound  Acute on chronic systolic heart failure NYHA class III EF 25 to 30%  Chronic A-fib on oral anticoagulation  History of CAD per patient medical management only  Hypertension  Diabetes  Morbid obesity  COPD    Plan:   Tele monitoring  Serial enzymes  2d echo done 3/25 EF 25-30%  Daily EKG's  Eliquis 5 mg p.o. twice daily  Lopressor 50 mg p.o. twice daily  Currently holding Lipitor due to elevated LFTs  Plan to resume Lasix 40 mg p.o. twice daily tomorrow  Add Aldactone once feasible  GDMT  Poor candidate for Jardiance  Strict intake and output  Daily weights    Tripp Camargo [facility]      Of note, this documentation is completed using the Dragon Dictation system (voice recognition software). There may be spelling and/or grammatical errors that were not corrected prior to final submission.      Electronically signed by Kike Camargo,  APRN-CNP, on 5/12/2025 at 1:42 PM    Patient seen and examined in conjunction with FRAN Lyons and agree with the evaluation as noted above.  I have personally interviewed and examined the patient.   I have personally and independently reviewed the pertinentlabs and diagnostic testing.  I have personally verified the elements of the history and physical listed above and changes, if any, are noted below.    74-year-old lady with a history of persistent atrial fibrillation, coronary artery disease, diabetes, COPD who was seen by EP for A-fib with RVR.  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  She denies any chest pain.  She had a recent echocardiogram that showed severe LV dysfunction with EF of 25 to 30% and she has been having significant shortness of breath with minimal activity.  She has also noticed some orthopnea but no paroxysmal nocturnal dyspnea.    She was initiated on GDMT but this is limited because of her relative hypotension.  Labs show BNP greater than 1800    Agree with exam as noted above.  Patient is visibly dyspneic at rest and tachypneic.  She has reduced breath sounds in the lung bases posteriorly.  Cardiac exam reveals irregular S1 and S2, no murmurs were appreciated.  Extremities: Left lower leg wound dressing intact.    ASSESSMENT AND PLAN:  1.  Severe LV dysfunction, with probable  ischemic cardiomyopathy with a known left main stenosis as noted above.  However patient has declined any form of intervention except continue medical treatment.  She is currently just on beta-blockers because of relative hypotension, but we will initiate low-dose Entresto if blood pressure improves.  2.  Persistent atrial fibrillation: Rate appears fairly well-controlled and she is on Eliquis for anticoagulation which we will continue.  CAD: With severe  left main.3.  Stenosis currently on medical treatment.  Will continue with beta-blockers and aspirin.       [1]   Allergies  Allergen Reactions    Iodine Hives    Adhesive Tape-Silicones Rash    Gadolinium-Containing Contrast Media Rash   [2]   Past Medical History:  Diagnosis Date    Diabetes mellitus (Multi)     Hyperlipemia     Hypertension     Morbid (severe) obesity due to excess calories (Multi) 10/28/2022    Class 3 severe obesity due to excess calories with serious comorbidity and body mass index (BMI) of 40.0 to 44.9 in adult    Non-pressure chronic ulcer of left ankle with unspecified severity (Multi) 2020    Ulcer of left ankle   [3]   Past Surgical History:  Procedure Laterality Date    OTHER SURGICAL HISTORY  2019    No history of surgery   [4] No family history on file.  [5]   Social History  Tobacco Use    Smoking status: Former     Current packs/day: 0.00     Types: Cigarettes     Quit date:      Years since quittin.3    Smokeless tobacco: Never   Substance Use Topics    Alcohol use: Never    Drug use: Never   [6] apixaban, 5 mg, oral, BID  [Held by provider] atorvastatin, 40 mg, oral, Nightly  cephalexin, 500 mg, oral, q8h JAY  [Held by provider] digoxin, 125 mcg, oral, Daily  [Held by provider] furosemide, 40 mg, oral, BID  [Held by provider] metFORMIN, 1,000 mg, oral, BID  methadone, 5 mg, oral, BID  metoprolol, 5 mg, intravenous, Once  metoprolol tartrate, 50 mg, oral, BID  montelukast, 10 mg, oral, Nightly  pantoprazole, 40 mg, oral, Daily   Or  pantoprazole, 40 mg, intravenous, Daily  [Held by provider] potassium chloride CR, 20 mEq, oral, Daily  sodium chloride, 500 mL, intravenous, Once    [7]    [8]   Patient Active Problem List  Diagnosis    Adnexal mass    Asthma in adult, unspecified asthma severity, uncomplicated (HHS-HCC)    Cellulitis    Chronic congestive heart failure    Chronic pain syndrome    Coronary artery disease involving native heart with angina pectoris     DJD (degenerative joint disease)    Endometrial hyperplasia    Essential hypertension    HLD (hyperlipidemia)    Pernicious anemia    Physical debility    Vitamin B12 deficiency    Skin ulcer of ankle (Multi)    Type 2 diabetes mellitus, without long-term current use of insulin (Multi)    Sepsis due to pneumonia (Multi)    Cardiomyopathy, ischemic    Atrial fibrillation with rapid ventricular response (Multi)

## 2025-05-12 NOTE — CARE PLAN
The clinical goals for the shift include Patient will report a decrease in pain through shift.        Problem: Arrythmia/Dysrhythmia  Goal: Lab values return to normal range  Outcome: Not Progressing     Problem: Pain  Goal: Turns in bed with improved pain control throughout the shift  Outcome: Not Progressing  Goal: Performs ADL's with improved pain control throughout shift  Outcome: Not Progressing

## 2025-05-12 NOTE — PROGRESS NOTES
Cardiology Progress Note  Patient: Isa Jack  Unit/Bed: 809/809-A  YOB: 1950  MRN: [id number]  Acct: [id number]   Admitting Diagnosis:   Atrial fibrillation with rapid ventricular response (Multi) [I48.91]  Date:  5/9/2025  Hospital Day: 3  Attending: Steph Bowling MD   Rounding VANI/Cardiologist:  EROS Guerrier-CNP,      Primary Cardiologist: Dr. Pato Bowman      Complaint:  Chief Complaint   Patient presents with    Irregular Heart Beat     Pt arrived via squad from home has been vomiting and has been in a-fib        SUBJECTIVE    Telemetry overnight shows atrial fibrillation with heart rate 50 to 90 bpm  Still c/o weakness - denies palpitations.      VITALS   Visit Vitals  /71 (Patient Position: Sitting)   Pulse 69   Temp 36.3 °C (97.3 °F)   Resp 18        I/O:    Intake/Output Summary (Last 24 hours) at 5/12/2025 1054  Last data filed at 5/12/2025 0941  Gross per 24 hour   Intake 960 ml   Output 175 ml   Net 785 ml        Allergies:  RX Allergies[1]     PHYSICAL EXAM   Physical Exam  Constitutional:       Appearance: Normal appearance.   HENT:      Head: Normocephalic.      Nose: Nose normal.      Mouth/Throat:      Mouth: Mucous membranes are moist.   Eyes:      Conjunctiva/sclera: Conjunctivae normal.   Cardiovascular:      Rate and Rhythm: Tachycardia present. Rhythm irregular.      Pulses: Normal pulses.      Heart sounds: Murmur heard.   Pulmonary:      Effort: Pulmonary effort is normal.      Breath sounds: Normal breath sounds.   Musculoskeletal:         General: Normal range of motion.      Right lower leg: Edema present.      Left lower leg: Edema present.   Skin:     General: Skin is warm and dry.   Neurological:      General: No focal deficit present.      Mental Status: She is alert and oriented to person, place, and time.   Psychiatric:         Mood and Affect: Mood normal.         Behavior: Behavior normal.           LABS     Results for orders placed or  performed during the hospital encounter of 05/09/25 (from the past 24 hours)   POCT GLUCOSE   Result Value Ref Range    POCT Glucose 186 (H) 74 - 99 mg/dL   POCT GLUCOSE   Result Value Ref Range    POCT Glucose 181 (H) 74 - 99 mg/dL   POCT GLUCOSE   Result Value Ref Range    POCT Glucose 186 (H) 74 - 99 mg/dL   Comprehensive metabolic panel   Result Value Ref Range    Glucose 149 (H) 74 - 99 mg/dL    Sodium 127 (L) 136 - 145 mmol/L    Potassium 5.2 3.5 - 5.3 mmol/L    Chloride 98 98 - 107 mmol/L    Bicarbonate 21 21 - 32 mmol/L    Anion Gap 13 10 - 20 mmol/L    Urea Nitrogen 46 (H) 6 - 23 mg/dL    Creatinine 1.34 (H) 0.50 - 1.05 mg/dL    eGFR 41 (L) >60 mL/min/1.73m*2    Calcium 8.6 8.6 - 10.3 mg/dL    Albumin 3.5 3.4 - 5.0 g/dL    Alkaline Phosphatase 103 33 - 136 U/L    Total Protein 6.7 6.4 - 8.2 g/dL    AST 54 (H) 9 - 39 U/L    Bilirubin, Total 0.7 0.0 - 1.2 mg/dL     (H) 7 - 45 U/L   CBC and Auto Differential   Result Value Ref Range    WBC 12.1 (H) 4.4 - 11.3 x10*3/uL    nRBC 0.7 (H) 0.0 - 0.0 /100 WBCs    RBC 3.69 (L) 4.00 - 5.20 x10*6/uL    Hemoglobin 10.4 (L) 12.0 - 16.0 g/dL    Hematocrit 34.4 (L) 36.0 - 46.0 %    MCV 93 80 - 100 fL    MCH 28.2 26.0 - 34.0 pg    MCHC 30.2 (L) 32.0 - 36.0 g/dL    RDW 16.2 (H) 11.5 - 14.5 %    Platelets 207 150 - 450 x10*3/uL    Neutrophils % 85.8 40.0 - 80.0 %    Immature Granulocytes %, Automated 0.3 0.0 - 0.9 %    Lymphocytes % 5.4 13.0 - 44.0 %    Monocytes % 7.1 2.0 - 10.0 %    Eosinophils % 1.0 0.0 - 6.0 %    Basophils % 0.4 0.0 - 2.0 %    Neutrophils Absolute 10.34 (H) 1.60 - 5.50 x10*3/uL    Immature Granulocytes Absolute, Automated 0.04 0.00 - 0.50 x10*3/uL    Lymphocytes Absolute 0.65 (L) 0.80 - 3.00 x10*3/uL    Monocytes Absolute 0.86 (H) 0.05 - 0.80 x10*3/uL    Eosinophils Absolute 0.12 0.00 - 0.40 x10*3/uL    Basophils Absolute 0.05 0.00 - 0.10 x10*3/uL   Magnesium   Result Value Ref Range    Magnesium 2.05 1.60 - 2.40 mg/dL   ECG 12 Lead   Result Value  Ref Range    Ventricular Rate 80 BPM    Atrial Rate 43 BPM    QRS Duration 106 ms    QT Interval 368 ms    QTC Calculation(Bazett) 424 ms    R Axis -17 degrees    T Axis 39 degrees    QRS Count 13 beats    Q Onset 216 ms    T Offset 400 ms    QTC Fredericia 405 ms   POCT GLUCOSE   Result Value Ref Range    POCT Glucose 142 (H) 74 - 99 mg/dL   POCT GLUCOSE   Result Value Ref Range    POCT Glucose 184 (H) 74 - 99 mg/dL        Scheduled medications  Scheduled Medications[2]  Continuous medications  Continuous Medications[3]  PRN medications  PRN Medications[4]     Imaging  XR chest 1 view  Result Date: 5/9/2025  1.  Left basilar airspace opacification with small left-sided pleural effusion.     Signed by: Joel Matthews 5/9/2025 5:58 PM Dictation workstation:   [id number]      Cardiology, Vascular, and Other Imaging  ECG 12 Lead  Result Date: 5/12/2025  Atrial fibrillation Anterior infarct , age undetermined Abnormal ECG When compared with ECG of 11-MAY-2025 06:26, No significant change was found    ECG 12 Lead  Result Date: 5/11/2025  Atrial fibrillation with premature ventricular or aberrantly conducted complexes Nonspecific T wave abnormality Abnormal ECG When compared with ECG of 10-MAY-2025 09:42, (unconfirmed) No significant change was found Confirmed by Pato Bowman ([id number]) on 5/11/2025 10:09:39 AM    ECG 12 Lead  Result Date: 5/11/2025  Atrial fibrillation Low voltage QRS Nonspecific T wave abnormality Abnormal ECG When compared with ECG of 09-MAY-2025 17:06, (unconfirmed) Vent. rate has decreased BY  58 BPM Confirmed by Pato Bowman [id number]) on 5/11/2025 10:09:26 AM    ECG 12 lead  Result Date: 5/11/2025  Atrial fibrillation with rapid ventricular response Low voltage QRS Nonspecific T wave abnormality Abnormal ECG When compared with ECG of 12-OCT-2023 18:37, Questionable change in QRS axis See ED provider note for full interpretation and clinical correlation Confirmed by Pato Bowman ([id number]) on 5/11/2025  10:09:21 AM         Encounter Date: 05/09/25   ECG 12 Lead   Result Value    Ventricular Rate 80    Atrial Rate 43    QRS Duration 106    QT Interval 368    QTC Calculation(Bazett) 424    R Axis -17    T Axis 39    QRS Count 13    Q Onset 216    T Offset 400    QTC Fredericia 405    Narrative    Atrial fibrillation  Anterior infarct , age undetermined  Abnormal ECG  When compared with ECG of 11-MAY-2025 06:26,  No significant change was found        Tele Monitoring: Atrial fibrillation 50s and 90s/EKG shows atrial fibrillation with heart rate 80 bpm and QTc 424 ms    Assessment   Clinical impression     1.  Ischemic cardiomyopathy  2.  Congestive heart failure acute on chronic systolic decompensated  3.  History of atrial fibrillation at some point she was on amiodarone therapy.  Currently in episodes of rapid ventricular response  4.  High digoxin level  5.  History of coronary artery disease with history of cardiac catheterization with 70% lesion in the left main as described above.  No reevaluated recently  6.  Chronic cellulitis-chronic left lower extremity:  7.  Questionable sepsis        Plan:  Continue metoprolol titrate 50 mg twice daily, at time of discharge okay to resume metoprolol succinate 100 mg daily  Discontinue digoxin secondary to dig toxicity  Daily labs and EKG  Continue to monitor on telemetry while hospitalized  Supplemental potassium keep K over 4 and supplemental magnesium keep mag over 2  Continue rate control strategy for atrial fibrillation  Okay to discharge per EP service outpatient follow-up with [facility] per patient  Patient will need to follow-up with general cardiology for determination repeat ischemic eval and management of CHF       Electronically signed by DELFINA Guerrier on 5/12/2025 at 10:54 AM              [1]   Allergies  Allergen Reactions    Iodine Hives    Adhesive Tape-Silicones Rash    Gadolinium-Containing Contrast Media Rash   [2] apixaban, 5 mg, oral, BID  [Held by  provider] atorvastatin, 40 mg, oral, Nightly  cephalexin, 500 mg, oral, q8h JAY  [Held by provider] digoxin, 125 mcg, oral, Daily  furosemide, 40 mg, oral, BID  [Held by provider] metFORMIN, 1,000 mg, oral, BID  methadone, 5 mg, oral, BID  metoprolol, 5 mg, intravenous, Once  metoprolol tartrate, 50 mg, oral, BID  montelukast, 10 mg, oral, Nightly  pantoprazole, 40 mg, oral, Daily   Or  pantoprazole, 40 mg, intravenous, Daily  [Held by provider] potassium chloride CR, 20 mEq, oral, Daily  sodium chloride, 500 mL, intravenous, Once  [3]    [4] PRN medications: acetaminophen **OR** acetaminophen **OR** acetaminophen, albuterol, HYDROcodone-acetaminophen, ondansetron **OR** ondansetron

## 2025-05-12 NOTE — PROGRESS NOTES
Isa Jack is a 74 y.o. female on day 3 of admission presenting with Atrial fibrillation with rapid ventricular response (Multi).    Subjective   Pt is tired as she did not get much sleep.        Objective     Physical Exam  Vitals and nursing note reviewed.   Constitutional:       General: She is not in acute distress.     Appearance: Normal appearance. She is not ill-appearing or toxic-appearing.   HENT:      Head: Normocephalic and atraumatic.      Nose: Nose normal.      Mouth/Throat:      Mouth: Mucous membranes are moist.   Eyes:      Extraocular Movements: Extraocular movements intact.      Conjunctiva/sclera: Conjunctivae normal.      Pupils: Pupils are equal, round, and reactive to light.   Cardiovascular:      Rate and Rhythm: Normal rate. Rhythm irregular.      Heart sounds: No murmur heard.     No gallop.   Pulmonary:      Effort: Pulmonary effort is normal. No respiratory distress.      Breath sounds: Normal breath sounds. No wheezing, rhonchi or rales.   Abdominal:      General: Abdomen is flat. Bowel sounds are normal. There is no distension.      Palpations: Abdomen is soft. There is no mass.      Tenderness: There is no abdominal tenderness.   Musculoskeletal:         General: No swelling or tenderness. Normal range of motion.      Cervical back: Normal range of motion and neck supple.      Right lower leg: Edema present.      Left lower leg: Edema present.   Skin:     General: Skin is warm and dry.   Neurological:      Mental Status: She is alert and oriented to person, place, and time.      Motor: Weakness present.   Psychiatric:         Mood and Affect: Mood normal.         Behavior: Behavior normal.         Thought Content: Thought content normal.         Judgment: Judgment normal.         Last Recorded Vitals:  /71 (Patient Position: Sitting)   Pulse 69   Temp 36.3 °C (97.3 °F)   Resp 18   Ht 1.524 m (5')   Wt 91.1 kg (200 lb 13.4 oz)   SpO2 100%   BMI 39.22 kg/m²       Scheduled medications:  Scheduled Medications[1]  Continuous medications:  Continuous Medications[2]  PRN medications:  PRN Medications[3]     Relevant Results:  Results for orders placed or performed during the hospital encounter of 05/09/25 (from the past 24 hours)   POCT GLUCOSE   Result Value Ref Range    POCT Glucose 181 (H) 74 - 99 mg/dL   POCT GLUCOSE   Result Value Ref Range    POCT Glucose 186 (H) 74 - 99 mg/dL   Comprehensive metabolic panel   Result Value Ref Range    Glucose 149 (H) 74 - 99 mg/dL    Sodium 127 (L) 136 - 145 mmol/L    Potassium 5.2 3.5 - 5.3 mmol/L    Chloride 98 98 - 107 mmol/L    Bicarbonate 21 21 - 32 mmol/L    Anion Gap 13 10 - 20 mmol/L    Urea Nitrogen 46 (H) 6 - 23 mg/dL    Creatinine 1.34 (H) 0.50 - 1.05 mg/dL    eGFR 41 (L) >60 mL/min/1.73m*2    Calcium 8.6 8.6 - 10.3 mg/dL    Albumin 3.5 3.4 - 5.0 g/dL    Alkaline Phosphatase 103 33 - 136 U/L    Total Protein 6.7 6.4 - 8.2 g/dL    AST 54 (H) 9 - 39 U/L    Bilirubin, Total 0.7 0.0 - 1.2 mg/dL     (H) 7 - 45 U/L   CBC and Auto Differential   Result Value Ref Range    WBC 12.1 (H) 4.4 - 11.3 x10*3/uL    nRBC 0.7 (H) 0.0 - 0.0 /100 WBCs    RBC 3.69 (L) 4.00 - 5.20 x10*6/uL    Hemoglobin 10.4 (L) 12.0 - 16.0 g/dL    Hematocrit 34.4 (L) 36.0 - 46.0 %    MCV 93 80 - 100 fL    MCH 28.2 26.0 - 34.0 pg    MCHC 30.2 (L) 32.0 - 36.0 g/dL    RDW 16.2 (H) 11.5 - 14.5 %    Platelets 207 150 - 450 x10*3/uL    Neutrophils % 85.8 40.0 - 80.0 %    Immature Granulocytes %, Automated 0.3 0.0 - 0.9 %    Lymphocytes % 5.4 13.0 - 44.0 %    Monocytes % 7.1 2.0 - 10.0 %    Eosinophils % 1.0 0.0 - 6.0 %    Basophils % 0.4 0.0 - 2.0 %    Neutrophils Absolute 10.34 (H) 1.60 - 5.50 x10*3/uL    Immature Granulocytes Absolute, Automated 0.04 0.00 - 0.50 x10*3/uL    Lymphocytes Absolute 0.65 (L) 0.80 - 3.00 x10*3/uL    Monocytes Absolute 0.86 (H) 0.05 - 0.80 x10*3/uL    Eosinophils Absolute 0.12 0.00 - 0.40 x10*3/uL    Basophils Absolute 0.05 0.00 -  0.10 x10*3/uL   Magnesium   Result Value Ref Range    Magnesium 2.05 1.60 - 2.40 mg/dL   ECG 12 Lead   Result Value Ref Range    Ventricular Rate 80 BPM    Atrial Rate 43 BPM    QRS Duration 106 ms    QT Interval 368 ms    QTC Calculation(Bazett) 424 ms    R Axis -17 degrees    T Axis 39 degrees    QRS Count 13 beats    Q Onset 216 ms    T Offset 400 ms    QTC Fredericia 405 ms   POCT GLUCOSE   Result Value Ref Range    POCT Glucose 142 (H) 74 - 99 mg/dL   POCT GLUCOSE   Result Value Ref Range    POCT Glucose 184 (H) 74 - 99 mg/dL       Imaging  No results found.    Cardiology, Vascular, and Other Imaging  ECG 12 Lead  Result Date: 5/12/2025  Atrial fibrillation Anterior infarct , age undetermined Abnormal ECG When compared with ECG of 11-MAY-2025 06:26, No significant change was found    ECG 12 Lead  Result Date: 5/11/2025  Atrial fibrillation with premature ventricular or aberrantly conducted complexes Nonspecific T wave abnormality Abnormal ECG When compared with ECG of 10-MAY-2025 09:42, (unconfirmed) No significant change was found Confirmed by Pato Bowman ([id number]) on 5/11/2025 10:09:39 AM                       Assessment/Plan   Assessment & Plan  Atrial fibrillation with rapid ventricular response (Multi)    A fib RVR  RVR resolved  - cardio following  - metoprolol tartrate, will eventually need metoprolol succinate    DOMINIC  - bolus 500cc  - hold lasix    Possible leg cellulitis  - keflex    Acute on chronic systolic and diastolic CHF  improved  - cardio followinig  - was on lasix gtt    Chronic hypoxic resp failure  - on home 2L NC  - singulair    HypoNa  - 500cc bolus    Transaminitis  - hold statin    Leukocytosis  - monitor    Normocytic anemia  - monitor    Leukocytosis  - monitor    HTN  - losartan    HLD  - hold statin     GERD  - ppi    DVT ppx: eliquis  Dispo: monitor clinically, monitor Cr         Steph Bowling MD  Hospitalist         [1] apixaban, 5 mg, oral, BID  [Held by provider] atorvastatin, 40  mg, oral, Nightly  cephalexin, 500 mg, oral, q8h JAY  [Held by provider] digoxin, 125 mcg, oral, Daily  furosemide, 40 mg, oral, BID  [Held by provider] metFORMIN, 1,000 mg, oral, BID  methadone, 5 mg, oral, BID  metoprolol, 5 mg, intravenous, Once  metoprolol tartrate, 50 mg, oral, BID  montelukast, 10 mg, oral, Nightly  pantoprazole, 40 mg, oral, Daily   Or  pantoprazole, 40 mg, intravenous, Daily  [Held by provider] potassium chloride CR, 20 mEq, oral, Daily  sodium chloride, 500 mL, intravenous, Once  [2]    [3] PRN medications: acetaminophen **OR** acetaminophen **OR** acetaminophen, albuterol, HYDROcodone-acetaminophen, ondansetron **OR** ondansetron

## 2025-05-12 NOTE — PROGRESS NOTES
Physical Therapy    Physical Therapy Evaluation  Patient Name: Isa Jack  MRN: [id number]  Today's Date: 5/12/2025   Start Time: 10:42  Stop Time: 11:01   809/809-A      Completion of this session, clinical decision making, and documentation performed under the supervision/direction of Conchita Roman, DPT, ATC/L, CSCS.     Assessment/Plan   PT Assessment  PT Assessment Results: Decreased strength, Decreased range of motion, Decreased endurance, Decreased mobility, Obesity, Decreased skin integrity, Pain  Rehab Prognosis: Fair  Barriers to Discharge Home: Physical needs, Caregiver assistance (currently on O2, no use of O2 at home)  Caregiver Assistance:  (arielt needs 24/7 assistance)  Physical Needs: Stair navigation into home limited by function/safety, 24hr mobility assistance needed  Evaluation/Treatment Tolerance: Patient limited by pain, Treatment limited secondary to agitation  Assessment Comment: Patient will benefit from continued PT treatment throughout hospital stay to progress transfers, standing tolerance, and ambulation  End of Session Patient Position: Up in chair, Alarm on (LE not elevated, patient states that elevating legs cause pain due to pressure on wound)  IP OR SWING BED PT PLAN  Inpatient or Swing Bed: Inpatient  PT Plan  Treatment/Interventions: Bed mobility, Transfer training, Gait training, Stair training, Neuromuscular re-education, Strengthening, Endurance training, Therapeutic exercise, Therapeutic activity  PT Plan: Ongoing PT  PT Frequency: 3 times per week  PT Discharge Recommendations: Moderate intensity level of continued care  PT Recommended Transfer Status: Assist x2  PT - OK to Discharge:  (once deemed medically appropraite with continued PT treatment)    Subjective     General Visit Information:  General  Reason for Referral: impaired mobility  Referred By: PT/OT 5/9/25  Humberto MATHIS  Past Medical History Relevant to Rehab: asthma, dyslipedema, HTN, CHF, DM, CAD, DJD,  anemia, skin ulcer of L ankle, chronic cellulitis of bilateral LE, Afib on eliquis, ischemic cardiomyopathty, LVEF= 25-30%  Co-Treatment: OT  Co-Treatment Reason: to maximize patient/staff safety  Patient Position Received: Up in chair, Alarm on  General Comment: to ED 5/9/25 due to vomiting for two days and general malaise, in ED found to be in AFib with RVR, to RMF being treated for Afib; BNP 1,814 on 5.9.25, Sodium: 127 on 5/11/25; XR chest 5/9/25 (+) L basilar airspace opacification with small L pleural effusion; EKG 5/9/25, 5/10/25, and 5/11/25 showing Afib    Home Living:  Home Living  Home Living Comments: per patient report resides in home with son and daughter-in-law, someone is home with her at all times, lives in ranch style home with 3 steps to enter without a handrail, walk in shower, owns lift chair but lift function is currently not functioning    Prior Level of Function:  Prior Function Per Pt/Caregiver Report  Prior Function Comments: per patient report uses rollator walker for mobility in home, needs assistance from son and daughter in law to ascend/descend steps to enter home, does not leave home often, patient states she is unable to walk without rollator, paitent recieves assistance for ADLs, IADLs and medication management,  paitnet sleeps in recliner chair, lift function is currently unusable, patient has in home care by RN, home health aide and PT, recieves assistance for bathing and dressing, family members in home complete cooking, cleaning and laundry    Precautions:  Precautions  Medical Precautions: Fall precautions, Oxygen therapy device and L/min    Vital Signs:  Vital Signs  Heart Rate:  (pre-acitivty: 60)  SpO2:  (on 2L O2 via NC; pre-activity 99%)  Objective     Pain:  Pain Assessment  Pain Assessment: 0-10  0-10 (Numeric) Pain Score: 10 - Worst possible pain  Pain Type: Chronic pain  Pain Location: Leg  Pain Orientation: Left    Cognition:  Cognition  Overall Cognitive Status:  Within Functional Limits (alert and cooperative to questioning, hesistent to move, required much encouragement ; increased agitation at end of session secondary to pain and discomfort, patient apologized before therapist left room)    General Assessments:  General Observation  General Observation: virgil seated in chiar, leaning to left side, femalue purewick catheter in place (discontinued for mobility but reconnected at end of session, 2L O2 via NC donned and maintained throughout session, ace bandages on bilateral LE due to wounds, L LE had significant drainage, changed sock prior to mobility, patient reported pain from LE wounds, mobility tasks were very effortful for patient, patient needed encouragement to move   Activity Tolerance  Endurance:  (poor; secondary to pain)                 Static Sitting Balance: fair: able to sit upright in chair, effortful to maintain trunk at midline  Dynamic Sitting Balance: fair: able to withstand MMT with use of arms on chair for support  Static Standing Balance: fair - : benefitted from use of WW for standing support, paitient dstates she felt like she would fall to the floor, able to maintain balance on own  Dynamic Standing Balance: fair-: unable to ambulate, patient demonstrated ability to complete 2 standing marches, benefitted from support of WW    Functional Assessments:     Bed Mobility  Bed Mobility:  (not tested, gerardtent up in chair at start of session)  Transfers  Transfer:  (sit<>stand mod Ax2, slowed movement, use of blue pad to help assist to stand, VC for hand placement and for count to ascend, patient reported dizziness upon standing but able to acculmate; gerardtent reported discomfort in sitting due to legs touching chair)  Ambulation/Gait Training  Ambulation/Gait Training Performed:  (arielt asked to take forward and backward steps, patient initally declined, patient took one step forward with R LE, unable to take step with L LE due to pain; patient able  to complete one standing march bilaterally, use of WW for standing support, CGA,  increased pain/discomfort, patient able to take two steps backward to chair, decreased step length with backward steps to chair, further ambulation deferred due to pain and decreased tolerance for upright activities )          Extremity/Trunk Assessments:  RUE :  (AROM shoulder flexion to 45, PROM to 90 degrees)  LUE: Within Functional Limits  RLE :  (AROM hip and ankle limited by body habitus , knee ext lacking ~15 deg; MMT, hip flexion not formally tested due to position in chair, demonstrated at least 3- /5 strength in standing with completion of standing march, Knee extension 4-/5, ankle DF 4-/5)  LLE :  (AROM hip and ankle limited by body habitus, knee ext lacking ~20, MMT, hip flexion not formally tested but demonstrated 3-/5 in standing with completion of standing march, knee ext 4-/5, ankle DF 4-/5)    Outcome Measures:     [facility] Basic Mobility  Turning from your back to your side while in a flat bed without using bedrails: A lot  Moving from lying on your back to sitting on the side of a flat bed without using bedrails: A lot  Moving to and from bed to chair (including a wheelchair): A lot  Standing up from a chair using your arms (e.g. wheelchair or bedside chair): A lot  To walk in hospital room: Total  Climbing 3-5 steps with railing: Total  Basic Mobility - Total Score: 10        Goals:  Encounter Problems       Encounter Problems (Active)       PT Problem       sit<>stand, CGA use of WW for standing support  (Progressing)       Start:  05/12/25    Expected End:  05/26/25            patient to demonstrate standing tolerance of 2 minutes with use of WW for standing support  (Progressing)       Start:  05/12/25    Expected End:  05/26/25            patient to ambulate 15' 2-3 bouts, mod A x2, use of WW  (Progressing)       Start:  05/12/25    Expected End:  05/26/25            chair to chair transfer, use of WW, CGA         "Start:  05/12/25    Expected End:  05/26/25                patient to demonstrate ability to perform toe taps onto 2\" step, 5x bilaterally, use of WW for standing support        Start:  05/12/25    Expected End:  05/26/25                     Education Documentation  Mobility Training, taught by MAYO Summers at 5/12/2025 11:48 AM.  Learner: Patient  Readiness: Acceptance  Method: Explanation, Demonstration  Response: Verbalizes Understanding, Demonstrated Understanding, Needs Reinforcement  Comment: see note    Education Comments  No comments found.           "

## 2025-05-12 NOTE — PROGRESS NOTES
Occupational Therapy    Evaluation    Patient Name: Isa Jack  MRN: [id number]  : 1950  Today's Date: 25  Time Calculation  Start Time: 1043  Stop Time: 1059  Time Calculation (min): 16 min     809/809-A    Assessment:  OT Assessment: pt presents with impairments in ADLS and decreased indep with functional transfers, will benefit from con't skilled OT to address impairments  Prognosis: Good  Barriers to Discharge Home: Physical needs  Physical Needs: Stair navigation into home limited by function/safety, Ambulating household distances limited by function/safety, 24hr mobility assistance needed, 24hr ADL assistance needed, High falls risk due to function or environment  End of Session Patient Position: Up in chair, Alarm on (call light in reach, reattached to suction on purewick)  OT Assessment Results: Decreased ADL status, Decreased endurance, Decreased functional mobility  Prognosis: Good    Plan:  Treatment Interventions: ADL retraining, Functional transfer training, Endurance training, Patient/family training  OT Frequency: 2 times per week  OT Discharge Recommendations: Moderate intensity level of continued care  OT Recommended Transfer Status: Moderate assist, Assist of 2  OT - OK to Discharge: Yes  Treatment Interventions: ADL retraining, Functional transfer training, Endurance training, Patient/family training  Subjective     Current Problem:  1. Atrial fibrillation with rapid ventricular response (Multi)          Medical History[1]  Surgical History[2]    General:   OT Received On: 25  General  Reason for Referral: ADL impairment  Referred By: PT/OT 25  Humberto MATHIS  Past Medical History Relevant to Rehab: asthma, dyslipidemia, HTN, CHF, DM, CAD, DJD, anemia, skin ulcer of L ankle, chronic cellulitis of bilateral LE, Afib on eliquis, ischemic cardiomyopathy, LVEF= 25-30%  Co-Treatment: PT  Co-Treatment Reason: to maximize patient/staff safety  Patient Position Received: Up in  chair, Alarm on  General Comment: to ED 5/9/25 due to vomiting for two days and general malaise, in ED found to be in AFib with RVR, to RMF being treated for Afib; BNP 1,814 on 5.9.25, Sodium: 127 on 5/11/25; XR chest 5/9/25 (+) L basilar airspace opacification with small L pleural effusion; EKG 5/9/25, 5/10/25, and 5/11/25 showing Afib    Precautions:  Medical Precautions: Fall precautions, Oxygen therapy device and L/min           Pain:  Pain Assessment  Pain Assessment: 0-10  0-10 (Numeric) Pain Score: 10 - Worst possible pain  Pain Type: Chronic pain  Pain Location: Leg (posterior thigh, calf)  Pain Orientation: Left  Objective     Cognition:  Overall Cognitive Status: Within Functional Limits (alert and cooperative to questioning, hesistent to move, required much encouragement ; increased agitation at end of session secondary to pain and discomfort, patient apologized before therapist left room)             Home Living:  Home Living Comments: per patient report resides in home with son and daughter-in-law, 24 hour supervision lives in ranch style home with 3 steps to enter without a handrail, walk in shower, owns lift chair but lift function is broken    Prior Function:  Prior Function Comments: per patient report uses rollator for mobility, needs assistance from son and daughter in law to ascend/descend steps to enter home, does not leave home often,  patient recieves assistance for ADLs, IADLs and medication management,  patient sleeps in recliner chair, lift function is currently unusable, patient has in home care by RN, home health aide and PT, receives assistance for bathing and dressing, family does cooking, cleaning and laundry           Activities of Daily Living:   Eating Assistance: Independent  Grooming Assistance: Minimal  Bathing Assistance: Maximal  UE Dressing Assistance: Moderate  LE Dressing Assistance: Maximal  Toileting Assistance with Device: Maximal  Functional Assistance: Moderate                          Activity Tolerance:  Endurance:  (poor, limited by pain)           Bed Mobility/Transfers: Bed Mobility  Bed Mobility: No  Transfers  Transfer:  (sit<>stand from recliner chiar, mod Ax2, use of blue pad to assist, VC for hand placement , patient reported dizziness upon standing but able to acclimate; paitent reported discomfort in sitting due to legs touching chair)                Balance:  Static Sitting: good  Dynamic Sitting: fair  Static Standing: fair   Dynamic Standing: fair -         Vision:Vision - Basic Assessment  Current Vision: No visual deficits        Sensation:  Light Touch: No apparent deficits    Strength:  Strength Comments: R shoulder 2/5, R elbow, wrist and hand 4/5, L UE 4/5 throughout            Extremities: RUE   RUE :  (AAROM shoulder flexion to 45, PROM to 70, elbow, wrist and hand AROM WFL) and LUE   LUE: Within Functional Limits    Outcome Measures: [facility] Daily Activity  Putting on and taking off regular lower body clothing: Total  Bathing (including washing, rinsing, drying): A lot  Putting on and taking off regular upper body clothing: A lot  Toileting, which includes using toilet, bedpan or urinal: A lot  Taking care of personal grooming such as brushing teeth: A lot  Eating Meals: A little  Daily Activity - Total Score: 12    Education Documentation  ADL Training, taught by Marium Hopper OT at 5/12/2025  2:35 PM.  Learner: Patient  Readiness: Acceptance  Method: Explanation, Demonstration  Response: Verbalizes Understanding, Needs Reinforcement                 Goals:  Encounter Problems       Encounter Problems (Active)       OT Goals       pt will dress UB with SBA (Progressing)       Start:  05/12/25    Expected End:  05/26/25            Pt will transfer to bed, chair, toilet with CGA (Progressing)       Start:  05/12/25    Expected End:  05/26/25            Pt will participate in gentle strengthening R elbow, wrist, hand and L UE  (Progressing)       Start:   05/12/25    Expected End:  05/26/25            Pt will demo fair + dyn sitting balance with ADLS (Progressing)       Start:  05/12/25    Expected End:  05/26/25                        [1]   Past Medical History:  Diagnosis Date    Diabetes mellitus (Multi)     Hyperlipemia     Hypertension     Morbid (severe) obesity due to excess calories (Multi) 10/28/2022    Class 3 severe obesity due to excess calories with serious comorbidity and body mass index (BMI) of 40.0 to 44.9 in adult    Non-pressure chronic ulcer of left ankle with unspecified severity (Multi) 04/27/2020    Ulcer of left ankle   [2]   Past Surgical History:  Procedure Laterality Date    OTHER SURGICAL HISTORY  08/07/2019    No history of surgery

## 2025-05-13 ENCOUNTER — APPOINTMENT (OUTPATIENT)
Dept: CARDIOLOGY | Facility: HOSPITAL | Age: 75
DRG: 291 | End: 2025-05-13
Payer: COMMERCIAL

## 2025-05-13 LAB
ALBUMIN SERPL BCP-MCNC: 3.7 G/DL (ref 3.4–5)
ALP SERPL-CCNC: 106 U/L (ref 33–136)
ALT SERPL W P-5'-P-CCNC: 133 U/L (ref 7–45)
ANION GAP SERPL CALC-SCNC: 17 MMOL/L (ref 10–20)
ANION GAP SERPL CALC-SCNC: 19 MMOL/L (ref 10–20)
APPEARANCE UR: CLEAR
AST SERPL W P-5'-P-CCNC: 73 U/L (ref 9–39)
B-LACTAMASE ORGANISM ISLT: NEGATIVE
BACTERIA SPEC CULT: ABNORMAL
BASOPHILS # BLD AUTO: 0.03 X10*3/UL (ref 0–0.1)
BASOPHILS NFR BLD AUTO: 0.2 %
BILIRUB SERPL-MCNC: 1 MG/DL (ref 0–1.2)
BILIRUB UR STRIP.AUTO-MCNC: NEGATIVE MG/DL
BUN SERPL-MCNC: 52 MG/DL (ref 6–23)
BUN SERPL-MCNC: 54 MG/DL (ref 6–23)
CALCIUM SERPL-MCNC: 8.7 MG/DL (ref 8.6–10.3)
CALCIUM SERPL-MCNC: 8.7 MG/DL (ref 8.6–10.3)
CHLORIDE SERPL-SCNC: 97 MMOL/L (ref 98–107)
CHLORIDE SERPL-SCNC: 98 MMOL/L (ref 98–107)
CO2 SERPL-SCNC: 15 MMOL/L (ref 21–32)
CO2 SERPL-SCNC: 16 MMOL/L (ref 21–32)
COLOR UR: YELLOW
CREAT SERPL-MCNC: 1.3 MG/DL (ref 0.5–1.05)
CREAT SERPL-MCNC: 1.31 MG/DL (ref 0.5–1.05)
CREAT UR-MCNC: 102.8 MG/DL (ref 20–320)
EGFRCR SERPLBLD CKD-EPI 2021: 43 ML/MIN/1.73M*2
EGFRCR SERPLBLD CKD-EPI 2021: 43 ML/MIN/1.73M*2
EOSINOPHIL # BLD AUTO: 0.02 X10*3/UL (ref 0–0.4)
EOSINOPHIL NFR BLD AUTO: 0.2 %
ERYTHROCYTE [DISTWIDTH] IN BLOOD BY AUTOMATED COUNT: 16.4 % (ref 11.5–14.5)
GLUCOSE BLD MANUAL STRIP-MCNC: 129 MG/DL (ref 74–99)
GLUCOSE BLD MANUAL STRIP-MCNC: 164 MG/DL (ref 74–99)
GLUCOSE BLD MANUAL STRIP-MCNC: 167 MG/DL (ref 74–99)
GLUCOSE BLD MANUAL STRIP-MCNC: 168 MG/DL (ref 74–99)
GLUCOSE BLD MANUAL STRIP-MCNC: 194 MG/DL (ref 74–99)
GLUCOSE BLD MANUAL STRIP-MCNC: 230 MG/DL (ref 74–99)
GLUCOSE SERPL-MCNC: 172 MG/DL (ref 74–99)
GLUCOSE SERPL-MCNC: 176 MG/DL (ref 74–99)
GLUCOSE UR STRIP.AUTO-MCNC: NORMAL MG/DL
GRAM STN SPEC: ABNORMAL
GRAM STN SPEC: ABNORMAL
HCT VFR BLD AUTO: 36.8 % (ref 36–46)
HGB BLD-MCNC: 11.2 G/DL (ref 12–16)
HOLD SPECIMEN: NORMAL
HYALINE CASTS #/AREA URNS AUTO: ABNORMAL /LPF
IMM GRANULOCYTES # BLD AUTO: 0.1 X10*3/UL (ref 0–0.5)
IMM GRANULOCYTES NFR BLD AUTO: 0.8 % (ref 0–0.9)
KETONES UR STRIP.AUTO-MCNC: NEGATIVE MG/DL
LACTATE SERPL-SCNC: 2.3 MMOL/L (ref 0.4–2)
LACTATE SERPL-SCNC: 3.2 MMOL/L (ref 0.4–2)
LEUKOCYTE ESTERASE UR QL STRIP.AUTO: NEGATIVE
LYMPHOCYTES # BLD AUTO: 0.36 X10*3/UL (ref 0.8–3)
LYMPHOCYTES NFR BLD AUTO: 2.7 %
MAGNESIUM SERPL-MCNC: 2.15 MG/DL (ref 1.6–2.4)
MCH RBC QN AUTO: 28.2 PG (ref 26–34)
MCHC RBC AUTO-ENTMCNC: 30.4 G/DL (ref 32–36)
MCV RBC AUTO: 93 FL (ref 80–100)
MONOCYTES # BLD AUTO: 0.95 X10*3/UL (ref 0.05–0.8)
MONOCYTES NFR BLD AUTO: 7.2 %
NEUTROPHILS # BLD AUTO: 11.79 X10*3/UL (ref 1.6–5.5)
NEUTROPHILS NFR BLD AUTO: 88.9 %
NITRITE UR QL STRIP.AUTO: NEGATIVE
NRBC BLD-RTO: 1.2 /100 WBCS (ref 0–0)
OSMOLALITY SERPL: 291 MOSM/KG (ref 280–300)
OSMOLALITY UR: 530 MOSM/KG (ref 200–1200)
PH UR STRIP.AUTO: 5.5 [PH]
PLATELET # BLD AUTO: 201 X10*3/UL (ref 150–450)
POTASSIUM SERPL-SCNC: 5 MMOL/L (ref 3.5–5.3)
POTASSIUM SERPL-SCNC: 5.7 MMOL/L (ref 3.5–5.3)
POTASSIUM SERPL-SCNC: 5.9 MMOL/L (ref 3.5–5.3)
POTASSIUM SERPL-SCNC: 6.3 MMOL/L (ref 3.5–5.3)
PROT SERPL-MCNC: 6.8 G/DL (ref 6.4–8.2)
PROT UR STRIP.AUTO-MCNC: ABNORMAL MG/DL
RBC # BLD AUTO: 3.97 X10*6/UL (ref 4–5.2)
RBC # UR STRIP.AUTO: NEGATIVE MG/DL
RBC #/AREA URNS AUTO: ABNORMAL /HPF
SODIUM SERPL-SCNC: 125 MMOL/L (ref 136–145)
SODIUM SERPL-SCNC: 125 MMOL/L (ref 136–145)
SODIUM UR-SCNC: <10 MMOL/L
SODIUM/CREAT UR-RTO: NORMAL
SP GR UR STRIP.AUTO: 1.02
SQUAMOUS #/AREA URNS AUTO: ABNORMAL /HPF
UROBILINOGEN UR STRIP.AUTO-MCNC: ABNORMAL MG/DL
WBC # BLD AUTO: 13.3 X10*3/UL (ref 4.4–11.3)
WBC #/AREA URNS AUTO: ABNORMAL /HPF
YEAST BUDDING #/AREA UR COMP ASSIST: PRESENT /HPF

## 2025-05-13 PROCEDURE — 83605 ASSAY OF LACTIC ACID: CPT | Performed by: INTERNAL MEDICINE

## 2025-05-13 PROCEDURE — 84132 ASSAY OF SERUM POTASSIUM: CPT | Performed by: NURSE PRACTITIONER

## 2025-05-13 PROCEDURE — 83735 ASSAY OF MAGNESIUM: CPT | Performed by: INTERNAL MEDICINE

## 2025-05-13 PROCEDURE — 84132 ASSAY OF SERUM POTASSIUM: CPT | Performed by: INTERNAL MEDICINE

## 2025-05-13 PROCEDURE — 2500000002 HC RX 250 W HCPCS SELF ADMINISTERED DRUGS (ALT 637 FOR MEDICARE OP, ALT 636 FOR OP/ED): Performed by: INTERNAL MEDICINE

## 2025-05-13 PROCEDURE — 2500000005 HC RX 250 GENERAL PHARMACY W/O HCPCS: Performed by: INTERNAL MEDICINE

## 2025-05-13 PROCEDURE — 2500000002 HC RX 250 W HCPCS SELF ADMINISTERED DRUGS (ALT 637 FOR MEDICARE OP, ALT 636 FOR OP/ED): Performed by: STUDENT IN AN ORGANIZED HEALTH CARE EDUCATION/TRAINING PROGRAM

## 2025-05-13 PROCEDURE — 83935 ASSAY OF URINE OSMOLALITY: CPT | Mod: ELYLAB | Performed by: INTERNAL MEDICINE

## 2025-05-13 PROCEDURE — 1200000002 HC GENERAL ROOM WITH TELEMETRY DAILY

## 2025-05-13 PROCEDURE — 93005 ELECTROCARDIOGRAM TRACING: CPT

## 2025-05-13 PROCEDURE — 2500000004 HC RX 250 GENERAL PHARMACY W/ HCPCS (ALT 636 FOR OP/ED): Mod: JZ | Performed by: INTERNAL MEDICINE

## 2025-05-13 PROCEDURE — S0109 METHADONE ORAL 5MG: HCPCS | Performed by: STUDENT IN AN ORGANIZED HEALTH CARE EDUCATION/TRAINING PROGRAM

## 2025-05-13 PROCEDURE — 2500000001 HC RX 250 WO HCPCS SELF ADMINISTERED DRUGS (ALT 637 FOR MEDICARE OP): Performed by: INTERNAL MEDICINE

## 2025-05-13 PROCEDURE — 99233 SBSQ HOSP IP/OBS HIGH 50: CPT | Performed by: INTERNAL MEDICINE

## 2025-05-13 PROCEDURE — 2500000001 HC RX 250 WO HCPCS SELF ADMINISTERED DRUGS (ALT 637 FOR MEDICARE OP): Performed by: STUDENT IN AN ORGANIZED HEALTH CARE EDUCATION/TRAINING PROGRAM

## 2025-05-13 PROCEDURE — 97530 THERAPEUTIC ACTIVITIES: CPT | Mod: GP,CQ

## 2025-05-13 PROCEDURE — 82570 ASSAY OF URINE CREATININE: CPT | Performed by: INTERNAL MEDICINE

## 2025-05-13 PROCEDURE — 2500000004 HC RX 250 GENERAL PHARMACY W/ HCPCS (ALT 636 FOR OP/ED): Performed by: NURSE PRACTITIONER

## 2025-05-13 PROCEDURE — 97535 SELF CARE MNGMENT TRAINING: CPT | Mod: GO,CO

## 2025-05-13 PROCEDURE — 80053 COMPREHEN METABOLIC PANEL: CPT | Performed by: INTERNAL MEDICINE

## 2025-05-13 PROCEDURE — 81001 URINALYSIS AUTO W/SCOPE: CPT | Performed by: INTERNAL MEDICINE

## 2025-05-13 PROCEDURE — 93010 ELECTROCARDIOGRAM REPORT: CPT | Performed by: INTERNAL MEDICINE

## 2025-05-13 PROCEDURE — 82947 ASSAY GLUCOSE BLOOD QUANT: CPT

## 2025-05-13 PROCEDURE — 36415 COLL VENOUS BLD VENIPUNCTURE: CPT | Performed by: INTERNAL MEDICINE

## 2025-05-13 PROCEDURE — 36415 COLL VENOUS BLD VENIPUNCTURE: CPT | Performed by: NURSE PRACTITIONER

## 2025-05-13 PROCEDURE — 85025 COMPLETE CBC W/AUTO DIFF WBC: CPT | Performed by: INTERNAL MEDICINE

## 2025-05-13 RX ORDER — DEXTROSE 50 % IN WATER (D50W) INTRAVENOUS SYRINGE
25 ONCE
Status: COMPLETED | OUTPATIENT
Start: 2025-05-13 | End: 2025-05-13

## 2025-05-13 RX ORDER — INSULIN LISPRO 100 [IU]/ML
0-10 INJECTION, SOLUTION INTRAVENOUS; SUBCUTANEOUS
Status: DISCONTINUED | OUTPATIENT
Start: 2025-05-13 | End: 2025-05-20 | Stop reason: HOSPADM

## 2025-05-13 RX ORDER — FUROSEMIDE 40 MG/1
40 TABLET ORAL
Status: DISCONTINUED | OUTPATIENT
Start: 2025-05-13 | End: 2025-05-20 | Stop reason: HOSPADM

## 2025-05-13 RX ORDER — DEXTROSE MONOHYDRATE 100 MG/ML
50 INJECTION, SOLUTION INTRAVENOUS CONTINUOUS
Status: ACTIVE | OUTPATIENT
Start: 2025-05-13 | End: 2025-05-13

## 2025-05-13 RX ORDER — SODIUM BICARBONATE 1 MEQ/ML
50 SYRINGE (ML) INTRAVENOUS ONCE
Status: COMPLETED | OUTPATIENT
Start: 2025-05-13 | End: 2025-05-13

## 2025-05-13 RX ORDER — TALC
6 POWDER (GRAM) TOPICAL NIGHTLY
Status: DISCONTINUED | OUTPATIENT
Start: 2025-05-13 | End: 2025-05-20 | Stop reason: HOSPADM

## 2025-05-13 RX ORDER — LORAZEPAM 2 MG/ML
1 INJECTION INTRAMUSCULAR ONCE
Status: COMPLETED | OUTPATIENT
Start: 2025-05-13 | End: 2025-05-13

## 2025-05-13 RX ORDER — METOPROLOL SUCCINATE 50 MG/1
100 TABLET, EXTENDED RELEASE ORAL DAILY
Start: 2025-05-13

## 2025-05-13 RX ORDER — QUETIAPINE FUMARATE 25 MG/1
12.5 TABLET, FILM COATED ORAL NIGHTLY
Status: DISCONTINUED | OUTPATIENT
Start: 2025-05-13 | End: 2025-05-13

## 2025-05-13 RX ORDER — CEPHALEXIN 500 MG/1
500 CAPSULE ORAL EVERY 8 HOURS SCHEDULED
Start: 2025-05-13 | End: 2025-05-16

## 2025-05-13 RX ORDER — HALOPERIDOL LACTATE 5 MG/ML
2 INJECTION, SOLUTION INTRAMUSCULAR EVERY 4 HOURS PRN
Status: DISCONTINUED | OUTPATIENT
Start: 2025-05-13 | End: 2025-05-15

## 2025-05-13 RX ADMIN — METOPROLOL TARTRATE 50 MG: 50 TABLET, FILM COATED ORAL at 10:22

## 2025-05-13 RX ADMIN — MONTELUKAST SODIUM 10 MG: 10 TABLET, FILM COATED ORAL at 22:45

## 2025-05-13 RX ADMIN — SODIUM ZIRCONIUM CYCLOSILICATE 10 G: 10 POWDER, FOR SUSPENSION ORAL at 22:44

## 2025-05-13 RX ADMIN — DEXTROSE MONOHYDRATE 25 G: 25 INJECTION, SOLUTION INTRAVENOUS at 13:36

## 2025-05-13 RX ADMIN — PANTOPRAZOLE SODIUM 40 MG: 40 INJECTION, POWDER, FOR SOLUTION INTRAVENOUS at 06:20

## 2025-05-13 RX ADMIN — SODIUM ZIRCONIUM CYCLOSILICATE 10 G: 10 POWDER, FOR SUSPENSION ORAL at 14:13

## 2025-05-13 RX ADMIN — LORAZEPAM 1 MG: 2 INJECTION INTRAMUSCULAR; INTRAVENOUS at 14:45

## 2025-05-13 RX ADMIN — APIXABAN 5 MG: 5 TABLET, FILM COATED ORAL at 10:22

## 2025-05-13 RX ADMIN — CEPHALEXIN 500 MG: 500 CAPSULE ORAL at 22:45

## 2025-05-13 RX ADMIN — HALOPERIDOL LACTATE 2 MG: 5 INJECTION, SOLUTION INTRAMUSCULAR at 14:32

## 2025-05-13 RX ADMIN — CEPHALEXIN 500 MG: 500 CAPSULE ORAL at 06:20

## 2025-05-13 RX ADMIN — HYDROCODONE BITARTRATE AND ACETAMINOPHEN 1 TABLET: 5; 325 TABLET ORAL at 00:33

## 2025-05-13 RX ADMIN — INSULIN HUMAN 10 UNITS: 100 INJECTION, SOLUTION PARENTERAL at 13:30

## 2025-05-13 RX ADMIN — FUROSEMIDE 40 MG: 40 TABLET ORAL at 10:22

## 2025-05-13 RX ADMIN — METHADONE HYDROCHLORIDE 5 MG: 5 TABLET ORAL at 22:45

## 2025-05-13 RX ADMIN — CEPHALEXIN 500 MG: 500 CAPSULE ORAL at 14:13

## 2025-05-13 RX ADMIN — INSULIN LISPRO 2 UNITS: 100 INJECTION, SOLUTION INTRAVENOUS; SUBCUTANEOUS at 10:22

## 2025-05-13 RX ADMIN — DEXTROSE MONOHYDRATE 50 ML/HR: 100 INJECTION, SOLUTION INTRAVENOUS at 13:55

## 2025-05-13 RX ADMIN — SODIUM BICARBONATE 50 MEQ: 84 INJECTION INTRAVENOUS at 13:55

## 2025-05-13 RX ADMIN — INSULIN LISPRO 2 UNITS: 100 INJECTION, SOLUTION INTRAVENOUS; SUBCUTANEOUS at 12:31

## 2025-05-13 RX ADMIN — METHADONE HYDROCHLORIDE 5 MG: 5 TABLET ORAL at 10:22

## 2025-05-13 ASSESSMENT — COGNITIVE AND FUNCTIONAL STATUS - GENERAL
DAILY ACTIVITIY SCORE: 15
TOILETING: A LOT
CLIMB 3 TO 5 STEPS WITH RAILING: TOTAL
TURNING FROM BACK TO SIDE WHILE IN FLAT BAD: A LOT
MOVING FROM LYING ON BACK TO SITTING ON SIDE OF FLAT BED WITH BEDRAILS: A LOT
DRESSING REGULAR UPPER BODY CLOTHING: A LITTLE
HELP NEEDED FOR BATHING: A LOT
MOVING TO AND FROM BED TO CHAIR: A LOT
MOBILITY SCORE: 11
WALKING IN HOSPITAL ROOM: A LOT
PERSONAL GROOMING: A LITTLE
STANDING UP FROM CHAIR USING ARMS: A LOT
DRESSING REGULAR LOWER BODY CLOTHING: A LOT
EATING MEALS: A LITTLE

## 2025-05-13 ASSESSMENT — PAIN - FUNCTIONAL ASSESSMENT
PAIN_FUNCTIONAL_ASSESSMENT: 0-10
PAIN_FUNCTIONAL_ASSESSMENT: 0-10

## 2025-05-13 ASSESSMENT — PAIN SCALES - GENERAL
PAINLEVEL_OUTOF10: 10 - WORST POSSIBLE PAIN
PAINLEVEL_OUTOF10: 10 - WORST POSSIBLE PAIN

## 2025-05-13 ASSESSMENT — ACTIVITIES OF DAILY LIVING (ADL): HOME_MANAGEMENT_TIME_ENTRY: 13

## 2025-05-13 NOTE — PROGRESS NOTES
[facility] Heart Progress Note           Rounding VANI/Cardiologist:  Kike Camargo, APRN-PINA, Dr Noel  Primary Cardiologist: [facility]    Date:  5/13/2025  Patient:  Isa Jack  YOB: 1950  MRN:  [id number]   Admit Date:  5/9/2025      SUBJECTIVE:    5/13/25  Patient is awake and alert she spoke to me at length she states that they were talking to her about her needing much assistance at home and they do recommend that she goes to a skilled facility.  Right now she is currently very reluctant I spoke to her at length regarding her heart standpoint she absolutely insist that it is medical management only and does not want any invasive testing at this time  EKG is A-fib rate in the 70s    Cardiology standpoint  CONCLUSIONS:   - Exam indication: Sustained atrial fibrillation   - The left ventricle is normal in size. Left ventricular systolic function is   severely decreased. EF = 29 ± 5% (2D biplane) Left ventricular diastolic function   was not evaluated due to AF.   - The right ventricle is normal in size. Right ventricular systolic function is   normal.   - The left atrial cavity is dilated.   - The right atrial cavity is mildly dilated.   - No significant valvular abnormalities.   - Exam was compared with the prior  echocardiographic exam performed on 1/20/25.      2006 cath  FINDINGS:   LMCA (Ostial), Discrete 70% lesion   LAD PATENT Luminal Irregular   CX PATENT Luminal Irregular   RCA PATENT Luminal Irregular    LVEF is slightly increased. MR is decreased. Otherwise similar findings.          VITALS:     Vitals:    05/12/25 2127 05/13/25 0036 05/13/25 0556 05/13/25 0744   BP: 125/69 137/68 121/77 136/85   BP Location:  Right arm Right arm    Patient Position:  Sitting Sitting    Pulse: 82 85 95    Resp:  20 20    Temp: 36.3 °C (97.3 °F) 36.1 °C (97 °F) 36.1 °C (97 °F) 36.4 °C (97.5 °F)   TempSrc:  Temporal Temporal    SpO2: 99% 96% 95% 99%   Weight:       Height:           Intake/Output  "Summary (Last 24 hours) at 5/13/2025 1103  Last data filed at 5/13/2025 0556  Gross per 24 hour   Intake 150 ml   Output 50 ml   Net 100 ml       Wt Readings from Last 4 Encounters:   05/09/25 91.1 kg (200 lb 13.4 oz)   10/09/23 103 kg (227 lb 15.3 oz)   04/20/23 95.1 kg (209 lb 9.6 oz)   10/28/22 65.9 kg (145 lb 6 oz)       CURRENT HOSPITAL MEDICATIONS:   Scheduled Medications[1]  Continuous Medications[2]  Current Outpatient Medications   Medication Instructions    albuterol (ProAir HFA) 90 mcg/actuation inhaler 2 puffs, Every 4 hours PRN    albuterol 90 mcg/actuation inhaler 2 puffs, inhalation, Every 4 hours PRN, OK to substitute in class    albuterol 2.5 mg, nebulization, Every 4 hours PRN    albuterol 2.5 mg, Every 4 hours PRN    apixaban (ELIQUIS) 5 mg, oral, Every 12 hours    atorvastatin (LIPITOR) 40 mg, Nightly    carvedilol (COREG) 6.25 mg, 2 times daily (morning and late afternoon)    clotrimazole (Lotrimin) 1 % cream 1 Application, Topical, 2 times daily, Apply to affected areas 2-3 times daily    cyanocobalamin, vitamin B-12, 2,500 mcg tablet, sublingual 1 each, sublingual, Daily RT    digoxin (LANOXIN) 125 mcg, oral, Daily    elastic bandage (BAND-AID ACTIVE FLEX TOP) USE AS DIRECTED. apply 1 4x4\" bandage to open wound once daily    elastic bandage bandage USE AS DIRECTED.    furosemide (LASIX) 40 mg, 2 times daily    HYDROcodone-acetaminophen (Norco) 5-325 mg tablet 1 tablet, oral, 2 times daily PRN    HYDROcodone-acetaminophen (Norco) 5-325 mg tablet 1 tablet, 2 times daily    meloxicam (MOBIC) 7.5 mg, Daily PRN    metFORMIN (GLUCOPHAGE) 1,000 mg, 2 times daily    methadone (DOLOPHINE) 5 mg, 2 times daily    montelukast (SINGULAIR) 10 mg, oral, Daily    montelukast (SINGULAIR) 10 mg, Nightly    mupirocin (Bactroban) 2 % ointment 1 Application, Topical, 2 times daily PRN    naloxone (NARCAN) 4 mg, nasal, As needed    nitroglycerin (NITROSTAT) 0.4 mg, sublingual, Every 5 min PRN    nitroglycerin " (NITROSTAT) 0.4 mg, sublingual, Every 5 min PRN    OneTouch Ultra Test strip 2 times daily    potassium chloride CR 20 mEq ER tablet 20 mEq, 2 times daily    sacubitriL-valsartan (Entresto) 24-26 mg tablet 1 tablet, oral, 2 times daily        PHYSICAL EXAMINATION:   GENERAL APPEARANCE: Well developed, well nourished, in no acute distress.  CHEST: Symmetric and non-tender.  INTEGUMENT: Skin warm and dry, without gross excoriationis or lesions.  HEENT: No gross abnormalities of conjunctiva, teeth, gums, oral mucosa  NECK: Supple, no JVD, no bruit. Thyroid not palpable. Carotid upstrokes normal.  NEURO/PSHCY: Alert and oriented x3; appropriate behavior and responses and responses, grossly normal cerebellar function with normal balance and coordination  LUNGS: scattered rales  HEART: S1, S2 irregular 1 out of 6 systolic murmur  ABDOMEN: Soft, nontender, no palpable hepatosplenomegaly, no mases, no bruits. Abdominal aorta not noted to be enlarged.  EXTREMITIES: Warm with good color, no clubbing or cyanois. 2 plus edema  PERIPHERAL VASCULAR: Pulses present and equally palpable; 1+ throughout. No femoral bruits.      LAB DATA:     CBC:   Results from last 7 days   Lab Units 05/13/25  0542 05/12/25 0541 05/11/25  0552   WBC AUTO x10*3/uL 13.3* 12.1* 10.3   RBC AUTO x10*6/uL 3.97* 3.69* 3.52*   HEMOGLOBIN g/dL 11.2* 10.4* 9.9*   HEMATOCRIT % 36.8 34.4* 32.6*   MCV fL 93 93 93   MCH pg 28.2 28.2 28.1   MCHC g/dL 30.4* 30.2* 30.4*   RDW % 16.4* 16.2* 16.4*   PLATELETS AUTO x10*3/uL 201 207 213     CMP:    Results from last 7 days   Lab Units 05/13/25  0542 05/12/25  0541 05/11/25  0552   SODIUM mmol/L 125* 127* 128*   POTASSIUM mmol/L 5.9* 5.2 4.4   CHLORIDE mmol/L 98 98 100   CO2 mmol/L 16* 21 21   BUN mg/dL 52* 46* 41*   CREATININE mg/dL 1.30* 1.34* 1.27*   GLUCOSE mg/dL 176* 149* 210*   PROTEIN TOTAL g/dL 6.8 6.7 6.4   CALCIUM mg/dL 8.7 8.6 8.5*   BILIRUBIN TOTAL mg/dL 1.0 0.7 0.7   ALK PHOS U/L 106 103 105   AST U/L 73*  54* 68*   ALT U/L 133* 131* 156*     BMP:    Results from last 7 days   Lab Units 05/13/25  0542 05/12/25  0541 05/11/25  0552   SODIUM mmol/L 125* 127* 128*   POTASSIUM mmol/L 5.9* 5.2 4.4   CHLORIDE mmol/L 98 98 100   CO2 mmol/L 16* 21 21   BUN mg/dL 52* 46* 41*   CREATININE mg/dL 1.30* 1.34* 1.27*   CALCIUM mg/dL 8.7 8.6 8.5*   GLUCOSE mg/dL 176* 149* 210*     Magnesium:  Results from last 7 days   Lab Units 05/13/25  0542 05/12/25  0541 05/11/25  0552   MAGNESIUM mg/dL 2.15 2.05 1.89     Troponin:      BNP:   Results from last 7 days   Lab Units 05/09/25  1719   BNP pg/mL 1,814*     Lipid Panel:         DIAGNOSTIC TESTING:   @No results found for this or any previous visit.    ECG 12 Lead  Result Date: 5/11/2025  Atrial fibrillation Low voltage QRS Nonspecific T wave abnormality Abnormal ECG When compared with ECG of 09-MAY-2025 17:06, (unconfirmed) Vent. rate has decreased BY  58 BPM Confirmed by Pato Bowman ([id number]) on 5/11/2025 10:09:26 AM    ECG 12 lead  Result Date: 5/11/2025  Atrial fibrillation with rapid ventricular response Low voltage QRS Nonspecific T wave abnormality Abnormal ECG When compared with ECG of 12-OCT-2023 18:37, Questionable change in QRS axis See ED provider note for full interpretation and clinical correlation Confirmed by Pato Bowman ([id number]) on 5/11/2025 10:09:21 AM    XR chest 1 view  Result Date: 5/9/2025  Interpreted By:  Joel Matthews, STUDY: XR CHEST 1 VIEW;  5/9/2025 5:46 pm   INDICATION: Signs/Symptoms:SOB.   COMPARISON: Chest radiograph 10/07/2023   ACCESSION NUMBER(S): [id number]   ORDERING CLINICIAN: ELVIE WHITE   FINDINGS:     CARDIOMEDIASTINAL SILHOUETTE: Cardiomediastinal silhouette is stable in size and configuration.   LUNGS: Left basilar airspace opacification small sided pleural effusion. No pneumothorax.   ABDOMEN: No remarkable upper abdominal findings.   BONES: No acute osseous changes.       1.  Left basilar airspace opacification with small left-sided  pleural effusion.     Signed by: Joel Byron 5/9/2025 5:58 PM Dictation workstation:   [id number]       XR chest 1 view   Final Result   1.  Left basilar airspace opacification with small left-sided pleural   effusion.             Signed by: Joel Byron 5/9/2025 5:58 PM   Dictation workstation:   [id number]          No echocardiogram results found for the past 14 days    RADIOLOGY:     XR chest 1 view   Final Result   1.  Left basilar airspace opacification with small left-sided pleural   effusion.             Signed by: Joel Byron 5/9/2025 5:58 PM   Dictation workstation:   [id number]          PROBLEM LIST   Problem List[3]    ASSESSMENT:     Chronic left lower extremity wound  Acute on chronic systolic heart failure NYHA class III EF 25 to 30%  Chronic A-fib on oral anticoagulation  History of CAD per patient medical management only  Hypertension  Diabetes  Morbid obesity  COPD  Hyponatremia  hyperkalemia  PLAN:   Patient seen and examined in conjunction with EROS Lyons/PINA and agree with the evaluation as noted above.  I have personally interviewed and examined the patient.   I have personally and independently reviewed the pertinentlabs and diagnostic testing.  I have personally verified the elements of the history and physical listed above and changes, if any, are noted below.     74-year-old lady with a history of persistent atrial fibrillation, coronary artery disease, diabetes, COPD who was seen by EP for A-fib with RVR.  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  She denies any chest pain.  She had a recent echocardiogram that showed severe LV dysfunction with EF of 25 to 30% and she has been having significant shortness of breath with minimal activity.  She has also noticed some orthopnea but no paroxysmal nocturnal dyspnea.     She was  initiated on GDMT but this is limited because of her relative hypotension.  Labs show BNP greater than 1800     Agree with exam as noted above.  Patient is visibly dyspneic at rest and tachypneic.  She has reduced breath sounds in the lung bases posteriorly.  Cardiac exam reveals irregular S1 and S2, no murmurs were appreciated.  Extremities: Left lower leg wound dressing intact.     ASSESSMENT AND PLAN:  1.  Severe LV dysfunction, with probable  ischemic cardiomyopathy with a known left main stenosis as noted above.  However patient has declined any form of intervention except continue medical treatment.  She is currently just on beta-blockers because of relative hypotension, but we will initiate low-dose Entresto if blood pressure improves.  2.  Persistent atrial fibrillation: Rate appears fairly well-controlled and she is on Eliquis for anticoagulation which we will continue.  CAD: With severe left main.3.  Stenosis currently on medical treatment.  Will continue with beta-blockers and aspirin.    5/13/25  Tele monitoring  Eliquis 5 mg p.o. twice daily  Lasix 40 mg p.o. twice daily  Lopressor 50 mg p.o. twice daily  Repeat potassium level now  No Aldactone due to the hyperkalemia  Cardiology following along    Tripp Camargo CNP  [facility]      Of note, this documentation is completed using the Dragon Dictation system (voice recognition software). There may be spelling and/or grammatical errors that were not corrected prior to final submission.    Please do not hesitate to call with questions.  Electronically signed by EROS Beck-CNP, on 5/13/2025 at 11:03 AM    Patient seen and examined in conjunction with EROS Tam and agree with the evaluation as noted above.  Patient remains in status quo, still appears quite dyspneic with minimal exertion but continues to deny any chest pain.  She is responding to diuresis.  She remains in A-fib with  controlled ventricular response.  At this time, we will continue with current cardiac medications.  AKA       [1] apixaban, 5 mg, oral, BID  [Held by provider] atorvastatin, 40 mg, oral, Nightly  cephalexin, 500 mg, oral, q8h JAY  furosemide, 40 mg, oral, BID  insulin lispro, 0-10 Units, subcutaneous, TID AC  melatonin, 6 mg, oral, Nightly  methadone, 5 mg, oral, BID  metoprolol tartrate, 50 mg, oral, BID  montelukast, 10 mg, oral, Nightly  pantoprazole, 40 mg, oral, Daily    [2]    [3]   Patient Active Problem List  Diagnosis    Adnexal mass    Asthma in adult, unspecified asthma severity, uncomplicated (HHS-HCC)    Cellulitis    Chronic congestive heart failure    Chronic pain syndrome    Coronary artery disease involving native heart with angina pectoris    DJD (degenerative joint disease)    Endometrial hyperplasia    Essential hypertension    HLD (hyperlipidemia)    Pernicious anemia    Physical debility    Vitamin B12 deficiency    Skin ulcer of ankle (Multi)    Type 2 diabetes mellitus, without long-term current use of insulin (Multi)    Sepsis due to pneumonia (Multi)    Cardiomyopathy, ischemic    Atrial fibrillation with rapid ventricular response (Multi)

## 2025-05-13 NOTE — PROGRESS NOTES
Isa Jack is a 74 y.o. female on day 4 of admission presenting with Atrial fibrillation with rapid ventricular response (Multi).    Subjective   Apparently was agitated, impulsive overnight, required Geodon. This AM, she is requesting help to go to the bathroom. Wants to go home. Alert, oriented to self and hospital.     Objective     Physical Exam  Vitals and nursing note reviewed.   Constitutional:       General: She is not in acute distress.     Appearance: Normal appearance. She is not ill-appearing or toxic-appearing.   HENT:      Head: Normocephalic and atraumatic.      Nose: Nose normal.      Mouth/Throat:      Mouth: Mucous membranes are moist.   Eyes:      Extraocular Movements: Extraocular movements intact.      Conjunctiva/sclera: Conjunctivae normal.      Pupils: Pupils are equal, round, and reactive to light.   Cardiovascular:      Rate and Rhythm: Normal rate. Rhythm irregular.      Heart sounds: No murmur heard.     No gallop.   Pulmonary:      Effort: Pulmonary effort is normal. No respiratory distress.      Breath sounds: Normal breath sounds. No wheezing, rhonchi or rales.   Abdominal:      General: Abdomen is flat. Bowel sounds are normal. There is no distension.      Palpations: Abdomen is soft. There is no mass.      Tenderness: There is no abdominal tenderness.   Musculoskeletal:         General: No swelling or tenderness. Normal range of motion.      Cervical back: Normal range of motion and neck supple.      Right lower leg: Edema present.      Left lower leg: Edema present.   Skin:     General: Skin is warm and dry.   Neurological:      Mental Status: She is alert and oriented to person, place, and time.      Motor: Weakness present.   Psychiatric:         Mood and Affect: Mood normal.         Behavior: Behavior normal.         Thought Content: Thought content normal.         Judgment: Judgment normal.         Last Recorded Vitals:  /85   Pulse 95   Temp 36.4 °C (97.5 °F)    Resp 20   Ht 1.524 m (5')   Wt 91.1 kg (200 lb 13.4 oz)   SpO2 99%   BMI 39.22 kg/m²      Scheduled medications:  Scheduled Medications[1]  Continuous medications:  Continuous Medications[2]  PRN medications:  PRN Medications[3]     Relevant Results:  Results for orders placed or performed during the hospital encounter of 05/09/25 (from the past 24 hours)   POCT GLUCOSE   Result Value Ref Range    POCT Glucose 184 (H) 74 - 99 mg/dL   POCT GLUCOSE   Result Value Ref Range    POCT Glucose 223 (H) 74 - 99 mg/dL   ECG 12 lead   Result Value Ref Range    Ventricular Rate 80 BPM    Atrial Rate 38 BPM    QRS Duration 96 ms    QT Interval 314 ms    QTC Calculation(Bazett) 362 ms    R Axis -15 degrees    T Axis 164 degrees    QRS Count 13 beats    Q Onset 216 ms    T Offset 373 ms    QTC Fredericia 345 ms   CBC and Auto Differential   Result Value Ref Range    WBC 13.3 (H) 4.4 - 11.3 x10*3/uL    nRBC 1.2 (H) 0.0 - 0.0 /100 WBCs    RBC 3.97 (L) 4.00 - 5.20 x10*6/uL    Hemoglobin 11.2 (L) 12.0 - 16.0 g/dL    Hematocrit 36.8 36.0 - 46.0 %    MCV 93 80 - 100 fL    MCH 28.2 26.0 - 34.0 pg    MCHC 30.4 (L) 32.0 - 36.0 g/dL    RDW 16.4 (H) 11.5 - 14.5 %    Platelets 201 150 - 450 x10*3/uL    Neutrophils % 88.9 40.0 - 80.0 %    Immature Granulocytes %, Automated 0.8 0.0 - 0.9 %    Lymphocytes % 2.7 13.0 - 44.0 %    Monocytes % 7.2 2.0 - 10.0 %    Eosinophils % 0.2 0.0 - 6.0 %    Basophils % 0.2 0.0 - 2.0 %    Neutrophils Absolute 11.79 (H) 1.60 - 5.50 x10*3/uL    Immature Granulocytes Absolute, Automated 0.10 0.00 - 0.50 x10*3/uL    Lymphocytes Absolute 0.36 (L) 0.80 - 3.00 x10*3/uL    Monocytes Absolute 0.95 (H) 0.05 - 0.80 x10*3/uL    Eosinophils Absolute 0.02 0.00 - 0.40 x10*3/uL    Basophils Absolute 0.03 0.00 - 0.10 x10*3/uL   Comprehensive metabolic panel   Result Value Ref Range    Glucose 176 (H) 74 - 99 mg/dL    Sodium 125 (L) 136 - 145 mmol/L    Potassium 5.9 (H) 3.5 - 5.3 mmol/L    Chloride 98 98 - 107 mmol/L     Bicarbonate 16 (L) 21 - 32 mmol/L    Anion Gap 17 10 - 20 mmol/L    Urea Nitrogen 52 (H) 6 - 23 mg/dL    Creatinine 1.30 (H) 0.50 - 1.05 mg/dL    eGFR 43 (L) >60 mL/min/1.73m*2    Calcium 8.7 8.6 - 10.3 mg/dL    Albumin 3.7 3.4 - 5.0 g/dL    Alkaline Phosphatase 106 33 - 136 U/L    Total Protein 6.8 6.4 - 8.2 g/dL    AST 73 (H) 9 - 39 U/L    Bilirubin, Total 1.0 0.0 - 1.2 mg/dL     (H) 7 - 45 U/L   Magnesium   Result Value Ref Range    Magnesium 2.15 1.60 - 2.40 mg/dL   ECG 12 Lead   Result Value Ref Range    Ventricular Rate 78 BPM    Atrial Rate 84 BPM    QRS Duration 108 ms    QT Interval 354 ms    QTC Calculation(Bazett) 403 ms    R Axis -12 degrees    T Axis 263 degrees    QRS Count 13 beats    Q Onset 215 ms    T Offset 392 ms    QTC Fredericia 386 ms       Imaging  No results found.    Cardiology, Vascular, and Other Imaging  ECG 12 Lead  Result Date: 5/13/2025  Atrial fibrillation with a competing junctional pacemaker Incomplete left bundle branch block Nonspecific T wave abnormality Abnormal ECG When compared with ECG of 12-MAY-2025 21:22, (unconfirmed) Borderline criteria for Anterior infarct are no longer Present Borderline criteria for Anterolateral infarct are no longer Present Nonspecific T wave abnormality no longer evident in Anterior leads    ECG 12 lead  Result Date: 5/13/2025  Atrial fibrillation Low voltage QRS Possible Anterolateral infarct (cited on or before 12-MAY-2025) Abnormal ECG When compared with ECG of 12-MAY-2025 06:51, Questionable change in initial forces of Anterior leads QT has shortened    ECG 12 Lead  Result Date: 5/12/2025  Atrial fibrillation Anterior infarct , age undetermined Abnormal ECG When compared with ECG of 11-MAY-2025 06:26, No significant change was found Confirmed by Pato Bowman ([id number]) on 5/12/2025 2:33:52 PM                       Assessment/Plan   Assessment & Plan  Atrial fibrillation with rapid ventricular response (Multi)    A fib RVR  RVR resolved  -  cardio following  - metoprolol tartrate, will eventually need metoprolol succinate    DOMINIC  - bolus 500cc  - hold lasix    Possible leg cellulitis  - keflex    Acute on chronic systolic and diastolic CHF  improved  - cardio followinig  - was on lasix gtt    Chronic hypoxic resp failure  - on home 2L NC  - singulair    HypoNa  - 500cc bolus    Transaminitis  - hold statin    Leukocytosis  - monitor    Normocytic anemia  - monitor    HTN  - losartan    HLD  - hold statin     GERD  - ppi    DVT ppx: eliquis  Dispo: monitor clinically, monitor Cr    5/13/25  -remains in rate controlled afib. EP signed off now, okay to resume metoprolol succinate on dc. Dig was stopped 2/2 toxicity. On Eliquis for AC. Okay to dc from their standpoint and fu with [facility] as outpt. Keep on tele here.   -cardiology following for hx of CAD and ICM managed medically d/t pt historical refusal of cath. GDMT has been limited by hypotension. Currently only on BB. Not on antiplatelet, presumably due to use of Eliquis above. Statin currently held d/t mild LFT elevation. Defer to cards on med adjustments.   -worsened hyponatremia after IVF yesterday. Her Scr is stable, 1.30 today, and appears to be at historical baseline per [facility] records. Stop further ivf. Would resume PO Lasix today. Check UA, sosm, uosm, maria isabel. Hyperkalemia noted. Rpt BMP in PM.   -add scheduled melatonin. Does not need further antipsychotic currently.   -add SSI for hyperglycemia  -mild leukocytosis, possibly 2/2 LE cellulitis. Checking UA. Cont empiric Keflex.   -dispo tbd, pt/ot rec moderate intensity, however have not been able to touch base with son. TCC on case.     Waldemar Amezcua MD  Hospitalist         [1] apixaban, 5 mg, oral, BID  [Held by provider] atorvastatin, 40 mg, oral, Nightly  cephalexin, 500 mg, oral, q8h JAY  [Held by provider] furosemide, 40 mg, oral, BID  insulin lispro, 0-10 Units, subcutaneous, TID AC  melatonin, 6 mg, oral, Nightly  methadone, 5 mg, oral,  BID  metoprolol tartrate, 50 mg, oral, BID  montelukast, 10 mg, oral, Nightly  pantoprazole, 40 mg, oral, Daily     [2]    [3] PRN medications: acetaminophen **OR** acetaminophen **OR** acetaminophen, albuterol, haloperidol lactate, HYDROcodone-acetaminophen, ondansetron **OR** ondansetron

## 2025-05-13 NOTE — PROGRESS NOTES
Physical Therapy    Physical Therapy Treatment    Patient Name: Isa Jack  MRN: [id number]  Department: [facility]  Room: [facility]80[facility]  Today's Date: 5/13/2025  Time Calculation  Start Time: 1123  Stop Time: 1149  Time Calculation (min): 26 min         Assessment/Plan   PT Assessment  PT Assessment Results: Decreased strength, Decreased range of motion, Decreased endurance, Decreased mobility, Obesity, Decreased skin integrity, Pain  Rehab Prognosis: Fair  Barriers to Discharge Home: Physical needs, Caregiver assistance  End of Session Communication: Bedside nurse, PCT/NA/CTA, Care Coordinator  Assessment Comment: Patient will beneift from continued PT treatment throughout hospital stay to progress transfers, standing tolerance, and ambulation  End of Session Patient Position: Bed, 3 rail up, Alarm off, not on at start of session  PT Plan  Inpatient/Swing Bed or Outpatient: Inpatient  PT Plan  Treatment/Interventions: Bed mobility, Transfer training, Gait training  PT Plan: Ongoing PT  PT Frequency: 3 times per week  PT Discharge Recommendations: Moderate intensity level of continued care  PT Recommended Transfer Status: Assist x2      General Visit Information:   PT  Visit  PT Received On: 05/13/25  General  Reason for Referral: Impaired mobility  Referred By: PT/OT 5/9/25  Humberto MATHIS  Past Medical History Relevant to Rehab: asthma, dyslipidemia, HTN, CHF, DM, CAD, DJD, anemia, skin ulcer of L ankle, chronic cellulitis of bilateral LE, Afib on eliquis, ischemic cardiomyopathy, LVEF= 25-30%  Co-Treatment: OT  Co-Treatment Reason: to maximize patient/staff safety  Prior to Session Communication: Bedside nurse, PCT/NA/CTA  Patient Position Received: Bed, 3 rail up, Alarm off, not on at start of session  General Comment: Pt is agreeable to PT treatment. Nursing requesting pt remain in bed at end of session due to concerns of skin breakdown as she has been sitting in chair for extended time periods. ([place] tele.  Increased edema BLE)    Subjective   Precautions:  Precautions  Medical Precautions: Fall precautions, Oxygen therapy device and L/min            Objective   Pain:  Pain Assessment  Pain Assessment: 0-10  0-10 (Numeric) Pain Score: 10 - Worst possible pain  Pain Type: Chronic pain  Pain Location: Leg  Pain Orientation: Left, Lower  Cognition:  Cognition  Overall Cognitive Status: Within Functional Limits  Coordination:     Postural Control:     Extremity/Trunk Assessments:        Activity Tolerance:  Activity Tolerance  Endurance: Decreased tolerance for upright activites  Treatments:  Bed Mobility  Bed Mobility: Yes (Sup>sit with mod A for both LLE and trunk assist with HOB elevated. Sit>sup with max A x2 to bring BLE into the bed the and for trunk management.)    Ambulation/Gait Training  Ambulation/Gait Training Performed: Yes (Pt taking ~5 steps toward HOB with WW/gait belt and min A x 2. Pt with delayed processing needing increased cueing throughout for sequencing technique for both BLE and WW. Stops often attempting to lean fwd on WW.)  Transfers  Transfer: Yes (Sit<>stand transfers from EOB with min A x 2 and cues throughout for hand placement. Pt using a WW/gait belt.)    Outcome Measures:  [facility] Basic Mobility  Turning from your back to your side while in a flat bed without using bedrails: A lot  Moving from lying on your back to sitting on the side of a flat bed without using bedrails: A lot  Moving to and from bed to chair (including a wheelchair): A lot  Standing up from a chair using your arms (e.g. wheelchair or bedside chair): A lot  To walk in hospital room: A lot  Climbing 3-5 steps with railing: Total  Basic Mobility - Total Score: 11    Education Documentation  Mobility Training, taught by Miri Navas PTA at 5/13/2025 12:37 PM.  Learner: Patient  Readiness: Acceptance  Method: Explanation  Response: Needs Reinforcement    Education Comments  No comments found.        EDUCATION:  Outpatient  "Education  Individual(s) Educated: Patient  Education Provided: Body Mechanics, Fall Risk, Home Safety    Encounter Problems       Encounter Problems (Active)       PT Problem       sit<>stand, CGA use of WW for standing support  (Progressing)       Start:  05/12/25    Expected End:  05/26/25            patient to demonstrate standing tolerance of 2 minutes with use of WW for standing support  (Progressing)       Start:  05/12/25    Expected End:  05/26/25            patient to ambulate 15' 2-3 bouts, mod A x2, use of WW  (Progressing)       Start:  05/12/25    Expected End:  05/26/25            chair to chair transfer, use of WW, CGA  (Progressing)       Start:  05/12/25    Expected End:  05/26/25                patient to demonstrate ability to perform toe taps onto 2\" step, 5x bilaterally, use of WW for standing support  (Not Progressing)       Start:  05/12/25    Expected End:  05/26/25                       "

## 2025-05-13 NOTE — PROGRESS NOTES
Met with patient at bedside to discuss dc planning needs.  She confirms she is active with [facility] , aides 3-4 times a week who assist with bathing. [facility] states pt is no longer active with their agency, however did confirm [facility].  Pt confirms she resides at home with son and DIL. Meals are delivered  2x daily to the home for the patient.   Discussed therapy evals and pt states she has been to [facility] in the past and that is her preference ,  Initial referral has been sent via care port.    Will follow up with son - per nursing will be up to visit this afternoon

## 2025-05-13 NOTE — PROGRESS NOTES
Referral was sent to [facility], they are able to accept.  Pt. With [facility] ins. Will need completed and signed gold form and AVS in order to complete  [id number] to obtain precert with this ins. Co.  [facility] is aware.

## 2025-05-13 NOTE — PROGRESS NOTES
Attempted to contact brett Young at new number provided [phone] x,  voicemail box is full and unable to leave message.    Son and MIRIAN Justice at bedside confirmed dc plan of [facility] . Updated that pt has been accepted to facility and precert will need to be obtained.  ADOD 2-3 days.    MIRIAN contact number obtained [phone]

## 2025-05-13 NOTE — PROGRESS NOTES
Occupational Therapy    OT Treatment    Patient Name: Isa Jack  MRN: [id number]  Department: [facility]  Room: [facility]809-  Today's Date: 5/13/2025  Time Calculation  Start Time: 1122  Stop Time: 1148  Time Calculation (min): 26 min        Assessment:  OT Assessment: Pt presents with decreased strength, balance, and endurance which impact her ADL and functional transfer status. Pt would benefit from skilled OT to address deficits.  Prognosis: Good  Barriers to Discharge Home: Physical needs  Physical Needs: Stair navigation into home limited by function/safety, Ambulating household distances limited by function/safety, 24hr mobility assistance needed, 24hr ADL assistance needed, High falls risk due to function or environment  Evaluation/Treatment Tolerance: Patient limited by fatigue, Patient limited by pain  Medical Staff Made Aware: Yes  End of Session Communication: Bedside nurse, PCT/NA/CTA, Care Coordinator  End of Session Patient Position: Bed, 3 rail up, Alarm off, not on at start of session  Prognosis: Good  Evaluation/Treatment Tolerance: Patient limited by fatigue, Patient limited by pain  Medical Staff Made Aware: Yes  Plan:  Treatment Interventions: ADL retraining, Functional transfer training, Endurance training, Patient/family training  OT Frequency: 2 times per week  OT Discharge Recommendations: Moderate intensity level of continued care  OT Recommended Transfer Status: Moderate assist, Assist of 2  OT - OK to Discharge: Yes  Treatment Interventions: ADL retraining, Functional transfer training, Endurance training, Patient/family training    Subjective   Previous Visit Info:     General:  General  Reason for Referral: Impaired mobility  Referred By: PT/OT 5/9/25  Humberto MATHIS  Past Medical History Relevant to Rehab: asthma, dyslipidemia, HTN, CHF, DM, CAD, DJD, anemia, skin ulcer of L ankle, chronic cellulitis of bilateral LE, Afib on eliquis, ischemic cardiomyopathy, LVEF= 25-30%  Co-Treatment:  PT  Co-Treatment Reason: to maximize patient/staff safety  Prior to Session Communication: Bedside nurse, PCT/NA/CTA  Patient Position Received: Bed, 3 rail up, Alarm off, not on at start of session  General Comment: Pt is agreeable to OT treatment. Nursing requesting pt remain in bed at end of session due to concerns of skin breakdown as she has been sitting in chair for extended time periods. ([place], tele. Increased edema BLE)  Precautions:  Medical Precautions: Fall precautions, Oxygen therapy device and L/min         Pain:  Pain Assessment  Pain Assessment: 0-10  0-10 (Numeric) Pain Score: 10 - Worst possible pain  Pain Type: Chronic pain  Pain Location: Leg  Pain Orientation: Left, Lower  Pain Interventions: Repositioned, Ambulation/increased activity (activity modification)  Response to Interventions: Content/relaxed    Objective    Cognition:  Cognition  Overall Cognitive Status: Impaired  Orientation Level: Disoriented to situation  Safety Judgment: Decreased awareness of need for assistance  Problem Solving: Assistance required to identify errors made  Attention: Exceptions to WFL  Problem Solving: Exceptions to WFL  Safety/Judgement: Exceptions to WFL  Insight: Mild  Planning: Reduced planning skills  Organization: Mildly disorganized    Activities of Daily Living: UE Dressing  UE Dressing Comments: don gown with min assist and cues for technique.  Intermittent assist to maintain sitting balance needed.       Bed Mobility/Transfers: Bed Mobility  Bed Mobility: Yes (Sup>sit with mod A x2 for both LLE and trunk assist with HOB elevated. Sit>sup with max A x 2 to bring BLE into the bed the and for trunk management.)    Transfers  Transfer: Yes (Sit<>stand transfers from EOB with min A x 2 and cues throughout for hand placement. Pt using a WW/gait belt.)    Sitting Balance:  Dynamic Sitting Balance  Dynamic Sitting-Comments: Pt demo'd fair/fair - balance at EOB while performing adl tasks        Therapy/Activity: Balance/Neuromuscular Re-Education  Balance/Neuromuscular Re-Education Activity Performed: Yes (Pt standing at EOB with WW and min A x 1-2 for ~6 min with F-/P+ balance. Noted to lean fwd onto WW at times.)      Outcome Measures:[facility] Daily Activity  Putting on and taking off regular lower body clothing: A lot  Bathing (including washing, rinsing, drying): A lot  Putting on and taking off regular upper body clothing: A little  Toileting, which includes using toilet, bedpan or urinal: A lot  Taking care of personal grooming such as brushing teeth: A little  Eating Meals: A little  Daily Activity - Total Score: 15        Education Documentation  ADL Training, taught by Jennifer L Felty, OTA at 5/13/2025  1:18 PM.  Learner: Patient  Readiness: Acceptance  Method: Explanation  Response: Needs Reinforcement     EDUCATION:  Education  Individual(s) Educated: Patient  Education Provided:  (Pt educated on fall prevention techniques; safe transfer techniques including hand placement and positioning; techniques/strategies for balance improvement; compensatory adl techniques; safe body mechanics; energy conservation techniques.)  Patient Response to Education: Patient/Caregiver Verbalized Understanding of Information  Education Comment: continued education and reinforcement needed    Goals:  Encounter Problems       Encounter Problems (Active)       OT Goals       pt will dress UB with SBA (Progressing)       Start:  05/12/25    Expected End:  05/26/25            Pt will transfer to bed, chair, toilet with CGA (Progressing)       Start:  05/12/25    Expected End:  05/26/25            Pt will participate in gentle strengthening R elbow, wrist, hand and L UE  (Progressing)       Start:  05/12/25    Expected End:  05/26/25            Pt will demo fair + dyn sitting balance with ADLS (Progressing)       Start:  05/12/25    Expected End:  05/26/25

## 2025-05-14 ENCOUNTER — APPOINTMENT (OUTPATIENT)
Dept: CARDIOLOGY | Facility: HOSPITAL | Age: 75
DRG: 291 | End: 2025-05-14
Payer: COMMERCIAL

## 2025-05-14 LAB
ALBUMIN SERPL BCP-MCNC: 3.2 G/DL (ref 3.4–5)
ALP SERPL-CCNC: 93 U/L (ref 33–136)
ALT SERPL W P-5'-P-CCNC: 90 U/L (ref 7–45)
AMMONIA PLAS-SCNC: 45 UMOL/L (ref 16–53)
ANION GAP SERPL CALC-SCNC: 15 MMOL/L (ref 10–20)
AST SERPL W P-5'-P-CCNC: 37 U/L (ref 9–39)
ATRIAL RATE: 38 BPM
BACTERIA BLD CULT: NORMAL
BASOPHILS # BLD AUTO: 0.04 X10*3/UL (ref 0–0.1)
BASOPHILS NFR BLD AUTO: 0.3 %
BILIRUB SERPL-MCNC: 1 MG/DL (ref 0–1.2)
BUN SERPL-MCNC: 54 MG/DL (ref 6–23)
CALCIUM SERPL-MCNC: 8.3 MG/DL (ref 8.6–10.3)
CHLORIDE SERPL-SCNC: 98 MMOL/L (ref 98–107)
CO2 SERPL-SCNC: 18 MMOL/L (ref 21–32)
CREAT SERPL-MCNC: 1.19 MG/DL (ref 0.5–1.05)
EGFRCR SERPLBLD CKD-EPI 2021: 48 ML/MIN/1.73M*2
EOSINOPHIL # BLD AUTO: 0.31 X10*3/UL (ref 0–0.4)
EOSINOPHIL NFR BLD AUTO: 2.2 %
ERYTHROCYTE [DISTWIDTH] IN BLOOD BY AUTOMATED COUNT: 16.7 % (ref 11.5–14.5)
GLUCOSE BLD MANUAL STRIP-MCNC: 194 MG/DL (ref 74–99)
GLUCOSE BLD MANUAL STRIP-MCNC: 228 MG/DL (ref 74–99)
GLUCOSE BLD MANUAL STRIP-MCNC: 246 MG/DL (ref 74–99)
GLUCOSE BLD MANUAL STRIP-MCNC: 249 MG/DL (ref 74–99)
GLUCOSE SERPL-MCNC: 187 MG/DL (ref 74–99)
HCT VFR BLD AUTO: 35.6 % (ref 36–46)
HGB BLD-MCNC: 10.5 G/DL (ref 12–16)
IMM GRANULOCYTES # BLD AUTO: 0.06 X10*3/UL (ref 0–0.5)
IMM GRANULOCYTES NFR BLD AUTO: 0.4 % (ref 0–0.9)
LACTATE SERPL-SCNC: 1.2 MMOL/L (ref 0.4–2)
LYMPHOCYTES # BLD AUTO: 0.33 X10*3/UL (ref 0.8–3)
LYMPHOCYTES NFR BLD AUTO: 2.4 %
MAGNESIUM SERPL-MCNC: 2.09 MG/DL (ref 1.6–2.4)
MCH RBC QN AUTO: 27.9 PG (ref 26–34)
MCHC RBC AUTO-ENTMCNC: 29.5 G/DL (ref 32–36)
MCV RBC AUTO: 94 FL (ref 80–100)
MONOCYTES # BLD AUTO: 1.14 X10*3/UL (ref 0.05–0.8)
MONOCYTES NFR BLD AUTO: 8.1 %
NEUTROPHILS # BLD AUTO: 12.12 X10*3/UL (ref 1.6–5.5)
NEUTROPHILS NFR BLD AUTO: 86.6 %
NRBC BLD-RTO: 0.9 /100 WBCS (ref 0–0)
PLATELET # BLD AUTO: 170 X10*3/UL (ref 150–450)
POTASSIUM SERPL-SCNC: 4.8 MMOL/L (ref 3.5–5.3)
PROT SERPL-MCNC: 6 G/DL (ref 6.4–8.2)
Q ONSET: 216 MS
QRS COUNT: 13 BEATS
QRS DURATION: 96 MS
QT INTERVAL: 314 MS
QTC CALCULATION(BAZETT): 362 MS
QTC FREDERICIA: 345 MS
R AXIS: -15 DEGREES
RBC # BLD AUTO: 3.77 X10*6/UL (ref 4–5.2)
SODIUM SERPL-SCNC: 126 MMOL/L (ref 136–145)
T AXIS: 164 DEGREES
T OFFSET: 373 MS
T4 FREE SERPL-MCNC: 0.74 NG/DL (ref 0.61–1.12)
TSH SERPL-ACNC: 6.28 MIU/L (ref 0.44–3.98)
VENTRICULAR RATE: 80 BPM
WBC # BLD AUTO: 14 X10*3/UL (ref 4.4–11.3)

## 2025-05-14 PROCEDURE — 85025 COMPLETE CBC W/AUTO DIFF WBC: CPT | Performed by: INTERNAL MEDICINE

## 2025-05-14 PROCEDURE — 93010 ELECTROCARDIOGRAM REPORT: CPT | Performed by: INTERNAL MEDICINE

## 2025-05-14 PROCEDURE — 2500000004 HC RX 250 GENERAL PHARMACY W/ HCPCS (ALT 636 FOR OP/ED): Mod: JZ | Performed by: NURSE PRACTITIONER

## 2025-05-14 PROCEDURE — 2500000002 HC RX 250 W HCPCS SELF ADMINISTERED DRUGS (ALT 637 FOR MEDICARE OP, ALT 636 FOR OP/ED): Performed by: INTERNAL MEDICINE

## 2025-05-14 PROCEDURE — 36415 COLL VENOUS BLD VENIPUNCTURE: CPT | Performed by: INTERNAL MEDICINE

## 2025-05-14 PROCEDURE — 82947 ASSAY GLUCOSE BLOOD QUANT: CPT

## 2025-05-14 PROCEDURE — 2500000001 HC RX 250 WO HCPCS SELF ADMINISTERED DRUGS (ALT 637 FOR MEDICARE OP): Performed by: STUDENT IN AN ORGANIZED HEALTH CARE EDUCATION/TRAINING PROGRAM

## 2025-05-14 PROCEDURE — 83605 ASSAY OF LACTIC ACID: CPT | Performed by: INTERNAL MEDICINE

## 2025-05-14 PROCEDURE — 2500000004 HC RX 250 GENERAL PHARMACY W/ HCPCS (ALT 636 FOR OP/ED): Mod: JZ | Performed by: INTERNAL MEDICINE

## 2025-05-14 PROCEDURE — 2500000001 HC RX 250 WO HCPCS SELF ADMINISTERED DRUGS (ALT 637 FOR MEDICARE OP): Performed by: INTERNAL MEDICINE

## 2025-05-14 PROCEDURE — 84443 ASSAY THYROID STIM HORMONE: CPT | Performed by: INTERNAL MEDICINE

## 2025-05-14 PROCEDURE — 99232 SBSQ HOSP IP/OBS MODERATE 35: CPT | Performed by: INTERNAL MEDICINE

## 2025-05-14 PROCEDURE — 84439 ASSAY OF FREE THYROXINE: CPT | Performed by: INTERNAL MEDICINE

## 2025-05-14 PROCEDURE — 93005 ELECTROCARDIOGRAM TRACING: CPT

## 2025-05-14 PROCEDURE — 2500000005 HC RX 250 GENERAL PHARMACY W/O HCPCS: Performed by: INTERNAL MEDICINE

## 2025-05-14 PROCEDURE — 82140 ASSAY OF AMMONIA: CPT | Performed by: INTERNAL MEDICINE

## 2025-05-14 PROCEDURE — 2500000001 HC RX 250 WO HCPCS SELF ADMINISTERED DRUGS (ALT 637 FOR MEDICARE OP): Performed by: NURSE PRACTITIONER

## 2025-05-14 PROCEDURE — S0109 METHADONE ORAL 5MG: HCPCS | Performed by: STUDENT IN AN ORGANIZED HEALTH CARE EDUCATION/TRAINING PROGRAM

## 2025-05-14 PROCEDURE — 83735 ASSAY OF MAGNESIUM: CPT | Performed by: INTERNAL MEDICINE

## 2025-05-14 PROCEDURE — 80053 COMPREHEN METABOLIC PANEL: CPT | Performed by: INTERNAL MEDICINE

## 2025-05-14 PROCEDURE — 2500000002 HC RX 250 W HCPCS SELF ADMINISTERED DRUGS (ALT 637 FOR MEDICARE OP, ALT 636 FOR OP/ED): Performed by: STUDENT IN AN ORGANIZED HEALTH CARE EDUCATION/TRAINING PROGRAM

## 2025-05-14 PROCEDURE — 1200000002 HC GENERAL ROOM WITH TELEMETRY DAILY

## 2025-05-14 RX ORDER — NYSTATIN 100000 [USP'U]/G
1 POWDER TOPICAL 2 TIMES DAILY
Status: DISCONTINUED | OUTPATIENT
Start: 2025-05-14 | End: 2025-05-20 | Stop reason: HOSPADM

## 2025-05-14 RX ORDER — ZIPRASIDONE MESYLATE 20 MG/ML
5 INJECTION, POWDER, LYOPHILIZED, FOR SOLUTION INTRAMUSCULAR ONCE
Status: DISCONTINUED | OUTPATIENT
Start: 2025-05-14 | End: 2025-05-15

## 2025-05-14 RX ADMIN — ONDANSETRON 4 MG: 2 INJECTION INTRAMUSCULAR; INTRAVENOUS at 01:26

## 2025-05-14 RX ADMIN — CEPHALEXIN 500 MG: 500 CAPSULE ORAL at 07:13

## 2025-05-14 RX ADMIN — Medication 6 MG: at 20:31

## 2025-05-14 RX ADMIN — MONTELUKAST SODIUM 10 MG: 10 TABLET, FILM COATED ORAL at 20:31

## 2025-05-14 RX ADMIN — HYDROCODONE BITARTRATE AND ACETAMINOPHEN 1 TABLET: 5; 325 TABLET ORAL at 22:27

## 2025-05-14 RX ADMIN — ACETAMINOPHEN 650 MG: 325 TABLET ORAL at 05:17

## 2025-05-14 RX ADMIN — HALOPERIDOL LACTATE 2 MG: 5 INJECTION, SOLUTION INTRAMUSCULAR at 16:40

## 2025-05-14 RX ADMIN — APIXABAN 5 MG: 5 TABLET, FILM COATED ORAL at 00:00

## 2025-05-14 RX ADMIN — INSULIN LISPRO 4 UNITS: 100 INJECTION, SOLUTION INTRAVENOUS; SUBCUTANEOUS at 12:02

## 2025-05-14 RX ADMIN — CEPHALEXIN 500 MG: 500 CAPSULE ORAL at 21:05

## 2025-05-14 RX ADMIN — FUROSEMIDE 40 MG: 40 TABLET ORAL at 17:01

## 2025-05-14 RX ADMIN — PANTOPRAZOLE SODIUM 40 MG: 40 TABLET, DELAYED RELEASE ORAL at 06:55

## 2025-05-14 RX ADMIN — APIXABAN 5 MG: 5 TABLET, FILM COATED ORAL at 20:32

## 2025-05-14 RX ADMIN — FUROSEMIDE 40 MG: 40 TABLET ORAL at 09:02

## 2025-05-14 RX ADMIN — APIXABAN 5 MG: 5 TABLET, FILM COATED ORAL at 09:02

## 2025-05-14 RX ADMIN — INSULIN LISPRO 4 UNITS: 100 INJECTION, SOLUTION INTRAVENOUS; SUBCUTANEOUS at 17:01

## 2025-05-14 RX ADMIN — METOPROLOL TARTRATE 50 MG: 50 TABLET, FILM COATED ORAL at 20:32

## 2025-05-14 RX ADMIN — CEPHALEXIN 500 MG: 500 CAPSULE ORAL at 17:01

## 2025-05-14 RX ADMIN — NYSTATIN 1 APPLICATION: 100000 POWDER TOPICAL at 17:00

## 2025-05-14 RX ADMIN — INSULIN LISPRO 2 UNITS: 100 INJECTION, SOLUTION INTRAVENOUS; SUBCUTANEOUS at 09:02

## 2025-05-14 RX ADMIN — METHADONE HYDROCHLORIDE 5 MG: 5 TABLET ORAL at 20:32

## 2025-05-14 ASSESSMENT — COGNITIVE AND FUNCTIONAL STATUS - GENERAL
DAILY ACTIVITIY SCORE: 18
MOBILITY SCORE: 17
TOILETING: A LOT
HELP NEEDED FOR BATHING: A LITTLE
DRESSING REGULAR UPPER BODY CLOTHING: A LITTLE
MOVING FROM LYING ON BACK TO SITTING ON SIDE OF FLAT BED WITH BEDRAILS: A LITTLE
TURNING FROM BACK TO SIDE WHILE IN FLAT BAD: A LITTLE
CLIMB 3 TO 5 STEPS WITH RAILING: A LOT
WALKING IN HOSPITAL ROOM: A LITTLE
DRESSING REGULAR LOWER BODY CLOTHING: A LITTLE
STANDING UP FROM CHAIR USING ARMS: A LITTLE
PERSONAL GROOMING: A LITTLE
MOVING TO AND FROM BED TO CHAIR: A LITTLE

## 2025-05-14 ASSESSMENT — PAIN - FUNCTIONAL ASSESSMENT
PAIN_FUNCTIONAL_ASSESSMENT: 0-10
PAIN_FUNCTIONAL_ASSESSMENT: 0-10

## 2025-05-14 ASSESSMENT — PAIN SCALES - GENERAL
PAINLEVEL_OUTOF10: 0 - NO PAIN
PAINLEVEL_OUTOF10: 10 - WORST POSSIBLE PAIN

## 2025-05-14 ASSESSMENT — PAIN DESCRIPTION - LOCATION: LOCATION: LEG

## 2025-05-14 ASSESSMENT — PAIN DESCRIPTION - ORIENTATION: ORIENTATION: LEFT

## 2025-05-14 NOTE — PROGRESS NOTES
Isa Jack is a 74 y.o. female on day 5 of admission presenting with Atrial fibrillation with rapid ventricular response (Multi).    Subjective   NAEON. Yesterday during day she had agitated delirium, not redirectable, required IV Haldol and Ativan. Was sleepy rest of day and through night. This AM still very sleepy, not very responsive to questioning.     Objective   GEN: ill appearing, appears stated age, NAD  CV: irregular rhythm, regular rate, no m/r/g, + BLE edema  LUNGS: distant breath sounds  ABD: soft, NT  MSK; ble wrapped, erythema noted  NEURO: drowsy    Last Recorded Vitals:  /66 (BP Location: Right arm, Patient Position: Lying)   Pulse 82   Temp 35.7 °C (96.3 °F) (Temporal)   Resp 18   Ht 1.524 m (5')   Wt 91.1 kg (200 lb 13.4 oz)   SpO2 99%   BMI 39.22 kg/m²      Scheduled medications:  Scheduled Medications[1]  Continuous medications:  Continuous Medications[2]  PRN medications:  PRN Medications[3]     Relevant Results:  Results for orders placed or performed during the hospital encounter of 05/09/25 (from the past 24 hours)   POCT GLUCOSE   Result Value Ref Range    POCT Glucose 164 (H) 74 - 99 mg/dL   Sodium, Urine Random   Result Value Ref Range    Sodium, Urine Random <10 mmol/L    Creatinine, Urine Random 102.8 20.0 - 320.0 mg/dL    Sodium/Creatinine Ratio     Osmolality, urine   Result Value Ref Range    Osmolality, Urine Random 530 200 - 1,200 mOsm/kg   Urinalysis with Reflex Culture and Microscopic   Result Value Ref Range    Color, Urine Yellow Light-Yellow, Yellow, Dark-Yellow    Appearance, Urine Clear Clear    Specific Gravity, Urine 1.023 1.005 - 1.035    pH, Urine 5.5 5.0, 5.5, 6.0, 6.5, 7.0, 7.5, 8.0    Protein, Urine 30 (1+) (A) NEGATIVE, 10 (TRACE), 20 (TRACE) mg/dL    Glucose, Urine Normal Normal mg/dL    Blood, Urine NEGATIVE NEGATIVE mg/dL    Ketones, Urine NEGATIVE NEGATIVE mg/dL    Bilirubin, Urine NEGATIVE NEGATIVE mg/dL    Urobilinogen, Urine 2 (1+) (A) Normal  mg/dL    Nitrite, Urine NEGATIVE NEGATIVE    Leukocyte Esterase, Urine NEGATIVE NEGATIVE   Extra Urine Gray Tube   Result Value Ref Range    Extra Tube Hold for add-ons.    Urinalysis Microscopic   Result Value Ref Range    WBC, Urine 1-5 1-5, NONE /HPF    RBC, Urine 1-2 NONE, 1-2, 3-5 /HPF    Squamous Epithelial Cells, Urine 1-9 (SPARSE) Reference range not established. /HPF    Budding Yeast, Urine PRESENT (A) NONE /HPF    Hyaline Casts, Urine 3+ (A) NONE /LPF   Potassium   Result Value Ref Range    Potassium 6.3 (HH) 3.5 - 5.3 mmol/L   POCT GLUCOSE   Result Value Ref Range    POCT Glucose 194 (H) 74 - 99 mg/dL   Basic metabolic panel   Result Value Ref Range    Glucose 172 (H) 74 - 99 mg/dL    Sodium 125 (L) 136 - 145 mmol/L    Potassium 5.7 (H) 3.5 - 5.3 mmol/L    Chloride 97 (L) 98 - 107 mmol/L    Bicarbonate 15 (L) 21 - 32 mmol/L    Anion Gap 19 10 - 20 mmol/L    Urea Nitrogen 54 (H) 6 - 23 mg/dL    Creatinine 1.31 (H) 0.50 - 1.05 mg/dL    eGFR 43 (L) >60 mL/min/1.73m*2    Calcium 8.7 8.6 - 10.3 mg/dL   Lactate   Result Value Ref Range    Lactate 2.3 (H) 0.4 - 2.0 mmol/L   POCT GLUCOSE   Result Value Ref Range    POCT Glucose 230 (H) 74 - 99 mg/dL   POCT GLUCOSE   Result Value Ref Range    POCT Glucose 167 (H) 74 - 99 mg/dL   Potassium   Result Value Ref Range    Potassium 5.0 3.5 - 5.3 mmol/L   POCT GLUCOSE   Result Value Ref Range    POCT Glucose 129 (H) 74 - 99 mg/dL   Lactate   Result Value Ref Range    Lactate 3.2 (H) 0.4 - 2.0 mmol/L   POCT GLUCOSE   Result Value Ref Range    POCT Glucose 168 (H) 74 - 99 mg/dL   Ammonia   Result Value Ref Range    Ammonia 45 16 - 53 umol/L   TSH with reflex to Free T4 if abnormal   Result Value Ref Range    Thyroid Stimulating Hormone 6.28 (H) 0.44 - 3.98 mIU/L   CBC and Auto Differential   Result Value Ref Range    WBC 14.0 (H) 4.4 - 11.3 x10*3/uL    nRBC 0.9 (H) 0.0 - 0.0 /100 WBCs    RBC 3.77 (L) 4.00 - 5.20 x10*6/uL    Hemoglobin 10.5 (L) 12.0 - 16.0 g/dL     Hematocrit 35.6 (L) 36.0 - 46.0 %    MCV 94 80 - 100 fL    MCH 27.9 26.0 - 34.0 pg    MCHC 29.5 (L) 32.0 - 36.0 g/dL    RDW 16.7 (H) 11.5 - 14.5 %    Platelets 170 150 - 450 x10*3/uL    Neutrophils % 86.6 40.0 - 80.0 %    Immature Granulocytes %, Automated 0.4 0.0 - 0.9 %    Lymphocytes % 2.4 13.0 - 44.0 %    Monocytes % 8.1 2.0 - 10.0 %    Eosinophils % 2.2 0.0 - 6.0 %    Basophils % 0.3 0.0 - 2.0 %    Neutrophils Absolute 12.12 (H) 1.60 - 5.50 x10*3/uL    Immature Granulocytes Absolute, Automated 0.06 0.00 - 0.50 x10*3/uL    Lymphocytes Absolute 0.33 (L) 0.80 - 3.00 x10*3/uL    Monocytes Absolute 1.14 (H) 0.05 - 0.80 x10*3/uL    Eosinophils Absolute 0.31 0.00 - 0.40 x10*3/uL    Basophils Absolute 0.04 0.00 - 0.10 x10*3/uL   Comprehensive metabolic panel   Result Value Ref Range    Glucose 187 (H) 74 - 99 mg/dL    Sodium 126 (L) 136 - 145 mmol/L    Potassium 4.8 3.5 - 5.3 mmol/L    Chloride 98 98 - 107 mmol/L    Bicarbonate 18 (L) 21 - 32 mmol/L    Anion Gap 15 10 - 20 mmol/L    Urea Nitrogen 54 (H) 6 - 23 mg/dL    Creatinine 1.19 (H) 0.50 - 1.05 mg/dL    eGFR 48 (L) >60 mL/min/1.73m*2    Calcium 8.3 (L) 8.6 - 10.3 mg/dL    Albumin 3.2 (L) 3.4 - 5.0 g/dL    Alkaline Phosphatase 93 33 - 136 U/L    Total Protein 6.0 (L) 6.4 - 8.2 g/dL    AST 37 9 - 39 U/L    Bilirubin, Total 1.0 0.0 - 1.2 mg/dL    ALT 90 (H) 7 - 45 U/L   Magnesium   Result Value Ref Range    Magnesium 2.09 1.60 - 2.40 mg/dL   Lactate   Result Value Ref Range    Lactate 1.2 0.4 - 2.0 mmol/L   Thyroxine, Free   Result Value Ref Range    Thyroxine, Free 0.74 0.61 - 1.12 ng/dL   POCT GLUCOSE   Result Value Ref Range    POCT Glucose 194 (H) 74 - 99 mg/dL   ECG 12 Lead   Result Value Ref Range    Ventricular Rate 75 BPM    Atrial Rate 35 BPM    QRS Duration 94 ms    QT Interval 332 ms    QTC Calculation(Bazett) 370 ms    R Axis -15 degrees    T Axis 211 degrees    QRS Count 12 beats    Q Onset 216 ms    T Offset 382 ms    QTC Fredericia 357 ms        Imaging  No results found.    Cardiology, Vascular, and Other Imaging  ECG 12 Lead  Result Date: 5/14/2025  Atrial fibrillation Anterolateral infarct , age undetermined Abnormal ECG When compared with ECG of 13-MAY-2025 06:50, (unconfirmed) Incomplete left bundle branch block is no longer Present Nonspecific T wave abnormality now evident in Anterior leads    ECG 12 Lead  Result Date: 5/13/2025  Atrial fibrillation with a competing junctional pacemaker Incomplete left bundle branch block Nonspecific T wave abnormality Abnormal ECG When compared with ECG of 12-MAY-2025 21:22, (unconfirmed) Borderline criteria for Anterior infarct are no longer Present Borderline criteria for Anterolateral infarct are no longer Present Nonspecific T wave abnormality no longer evident in Anterior leads    ECG 12 lead  Result Date: 5/13/2025  Atrial fibrillation Low voltage QRS Possible Anterolateral infarct (cited on or before 12-MAY-2025) Abnormal ECG When compared with ECG of 12-MAY-2025 06:51, Questionable change in initial forces of Anterior leads QT has shortened                       Assessment/Plan   Assessment & Plan  Atrial fibrillation with rapid ventricular response (Multi)    A fib RVR  RVR resolved  - cardio following  - metoprolol tartrate, will eventually need metoprolol succinate    DOMINIC  - bolus 500cc  - hold lasix    Possible leg cellulitis  - keflex    Acute on chronic systolic and diastolic CHF  improved  - cardio followinig  - was on lasix gtt    Chronic hypoxic resp failure  - on home 2L NC  - singulair    HypoNa  - 500cc bolus    Transaminitis  - hold statin    Leukocytosis  - monitor    Normocytic anemia  - monitor    HTN  - losartan    HLD  - hold statin     GERD  - ppi    DVT ppx: eliquis  Dispo: monitor clinically, monitor Cr    5/13/25  -remains in rate controlled afib. EP signed off now, okay to resume metoprolol succinate on dc. Dig was stopped 2/2 toxicity. On Eliquis for AC. Okay to dc from their  standpoint and fu with [facility] as outpt. Keep on tele here.   -cardiology following for hx of CAD and ICM managed medically d/t pt historical refusal of cath. GDMT has been limited by hypotension. Currently only on BB. Not on antiplatelet, presumably due to use of Eliquis above. Statin currently held d/t mild LFT elevation. Defer to cards on med adjustments.   -worsened hyponatremia after IVF yesterday. Her Scr is stable, 1.30 today, and appears to be at historical baseline per [facility] records. Stop further ivf. Would resume PO Lasix today. Check UA, sosm, uosm, maria isabel. Hyperkalemia noted. Rpt BMP in PM.   -add scheduled melatonin. Does not need further antipsychotic currently.   -add SSI for hyperglycemia  -mild leukocytosis, possibly 2/2 LE cellulitis. Checking UA. Cont empiric Keflex.   -dispo tbd, pt/ot rec moderate intensity, however have not been able to touch base with son. TCC on case.     5/14/25  -very drowsy today after having received IV Haldol and Ativan yesterday for agitated delirium  -hold methadone for drowsiness, bradypnea  -will hold lopressor for hypotension today, rates controlled  -wbc stably mildly elevated, cont on Keflex  -K better, needed hyperK+ cocktail yesterday  -Scr improved, at baseline CKD3. Na stable 126. Cont PO Lasix today.   -lfts improved  -TFTs show subclinical hypothyroidism  -snf dispo    Waldemar Amezcua MD  Hospitalist         [1] apixaban, 5 mg, oral, BID  [Held by provider] atorvastatin, 40 mg, oral, Nightly  cephalexin, 500 mg, oral, q8h JAY  furosemide, 40 mg, oral, BID  insulin lispro, 0-10 Units, subcutaneous, TID AC  melatonin, 6 mg, oral, Nightly  methadone, 5 mg, oral, BID  metoprolol tartrate, 50 mg, oral, BID  montelukast, 10 mg, oral, Nightly  pantoprazole, 40 mg, oral, Daily     [2]    [3] PRN medications: acetaminophen **OR** acetaminophen **OR** acetaminophen, albuterol, haloperidol lactate, HYDROcodone-acetaminophen, ondansetron **OR** ondansetron

## 2025-05-14 NOTE — CARE PLAN
The patient's goals for the shift include      The clinical goals for the shift include Patient will report a decrease in pain through shift

## 2025-05-15 ENCOUNTER — APPOINTMENT (OUTPATIENT)
Dept: CARDIOLOGY | Facility: HOSPITAL | Age: 75
DRG: 291 | End: 2025-05-15
Payer: COMMERCIAL

## 2025-05-15 LAB
ALBUMIN SERPL BCP-MCNC: 3.2 G/DL (ref 3.4–5)
ALP SERPL-CCNC: 87 U/L (ref 33–136)
ALT SERPL W P-5'-P-CCNC: 64 U/L (ref 7–45)
ANION GAP SERPL CALC-SCNC: 12 MMOL/L (ref 10–20)
ANION GAP SERPL CALC-SCNC: 15 MMOL/L (ref 10–20)
AST SERPL W P-5'-P-CCNC: 22 U/L (ref 9–39)
ATRIAL RATE: 84 BPM
BASOPHILS # BLD AUTO: 0.01 X10*3/UL (ref 0–0.1)
BASOPHILS NFR BLD AUTO: 0.1 %
BILIRUB SERPL-MCNC: 0.8 MG/DL (ref 0–1.2)
BNP SERPL-MCNC: 2422 PG/ML (ref 0–99)
BUN SERPL-MCNC: 52 MG/DL (ref 6–23)
BUN SERPL-MCNC: 54 MG/DL (ref 6–23)
CALCIUM SERPL-MCNC: 8.2 MG/DL (ref 8.6–10.3)
CALCIUM SERPL-MCNC: 8.3 MG/DL (ref 8.6–10.3)
CHLORIDE SERPL-SCNC: 97 MMOL/L (ref 98–107)
CHLORIDE SERPL-SCNC: 97 MMOL/L (ref 98–107)
CO2 SERPL-SCNC: 18 MMOL/L (ref 21–32)
CO2 SERPL-SCNC: 20 MMOL/L (ref 21–32)
CREAT SERPL-MCNC: 1.23 MG/DL (ref 0.5–1.05)
CREAT SERPL-MCNC: 1.25 MG/DL (ref 0.5–1.05)
CREAT UR-MCNC: 86.3 MG/DL (ref 20–320)
EGFRCR SERPLBLD CKD-EPI 2021: 45 ML/MIN/1.73M*2
EGFRCR SERPLBLD CKD-EPI 2021: 46 ML/MIN/1.73M*2
EOSINOPHIL # BLD AUTO: 0.25 X10*3/UL (ref 0–0.4)
EOSINOPHIL NFR BLD AUTO: 2 %
ERYTHROCYTE [DISTWIDTH] IN BLOOD BY AUTOMATED COUNT: 16.9 % (ref 11.5–14.5)
GLUCOSE BLD MANUAL STRIP-MCNC: 179 MG/DL (ref 74–99)
GLUCOSE BLD MANUAL STRIP-MCNC: 193 MG/DL (ref 74–99)
GLUCOSE BLD MANUAL STRIP-MCNC: 220 MG/DL (ref 74–99)
GLUCOSE BLD MANUAL STRIP-MCNC: 250 MG/DL (ref 74–99)
GLUCOSE SERPL-MCNC: 195 MG/DL (ref 74–99)
GLUCOSE SERPL-MCNC: 213 MG/DL (ref 74–99)
HCT VFR BLD AUTO: 31.6 % (ref 36–46)
HGB BLD-MCNC: 9.7 G/DL (ref 12–16)
IMM GRANULOCYTES # BLD AUTO: 0.08 X10*3/UL (ref 0–0.5)
IMM GRANULOCYTES NFR BLD AUTO: 0.7 % (ref 0–0.9)
LYMPHOCYTES # BLD AUTO: 0.46 X10*3/UL (ref 0.8–3)
LYMPHOCYTES NFR BLD AUTO: 3.7 %
MCH RBC QN AUTO: 28.1 PG (ref 26–34)
MCHC RBC AUTO-ENTMCNC: 30.7 G/DL (ref 32–36)
MCV RBC AUTO: 92 FL (ref 80–100)
MONOCYTES # BLD AUTO: 1.04 X10*3/UL (ref 0.05–0.8)
MONOCYTES NFR BLD AUTO: 8.5 %
NEUTROPHILS # BLD AUTO: 10.44 X10*3/UL (ref 1.6–5.5)
NEUTROPHILS NFR BLD AUTO: 85 %
NRBC BLD-RTO: 0.6 /100 WBCS (ref 0–0)
OSMOLALITY SERPL: 290 MOSM/KG (ref 280–300)
OSMOLALITY UR: 431 MOSM/KG (ref 200–1200)
PLATELET # BLD AUTO: 159 X10*3/UL (ref 150–450)
POTASSIUM SERPL-SCNC: 5 MMOL/L (ref 3.5–5.3)
POTASSIUM SERPL-SCNC: 5 MMOL/L (ref 3.5–5.3)
PROT SERPL-MCNC: 6.1 G/DL (ref 6.4–8.2)
Q ONSET: 215 MS
QRS COUNT: 13 BEATS
QRS DURATION: 108 MS
QT INTERVAL: 354 MS
QTC CALCULATION(BAZETT): 403 MS
QTC FREDERICIA: 386 MS
R AXIS: -12 DEGREES
RBC # BLD AUTO: 3.45 X10*6/UL (ref 4–5.2)
SODIUM SERPL-SCNC: 124 MMOL/L (ref 136–145)
SODIUM SERPL-SCNC: 125 MMOL/L (ref 136–145)
SODIUM UR-SCNC: <10 MMOL/L
SODIUM/CREAT UR-RTO: NORMAL
T AXIS: 263 DEGREES
T OFFSET: 392 MS
VENTRICULAR RATE: 78 BPM
WBC # BLD AUTO: 12.3 X10*3/UL (ref 4.4–11.3)

## 2025-05-15 PROCEDURE — 2500000001 HC RX 250 WO HCPCS SELF ADMINISTERED DRUGS (ALT 637 FOR MEDICARE OP): Performed by: STUDENT IN AN ORGANIZED HEALTH CARE EDUCATION/TRAINING PROGRAM

## 2025-05-15 PROCEDURE — 99233 SBSQ HOSP IP/OBS HIGH 50: CPT | Performed by: INTERNAL MEDICINE

## 2025-05-15 PROCEDURE — 1200000002 HC GENERAL ROOM WITH TELEMETRY DAILY

## 2025-05-15 PROCEDURE — 97530 THERAPEUTIC ACTIVITIES: CPT | Mod: GP,CQ

## 2025-05-15 PROCEDURE — 2500000002 HC RX 250 W HCPCS SELF ADMINISTERED DRUGS (ALT 637 FOR MEDICARE OP, ALT 636 FOR OP/ED): Performed by: INTERNAL MEDICINE

## 2025-05-15 PROCEDURE — 99221 1ST HOSP IP/OBS SF/LOW 40: CPT | Performed by: PSYCHIATRY & NEUROLOGY

## 2025-05-15 PROCEDURE — 82947 ASSAY GLUCOSE BLOOD QUANT: CPT

## 2025-05-15 PROCEDURE — 2500000001 HC RX 250 WO HCPCS SELF ADMINISTERED DRUGS (ALT 637 FOR MEDICARE OP): Performed by: NURSE PRACTITIONER

## 2025-05-15 PROCEDURE — 82570 ASSAY OF URINE CREATININE: CPT | Performed by: INTERNAL MEDICINE

## 2025-05-15 PROCEDURE — 2500000002 HC RX 250 W HCPCS SELF ADMINISTERED DRUGS (ALT 637 FOR MEDICARE OP, ALT 636 FOR OP/ED): Performed by: STUDENT IN AN ORGANIZED HEALTH CARE EDUCATION/TRAINING PROGRAM

## 2025-05-15 PROCEDURE — 36415 COLL VENOUS BLD VENIPUNCTURE: CPT | Performed by: INTERNAL MEDICINE

## 2025-05-15 PROCEDURE — 2500000005 HC RX 250 GENERAL PHARMACY W/O HCPCS: Performed by: INTERNAL MEDICINE

## 2025-05-15 PROCEDURE — 83935 ASSAY OF URINE OSMOLALITY: CPT | Mod: ELYLAB | Performed by: INTERNAL MEDICINE

## 2025-05-15 PROCEDURE — 93005 ELECTROCARDIOGRAM TRACING: CPT

## 2025-05-15 PROCEDURE — 2500000001 HC RX 250 WO HCPCS SELF ADMINISTERED DRUGS (ALT 637 FOR MEDICARE OP): Performed by: INTERNAL MEDICINE

## 2025-05-15 PROCEDURE — 80048 BASIC METABOLIC PNL TOTAL CA: CPT | Mod: CCI | Performed by: INTERNAL MEDICINE

## 2025-05-15 PROCEDURE — 2500000004 HC RX 250 GENERAL PHARMACY W/ HCPCS (ALT 636 FOR OP/ED): Mod: JZ | Performed by: NURSE PRACTITIONER

## 2025-05-15 PROCEDURE — 97535 SELF CARE MNGMENT TRAINING: CPT | Mod: GO

## 2025-05-15 PROCEDURE — S0109 METHADONE ORAL 5MG: HCPCS | Performed by: STUDENT IN AN ORGANIZED HEALTH CARE EDUCATION/TRAINING PROGRAM

## 2025-05-15 PROCEDURE — 93010 ELECTROCARDIOGRAM REPORT: CPT | Performed by: INTERNAL MEDICINE

## 2025-05-15 PROCEDURE — 85025 COMPLETE CBC W/AUTO DIFF WBC: CPT | Performed by: INTERNAL MEDICINE

## 2025-05-15 PROCEDURE — 83880 ASSAY OF NATRIURETIC PEPTIDE: CPT | Performed by: INTERNAL MEDICINE

## 2025-05-15 PROCEDURE — 83930 ASSAY OF BLOOD OSMOLALITY: CPT | Mod: ELYLAB | Performed by: INTERNAL MEDICINE

## 2025-05-15 PROCEDURE — 80053 COMPREHEN METABOLIC PANEL: CPT | Performed by: INTERNAL MEDICINE

## 2025-05-15 PROCEDURE — 2500000004 HC RX 250 GENERAL PHARMACY W/ HCPCS (ALT 636 FOR OP/ED): Mod: JZ | Performed by: INTERNAL MEDICINE

## 2025-05-15 RX ORDER — MENTHOL AND ZINC OXIDE .44; 20.625 G/100G; G/100G
1 OINTMENT TOPICAL 4 TIMES DAILY PRN
Status: DISCONTINUED | OUTPATIENT
Start: 2025-05-15 | End: 2025-05-20 | Stop reason: HOSPADM

## 2025-05-15 RX ORDER — FUROSEMIDE 10 MG/ML
40 INJECTION INTRAMUSCULAR; INTRAVENOUS 2 TIMES DAILY
Status: DISCONTINUED | OUTPATIENT
Start: 2025-05-15 | End: 2025-05-17

## 2025-05-15 RX ORDER — HALOPERIDOL LACTATE 5 MG/ML
1 INJECTION, SOLUTION INTRAMUSCULAR EVERY 4 HOURS PRN
Status: DISCONTINUED | OUTPATIENT
Start: 2025-05-15 | End: 2025-05-20 | Stop reason: HOSPADM

## 2025-05-15 RX ORDER — FUROSEMIDE 10 MG/ML
40 INJECTION INTRAMUSCULAR; INTRAVENOUS EVERY 12 HOURS
Status: DISCONTINUED | OUTPATIENT
Start: 2025-05-15 | End: 2025-05-15

## 2025-05-15 RX ADMIN — CEPHALEXIN 500 MG: 500 CAPSULE ORAL at 21:45

## 2025-05-15 RX ADMIN — MONTELUKAST SODIUM 10 MG: 10 TABLET, FILM COATED ORAL at 21:28

## 2025-05-15 RX ADMIN — NYSTATIN 1 APPLICATION: 100000 POWDER TOPICAL at 21:48

## 2025-05-15 RX ADMIN — APIXABAN 5 MG: 5 TABLET, FILM COATED ORAL at 21:28

## 2025-05-15 RX ADMIN — ATORVASTATIN CALCIUM 40 MG: 20 TABLET, FILM COATED ORAL at 21:28

## 2025-05-15 RX ADMIN — METOPROLOL TARTRATE 50 MG: 50 TABLET, FILM COATED ORAL at 08:15

## 2025-05-15 RX ADMIN — PANTOPRAZOLE SODIUM 40 MG: 40 TABLET, DELAYED RELEASE ORAL at 06:11

## 2025-05-15 RX ADMIN — FUROSEMIDE 40 MG: 10 INJECTION, SOLUTION INTRAVENOUS at 12:14

## 2025-05-15 RX ADMIN — ACETAMINOPHEN 650 MG: 325 TABLET ORAL at 21:46

## 2025-05-15 RX ADMIN — NYSTATIN 1 APPLICATION: 100000 POWDER TOPICAL at 10:06

## 2025-05-15 RX ADMIN — HYDROCODONE BITARTRATE AND ACETAMINOPHEN 1 TABLET: 5; 325 TABLET ORAL at 19:15

## 2025-05-15 RX ADMIN — METHADONE HYDROCHLORIDE 5 MG: 5 TABLET ORAL at 21:28

## 2025-05-15 RX ADMIN — SODIUM CHLORIDE 500 ML: 0.9 INJECTION, SOLUTION INTRAVENOUS at 08:15

## 2025-05-15 RX ADMIN — METHADONE HYDROCHLORIDE 5 MG: 5 TABLET ORAL at 08:15

## 2025-05-15 RX ADMIN — FUROSEMIDE 40 MG: 10 INJECTION, SOLUTION INTRAMUSCULAR; INTRAVENOUS at 20:11

## 2025-05-15 RX ADMIN — Medication 6 MG: at 21:28

## 2025-05-15 RX ADMIN — INSULIN LISPRO 2 UNITS: 100 INJECTION, SOLUTION INTRAVENOUS; SUBCUTANEOUS at 12:14

## 2025-05-15 RX ADMIN — CEPHALEXIN 500 MG: 500 CAPSULE ORAL at 06:11

## 2025-05-15 RX ADMIN — HYDROCODONE BITARTRATE AND ACETAMINOPHEN 1 TABLET: 5; 325 TABLET ORAL at 04:55

## 2025-05-15 RX ADMIN — HALOPERIDOL LACTATE 1 MG: 5 INJECTION, SOLUTION INTRAMUSCULAR at 19:43

## 2025-05-15 RX ADMIN — CEPHALEXIN 500 MG: 500 CAPSULE ORAL at 15:11

## 2025-05-15 RX ADMIN — APIXABAN 5 MG: 5 TABLET, FILM COATED ORAL at 08:15

## 2025-05-15 RX ADMIN — ONDANSETRON 4 MG: 2 INJECTION INTRAMUSCULAR; INTRAVENOUS at 21:46

## 2025-05-15 RX ADMIN — INSULIN LISPRO 2 UNITS: 100 INJECTION, SOLUTION INTRAVENOUS; SUBCUTANEOUS at 08:15

## 2025-05-15 RX ADMIN — METOPROLOL TARTRATE 50 MG: 50 TABLET, FILM COATED ORAL at 21:28

## 2025-05-15 ASSESSMENT — ACTIVITIES OF DAILY LIVING (ADL): HOME_MANAGEMENT_TIME_ENTRY: 14

## 2025-05-15 ASSESSMENT — COGNITIVE AND FUNCTIONAL STATUS - GENERAL
TURNING FROM BACK TO SIDE WHILE IN FLAT BAD: A LOT
DRESSING REGULAR UPPER BODY CLOTHING: A LITTLE
MOVING FROM LYING ON BACK TO SITTING ON SIDE OF FLAT BED WITH BEDRAILS: A LOT
WALKING IN HOSPITAL ROOM: A LOT
MOVING TO AND FROM BED TO CHAIR: A LOT
PERSONAL GROOMING: A LITTLE
CLIMB 3 TO 5 STEPS WITH RAILING: TOTAL
DRESSING REGULAR LOWER BODY CLOTHING: A LOT
HELP NEEDED FOR BATHING: A LOT
EATING MEALS: A LITTLE
DAILY ACTIVITIY SCORE: 15
TOILETING: A LOT
MOBILITY SCORE: 11
STANDING UP FROM CHAIR USING ARMS: A LOT

## 2025-05-15 ASSESSMENT — PAIN DESCRIPTION - ORIENTATION
ORIENTATION: LEFT
ORIENTATION: LEFT

## 2025-05-15 ASSESSMENT — PAIN DESCRIPTION - DESCRIPTORS: DESCRIPTORS: ACHING

## 2025-05-15 ASSESSMENT — PAIN SCALES - GENERAL
PAINLEVEL_OUTOF10: 7
PAINLEVEL_OUTOF10: 8
PAINLEVEL_OUTOF10: 0 - NO PAIN
PAINLEVEL_OUTOF10: 8
PAINLEVEL_OUTOF10: 10 - WORST POSSIBLE PAIN

## 2025-05-15 ASSESSMENT — PAIN - FUNCTIONAL ASSESSMENT
PAIN_FUNCTIONAL_ASSESSMENT: WONG-BAKER FACES
PAIN_FUNCTIONAL_ASSESSMENT: 0-10

## 2025-05-15 ASSESSMENT — PAIN DESCRIPTION - LOCATION
LOCATION: LEG
LOCATION: LEG

## 2025-05-15 ASSESSMENT — PAIN SCALES - WONG BAKER: WONGBAKER_NUMERICALRESPONSE: HURTS LITTLE BIT

## 2025-05-15 NOTE — PROGRESS NOTES
Occupational Therapy    OT Treatment    Patient Name: Isa Jack  MRN: [id number]  Department: [facility]  Room: [address] [place] [address]  Today's Date: 5/15/2025  Time Calculation  Start Time: 1232  Stop Time: 1300  Time Calculation (min): 28 min        Assessment:  OT Assessment: Session focused on increasing IND with toileting and functional transfers. Pt tolerated session fairly, requires increased time to complete all tasks and fatigues quickly.  End of Session Communication: Bedside nurse  End of Session Patient Position: Up in chair, Alarm on (BLE elevated on stool, pt refusing to elevate BLE on recliner despite encouragement)     Plan:  Treatment Interventions: ADL retraining, Functional transfer training, Endurance training, Patient/family training  OT Frequency: 2 times per week  OT Discharge Recommendations: Moderate intensity level of continued care  OT Recommended Transfer Status: Assist of 2  OT - OK to Discharge: Yes  Treatment Interventions: ADL retraining, Functional transfer training, Endurance training, Patient/family training    Subjective   OT Visit Info:  OT Received On: 05/15/25  General Visit Info:  General  Reason for Referral: ADL impairment  Referred By: PT/OT 5/9/25  Humberto MATHIS  Past Medical History Relevant to Rehab: asthma, dyslipidemia, HTN, CHF, DM, CAD, DJD, anemia, skin ulcer of L ankle, chronic cellulitis of bilateral LE, Afib on eliquis, ischemic cardiomyopathy, LVEF= 25-30%  Co-Treatment: PT  Co-Treatment Reason: to maximize patient/staff safety and tolerance.  Prior to Session Communication: Bedside nurse  Patient Position Received: Up in chair, Alarm on  General Comment: Pt pleasant and agreeable to participate, stating she needs to use BSC. Appears mildly lethargic at times. (Tele, [place] (removed for mobility/using BSC))  Precautions:  Medical Precautions: Fall precautions            Pain:  Pain Assessment  Pain Assessment: 0-10  0-10 (Numeric) Pain Score: 8  Pain Type: Acute pain  Pain  Location: Leg  Pain Orientation: Left  Pain Interventions:  (states she took pain meds)    Objective    Cognition:  Cognition  Orientation Level: Disoriented to situation     Activities of Daily Living: Grooming  Grooming Comments: Pt washed face while seated with S/U for retrieval of items.    Toileting  Toileting Comments: Pt required MAX A to complete hygiene in stance at FWW, encouraged pt to attempt but stating she is unable to.     Bed Mobility/Transfers: Bed Mobility  Bed Mobility: No     Toilet Transfers  Toilet Transfers Comments: SPT from chair <> BSC using FWW with MIN Ax2. Cues for hand placement and technique, increased time to complete. [place] A at times.     Therapy/Activity: Balance/Neuromuscular Re-Education  Balance/Neuromuscular Re-Education Activity Performed: Yes (Pt stood for ~5 minutes with FWW and F-/P+ standing balance. Pt takes extremely long to complete all transfers, increasing time in stance.)    Outcome Measures:[facility] Daily Activity  Putting on and taking off regular lower body clothing: A lot  Bathing (including washing, rinsing, drying): A lot  Putting on and taking off regular upper body clothing: A little  Toileting, which includes using toilet, bedpan or urinal: A lot  Taking care of personal grooming such as brushing teeth: A little  Eating Meals: A little  Daily Activity - Total Score: 15        Education Documentation  ADL Training, taught by Flora Reddy, OT at 5/15/2025  2:37 PM.  Learner: Patient  Readiness: Acceptance  Method: Explanation  Response: Verbalizes Understanding, Demonstrated Understanding, Needs Reinforcement      Goals:  Encounter Problems       Encounter Problems (Active)       OT Goals       pt will dress UB with SBA (Progressing)       Start:  05/12/25    Expected End:  05/26/25            Pt will transfer to bed, chair, toilet with CGA (Progressing)       Start:  05/12/25    Expected End:  05/26/25            Pt will participate in gentle strengthening R  elbow, wrist, hand and L UE  (Progressing)       Start:  05/12/25    Expected End:  05/26/25            Pt will demo fair + dyn sitting balance with ADLS (Progressing)       Start:  05/12/25    Expected End:  05/26/25
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
## 2025-05-15 NOTE — PROGRESS NOTES
Pt. Accepted at [facility], updated referral sent. Gold form and AVS complete and have requested [id number] and facility to start ins. [facility]

## 2025-05-15 NOTE — PROGRESS NOTES
Physical Therapy    Physical Therapy Treatment    Patient Name: Isa Jack  MRN: [id number]  Department: [facility]  Room: [facility]  Today's Date: 5/15/2025  Time Calculation  Start Time: 1231  Stop Time: 1259  Time Calculation (min): 28 min         Assessment/Plan   PT Assessment  PT Assessment Results: Decreased strength, Decreased range of motion, Decreased endurance, Decreased mobility, Obesity, Decreased skin integrity, Pain  Rehab Prognosis: Fair  Barriers to Discharge Home: Physical needs, Caregiver assistance  End of Session Communication: Bedside nurse  Assessment Comment: Patient will beneift from continued PT treatment throughout hospital stay to progress transfers, standing tolerance, and ambulation  End of Session Patient Position: Up in chair, Alarm on  PT Plan  Inpatient/Swing Bed or Outpatient: Inpatient  PT Plan  Treatment/Interventions: Transfer training, Gait training, Therapeutic exercise, Neuromuscular re-education  PT Plan: Ongoing PT  PT Frequency: 3 times per week  PT Discharge Recommendations: Moderate intensity level of continued care  PT Recommended Transfer Status: Assist x2    PT Visit Info:  PT Received On: 05/15/25     General Visit Information:   General  Reason for Referral: Impaired mobility  Referred By: PT/OT 5/9/25  Humberto MATHIS  Past Medical History Relevant to Rehab: asthma, dyslipidemia, HTN, CHF, DM, CAD, DJD, anemia, skin ulcer of L ankle, chronic cellulitis of bilateral LE, Afib on eliquis, ischemic cardiomyopathy, LVEF= 25-30%  Co-Treatment: OT  Co-Treatment Reason: to maximize patient/staff safety and tolerance.  Prior to Session Communication: Bedside nurse  Patient Position Received: Up in chair, Alarm on  General Comment: Pt is pleasant and cooperative this session, agreeable to treatment and requesting to use bedside commode. Pt appears mildy lethargic at times. (jethro, keshia,)    Subjective   Precautions:  Precautions  Medical Precautions: Fall precautions, Oxygen  therapy device and L/min     Date/Time Vitals Session Patient Position Pulse Resp SpO2 BP MAP (mmHg)    05/15/25 1145 --  --  80  20  100 %  164/92  122                 Objective   Pain:  Pain Assessment  Pain Assessment: 0-10  0-10 (Numeric) Pain Score: 8  Pain Type: Acute pain  Pain Location: Leg  Pain Orientation: Left  Pain Interventions:  (Pt states she took pain meds.)  Cognition:  Cognition  Orientation Level: Disoriented to situation  Coordination:     Postural Control:     Extremity/Trunk Assessments:        Activity Tolerance:  Activity Tolerance  Endurance: Decreased tolerance for upright activites  Treatments:  Therapeutic Exercise  Therapeutic Exercise Performed: Yes (Seated AP, LAQ x 10ea BLE)    Balance/Neuromuscular Re-Education  Balance/Neuromuscular Re-Education Activity Performed: Yes (Pt standing for ~5 min with WW and F-/P+ balance. Pt takes extremely long time to complete all transfers increasing stance time. Pt unable to tap toes onto step.)    Ambulation/Gait Training  Ambulation/Gait Training Performed: Yes (Pt limited in gait distance moving from chair>BSC>chair with WW and min A x 2. Pt moves extremely slow, needing verbal cues throughout for sequencing with BLE and PTA moving WW at times to keep pt in proximity.)  Transfers  Transfer: Yes (Sit<>stand transfers from chair and commode with WW and min A x 2. Pt needing increased time to complete and cues for  hand placement. Hand over hand assist at times.)    Outcome Measures:  [facility] Basic Mobility  Turning from your back to your side while in a flat bed without using bedrails: A lot  Moving from lying on your back to sitting on the side of a flat bed without using bedrails: A lot  Moving to and from bed to chair (including a wheelchair): A lot  Standing up from a chair using your arms (e.g. wheelchair or bedside chair): A lot  To walk in hospital room: A lot  Climbing 3-5 steps with railing: Total  Basic Mobility - Total Score:  "11    Education Documentation  Mobility Training, taught by Miri Navas PTA at 5/15/2025  1:17 PM.  Learner: Patient  Readiness: Acceptance  Method: Explanation  Response: Needs Reinforcement    Education Comments  No comments found.        EDUCATION:  Outpatient Education  Individual(s) Educated: Patient  Education Provided: Body Mechanics, Fall Risk, Home Safety    Encounter Problems       Encounter Problems (Active)       PT Problem       sit<>stand, CGA use of WW for standing support  (Progressing)       Start:  05/12/25    Expected End:  05/26/25            patient to demonstrate standing tolerance of 2 minutes with use of WW for standing support  (Progressing)       Start:  05/12/25    Expected End:  05/26/25            patient to ambulate 15' 2-3 bouts, mod A x2, use of WW  (Progressing)       Start:  05/12/25    Expected End:  05/26/25            chair to chair transfer, use of WW, CGA  (Progressing)       Start:  05/12/25    Expected End:  05/26/25                patient to demonstrate ability to perform toe taps onto 2\" step, 5x bilaterally, use of WW for standing support  (Not Progressing)       Start:  05/12/25    Expected End:  05/26/25                       "

## 2025-05-15 NOTE — PROGRESS NOTES
Isa Jack is a 74 y.o. female on day 6 of admission presenting with Atrial fibrillation with rapid ventricular response (Multi).    Subjective   NAEON. Still dealing with delirium in the afternoons and evenings. This AM she is again calm, cooperative, alert, oriented to self but not place or time. No acute complaints.     Objective   GEN: ill appearing, appears stated age, NAD  CV: irregular rhythm, regular rate, no m/r/g, + BLE edema  LUNGS: distant breath sounds  ABD: soft, NT  MSK; ble wrapped, erythema noted  NEURO: drowsy    Last Recorded Vitals:  /76 (BP Location: Right arm, Patient Position: Lying)   Pulse 87   Temp 35.8 °C (96.4 °F) (Temporal)   Resp 20   Ht 1.524 m (5')   Wt 91.1 kg (200 lb 13.4 oz)   SpO2 96%   BMI 39.22 kg/m²      Scheduled medications:  Scheduled Medications[1]  Continuous medications:  Continuous Medications[2]  PRN medications:  PRN Medications[3]     Relevant Results:  Results for orders placed or performed during the hospital encounter of 05/09/25 (from the past 24 hours)   POCT GLUCOSE   Result Value Ref Range    POCT Glucose 249 (H) 74 - 99 mg/dL   POCT GLUCOSE   Result Value Ref Range    POCT Glucose 246 (H) 74 - 99 mg/dL   POCT GLUCOSE   Result Value Ref Range    POCT Glucose 228 (H) 74 - 99 mg/dL   CBC and Auto Differential   Result Value Ref Range    WBC 12.3 (H) 4.4 - 11.3 x10*3/uL    nRBC 0.6 (H) 0.0 - 0.0 /100 WBCs    RBC 3.45 (L) 4.00 - 5.20 x10*6/uL    Hemoglobin 9.7 (L) 12.0 - 16.0 g/dL    Hematocrit 31.6 (L) 36.0 - 46.0 %    MCV 92 80 - 100 fL    MCH 28.1 26.0 - 34.0 pg    MCHC 30.7 (L) 32.0 - 36.0 g/dL    RDW 16.9 (H) 11.5 - 14.5 %    Platelets 159 150 - 450 x10*3/uL    Neutrophils % 85.0 40.0 - 80.0 %    Immature Granulocytes %, Automated 0.7 0.0 - 0.9 %    Lymphocytes % 3.7 13.0 - 44.0 %    Monocytes % 8.5 2.0 - 10.0 %    Eosinophils % 2.0 0.0 - 6.0 %    Basophils % 0.1 0.0 - 2.0 %    Neutrophils Absolute 10.44 (H) 1.60 - 5.50 x10*3/uL    Immature  Granulocytes Absolute, Automated 0.08 0.00 - 0.50 x10*3/uL    Lymphocytes Absolute 0.46 (L) 0.80 - 3.00 x10*3/uL    Monocytes Absolute 1.04 (H) 0.05 - 0.80 x10*3/uL    Eosinophils Absolute 0.25 0.00 - 0.40 x10*3/uL    Basophils Absolute 0.01 0.00 - 0.10 x10*3/uL   Comprehensive metabolic panel   Result Value Ref Range    Glucose 195 (H) 74 - 99 mg/dL    Sodium 125 (L) 136 - 145 mmol/L    Potassium 5.0 3.5 - 5.3 mmol/L    Chloride 97 (L) 98 - 107 mmol/L    Bicarbonate 18 (L) 21 - 32 mmol/L    Anion Gap 15 10 - 20 mmol/L    Urea Nitrogen 54 (H) 6 - 23 mg/dL    Creatinine 1.23 (H) 0.50 - 1.05 mg/dL    eGFR 46 (L) >60 mL/min/1.73m*2    Calcium 8.2 (L) 8.6 - 10.3 mg/dL    Albumin 3.2 (L) 3.4 - 5.0 g/dL    Alkaline Phosphatase 87 33 - 136 U/L    Total Protein 6.1 (L) 6.4 - 8.2 g/dL    AST 22 9 - 39 U/L    Bilirubin, Total 0.8 0.0 - 1.2 mg/dL    ALT 64 (H) 7 - 45 U/L   POCT GLUCOSE   Result Value Ref Range    POCT Glucose 179 (H) 74 - 99 mg/dL   ECG 12 Lead   Result Value Ref Range    Ventricular Rate 85 BPM    Atrial Rate 70 BPM    QRS Duration 106 ms    QT Interval 338 ms    QTC Calculation(Bazett) 402 ms    R Axis 54 degrees    T Axis -73 degrees    QRS Count 14 beats    Q Onset 216 ms    T Offset 385 ms    QTC Fredericia 379 ms       Imaging  No results found.    Cardiology, Vascular, and Other Imaging  ECG 12 Lead  Result Date: 5/15/2025  Atrial fibrillation Low voltage QRS Incomplete left bundle branch block Nonspecific T wave abnormality Abnormal ECG When compared with ECG of 14-MAY-2025 06:48, (unconfirmed) Questionable change in QRS axis    ECG 12 Lead  Result Date: 5/14/2025  Atrial fibrillation Anterolateral infarct , age undetermined Abnormal ECG When compared with ECG of 13-MAY-2025 06:50, (unconfirmed) Incomplete left bundle branch block is no longer Present Nonspecific T wave abnormality now evident in Anterior leads                       Assessment/Plan   Assessment & Plan  Atrial fibrillation with rapid  ventricular response (Multi)    A fib RVR  RVR resolved  - cardio following  - metoprolol tartrate, will eventually need metoprolol succinate    DOMINIC  - bolus 500cc  - hold lasix    Possible leg cellulitis  - keflex    Acute on chronic systolic and diastolic CHF  improved  - cardio followinig  - was on lasix gtt    Chronic hypoxic resp failure  - on home 2L NC  - singulair    HypoNa  - 500cc bolus    Transaminitis  - hold statin    Leukocytosis  - monitor    Normocytic anemia  - monitor    HTN  - losartan    HLD  - hold statin     GERD  - ppi    DVT ppx: eliquis  Dispo: monitor clinically, monitor Cr    5/13/25  -remains in rate controlled afib. EP signed off now, okay to resume metoprolol succinate on dc. Dig was stopped 2/2 toxicity. On Eliquis for AC. Okay to dc from their standpoint and fu with [facility] as outpt. Keep on tele here.   -cardiology following for hx of CAD and ICM managed medically d/t pt historical refusal of cath. GDMT has been limited by hypotension. Currently only on BB. Not on antiplatelet, presumably due to use of Eliquis above. Statin currently held d/t mild LFT elevation. Defer to cards on med adjustments.   -worsened hyponatremia after IVF yesterday. Her Scr is stable, 1.30 today, and appears to be at historical baseline per [facility] records. Stop further ivf. Would resume PO Lasix today. Check UA, sosm, uosm, maria isabel. Hyperkalemia noted. Rpt BMP in PM.   -add scheduled melatonin. Does not need further antipsychotic currently.   -add SSI for hyperglycemia  -mild leukocytosis, possibly 2/2 LE cellulitis. Checking UA. Cont empiric Keflex.   -dispo tbd, pt/ot rec moderate intensity, however have not been able to touch base with son. TCC on case.     5/14/25  -very drowsy today after having received IV Haldol and Ativan yesterday for agitated delirium  -hold methadone for drowsiness, bradypnea  -will hold lopressor for hypotension today, rates controlled  -wbc stably mildly elevated, cont on Keflex  -K better,  needed hyperK+ cocktail yesterday  -Scr improved, at baseline CKD3. Na stable 126. Cont PO Lasix today.   -lfts improved  -TFTs show subclinical hypothyroidism  -snf dispo    5/15/25  -psych was consulted for any further recs to help with agitated delirium. For now cont PRN IV Haldol. Limited options due to concurrent methadone use and potential for qtc prolongation.   -stable hyponatremia, has not really moved with PO diuretics. Still has volume overload on exam, BNP still elevated, rpt BMP with slightly worsened hyponatremia after receiving small IVF bolus. Will re institute IV Lasix.   -bladder scan as well to ensure no retention  -Scr 1.23, stable ckd3  -LLE cellulitis wound cx grew GBS, cont with Keflex x7d total. Afebrile. WBC down trending.   -lfts improving spontaneously, resume statin  -snf dispo when ready, not medically ready for dc yet    Waldemar Amezcua MD  Hospitalist         [1] apixaban, 5 mg, oral, BID  atorvastatin, 40 mg, oral, Nightly  cephalexin, 500 mg, oral, q8h JAY  [Held by provider] furosemide, 40 mg, oral, BID  insulin lispro, 0-10 Units, subcutaneous, TID AC  melatonin, 6 mg, oral, Nightly  methadone, 5 mg, oral, BID  metoprolol tartrate, 50 mg, oral, BID  montelukast, 10 mg, oral, Nightly  nystatin, 1 Application, Topical, BID  pantoprazole, 40 mg, oral, Daily  sodium chloride, 500 mL, intravenous, Once     [2]    [3] PRN medications: acetaminophen **OR** acetaminophen **OR** acetaminophen, albuterol, haloperidol lactate, HYDROcodone-acetaminophen, ondansetron **OR** ondansetron

## 2025-05-16 ENCOUNTER — APPOINTMENT (OUTPATIENT)
Dept: CARDIOLOGY | Facility: HOSPITAL | Age: 75
DRG: 291 | End: 2025-05-16
Payer: COMMERCIAL

## 2025-05-16 LAB
ALBUMIN SERPL BCP-MCNC: 3.1 G/DL (ref 3.4–5)
ALP SERPL-CCNC: 103 U/L (ref 33–136)
ALT SERPL W P-5'-P-CCNC: 57 U/L (ref 7–45)
ANION GAP SERPL CALC-SCNC: 13 MMOL/L (ref 10–20)
AST SERPL W P-5'-P-CCNC: 24 U/L (ref 9–39)
ATRIAL RATE: 117 BPM
ATRIAL RATE: 35 BPM
ATRIAL RATE: 70 BPM
BASOPHILS # BLD AUTO: 0.03 X10*3/UL (ref 0–0.1)
BASOPHILS NFR BLD AUTO: 0.2 %
BILIRUB SERPL-MCNC: 0.8 MG/DL (ref 0–1.2)
BUN SERPL-MCNC: 53 MG/DL (ref 6–23)
CALCIUM SERPL-MCNC: 8.3 MG/DL (ref 8.6–10.3)
CHLORIDE SERPL-SCNC: 97 MMOL/L (ref 98–107)
CO2 SERPL-SCNC: 22 MMOL/L (ref 21–32)
CREAT SERPL-MCNC: 1.13 MG/DL (ref 0.5–1.05)
EGFRCR SERPLBLD CKD-EPI 2021: 51 ML/MIN/1.73M*2
EOSINOPHIL # BLD AUTO: 0.19 X10*3/UL (ref 0–0.4)
EOSINOPHIL NFR BLD AUTO: 1.5 %
ERYTHROCYTE [DISTWIDTH] IN BLOOD BY AUTOMATED COUNT: 16.4 % (ref 11.5–14.5)
GLUCOSE BLD MANUAL STRIP-MCNC: 141 MG/DL (ref 74–99)
GLUCOSE BLD MANUAL STRIP-MCNC: 180 MG/DL (ref 74–99)
GLUCOSE BLD MANUAL STRIP-MCNC: 223 MG/DL (ref 74–99)
GLUCOSE BLD MANUAL STRIP-MCNC: 227 MG/DL (ref 74–99)
GLUCOSE SERPL-MCNC: 203 MG/DL (ref 74–99)
HCT VFR BLD AUTO: 31.3 % (ref 36–46)
HGB BLD-MCNC: 9.6 G/DL (ref 12–16)
IMM GRANULOCYTES # BLD AUTO: 0.07 X10*3/UL (ref 0–0.5)
IMM GRANULOCYTES NFR BLD AUTO: 0.6 % (ref 0–0.9)
LYMPHOCYTES # BLD AUTO: 0.58 X10*3/UL (ref 0.8–3)
LYMPHOCYTES NFR BLD AUTO: 4.6 %
MAGNESIUM SERPL-MCNC: 1.84 MG/DL (ref 1.6–2.4)
MCH RBC QN AUTO: 27.7 PG (ref 26–34)
MCHC RBC AUTO-ENTMCNC: 30.7 G/DL (ref 32–36)
MCV RBC AUTO: 90 FL (ref 80–100)
MONOCYTES # BLD AUTO: 1.47 X10*3/UL (ref 0.05–0.8)
MONOCYTES NFR BLD AUTO: 11.8 %
NEUTROPHILS # BLD AUTO: 10.15 X10*3/UL (ref 1.6–5.5)
NEUTROPHILS NFR BLD AUTO: 81.3 %
NRBC BLD-RTO: 0.4 /100 WBCS (ref 0–0)
PLATELET # BLD AUTO: 176 X10*3/UL (ref 150–450)
POTASSIUM SERPL-SCNC: 4.5 MMOL/L (ref 3.5–5.3)
PROT SERPL-MCNC: 5.8 G/DL (ref 6.4–8.2)
Q ONSET: 216 MS
Q ONSET: 216 MS
Q ONSET: 217 MS
QRS COUNT: 12 BEATS
QRS COUNT: 14 BEATS
QRS COUNT: 14 BEATS
QRS DURATION: 106 MS
QRS DURATION: 88 MS
QRS DURATION: 94 MS
QT INTERVAL: 322 MS
QT INTERVAL: 332 MS
QT INTERVAL: 338 MS
QTC CALCULATION(BAZETT): 370 MS
QTC CALCULATION(BAZETT): 391 MS
QTC CALCULATION(BAZETT): 402 MS
QTC FREDERICIA: 357 MS
QTC FREDERICIA: 367 MS
QTC FREDERICIA: 379 MS
R AXIS: -15 DEGREES
R AXIS: 54 DEGREES
R AXIS: 57 DEGREES
RBC # BLD AUTO: 3.47 X10*6/UL (ref 4–5.2)
SODIUM SERPL-SCNC: 127 MMOL/L (ref 136–145)
T AXIS: -73 DEGREES
T AXIS: 211 DEGREES
T AXIS: 270 DEGREES
T OFFSET: 378 MS
T OFFSET: 382 MS
T OFFSET: 385 MS
VENTRICULAR RATE: 75 BPM
VENTRICULAR RATE: 85 BPM
VENTRICULAR RATE: 89 BPM
WBC # BLD AUTO: 12.5 X10*3/UL (ref 4.4–11.3)

## 2025-05-16 PROCEDURE — S0109 METHADONE ORAL 5MG: HCPCS | Performed by: STUDENT IN AN ORGANIZED HEALTH CARE EDUCATION/TRAINING PROGRAM

## 2025-05-16 PROCEDURE — 2500000001 HC RX 250 WO HCPCS SELF ADMINISTERED DRUGS (ALT 637 FOR MEDICARE OP): Performed by: STUDENT IN AN ORGANIZED HEALTH CARE EDUCATION/TRAINING PROGRAM

## 2025-05-16 PROCEDURE — 80053 COMPREHEN METABOLIC PANEL: CPT | Performed by: INTERNAL MEDICINE

## 2025-05-16 PROCEDURE — 2500000002 HC RX 250 W HCPCS SELF ADMINISTERED DRUGS (ALT 637 FOR MEDICARE OP, ALT 636 FOR OP/ED): Performed by: STUDENT IN AN ORGANIZED HEALTH CARE EDUCATION/TRAINING PROGRAM

## 2025-05-16 PROCEDURE — 2500000004 HC RX 250 GENERAL PHARMACY W/ HCPCS (ALT 636 FOR OP/ED): Mod: JZ | Performed by: INTERNAL MEDICINE

## 2025-05-16 PROCEDURE — 83735 ASSAY OF MAGNESIUM: CPT | Performed by: INTERNAL MEDICINE

## 2025-05-16 PROCEDURE — 93010 ELECTROCARDIOGRAM REPORT: CPT | Performed by: INTERNAL MEDICINE

## 2025-05-16 PROCEDURE — 93005 ELECTROCARDIOGRAM TRACING: CPT

## 2025-05-16 PROCEDURE — 2500000004 HC RX 250 GENERAL PHARMACY W/ HCPCS (ALT 636 FOR OP/ED): Mod: JZ | Performed by: NURSE PRACTITIONER

## 2025-05-16 PROCEDURE — 85025 COMPLETE CBC W/AUTO DIFF WBC: CPT | Performed by: INTERNAL MEDICINE

## 2025-05-16 PROCEDURE — 36415 COLL VENOUS BLD VENIPUNCTURE: CPT | Performed by: INTERNAL MEDICINE

## 2025-05-16 PROCEDURE — 2500000002 HC RX 250 W HCPCS SELF ADMINISTERED DRUGS (ALT 637 FOR MEDICARE OP, ALT 636 FOR OP/ED): Performed by: INTERNAL MEDICINE

## 2025-05-16 PROCEDURE — 2500000001 HC RX 250 WO HCPCS SELF ADMINISTERED DRUGS (ALT 637 FOR MEDICARE OP): Performed by: INTERNAL MEDICINE

## 2025-05-16 PROCEDURE — 97530 THERAPEUTIC ACTIVITIES: CPT | Mod: GP,CQ

## 2025-05-16 PROCEDURE — 82947 ASSAY GLUCOSE BLOOD QUANT: CPT

## 2025-05-16 PROCEDURE — 99232 SBSQ HOSP IP/OBS MODERATE 35: CPT | Performed by: INTERNAL MEDICINE

## 2025-05-16 PROCEDURE — 2500000001 HC RX 250 WO HCPCS SELF ADMINISTERED DRUGS (ALT 637 FOR MEDICARE OP): Performed by: NURSE PRACTITIONER

## 2025-05-16 PROCEDURE — 1200000002 HC GENERAL ROOM WITH TELEMETRY DAILY

## 2025-05-16 RX ORDER — PROCHLORPERAZINE EDISYLATE 5 MG/ML
10 INJECTION INTRAMUSCULAR; INTRAVENOUS EVERY 6 HOURS PRN
Status: DISCONTINUED | OUTPATIENT
Start: 2025-05-16 | End: 2025-05-20 | Stop reason: HOSPADM

## 2025-05-16 RX ORDER — FAMOTIDINE 10 MG/ML
40 INJECTION, SOLUTION INTRAVENOUS ONCE
Status: COMPLETED | OUTPATIENT
Start: 2025-05-16 | End: 2025-05-16

## 2025-05-16 RX ORDER — INSULIN LISPRO 100 [IU]/ML
5 INJECTION, SOLUTION INTRAVENOUS; SUBCUTANEOUS
Status: DISCONTINUED | OUTPATIENT
Start: 2025-05-16 | End: 2025-05-18

## 2025-05-16 RX ADMIN — INSULIN LISPRO 4 UNITS: 100 INJECTION, SOLUTION INTRAVENOUS; SUBCUTANEOUS at 08:53

## 2025-05-16 RX ADMIN — HYDROCODONE BITARTRATE AND ACETAMINOPHEN 1 TABLET: 5; 325 TABLET ORAL at 15:42

## 2025-05-16 RX ADMIN — INSULIN LISPRO 4 UNITS: 100 INJECTION, SOLUTION INTRAVENOUS; SUBCUTANEOUS at 11:45

## 2025-05-16 RX ADMIN — INSULIN LISPRO 5 UNITS: 100 INJECTION, SOLUTION INTRAVENOUS; SUBCUTANEOUS at 11:46

## 2025-05-16 RX ADMIN — HYDROCODONE BITARTRATE AND ACETAMINOPHEN 1 TABLET: 5; 325 TABLET ORAL at 02:15

## 2025-05-16 RX ADMIN — FUROSEMIDE 40 MG: 10 INJECTION, SOLUTION INTRAMUSCULAR; INTRAVENOUS at 08:53

## 2025-05-16 RX ADMIN — PANTOPRAZOLE SODIUM 40 MG: 40 TABLET, DELAYED RELEASE ORAL at 06:48

## 2025-05-16 RX ADMIN — METHADONE HYDROCHLORIDE 5 MG: 5 TABLET ORAL at 08:53

## 2025-05-16 RX ADMIN — HALOPERIDOL LACTATE 1 MG: 5 INJECTION, SOLUTION INTRAMUSCULAR at 00:42

## 2025-05-16 RX ADMIN — INSULIN LISPRO 5 UNITS: 100 INJECTION, SOLUTION INTRAVENOUS; SUBCUTANEOUS at 16:47

## 2025-05-16 RX ADMIN — METOPROLOL TARTRATE 50 MG: 50 TABLET, FILM COATED ORAL at 08:53

## 2025-05-16 RX ADMIN — ONDANSETRON 4 MG: 2 INJECTION INTRAMUSCULAR; INTRAVENOUS at 19:42

## 2025-05-16 RX ADMIN — INSULIN LISPRO 2 UNITS: 100 INJECTION, SOLUTION INTRAVENOUS; SUBCUTANEOUS at 16:47

## 2025-05-16 RX ADMIN — NYSTATIN 1 APPLICATION: 100000 POWDER TOPICAL at 08:54

## 2025-05-16 RX ADMIN — CEPHALEXIN 500 MG: 500 CAPSULE ORAL at 21:06

## 2025-05-16 RX ADMIN — APIXABAN 5 MG: 5 TABLET, FILM COATED ORAL at 08:53

## 2025-05-16 RX ADMIN — CEPHALEXIN 500 MG: 500 CAPSULE ORAL at 15:41

## 2025-05-16 RX ADMIN — FAMOTIDINE 40 MG: 10 INJECTION, SOLUTION INTRAVENOUS at 21:06

## 2025-05-16 RX ADMIN — CEPHALEXIN 500 MG: 500 CAPSULE ORAL at 06:48

## 2025-05-16 ASSESSMENT — COGNITIVE AND FUNCTIONAL STATUS - GENERAL
WALKING IN HOSPITAL ROOM: A LITTLE
PERSONAL GROOMING: A LITTLE
DRESSING REGULAR UPPER BODY CLOTHING: A LOT
STANDING UP FROM CHAIR USING ARMS: A LOT
HELP NEEDED FOR BATHING: A LITTLE
DRESSING REGULAR LOWER BODY CLOTHING: A LITTLE
TOILETING: A LOT
MOBILITY SCORE: 15
MOVING TO AND FROM BED TO CHAIR: A LITTLE
TURNING FROM BACK TO SIDE WHILE IN FLAT BAD: A LITTLE
DAILY ACTIVITIY SCORE: 18
MOVING TO AND FROM BED TO CHAIR: A LITTLE
CLIMB 3 TO 5 STEPS WITH RAILING: A LOT
DRESSING REGULAR LOWER BODY CLOTHING: A LITTLE
MOVING FROM LYING ON BACK TO SITTING ON SIDE OF FLAT BED WITH BEDRAILS: A LITTLE
DAILY ACTIVITIY SCORE: 13
DRESSING REGULAR LOWER BODY CLOTHING: A LITTLE
MOVING TO AND FROM BED TO CHAIR: A LITTLE
MOBILITY SCORE: 17
MOVING FROM LYING ON BACK TO SITTING ON SIDE OF FLAT BED WITH BEDRAILS: A LITTLE
DRESSING REGULAR LOWER BODY CLOTHING: A LOT
TURNING FROM BACK TO SIDE WHILE IN FLAT BAD: A LITTLE
HELP NEEDED FOR BATHING: A LOT
HELP NEEDED FOR BATHING: A LITTLE
STANDING UP FROM CHAIR USING ARMS: A LITTLE
MOVING FROM LYING ON BACK TO SITTING ON SIDE OF FLAT BED WITH BEDRAILS: A LITTLE
MOBILITY SCORE: 13
TURNING FROM BACK TO SIDE WHILE IN FLAT BAD: A LITTLE
TOILETING: A LOT
TOILETING: A LOT
WALKING IN HOSPITAL ROOM: A LITTLE
MOVING TO AND FROM BED TO CHAIR: A LITTLE
MOVING FROM LYING ON BACK TO SITTING ON SIDE OF FLAT BED WITH BEDRAILS: A LITTLE
TURNING FROM BACK TO SIDE WHILE IN FLAT BAD: A LITTLE
MOBILITY SCORE: 17
STANDING UP FROM CHAIR USING ARMS: A LITTLE
EATING MEALS: A LITTLE
DRESSING REGULAR UPPER BODY CLOTHING: A LITTLE
CLIMB 3 TO 5 STEPS WITH RAILING: A LOT
STANDING UP FROM CHAIR USING ARMS: A LOT
MOBILITY SCORE: 17
DRESSING REGULAR UPPER BODY CLOTHING: A LITTLE
STANDING UP FROM CHAIR USING ARMS: A LITTLE
PERSONAL GROOMING: A LITTLE
MOVING TO AND FROM BED TO CHAIR: A LITTLE
TOILETING: A LOT
CLIMB 3 TO 5 STEPS WITH RAILING: TOTAL
DRESSING REGULAR UPPER BODY CLOTHING: A LITTLE
DAILY ACTIVITIY SCORE: 18
WALKING IN HOSPITAL ROOM: A LOT
MOVING FROM LYING ON BACK TO SITTING ON SIDE OF FLAT BED WITH BEDRAILS: A LITTLE
WALKING IN HOSPITAL ROOM: A LOT
HELP NEEDED FOR BATHING: A LITTLE
PERSONAL GROOMING: A LITTLE
PERSONAL GROOMING: A LOT
TURNING FROM BACK TO SIDE WHILE IN FLAT BAD: A LOT
WALKING IN HOSPITAL ROOM: A LITTLE
DAILY ACTIVITIY SCORE: 18

## 2025-05-16 NOTE — PROGRESS NOTES
Physical Therapy    Physical Therapy Treatment    Patient Name: Isa Jack  MRN: [id number]  Today's Date: 5/16/2025  Time Calculation  Start Time: 1243  Stop Time: 1306  Time Calculation (min): 23 min     809/809-A    Assessment/Plan   PT Assessment  PT Assessment Results: Decreased strength, Decreased range of motion, Decreased endurance, Decreased mobility, Obesity, Decreased skin integrity, Pain  Rehab Prognosis: Fair  Barriers to Discharge Home: Physical needs, Caregiver assistance  Caregiver Assistance: Caregiver assistance needed per identified barriers - however, level of patient's required assistance exceeds assistance available at home  Physical Needs: Stair navigation into home limited by function/safety, 24hr mobility assistance needed, 24hr ADL assistance needed, High falls risk due to function or environment  Treatment Tolerance: Patient limited by fatigue, Patient limited by pain  Medical Staff Made Aware: Yes  End of Session Communication: Bedside nurse  Assessment Comment: Patient continues to require (A) for safety with transfers/short amb distances. Patient will benefit from additional PT to address deficits and improve mobility.  End of Session Patient Position: Up in chair, Alarm on (Reclined in chair with LEs elevated; Pillows under each LE for additional elevated; Call light, phone, and tray table within reach.)  PT Plan  Inpatient/Swing Bed or Outpatient: Inpatient  Treatment/Interventions: Transfer training, Gait training, Therapeutic exercise, Neuromuscular re-education  PT Plan: Ongoing PT  PT Frequency: 3 times per week  PT Discharge Recommendations: Moderate intensity level of continued care    PT Recommended Transfer Status: Assist x1-2, ww+gait belt    General Visit Information:   PT  Visit  PT Received On: 05/16/25  General  Family/Caregiver Present: No  Prior to Session Communication: Bedside nurse  Patient Position Received:  (Sitting on BSC.)  General Comment: Patient agreeable  to therapy.    General Observations:   General Observation: Tele; Continuous SPO2; O2 via NC.    Subjective     Precautions:  Precautions  Medical Precautions: Fall precautions    Vital Signs:  Vital Signs  Heart Rate:  (70-95bpm)  SpO2:  (3L O2 via NC. SPO2 98-96%. No c/o SOB with transfers.)    Objective     Pain:  Pain Assessment  Pain Assessment:  (c/o (B)knee pain at 9/10. Requesting pain medicine; nursing informed.)  Pain Interventions: Repositioned, Ambulation/increased activity, Elevated  Response to Interventions: No change in pain    Cognition:  Cognition  Orientation Level: Disoriented to situation (Able to recall name/, place, year, and month without delay.)    Balance:   Static Sitting Balance  Static Sitting-Comment/Number of Minutes: G-  Dynamic Sitting Balance  Dynamic Sitting-Comments: F+  Static Standing Balance  Static Standing-Comment/Number of Minutes: F- with ww  Dynamic Standing Balance  Dynamic Standing-Comments: F- with ww    Treatments:              Ambulation/Gait Training  Ambulation/Gait Training Performed: Yes  Ambulation/Gait Training 1  Surface 1: Level tile  Device 1: Rolling walker  Gait Support Devices: Gait belt  Assistance 1: Minimum assistance  Comments/Distance (ft) 1: ~3ft BSC to chair. WBOS. Slow movements; patient needing step by step instructions and increased time to follow instructions for completing transfers. Increased lateral wt shifting. Forward flexed posture; slightly corrected with v/c. Decreased step height/length B. v/c and (A) for safer maneuvering of ww with directional changes. No LOB or buckling of knees. Increased fatigue reported.  Transfers  Transfer: Yes  Transfer 1  Technique 1: Sit to stand, Stand to sit  Transfer Device 1: Gait belt (ww)  Transfer Level of Assistance 1: Moderate assistance  Trials/Comments 1: (3x). v/c for safe hand placement and technique. Retropulsive. Slow transition of hands to/from ww. Benefits from elevated surfaces. Tolerated  "~3min standing with (B)UE on ww. Very slow descent.             Outcome Measures:     [facility] Basic Mobility  Turning from your back to your side while in a flat bed without using bedrails: A little  Moving from lying on your back to sitting on the side of a flat bed without using bedrails: A lot  Moving to and from bed to chair (including a wheelchair): A little  Standing up from a chair using your arms (e.g. wheelchair or bedside chair): A lot  To walk in hospital room: A lot  Climbing 3-5 steps with railing: Total  Basic Mobility - Total Score: 13                                      Education Documentation  Mobility Training, taught by Nadja Kern PTA at 5/16/2025  2:20 PM.  Learner: Patient  Readiness: Acceptance  Method: Explanation, Demonstration, Teach-back  Response: Verbalizes Understanding, Needs Reinforcement  Comment: See therapy note.           EDUCATION:  Individual(s) Educated: Patient  Education Provided: Body Mechanics, Fall Risk, POC, Posture (Balance; Safety with transfers/short amb distances.)  Patient Response to Education:  (Patient did not verablize understanding of information, but was receptive to instructions/(A) to complete tasks.)    Encounter Problems       Encounter Problems (Active)       PT Problem       sit<>stand, CGA use of WW for standing support  (Progressing)       Start:  05/12/25    Expected End:  05/26/25            patient to demonstrate standing tolerance of 2 minutes with use of WW for standing support  (Progressing)       Start:  05/12/25    Expected End:  05/26/25            patient to ambulate 15' 2-3 bouts, mod A x2, use of WW  (Progressing)       Start:  05/12/25    Expected End:  05/26/25            chair to chair transfer, use of WW, CGA  (Progressing)       Start:  05/12/25    Expected End:  05/26/25                patient to demonstrate ability to perform toe taps onto 2\" step, 5x bilaterally, use of WW for standing support  (Not Progressing)       Start:  " 05/12/25    Expected End:  05/26/25

## 2025-05-16 NOTE — PROGRESS NOTES
sIa Jack is a 74 y.o. female on day 7 of admission presenting with Atrial fibrillation with rapid ventricular response (Multi).    Subjective   NAEON. Delirium improved. Calm, cooperative again today.     Objective   GEN: ill appearing, appears stated age, NAD  CV: irregular rhythm, regular rate, no m/r/g, + BLE edema  LUNGS: distant breath sounds  ABD: soft, NT  MSK; ble wrapped, erythema noted  NEURO: drowsy    Last Recorded Vitals:  /70 (BP Location: Right arm, Patient Position: Lying)   Pulse 82   Temp 35.2 °C (95.4 °F) (Temporal)   Resp 20   Ht 1.524 m (5')   Wt 91.1 kg (200 lb 13.4 oz)   SpO2 98%   BMI 39.22 kg/m²      Scheduled medications:  Scheduled Medications[1]  Continuous medications:  Continuous Medications[2]  PRN medications:  PRN Medications[3]     Relevant Results:  Results for orders placed or performed during the hospital encounter of 05/09/25 (from the past 24 hours)   B-type natriuretic peptide   Result Value Ref Range    BNP 2,422 (H) 0 - 99 pg/mL   POCT GLUCOSE   Result Value Ref Range    POCT Glucose 193 (H) 74 - 99 mg/dL   Sodium, Urine Random   Result Value Ref Range    Sodium, Urine Random <10 mmol/L    Creatinine, Urine Random 86.3 20.0 - 320.0 mg/dL    Sodium/Creatinine Ratio     Osmolality, urine   Result Value Ref Range    Osmolality, Urine Random 431 200 - 1,200 mOsm/kg   Basic metabolic panel   Result Value Ref Range    Glucose 213 (H) 74 - 99 mg/dL    Sodium 124 (L) 136 - 145 mmol/L    Potassium 5.0 3.5 - 5.3 mmol/L    Chloride 97 (L) 98 - 107 mmol/L    Bicarbonate 20 (L) 21 - 32 mmol/L    Anion Gap 12 10 - 20 mmol/L    Urea Nitrogen 52 (H) 6 - 23 mg/dL    Creatinine 1.25 (H) 0.50 - 1.05 mg/dL    eGFR 45 (L) >60 mL/min/1.73m*2    Calcium 8.3 (L) 8.6 - 10.3 mg/dL   POCT GLUCOSE   Result Value Ref Range    POCT Glucose 220 (H) 74 - 99 mg/dL   POCT GLUCOSE   Result Value Ref Range    POCT Glucose 250 (H) 74 - 99 mg/dL   CBC and Auto Differential   Result Value Ref  Range    WBC 12.5 (H) 4.4 - 11.3 x10*3/uL    nRBC 0.4 (H) 0.0 - 0.0 /100 WBCs    RBC 3.47 (L) 4.00 - 5.20 x10*6/uL    Hemoglobin 9.6 (L) 12.0 - 16.0 g/dL    Hematocrit 31.3 (L) 36.0 - 46.0 %    MCV 90 80 - 100 fL    MCH 27.7 26.0 - 34.0 pg    MCHC 30.7 (L) 32.0 - 36.0 g/dL    RDW 16.4 (H) 11.5 - 14.5 %    Platelets 176 150 - 450 x10*3/uL    Neutrophils % 81.3 40.0 - 80.0 %    Immature Granulocytes %, Automated 0.6 0.0 - 0.9 %    Lymphocytes % 4.6 13.0 - 44.0 %    Monocytes % 11.8 2.0 - 10.0 %    Eosinophils % 1.5 0.0 - 6.0 %    Basophils % 0.2 0.0 - 2.0 %    Neutrophils Absolute 10.15 (H) 1.60 - 5.50 x10*3/uL    Immature Granulocytes Absolute, Automated 0.07 0.00 - 0.50 x10*3/uL    Lymphocytes Absolute 0.58 (L) 0.80 - 3.00 x10*3/uL    Monocytes Absolute 1.47 (H) 0.05 - 0.80 x10*3/uL    Eosinophils Absolute 0.19 0.00 - 0.40 x10*3/uL    Basophils Absolute 0.03 0.00 - 0.10 x10*3/uL   Comprehensive metabolic panel   Result Value Ref Range    Glucose 203 (H) 74 - 99 mg/dL    Sodium 127 (L) 136 - 145 mmol/L    Potassium 4.5 3.5 - 5.3 mmol/L    Chloride 97 (L) 98 - 107 mmol/L    Bicarbonate 22 21 - 32 mmol/L    Anion Gap 13 10 - 20 mmol/L    Urea Nitrogen 53 (H) 6 - 23 mg/dL    Creatinine 1.13 (H) 0.50 - 1.05 mg/dL    eGFR 51 (L) >60 mL/min/1.73m*2    Calcium 8.3 (L) 8.6 - 10.3 mg/dL    Albumin 3.1 (L) 3.4 - 5.0 g/dL    Alkaline Phosphatase 103 33 - 136 U/L    Total Protein 5.8 (L) 6.4 - 8.2 g/dL    AST 24 9 - 39 U/L    Bilirubin, Total 0.8 0.0 - 1.2 mg/dL    ALT 57 (H) 7 - 45 U/L   Magnesium   Result Value Ref Range    Magnesium 1.84 1.60 - 2.40 mg/dL   ECG 12 Lead   Result Value Ref Range    Ventricular Rate 89 BPM    Atrial Rate 117 BPM    QRS Duration 88 ms    QT Interval 322 ms    QTC Calculation(Bazett) 391 ms    R Axis 57 degrees    T Axis 270 degrees    QRS Count 14 beats    Q Onset 217 ms    T Offset 378 ms    QTC Fredericia 367 ms   POCT GLUCOSE   Result Value Ref Range    POCT Glucose 223 (H) 74 - 99 mg/dL        Imaging  No results found.    Cardiology, Vascular, and Other Imaging  ECG 12 Lead  Result Date: 5/16/2025  Atrial fibrillation Anterolateral infarct , age undetermined Abnormal ECG When compared with ECG of 15-MAY-2025 07:31, (unconfirmed) Incomplete left bundle branch block is no longer Present Anterolateral infarct is now Present    ECG 12 Lead  Result Date: 5/15/2025  Atrial fibrillation Low voltage QRS Incomplete left bundle branch block Nonspecific T wave abnormality Abnormal ECG When compared with ECG of 14-MAY-2025 06:48, (unconfirmed) Questionable change in QRS axis                       Assessment/Plan   Assessment & Plan  Atrial fibrillation with rapid ventricular response (Multi)    A fib RVR  RVR resolved  - cardio following  - metoprolol tartrate, will eventually need metoprolol succinate    DOMINIC  - bolus 500cc  - hold lasix    Possible leg cellulitis  - keflex    Acute on chronic systolic and diastolic CHF  improved  - cardio followinig  - was on lasix gtt    Chronic hypoxic resp failure  - on home 2L NC  - singulair    HypoNa  - 500cc bolus    Transaminitis  - hold statin    Leukocytosis  - monitor    Normocytic anemia  - monitor    HTN  - losartan    HLD  - hold statin     GERD  - ppi    DVT ppx: eliquis  Dispo: monitor clinically, monitor Cr    5/13/25  -remains in rate controlled afib. EP signed off now, okay to resume metoprolol succinate on dc. Dig was stopped 2/2 toxicity. On Eliquis for AC. Okay to dc from their standpoint and fu with [facility] as outpt. Keep on tele here.   -cardiology following for hx of CAD and ICM managed medically d/t pt historical refusal of cath. GDMT has been limited by hypotension. Currently only on BB. Not on antiplatelet, presumably due to use of Eliquis above. Statin currently held d/t mild LFT elevation. Defer to cards on med adjustments.   -worsened hyponatremia after IVF yesterday. Her Scr is stable, 1.30 today, and appears to be at historical baseline per [facility]  records. Stop further ivf. Would resume PO Lasix today. Check UA, sosm, uosm, maria isabel. Hyperkalemia noted. Rpt BMP in PM.   -add scheduled melatonin. Does not need further antipsychotic currently.   -add SSI for hyperglycemia  -mild leukocytosis, possibly 2/2 LE cellulitis. Checking UA. Cont empiric Keflex.   -dispo tbd, pt/ot rec moderate intensity, however have not been able to touch base with son. TCC on case.     5/14/25  -very drowsy today after having received IV Haldol and Ativan yesterday for agitated delirium  -hold methadone for drowsiness, bradypnea  -will hold lopressor for hypotension today, rates controlled  -wbc stably mildly elevated, cont on Keflex  -K better, needed hyperK+ cocktail yesterday  -Scr improved, at baseline CKD3. Na stable 126. Cont PO Lasix today.   -lfts improved  -TFTs show subclinical hypothyroidism  -snf dispo    5/15/25  -psych was consulted for any further recs to help with agitated delirium. For now cont PRN IV Haldol. Limited options due to concurrent methadone use and potential for qtc prolongation.   -stable hyponatremia, has not really moved with PO diuretics. Still has volume overload on exam, BNP still elevated, rpt BMP with slightly worsened hyponatremia after receiving small IVF bolus. Will re institute IV Lasix.   -bladder scan as well to ensure no retention  -Scr 1.23, stable ckd3  -LLE cellulitis wound cx grew GBS, cont with Keflex x7d total. Afebrile. WBC down trending.   -lfts improving spontaneously, resume statin  -snf dispo when ready, not medically ready for dc yet    5/16/25  -Na improved with IV diuresis, 127 today. Cont Lasix 40mg IV BID  -Scr stable, 1.13  -delirium improved, cont prn haldol  -cont keflex as ordered  -lfts cont to improve, near normal  -snf dispo, precert pending, will be medically ready likely in next 24h    Waldemar Amezcua MD  Hospitalist         [1] apixaban, 5 mg, oral, BID  atorvastatin, 40 mg, oral, Nightly  cephalexin, 500 mg, oral, q8h  JAY  furosemide, 40 mg, intravenous, BID  [Held by provider] furosemide, 40 mg, oral, BID  insulin lispro, 0-10 Units, subcutaneous, TID AC  melatonin, 6 mg, oral, Nightly  methadone, 5 mg, oral, BID  metoprolol tartrate, 50 mg, oral, BID  montelukast, 10 mg, oral, Nightly  nystatin, 1 Application, Topical, BID  pantoprazole, 40 mg, oral, Daily     [2]    [3] PRN medications: acetaminophen **OR** acetaminophen **OR** acetaminophen, albuterol, haloperidol lactate, HYDROcodone-acetaminophen, menthol-zinc oxide, ondansetron **OR** ondansetron

## 2025-05-17 LAB
ALBUMIN SERPL BCP-MCNC: 3.7 G/DL (ref 3.4–5)
ALP SERPL-CCNC: 129 U/L (ref 33–136)
ALT SERPL W P-5'-P-CCNC: 54 U/L (ref 7–45)
ANION GAP SERPL CALC-SCNC: 14 MMOL/L (ref 10–20)
AST SERPL W P-5'-P-CCNC: 23 U/L (ref 9–39)
BASOPHILS # BLD AUTO: 0.05 X10*3/UL (ref 0–0.1)
BASOPHILS NFR BLD AUTO: 0.3 %
BILIRUB SERPL-MCNC: 1 MG/DL (ref 0–1.2)
BUN SERPL-MCNC: 54 MG/DL (ref 6–23)
CALCIUM SERPL-MCNC: 8.9 MG/DL (ref 8.6–10.3)
CHLORIDE SERPL-SCNC: 93 MMOL/L (ref 98–107)
CO2 SERPL-SCNC: 23 MMOL/L (ref 21–32)
CREAT SERPL-MCNC: 1.18 MG/DL (ref 0.5–1.05)
EGFRCR SERPLBLD CKD-EPI 2021: 48 ML/MIN/1.73M*2
EOSINOPHIL # BLD AUTO: 0.45 X10*3/UL (ref 0–0.4)
EOSINOPHIL NFR BLD AUTO: 3.1 %
ERYTHROCYTE [DISTWIDTH] IN BLOOD BY AUTOMATED COUNT: 16.7 % (ref 11.5–14.5)
GLUCOSE BLD MANUAL STRIP-MCNC: 182 MG/DL (ref 74–99)
GLUCOSE BLD MANUAL STRIP-MCNC: 195 MG/DL (ref 74–99)
GLUCOSE BLD MANUAL STRIP-MCNC: 206 MG/DL (ref 74–99)
GLUCOSE SERPL-MCNC: 165 MG/DL (ref 74–99)
HCT VFR BLD AUTO: 37.1 % (ref 36–46)
HGB BLD-MCNC: 11.2 G/DL (ref 12–16)
IMM GRANULOCYTES # BLD AUTO: 0.08 X10*3/UL (ref 0–0.5)
IMM GRANULOCYTES NFR BLD AUTO: 0.6 % (ref 0–0.9)
LYMPHOCYTES # BLD AUTO: 0.68 X10*3/UL (ref 0.8–3)
LYMPHOCYTES NFR BLD AUTO: 4.7 %
MAGNESIUM SERPL-MCNC: 1.9 MG/DL (ref 1.6–2.4)
MCH RBC QN AUTO: 27.7 PG (ref 26–34)
MCHC RBC AUTO-ENTMCNC: 30.2 G/DL (ref 32–36)
MCV RBC AUTO: 92 FL (ref 80–100)
MONOCYTES # BLD AUTO: 1.77 X10*3/UL (ref 0.05–0.8)
MONOCYTES NFR BLD AUTO: 12.2 %
NEUTROPHILS # BLD AUTO: 11.45 X10*3/UL (ref 1.6–5.5)
NEUTROPHILS NFR BLD AUTO: 79.1 %
NRBC BLD-RTO: 0.5 /100 WBCS (ref 0–0)
PLATELET # BLD AUTO: 238 X10*3/UL (ref 150–450)
POTASSIUM SERPL-SCNC: 4.8 MMOL/L (ref 3.5–5.3)
PROT SERPL-MCNC: 7.2 G/DL (ref 6.4–8.2)
RBC # BLD AUTO: 4.05 X10*6/UL (ref 4–5.2)
SODIUM SERPL-SCNC: 125 MMOL/L (ref 136–145)
WBC # BLD AUTO: 14.5 X10*3/UL (ref 4.4–11.3)

## 2025-05-17 PROCEDURE — 99232 SBSQ HOSP IP/OBS MODERATE 35: CPT | Performed by: INTERNAL MEDICINE

## 2025-05-17 PROCEDURE — 2500000001 HC RX 250 WO HCPCS SELF ADMINISTERED DRUGS (ALT 637 FOR MEDICARE OP): Performed by: STUDENT IN AN ORGANIZED HEALTH CARE EDUCATION/TRAINING PROGRAM

## 2025-05-17 PROCEDURE — 2500000001 HC RX 250 WO HCPCS SELF ADMINISTERED DRUGS (ALT 637 FOR MEDICARE OP): Performed by: INTERNAL MEDICINE

## 2025-05-17 PROCEDURE — 2500000004 HC RX 250 GENERAL PHARMACY W/ HCPCS (ALT 636 FOR OP/ED): Performed by: INTERNAL MEDICINE

## 2025-05-17 PROCEDURE — 2500000005 HC RX 250 GENERAL PHARMACY W/O HCPCS: Performed by: INTERNAL MEDICINE

## 2025-05-17 PROCEDURE — 92610 EVALUATE SWALLOWING FUNCTION: CPT | Mod: GN | Performed by: SPEECH-LANGUAGE PATHOLOGIST

## 2025-05-17 PROCEDURE — 82947 ASSAY GLUCOSE BLOOD QUANT: CPT

## 2025-05-17 PROCEDURE — 2500000001 HC RX 250 WO HCPCS SELF ADMINISTERED DRUGS (ALT 637 FOR MEDICARE OP): Performed by: NURSE PRACTITIONER

## 2025-05-17 PROCEDURE — 83735 ASSAY OF MAGNESIUM: CPT | Performed by: INTERNAL MEDICINE

## 2025-05-17 PROCEDURE — 36415 COLL VENOUS BLD VENIPUNCTURE: CPT | Performed by: INTERNAL MEDICINE

## 2025-05-17 PROCEDURE — 2500000002 HC RX 250 W HCPCS SELF ADMINISTERED DRUGS (ALT 637 FOR MEDICARE OP, ALT 636 FOR OP/ED): Performed by: STUDENT IN AN ORGANIZED HEALTH CARE EDUCATION/TRAINING PROGRAM

## 2025-05-17 PROCEDURE — 80053 COMPREHEN METABOLIC PANEL: CPT | Performed by: INTERNAL MEDICINE

## 2025-05-17 PROCEDURE — 2500000002 HC RX 250 W HCPCS SELF ADMINISTERED DRUGS (ALT 637 FOR MEDICARE OP, ALT 636 FOR OP/ED): Performed by: INTERNAL MEDICINE

## 2025-05-17 PROCEDURE — 85025 COMPLETE CBC W/AUTO DIFF WBC: CPT | Performed by: INTERNAL MEDICINE

## 2025-05-17 PROCEDURE — 1200000002 HC GENERAL ROOM WITH TELEMETRY DAILY

## 2025-05-17 PROCEDURE — S0109 METHADONE ORAL 5MG: HCPCS | Performed by: STUDENT IN AN ORGANIZED HEALTH CARE EDUCATION/TRAINING PROGRAM

## 2025-05-17 RX ORDER — FUROSEMIDE 10 MG/ML
60 INJECTION INTRAMUSCULAR; INTRAVENOUS
Status: COMPLETED | OUTPATIENT
Start: 2025-05-17 | End: 2025-05-19

## 2025-05-17 RX ORDER — FUROSEMIDE 10 MG/ML
40 INJECTION INTRAMUSCULAR; INTRAVENOUS
Status: DISCONTINUED | OUTPATIENT
Start: 2025-05-17 | End: 2025-05-17

## 2025-05-17 RX ADMIN — Medication 6 MG: at 20:09

## 2025-05-17 RX ADMIN — METHADONE HYDROCHLORIDE 5 MG: 5 TABLET ORAL at 20:09

## 2025-05-17 RX ADMIN — NYSTATIN 1 APPLICATION: 100000 POWDER TOPICAL at 08:21

## 2025-05-17 RX ADMIN — INSULIN LISPRO 4 UNITS: 100 INJECTION, SOLUTION INTRAVENOUS; SUBCUTANEOUS at 17:28

## 2025-05-17 RX ADMIN — METHADONE HYDROCHLORIDE 5 MG: 5 TABLET ORAL at 08:20

## 2025-05-17 RX ADMIN — ATORVASTATIN CALCIUM 40 MG: 20 TABLET, FILM COATED ORAL at 20:09

## 2025-05-17 RX ADMIN — CEPHALEXIN 500 MG: 500 CAPSULE ORAL at 06:18

## 2025-05-17 RX ADMIN — APIXABAN 5 MG: 5 TABLET, FILM COATED ORAL at 20:09

## 2025-05-17 RX ADMIN — INSULIN LISPRO 2 UNITS: 100 INJECTION, SOLUTION INTRAVENOUS; SUBCUTANEOUS at 12:14

## 2025-05-17 RX ADMIN — INSULIN LISPRO 5 UNITS: 100 INJECTION, SOLUTION INTRAVENOUS; SUBCUTANEOUS at 12:15

## 2025-05-17 RX ADMIN — FUROSEMIDE 60 MG: 10 INJECTION, SOLUTION INTRAMUSCULAR; INTRAVENOUS at 16:00

## 2025-05-17 RX ADMIN — INSULIN LISPRO 2 UNITS: 100 INJECTION, SOLUTION INTRAVENOUS; SUBCUTANEOUS at 08:20

## 2025-05-17 RX ADMIN — CEPHALEXIN 500 MG: 500 CAPSULE ORAL at 16:00

## 2025-05-17 RX ADMIN — APIXABAN 5 MG: 5 TABLET, FILM COATED ORAL at 08:20

## 2025-05-17 RX ADMIN — PANTOPRAZOLE SODIUM 40 MG: 40 TABLET, DELAYED RELEASE ORAL at 06:18

## 2025-05-17 RX ADMIN — METOPROLOL TARTRATE 50 MG: 50 TABLET, FILM COATED ORAL at 08:20

## 2025-05-17 RX ADMIN — FUROSEMIDE 40 MG: 10 INJECTION, SOLUTION INTRAMUSCULAR; INTRAVENOUS at 08:20

## 2025-05-17 RX ADMIN — METOPROLOL TARTRATE 50 MG: 50 TABLET, FILM COATED ORAL at 20:09

## 2025-05-17 RX ADMIN — MONTELUKAST SODIUM 10 MG: 10 TABLET, FILM COATED ORAL at 20:09

## 2025-05-17 RX ADMIN — HYDROCODONE BITARTRATE AND ACETAMINOPHEN 1 TABLET: 5; 325 TABLET ORAL at 11:10

## 2025-05-17 RX ADMIN — NYSTATIN 1 APPLICATION: 100000 POWDER TOPICAL at 21:07

## 2025-05-17 RX ADMIN — CEPHALEXIN 500 MG: 500 CAPSULE ORAL at 21:07

## 2025-05-17 RX ADMIN — INSULIN LISPRO 5 UNITS: 100 INJECTION, SOLUTION INTRAVENOUS; SUBCUTANEOUS at 17:29

## 2025-05-17 RX ADMIN — INSULIN LISPRO 5 UNITS: 100 INJECTION, SOLUTION INTRAVENOUS; SUBCUTANEOUS at 08:20

## 2025-05-17 ASSESSMENT — COGNITIVE AND FUNCTIONAL STATUS - GENERAL
MOBILITY SCORE: 14
PERSONAL GROOMING: A LITTLE
DRESSING REGULAR LOWER BODY CLOTHING: A LOT
DAILY ACTIVITIY SCORE: 14
STANDING UP FROM CHAIR USING ARMS: A LOT
TURNING FROM BACK TO SIDE WHILE IN FLAT BAD: A LITTLE
WALKING IN HOSPITAL ROOM: A LOT
MOVING TO AND FROM BED TO CHAIR: A LOT
DAILY ACTIVITIY SCORE: 13
PERSONAL GROOMING: A LOT
MOVING FROM LYING ON BACK TO SITTING ON SIDE OF FLAT BED WITH BEDRAILS: A LITTLE
TOILETING: A LOT
HELP NEEDED FOR BATHING: A LOT
EATING MEALS: A LITTLE
DRESSING REGULAR UPPER BODY CLOTHING: A LOT
MOBILITY SCORE: 14
TURNING FROM BACK TO SIDE WHILE IN FLAT BAD: A LITTLE
MOVING FROM LYING ON BACK TO SITTING ON SIDE OF FLAT BED WITH BEDRAILS: A LITTLE
HELP NEEDED FOR BATHING: A LOT
TOILETING: A LOT
CLIMB 3 TO 5 STEPS WITH RAILING: A LOT
DRESSING REGULAR UPPER BODY CLOTHING: A LOT
EATING MEALS: A LITTLE
MOVING TO AND FROM BED TO CHAIR: A LOT
DRESSING REGULAR LOWER BODY CLOTHING: A LOT
STANDING UP FROM CHAIR USING ARMS: A LOT
WALKING IN HOSPITAL ROOM: A LOT
CLIMB 3 TO 5 STEPS WITH RAILING: A LOT

## 2025-05-17 ASSESSMENT — PAIN - FUNCTIONAL ASSESSMENT
PAIN_FUNCTIONAL_ASSESSMENT: 0-10

## 2025-05-17 ASSESSMENT — PAIN SCALES - GENERAL
PAINLEVEL_OUTOF10: 9
PAINLEVEL_OUTOF10: 9
PAINLEVEL_OUTOF10: 8
PAINLEVEL_OUTOF10: 6

## 2025-05-17 ASSESSMENT — PAIN DESCRIPTION - DESCRIPTORS
DESCRIPTORS: ACHING
DESCRIPTORS: ACHING

## 2025-05-17 ASSESSMENT — PAIN SCALES - WONG BAKER: WONGBAKER_NUMERICALRESPONSE: HURTS LITTLE BIT

## 2025-05-17 ASSESSMENT — PAIN DESCRIPTION - ORIENTATION: ORIENTATION: LEFT

## 2025-05-17 ASSESSMENT — PAIN DESCRIPTION - LOCATION: LOCATION: KNEE

## 2025-05-17 NOTE — CARE PLAN
Problem: Safety - Medical Restraint  Goal: Remains free of injury from restraints (Restraint for Interference with Medical Device)  Outcome: Progressing  Goal: Free from restraint(s) (Restraint for Interference with Medical Device)  Outcome: Progressing     Problem: Skin  Goal: Decreased wound size/increased tissue granulation at next dressing change  Outcome: Progressing  Goal: Prevent/manage excess moisture  Outcome: Progressing  Goal: Prevent/minimize sheer/friction injuries  Outcome: Progressing  Goal: Promote skin healing  Outcome: Progressing     Problem: Pain - Adult  Goal: Verbalizes/displays adequate comfort level or baseline comfort level  Outcome: Progressing     Problem: Safety - Adult  Goal: Free from fall injury  Outcome: Progressing     Problem: Discharge Planning  Goal: Discharge to home or other facility with appropriate resources  Outcome: Progressing     Problem: Chronic Conditions and Co-morbidities  Goal: Patient's chronic conditions and co-morbidity symptoms are monitored and maintained or improved  Outcome: Progressing     Problem: Nutrition  Goal: Nutrient intake appropriate for maintaining nutritional needs  Outcome: Progressing   The patient's goals for the shift include      The clinical goals for the shift include Pt will report decrease in pain throughout shift

## 2025-05-17 NOTE — CARE PLAN
The clinical goals for the shift include pt will remain free from falls an injury      Problem: Pain - Adult  Goal: Verbalizes/displays adequate comfort level or baseline comfort level  Outcome: Progressing     Problem: Safety - Adult  Goal: Free from fall injury  Outcome: Progressing     Problem: Skin  Goal: Decreased wound size/increased tissue granulation at next dressing change  Outcome: Progressing  Flowsheets (Taken 5/17/2025 1326)  Decreased wound size/increased tissue granulation at next dressing change: Promote sleep for wound healing  Goal: Prevent/manage excess moisture  Outcome: Progressing  Flowsheets (Taken 5/17/2025 1326)  Prevent/manage excess moisture: Follow provider orders for dressing changes  Goal: Prevent/minimize sheer/friction injuries  Outcome: Progressing  Flowsheets (Taken 5/17/2025 1326)  Prevent/minimize sheer/friction injuries: Increase activity/out of bed for meals  Goal: Promote skin healing  Outcome: Progressing  Flowsheets (Taken 5/17/2025 1326)  Promote skin healing: Assess skin/pad under line(s)/device(s)

## 2025-05-17 NOTE — PROGRESS NOTES
Inpatient Speech-Language Pathology Clinical Swallow Evaluation       Patient Name: Isa Jack  MRN: [id number]  : 1950  Patient Room: [address] [place] [address]  Today's Date: 25  Time Calculation  Start Time: 1101  Stop Time: 1123  Time Calculation (min): 22 min         Recommendations:  Solid Diet Recommendations : Regular (IDDSI Level 7)   Liquid Diet Recommendations: Thin (IDDSI Level 0)   Compensatory Swallowing Strategies: Upright 90 degrees as possible for all oral intake, Remain upright for 20-30 minutes after meals, Decrease distractions during eating/feeding, Single sips, Small bites/sips          Medical Staff Made Aware: Yes  Functional Oral Intake Scale: Level 7        total oral diet with no restrictions    Assessment:  Consistencies Trialed:   ice chips, thin liquids via cup and straw, puree, cracker trial - medication with sip of water from nurse, observed by clinician      Oral Mechanism Exam   Trigeminal Nerve (V)   Jaw open/close: Adequate     Bite:  Adequate    Sensation of face:  Adequate    Sensation of tongue: Adequate    Facial Nerve (VII)    Symmetry at rest: Adequate    Eyebrow raise: Adequate    Lip pucker: Adequate    Lip retraction/smile: Adequate      Cheek puffed: Reduced    Taste: Adequate    Vagus (X)  Volitional cough: Reduced    Hypoglossal (XII)       Lingual protrusion: Adequate    Tongue ROM/laterization, elevation: Adequate    Tongue strength: Adequate    Dentition: Edentulous       Respiratory Status: 2L O2 via nasal canula     EVALUATION:   Oral Status: Patient's oral status was within functional limits, oral cavity pink and moist. Pt is edentulous, reports having dentures, reports she does not have them and does not wear them/removes them for oral intake. Oral cavity was clean and clear prior to clinical swallow evaluation being completed.         Oral Phase: Patient's oral phase of the swallow characterized by mildly prolonged mastication of saltine cracker.  Pt able to  tolerate puree, soft and thin consistency within functional limits.  She had a minimal oral delay and no oral residuals with saltine cracker trial.  Patient tolerated thin liquids via cup and straw without anterior leakage.         Pharyngeal Phase: Patient's pharyngeal phase of the swallow characterized by no overt signs symptoms aspiration.         DIAGNOSTIC IMPRESSIONS:  Patient presents with a minimal oral dysphagia characterized by mildly prolonged mastication for regular textures, secondary to edentulous status and non-concerning for safety of swallow at this time.  Recommending to continue with regular diet and thin liquids.  A modified barium swallow study or further intervention is not warranted at this time unless pt’s symptoms change.     Plan:    Skilled speech therapy for dysphagia was warranted to assess for dysphagia and concern for aspiration. Pt has met goals at time of evaluation.   SLP Frequency: One time visit                         Barriers to improvement: none at this time   SLP Discharge Recommendations: No further SLP warranted upon discharge     Goals:  Goals established on 05/17/25      - Patient will tolerate regular solids and thin liquids without overt s/s of aspiration and/or laryngeal penetration or further s/s of pulmonary compromise in 100% of attempts.   GOAL MET       Subjective   Pt was slouched in chair at bedside prior to start of assessment. Nursing repositioned pt to upright seating and pt was able to adjust herself to more comfortable upright seating to participate in assessment. Pt agreed to participate in assessment as part of her course of care, but voiced that she was not hungry or thirsty and requested to complete as few trials as possible for evaluation.  Pt somewhat irritable throughout, however nursing later noted that pt had not slept well. Pt did not demonstrate outward clinical signs of significant pain - no grimacing, wincing, or clutching/touching parts of the  body, but when asked about pain, she voiced it to be 9/10. Nurse was present and provided medication, which pt was able to tolerate with no overt s/s of aspiration or adverse outcome.      Pain:  Ratin-10   Pt report: 9  Action taken: nurse present and provided medication    General Visit Information:  Pt. Admitted on 2025, for: atrial fibrillation with rapid ventricular response  Past medical history: asthma, dyslipidemia, HTN, CHF, DM, CAD, DJD, anemia, skin ulcer of L ankle, chronic cellulitis of bilateral LE, Afib on eliquis, ischemic cardiomyopathy, LVEF= 25-30%; GERD, CXR  noted left basilar opacification, small sided pleural effusion               Reason for Referral: r/o aspiration, dysphagia          Prior to Session Communication: Bedside nurse  / after session communication with bedside nurse    Treatment Completed with evaluation:   No      SLP Inpatient Education:  Learner patient   Barriers to Learning None   Method Verbal   Education - Topic ST provided patient education regarding role of ST, purpose of assessment, clinical impressions, goals of care    Outcome    2= meets goals/outcomes

## 2025-05-17 NOTE — PROGRESS NOTES
Isa Jack is a 74 y.o. female on day 8 of admission presenting with Atrial fibrillation with rapid ventricular response (Multi).    Subjective   This AM she appears well, no acute complaints. Lucid, conversing.     Objective   GEN: ill appearing, appears stated age, NAD  CV: irregular rhythm, regular rate, no m/r/g, +3 BLE edema  LUNGS: distant breath sounds  ABD: soft, NT  MSK; ble wrapped, erythema noted  NEURO: drowsy    Last Recorded Vitals:  /64   Pulse 103   Temp 36.4 °C (97.5 °F)   Resp (!) 8   Ht 1.524 m (5')   Wt 91.1 kg (200 lb 13.4 oz)   SpO2 97%   BMI 39.22 kg/m²      Scheduled medications:  Scheduled Medications[1]  Continuous medications:  Continuous Medications[2]  PRN medications:  PRN Medications[3]     Relevant Results:  Results for orders placed or performed during the hospital encounter of 05/09/25 (from the past 24 hours)   POCT GLUCOSE   Result Value Ref Range    POCT Glucose 227 (H) 74 - 99 mg/dL   POCT GLUCOSE   Result Value Ref Range    POCT Glucose 180 (H) 74 - 99 mg/dL   POCT GLUCOSE   Result Value Ref Range    POCT Glucose 141 (H) 74 - 99 mg/dL   CBC and Auto Differential   Result Value Ref Range    WBC 14.5 (H) 4.4 - 11.3 x10*3/uL    nRBC 0.5 (H) 0.0 - 0.0 /100 WBCs    RBC 4.05 4.00 - 5.20 x10*6/uL    Hemoglobin 11.2 (L) 12.0 - 16.0 g/dL    Hematocrit 37.1 36.0 - 46.0 %    MCV 92 80 - 100 fL    MCH 27.7 26.0 - 34.0 pg    MCHC 30.2 (L) 32.0 - 36.0 g/dL    RDW 16.7 (H) 11.5 - 14.5 %    Platelets 238 150 - 450 x10*3/uL    Neutrophils % 79.1 40.0 - 80.0 %    Immature Granulocytes %, Automated 0.6 0.0 - 0.9 %    Lymphocytes % 4.7 13.0 - 44.0 %    Monocytes % 12.2 2.0 - 10.0 %    Eosinophils % 3.1 0.0 - 6.0 %    Basophils % 0.3 0.0 - 2.0 %    Neutrophils Absolute 11.45 (H) 1.60 - 5.50 x10*3/uL    Immature Granulocytes Absolute, Automated 0.08 0.00 - 0.50 x10*3/uL    Lymphocytes Absolute 0.68 (L) 0.80 - 3.00 x10*3/uL    Monocytes Absolute 1.77 (H) 0.05 - 0.80 x10*3/uL     Eosinophils Absolute 0.45 (H) 0.00 - 0.40 x10*3/uL    Basophils Absolute 0.05 0.00 - 0.10 x10*3/uL   Comprehensive metabolic panel   Result Value Ref Range    Glucose 165 (H) 74 - 99 mg/dL    Sodium 125 (L) 136 - 145 mmol/L    Potassium 4.8 3.5 - 5.3 mmol/L    Chloride 93 (L) 98 - 107 mmol/L    Bicarbonate 23 21 - 32 mmol/L    Anion Gap 14 10 - 20 mmol/L    Urea Nitrogen 54 (H) 6 - 23 mg/dL    Creatinine 1.18 (H) 0.50 - 1.05 mg/dL    eGFR 48 (L) >60 mL/min/1.73m*2    Calcium 8.9 8.6 - 10.3 mg/dL    Albumin 3.7 3.4 - 5.0 g/dL    Alkaline Phosphatase 129 33 - 136 U/L    Total Protein 7.2 6.4 - 8.2 g/dL    AST 23 9 - 39 U/L    Bilirubin, Total 1.0 0.0 - 1.2 mg/dL    ALT 54 (H) 7 - 45 U/L   Magnesium   Result Value Ref Range    Magnesium 1.90 1.60 - 2.40 mg/dL   POCT GLUCOSE   Result Value Ref Range    POCT Glucose 195 (H) 74 - 99 mg/dL       Imaging  No results found.    Cardiology, Vascular, and Other Imaging  ECG 12 Lead  Result Date: 5/16/2025  Atrial fibrillation Anterolateral infarct , age undetermined Abnormal ECG When compared with ECG of 15-MAY-2025 07:31, (unconfirmed) Incomplete left bundle branch block is no longer Present Anterolateral infarct is now Present Confirmed by Lion Dominguez ([id number]) on 5/16/2025 11:14:01 PM      Assessment/Plan   Assessment & Plan      Acute on chronic combined systolic and diastolic CHF  -TTE (2023) LVEF 25-30%  -cardiology was following  -still appears sig volume overloaded, hyponatremic, BNP elevated  -cont Lasix 60mg IV BID  -strict I/os  -on BB, other GDMT has been limited by hypotension    Afib with RVR  -EP previously saw, now s/o  -dig was stopped d/t toxicity  -cont rate control with metoprolol, AC with Eliquis  -outpt fu with [facility]  -tele while here, remains rate controlled    Hyponatremia  -in s/o volume overload above, mgmt with diuresis    CAD  ICM  -managed medically d/t pt historical refusal of cath  -statin    Transaminitis  -improved to essentially normal  -was  possibly congestive hepatopathy    Acute hospital acquired agitated delirium  -now improved  -psych saw, no further changes, can use PRN IV Haldol    Methadone dependence  -cont pta pethadone    Chronic hypoxemic respiratory failure  -stable at baseline 2L O2 via NC  - singulair    LLE cellulitis  -wound cx grew GBS  -finished x7d Keflex    CKD3  -scr at baseline  -monitor with diuresis    DM2 with hyperglycemia  -basal bolus insulin regimen as ordered, titrate prn    Subclinical hypothyroidism  -repeat TFTs as outpt in 4-6 weeks    Vte ppx eliquis  Dispo is snf, pending precert  DNR/DNI, michelle with ICU    Waldemar Amezcua MD  Hospitalist         [1] apixaban, 5 mg, oral, BID  atorvastatin, 40 mg, oral, Nightly  cephalexin, 500 mg, oral, q8h JAY  furosemide, 40 mg, intravenous, BID  [Held by provider] furosemide, 40 mg, oral, BID  insulin lispro, 0-10 Units, subcutaneous, TID AC  insulin lispro, 5 Units, subcutaneous, TID AC  melatonin, 6 mg, oral, Nightly  methadone, 5 mg, oral, BID  metoprolol tartrate, 50 mg, oral, BID  montelukast, 10 mg, oral, Nightly  nystatin, 1 Application, Topical, BID  pantoprazole, 40 mg, oral, Daily     [2]    [3] PRN medications: acetaminophen **OR** acetaminophen **OR** acetaminophen, albuterol, haloperidol lactate, HYDROcodone-acetaminophen, menthol-zinc oxide, ondansetron **OR** ondansetron, prochlorperazine

## 2025-05-18 VITALS
HEIGHT: 60 IN | OXYGEN SATURATION: 100 % | RESPIRATION RATE: 14 BRPM | BODY MASS INDEX: 39.43 KG/M2 | TEMPERATURE: 97.5 F | HEART RATE: 74 BPM | WEIGHT: 200.84 LBS | SYSTOLIC BLOOD PRESSURE: 113 MMHG | DIASTOLIC BLOOD PRESSURE: 62 MMHG

## 2025-05-18 LAB
ANION GAP SERPL CALC-SCNC: 13 MMOL/L (ref 10–20)
ANION GAP SERPL CALC-SCNC: 16 MMOL/L (ref 10–20)
BASOPHILS # BLD AUTO: 0.04 X10*3/UL (ref 0–0.1)
BASOPHILS NFR BLD AUTO: 0.4 %
BUN SERPL-MCNC: 45 MG/DL (ref 6–23)
BUN SERPL-MCNC: 50 MG/DL (ref 6–23)
CALCIUM SERPL-MCNC: 8.2 MG/DL (ref 8.6–10.3)
CALCIUM SERPL-MCNC: 8.3 MG/DL (ref 8.6–10.3)
CHLORIDE SERPL-SCNC: 94 MMOL/L (ref 98–107)
CHLORIDE SERPL-SCNC: 94 MMOL/L (ref 98–107)
CO2 SERPL-SCNC: 19 MMOL/L (ref 21–32)
CO2 SERPL-SCNC: 28 MMOL/L (ref 21–32)
CREAT SERPL-MCNC: 0.89 MG/DL (ref 0.5–1.05)
CREAT SERPL-MCNC: 1.02 MG/DL (ref 0.5–1.05)
EGFRCR SERPLBLD CKD-EPI 2021: 57 ML/MIN/1.73M*2
EGFRCR SERPLBLD CKD-EPI 2021: 68 ML/MIN/1.73M*2
EOSINOPHIL # BLD AUTO: 0.23 X10*3/UL (ref 0–0.4)
EOSINOPHIL NFR BLD AUTO: 2.2 %
ERYTHROCYTE [DISTWIDTH] IN BLOOD BY AUTOMATED COUNT: 16.6 % (ref 11.5–14.5)
GLUCOSE BLD MANUAL STRIP-MCNC: 151 MG/DL (ref 74–99)
GLUCOSE BLD MANUAL STRIP-MCNC: 154 MG/DL (ref 74–99)
GLUCOSE BLD MANUAL STRIP-MCNC: 201 MG/DL (ref 74–99)
GLUCOSE BLD MANUAL STRIP-MCNC: 213 MG/DL (ref 74–99)
GLUCOSE BLD MANUAL STRIP-MCNC: 232 MG/DL (ref 74–99)
GLUCOSE SERPL-MCNC: 186 MG/DL (ref 74–99)
GLUCOSE SERPL-MCNC: 201 MG/DL (ref 74–99)
HCT VFR BLD AUTO: 34.8 % (ref 36–46)
HGB BLD-MCNC: 10.6 G/DL (ref 12–16)
IMM GRANULOCYTES # BLD AUTO: 0.04 X10*3/UL (ref 0–0.5)
IMM GRANULOCYTES NFR BLD AUTO: 0.4 % (ref 0–0.9)
LYMPHOCYTES # BLD AUTO: 0.87 X10*3/UL (ref 0.8–3)
LYMPHOCYTES NFR BLD AUTO: 8.4 %
MAGNESIUM SERPL-MCNC: 1.71 MG/DL (ref 1.6–2.4)
MCH RBC QN AUTO: 27.5 PG (ref 26–34)
MCHC RBC AUTO-ENTMCNC: 30.5 G/DL (ref 32–36)
MCV RBC AUTO: 90 FL (ref 80–100)
MONOCYTES # BLD AUTO: 1.69 X10*3/UL (ref 0.05–0.8)
MONOCYTES NFR BLD AUTO: 16.3 %
NEUTROPHILS # BLD AUTO: 7.5 X10*3/UL (ref 1.6–5.5)
NEUTROPHILS NFR BLD AUTO: 72.3 %
NRBC BLD-RTO: 0.7 /100 WBCS (ref 0–0)
PLATELET # BLD AUTO: 171 X10*3/UL (ref 150–450)
POTASSIUM SERPL-SCNC: 3.9 MMOL/L (ref 3.5–5.3)
POTASSIUM SERPL-SCNC: 4.5 MMOL/L (ref 3.5–5.3)
RBC # BLD AUTO: 3.85 X10*6/UL (ref 4–5.2)
SODIUM SERPL-SCNC: 124 MMOL/L (ref 136–145)
SODIUM SERPL-SCNC: 131 MMOL/L (ref 136–145)
WBC # BLD AUTO: 10.4 X10*3/UL (ref 4.4–11.3)

## 2025-05-18 PROCEDURE — 1200000002 HC GENERAL ROOM WITH TELEMETRY DAILY

## 2025-05-18 PROCEDURE — 2500000002 HC RX 250 W HCPCS SELF ADMINISTERED DRUGS (ALT 637 FOR MEDICARE OP, ALT 636 FOR OP/ED): Performed by: INTERNAL MEDICINE

## 2025-05-18 PROCEDURE — 36415 COLL VENOUS BLD VENIPUNCTURE: CPT | Performed by: INTERNAL MEDICINE

## 2025-05-18 PROCEDURE — 82947 ASSAY GLUCOSE BLOOD QUANT: CPT

## 2025-05-18 PROCEDURE — 2500000001 HC RX 250 WO HCPCS SELF ADMINISTERED DRUGS (ALT 637 FOR MEDICARE OP): Performed by: STUDENT IN AN ORGANIZED HEALTH CARE EDUCATION/TRAINING PROGRAM

## 2025-05-18 PROCEDURE — 2500000001 HC RX 250 WO HCPCS SELF ADMINISTERED DRUGS (ALT 637 FOR MEDICARE OP): Performed by: NURSE PRACTITIONER

## 2025-05-18 PROCEDURE — 99233 SBSQ HOSP IP/OBS HIGH 50: CPT | Performed by: INTERNAL MEDICINE

## 2025-05-18 PROCEDURE — 80048 BASIC METABOLIC PNL TOTAL CA: CPT | Performed by: INTERNAL MEDICINE

## 2025-05-18 PROCEDURE — 2500000001 HC RX 250 WO HCPCS SELF ADMINISTERED DRUGS (ALT 637 FOR MEDICARE OP): Performed by: INTERNAL MEDICINE

## 2025-05-18 PROCEDURE — 2500000002 HC RX 250 W HCPCS SELF ADMINISTERED DRUGS (ALT 637 FOR MEDICARE OP, ALT 636 FOR OP/ED): Performed by: STUDENT IN AN ORGANIZED HEALTH CARE EDUCATION/TRAINING PROGRAM

## 2025-05-18 PROCEDURE — 2500000005 HC RX 250 GENERAL PHARMACY W/O HCPCS: Performed by: INTERNAL MEDICINE

## 2025-05-18 PROCEDURE — 2500000004 HC RX 250 GENERAL PHARMACY W/ HCPCS (ALT 636 FOR OP/ED): Performed by: INTERNAL MEDICINE

## 2025-05-18 PROCEDURE — 85025 COMPLETE CBC W/AUTO DIFF WBC: CPT | Performed by: INTERNAL MEDICINE

## 2025-05-18 PROCEDURE — 83735 ASSAY OF MAGNESIUM: CPT | Performed by: INTERNAL MEDICINE

## 2025-05-18 PROCEDURE — S0109 METHADONE ORAL 5MG: HCPCS | Performed by: STUDENT IN AN ORGANIZED HEALTH CARE EDUCATION/TRAINING PROGRAM

## 2025-05-18 RX ORDER — DEXTROSE 50 % IN WATER (D50W) INTRAVENOUS SYRINGE
12.5
Status: DISCONTINUED | OUTPATIENT
Start: 2025-05-18 | End: 2025-05-20 | Stop reason: HOSPADM

## 2025-05-18 RX ORDER — DEXTROSE 50 % IN WATER (D50W) INTRAVENOUS SYRINGE
25
Status: DISCONTINUED | OUTPATIENT
Start: 2025-05-18 | End: 2025-05-20 | Stop reason: HOSPADM

## 2025-05-18 RX ORDER — INSULIN GLARGINE 100 [IU]/ML
10 INJECTION, SOLUTION SUBCUTANEOUS NIGHTLY
Status: DISCONTINUED | OUTPATIENT
Start: 2025-05-18 | End: 2025-05-20 | Stop reason: HOSPADM

## 2025-05-18 RX ORDER — INSULIN LISPRO 100 [IU]/ML
8 INJECTION, SOLUTION INTRAVENOUS; SUBCUTANEOUS
Status: DISCONTINUED | OUTPATIENT
Start: 2025-05-18 | End: 2025-05-20 | Stop reason: HOSPADM

## 2025-05-18 RX ADMIN — INSULIN LISPRO 4 UNITS: 100 INJECTION, SOLUTION INTRAVENOUS; SUBCUTANEOUS at 08:47

## 2025-05-18 RX ADMIN — INSULIN GLARGINE 10 UNITS: 100 INJECTION, SOLUTION SUBCUTANEOUS at 21:49

## 2025-05-18 RX ADMIN — CEPHALEXIN 500 MG: 500 CAPSULE ORAL at 07:08

## 2025-05-18 RX ADMIN — METHADONE HYDROCHLORIDE 5 MG: 5 TABLET ORAL at 21:48

## 2025-05-18 RX ADMIN — INSULIN LISPRO 5 UNITS: 100 INJECTION, SOLUTION INTRAVENOUS; SUBCUTANEOUS at 08:47

## 2025-05-18 RX ADMIN — APIXABAN 5 MG: 5 TABLET, FILM COATED ORAL at 08:47

## 2025-05-18 RX ADMIN — MONTELUKAST SODIUM 10 MG: 10 TABLET, FILM COATED ORAL at 21:48

## 2025-05-18 RX ADMIN — NYSTATIN 1 APPLICATION: 100000 POWDER TOPICAL at 08:58

## 2025-05-18 RX ADMIN — INSULIN LISPRO 8 UNITS: 100 INJECTION, SOLUTION INTRAVENOUS; SUBCUTANEOUS at 16:44

## 2025-05-18 RX ADMIN — PANTOPRAZOLE SODIUM 40 MG: 40 TABLET, DELAYED RELEASE ORAL at 07:08

## 2025-05-18 RX ADMIN — INSULIN LISPRO 4 UNITS: 100 INJECTION, SOLUTION INTRAVENOUS; SUBCUTANEOUS at 16:44

## 2025-05-18 RX ADMIN — METOPROLOL TARTRATE 50 MG: 50 TABLET, FILM COATED ORAL at 21:48

## 2025-05-18 RX ADMIN — Medication 6 MG: at 21:49

## 2025-05-18 RX ADMIN — FUROSEMIDE 60 MG: 10 INJECTION, SOLUTION INTRAMUSCULAR; INTRAVENOUS at 16:44

## 2025-05-18 RX ADMIN — APIXABAN 5 MG: 5 TABLET, FILM COATED ORAL at 21:48

## 2025-05-18 RX ADMIN — INSULIN LISPRO 4 UNITS: 100 INJECTION, SOLUTION INTRAVENOUS; SUBCUTANEOUS at 12:19

## 2025-05-18 RX ADMIN — METOPROLOL TARTRATE 50 MG: 50 TABLET, FILM COATED ORAL at 08:47

## 2025-05-18 RX ADMIN — INSULIN LISPRO 5 UNITS: 100 INJECTION, SOLUTION INTRAVENOUS; SUBCUTANEOUS at 12:19

## 2025-05-18 RX ADMIN — METHADONE HYDROCHLORIDE 5 MG: 5 TABLET ORAL at 08:47

## 2025-05-18 RX ADMIN — ATORVASTATIN CALCIUM 40 MG: 20 TABLET, FILM COATED ORAL at 21:48

## 2025-05-18 RX ADMIN — FUROSEMIDE 60 MG: 10 INJECTION, SOLUTION INTRAMUSCULAR; INTRAVENOUS at 08:46

## 2025-05-18 ASSESSMENT — COGNITIVE AND FUNCTIONAL STATUS - GENERAL
STANDING UP FROM CHAIR USING ARMS: A LITTLE
MOVING FROM LYING ON BACK TO SITTING ON SIDE OF FLAT BED WITH BEDRAILS: A LITTLE
DRESSING REGULAR LOWER BODY CLOTHING: A LOT
WALKING IN HOSPITAL ROOM: A LOT
CLIMB 3 TO 5 STEPS WITH RAILING: TOTAL
CLIMB 3 TO 5 STEPS WITH RAILING: A LOT
DAILY ACTIVITIY SCORE: 13
HELP NEEDED FOR BATHING: A LOT
STANDING UP FROM CHAIR USING ARMS: A LOT
PERSONAL GROOMING: A LITTLE
DAILY ACTIVITIY SCORE: 18
TOILETING: A LOT
MOBILITY SCORE: 17
DRESSING REGULAR LOWER BODY CLOTHING: A LITTLE
EATING MEALS: A LITTLE
HELP NEEDED FOR BATHING: A LITTLE
MOVING FROM LYING ON BACK TO SITTING ON SIDE OF FLAT BED WITH BEDRAILS: A LITTLE
DRESSING REGULAR UPPER BODY CLOTHING: A LITTLE
MOVING TO AND FROM BED TO CHAIR: A LITTLE
DRESSING REGULAR UPPER BODY CLOTHING: A LOT
MOVING TO AND FROM BED TO CHAIR: A LOT
TURNING FROM BACK TO SIDE WHILE IN FLAT BAD: A LITTLE
MOBILITY SCORE: 13
PERSONAL GROOMING: A LOT
TURNING FROM BACK TO SIDE WHILE IN FLAT BAD: A LITTLE
TOILETING: A LOT
WALKING IN HOSPITAL ROOM: A LITTLE

## 2025-05-18 ASSESSMENT — PAIN SCALES - GENERAL: PAINLEVEL_OUTOF10: 0 - NO PAIN

## 2025-05-18 ASSESSMENT — PAIN - FUNCTIONAL ASSESSMENT: PAIN_FUNCTIONAL_ASSESSMENT: 0-10

## 2025-05-18 NOTE — PROGRESS NOTES
Isa Jack is a 74 y.o. female on day 9 of admission presenting with Atrial fibrillation with rapid ventricular response (Multi).    Subjective   NAEON. No acute complaints.     Objective   GEN: ill appearing, appears stated age, NAD  CV: irregular rhythm, regular rate, no m/r/g, +3 BLE edema  LUNGS: distant breath sounds  ABD: soft, NT  MSK; ble wrapped, erythema noted  NEURO: drowsy    Last Recorded Vitals:  /74 (BP Location: Left arm, Patient Position: Sitting)   Pulse 89   Temp 35.5 °C (95.9 °F) (Temporal)   Resp 16   Ht 1.524 m (5')   Wt 91.1 kg (200 lb 13.4 oz)   SpO2 99%   BMI 39.22 kg/m²      Scheduled medications:  Scheduled Medications[1]  Continuous medications:  Continuous Medications[2]  PRN medications:  PRN Medications[3]     Relevant Results:  Results for orders placed or performed during the hospital encounter of 05/09/25 (from the past 24 hours)   POCT GLUCOSE   Result Value Ref Range    POCT Glucose 182 (H) 74 - 99 mg/dL   POCT GLUCOSE   Result Value Ref Range    POCT Glucose 206 (H) 74 - 99 mg/dL   CBC and Auto Differential   Result Value Ref Range    WBC 10.4 4.4 - 11.3 x10*3/uL    nRBC 0.7 (H) 0.0 - 0.0 /100 WBCs    RBC 3.85 (L) 4.00 - 5.20 x10*6/uL    Hemoglobin 10.6 (L) 12.0 - 16.0 g/dL    Hematocrit 34.8 (L) 36.0 - 46.0 %    MCV 90 80 - 100 fL    MCH 27.5 26.0 - 34.0 pg    MCHC 30.5 (L) 32.0 - 36.0 g/dL    RDW 16.6 (H) 11.5 - 14.5 %    Platelets 171 150 - 450 x10*3/uL    Neutrophils % 72.3 40.0 - 80.0 %    Immature Granulocytes %, Automated 0.4 0.0 - 0.9 %    Lymphocytes % 8.4 13.0 - 44.0 %    Monocytes % 16.3 2.0 - 10.0 %    Eosinophils % 2.2 0.0 - 6.0 %    Basophils % 0.4 0.0 - 2.0 %    Neutrophils Absolute 7.50 (H) 1.60 - 5.50 x10*3/uL    Immature Granulocytes Absolute, Automated 0.04 0.00 - 0.50 x10*3/uL    Lymphocytes Absolute 0.87 0.80 - 3.00 x10*3/uL    Monocytes Absolute 1.69 (H) 0.05 - 0.80 x10*3/uL    Eosinophils Absolute 0.23 0.00 - 0.40 x10*3/uL    Basophils  Absolute 0.04 0.00 - 0.10 x10*3/uL   Magnesium   Result Value Ref Range    Magnesium 1.71 1.60 - 2.40 mg/dL   Basic Metabolic Panel   Result Value Ref Range    Glucose 201 (H) 74 - 99 mg/dL    Sodium 124 (L) 136 - 145 mmol/L    Potassium 4.5 3.5 - 5.3 mmol/L    Chloride 94 (L) 98 - 107 mmol/L    Bicarbonate 19 (L) 21 - 32 mmol/L    Anion Gap 16 10 - 20 mmol/L    Urea Nitrogen 50 (H) 6 - 23 mg/dL    Creatinine 1.02 0.50 - 1.05 mg/dL    eGFR 57 (L) >60 mL/min/1.73m*2    Calcium 8.2 (L) 8.6 - 10.3 mg/dL   POCT GLUCOSE   Result Value Ref Range    POCT Glucose 201 (H) 74 - 99 mg/dL       Imaging  No results found.    Cardiology, Vascular, and Other Imaging  No other imaging results found for the past 2 days      Assessment/Plan   Assessment & Plan      Acute on chronic HFrEF  -TTE (2023) LVEF 25-30%  -cardiology was following  -still appears sig volume overloaded, hyponatremic, BNP was very elevated  -cont Lasix 60mg IV BID, now finally getting better uop  -strict I/os  -on BB, other GDMT has been limited by hypotension    Afib with RVR  -EP previously saw, now s/o  -dig was stopped d/t toxicity  -cont rate control with metoprolol, AC with Eliquis  -outpt fu with [facility] cardiology  -tele while here, remains rate controlled    Hyponatremia  -in s/o volume overload above, mgmt with diuresis  -has been fluctuating, Na 124 today, but only now getting some decent uop  -rpt bmp in PM  -if continues to be hyponatremic despite diuresis, will consult nephrology    CAD  ICM  -managed medically d/t pt historical refusal of cath  -statin  -not sure why not on antiplatelet, presumably d/t use of eliquis, defer to cards    Transaminitis  -improved to essentially normal  -was possibly congestive hepatopathy    Acute hospital acquired agitated delirium  -now improved  -psych saw, no further changes, can use PRN IV Haldol    Methadone dependence  -cont pta pethadone    Chronic hypoxemic respiratory failure  -stable at baseline 2L O2 via  NC  -singulair    LLE cellulitis  -wound cx grew GBS  -finished x7d Keflex    CKD3  -scr at baseline  -monitor with diuresis    DM2 with hyperglycemia  -basal bolus insulin regimen as ordered, titrate prn    Subclinical hypothyroidism  -repeat TFTs as outpt in 4-6 weeks    Vte ppx eliquis  Dispo is snf, pending precert  DNR/DNI, michelle with ICU    Waldemar Amezcua MD  Hospitalist         [1] apixaban, 5 mg, oral, BID  atorvastatin, 40 mg, oral, Nightly  furosemide, 60 mg, intravenous, 2 times per day  [Held by provider] furosemide, 40 mg, oral, BID  insulin lispro, 0-10 Units, subcutaneous, TID AC  insulin lispro, 5 Units, subcutaneous, TID AC  melatonin, 6 mg, oral, Nightly  methadone, 5 mg, oral, BID  metoprolol tartrate, 50 mg, oral, BID  montelukast, 10 mg, oral, Nightly  nystatin, 1 Application, Topical, BID  pantoprazole, 40 mg, oral, Daily     [2]    [3] PRN medications: acetaminophen **OR** acetaminophen **OR** acetaminophen, albuterol, haloperidol lactate, HYDROcodone-acetaminophen, menthol-zinc oxide, ondansetron **OR** ondansetron, prochlorperazine

## 2025-05-18 NOTE — CARE PLAN
The clinical goals for the shift include patient will report decreased pain by end of shift.      Problem: Pain - Adult  Goal: Verbalizes/displays adequate comfort level or baseline comfort level  Outcome: Progressing     Problem: Safety - Adult  Goal: Free from fall injury  Outcome: Progressing     Problem: Skin  Goal: Decreased wound size/increased tissue granulation at next dressing change  Outcome: Progressing  Flowsheets (Taken 5/18/2025 1445)  Decreased wound size/increased tissue granulation at next dressing change: Promote sleep for wound healing  Goal: Prevent/manage excess moisture  Outcome: Progressing  Flowsheets (Taken 5/18/2025 1445)  Prevent/manage excess moisture: Moisturize dry skin  Goal: Prevent/minimize sheer/friction injuries  Outcome: Progressing  Flowsheets (Taken 5/18/2025 1445)  Prevent/minimize sheer/friction injuries: Increase activity/out of bed for meals  Goal: Promote skin healing  Outcome: Progressing  Flowsheets (Taken 5/18/2025 1445)  Promote skin healing: Turn/reposition every 2 hours/use positioning/transfer devices

## 2025-05-18 NOTE — CARE PLAN
Problem: Safety - Medical Restraint  Goal: Remains free of injury from restraints (Restraint for Interference with Medical Device)  Outcome: Progressing  Goal: Free from restraint(s) (Restraint for Interference with Medical Device)  Outcome: Progressing     Problem: Skin  Goal: Decreased wound size/increased tissue granulation at next dressing change  Outcome: Progressing  Goal: Prevent/manage excess moisture  Outcome: Progressing  Goal: Prevent/minimize sheer/friction injuries  Outcome: Progressing  Goal: Promote skin healing  Outcome: Progressing     Problem: Pain - Adult  Goal: Verbalizes/displays adequate comfort level or baseline comfort level  Outcome: Progressing     Problem: Safety - Adult  Goal: Free from fall injury  Outcome: Progressing     Problem: Discharge Planning  Goal: Discharge to home or other facility with appropriate resources  Outcome: Progressing     Problem: Chronic Conditions and Co-morbidities  Goal: Patient's chronic conditions and co-morbidity symptoms are monitored and maintained or improved  Outcome: Progressing     Problem: Nutrition  Goal: Nutrient intake appropriate for maintaining nutritional needs  Outcome: Progressing   The patient's goals for the shift include      The clinical goals for the shift include patient will report decreased pain by end of shift.

## 2025-05-19 LAB
ANION GAP SERPL CALC-SCNC: 11 MMOL/L (ref 10–20)
BUN SERPL-MCNC: 41 MG/DL (ref 6–23)
CALCIUM SERPL-MCNC: 8 MG/DL (ref 8.6–10.3)
CHLORIDE SERPL-SCNC: 98 MMOL/L (ref 98–107)
CO2 SERPL-SCNC: 25 MMOL/L (ref 21–32)
CREAT SERPL-MCNC: 1.01 MG/DL (ref 0.5–1.05)
EGFRCR SERPLBLD CKD-EPI 2021: 58 ML/MIN/1.73M*2
GLUCOSE BLD MANUAL STRIP-MCNC: 183 MG/DL (ref 74–99)
GLUCOSE BLD MANUAL STRIP-MCNC: 185 MG/DL (ref 74–99)
GLUCOSE BLD MANUAL STRIP-MCNC: 189 MG/DL (ref 74–99)
GLUCOSE BLD MANUAL STRIP-MCNC: 336 MG/DL (ref 74–99)
GLUCOSE SERPL-MCNC: 195 MG/DL (ref 74–99)
HOLD SPECIMEN: 293
MAGNESIUM SERPL-MCNC: 1.65 MG/DL (ref 1.6–2.4)
POTASSIUM SERPL-SCNC: 4.1 MMOL/L (ref 3.5–5.3)
SODIUM SERPL-SCNC: 130 MMOL/L (ref 136–145)

## 2025-05-19 PROCEDURE — 83735 ASSAY OF MAGNESIUM: CPT | Performed by: INTERNAL MEDICINE

## 2025-05-19 PROCEDURE — 2500000002 HC RX 250 W HCPCS SELF ADMINISTERED DRUGS (ALT 637 FOR MEDICARE OP, ALT 636 FOR OP/ED): Performed by: INTERNAL MEDICINE

## 2025-05-19 PROCEDURE — 2500000002 HC RX 250 W HCPCS SELF ADMINISTERED DRUGS (ALT 637 FOR MEDICARE OP, ALT 636 FOR OP/ED): Performed by: STUDENT IN AN ORGANIZED HEALTH CARE EDUCATION/TRAINING PROGRAM

## 2025-05-19 PROCEDURE — 2500000001 HC RX 250 WO HCPCS SELF ADMINISTERED DRUGS (ALT 637 FOR MEDICARE OP): Performed by: NURSE PRACTITIONER

## 2025-05-19 PROCEDURE — S0109 METHADONE ORAL 5MG: HCPCS | Performed by: STUDENT IN AN ORGANIZED HEALTH CARE EDUCATION/TRAINING PROGRAM

## 2025-05-19 PROCEDURE — 2500000001 HC RX 250 WO HCPCS SELF ADMINISTERED DRUGS (ALT 637 FOR MEDICARE OP): Performed by: STUDENT IN AN ORGANIZED HEALTH CARE EDUCATION/TRAINING PROGRAM

## 2025-05-19 PROCEDURE — 97112 NEUROMUSCULAR REEDUCATION: CPT | Mod: GO,CO

## 2025-05-19 PROCEDURE — 97116 GAIT TRAINING THERAPY: CPT | Mod: GP,CQ

## 2025-05-19 PROCEDURE — 1200000002 HC GENERAL ROOM WITH TELEMETRY DAILY

## 2025-05-19 PROCEDURE — 36415 COLL VENOUS BLD VENIPUNCTURE: CPT | Performed by: INTERNAL MEDICINE

## 2025-05-19 PROCEDURE — 82947 ASSAY GLUCOSE BLOOD QUANT: CPT

## 2025-05-19 PROCEDURE — 97530 THERAPEUTIC ACTIVITIES: CPT | Mod: GP,CQ

## 2025-05-19 PROCEDURE — 2500000004 HC RX 250 GENERAL PHARMACY W/ HCPCS (ALT 636 FOR OP/ED): Performed by: INTERNAL MEDICINE

## 2025-05-19 PROCEDURE — 99232 SBSQ HOSP IP/OBS MODERATE 35: CPT | Performed by: INTERNAL MEDICINE

## 2025-05-19 PROCEDURE — 2500000001 HC RX 250 WO HCPCS SELF ADMINISTERED DRUGS (ALT 637 FOR MEDICARE OP): Performed by: INTERNAL MEDICINE

## 2025-05-19 PROCEDURE — 84295 ASSAY OF SERUM SODIUM: CPT | Performed by: INTERNAL MEDICINE

## 2025-05-19 PROCEDURE — 97535 SELF CARE MNGMENT TRAINING: CPT | Mod: GO,CO

## 2025-05-19 PROCEDURE — 2500000005 HC RX 250 GENERAL PHARMACY W/O HCPCS: Performed by: INTERNAL MEDICINE

## 2025-05-19 RX ADMIN — Medication 6 MG: at 20:32

## 2025-05-19 RX ADMIN — INSULIN LISPRO 2 UNITS: 100 INJECTION, SOLUTION INTRAVENOUS; SUBCUTANEOUS at 09:39

## 2025-05-19 RX ADMIN — FUROSEMIDE 40 MG: 40 TABLET ORAL at 17:37

## 2025-05-19 RX ADMIN — HYDROCODONE BITARTRATE AND ACETAMINOPHEN 1 TABLET: 5; 325 TABLET ORAL at 15:39

## 2025-05-19 RX ADMIN — HYDROCODONE BITARTRATE AND ACETAMINOPHEN 1 TABLET: 5; 325 TABLET ORAL at 09:37

## 2025-05-19 RX ADMIN — ATORVASTATIN CALCIUM 40 MG: 20 TABLET, FILM COATED ORAL at 20:32

## 2025-05-19 RX ADMIN — FUROSEMIDE 60 MG: 10 INJECTION, SOLUTION INTRAMUSCULAR; INTRAVENOUS at 09:38

## 2025-05-19 RX ADMIN — APIXABAN 5 MG: 5 TABLET, FILM COATED ORAL at 09:37

## 2025-05-19 RX ADMIN — APIXABAN 5 MG: 5 TABLET, FILM COATED ORAL at 20:31

## 2025-05-19 RX ADMIN — INSULIN LISPRO 2 UNITS: 100 INJECTION, SOLUTION INTRAVENOUS; SUBCUTANEOUS at 17:36

## 2025-05-19 RX ADMIN — INSULIN LISPRO 8 UNITS: 100 INJECTION, SOLUTION INTRAVENOUS; SUBCUTANEOUS at 11:35

## 2025-05-19 RX ADMIN — HALOPERIDOL LACTATE 1 MG: 5 INJECTION, SOLUTION INTRAMUSCULAR at 18:37

## 2025-05-19 RX ADMIN — NYSTATIN 1 APPLICATION: 100000 POWDER TOPICAL at 09:39

## 2025-05-19 RX ADMIN — METHADONE HYDROCHLORIDE 5 MG: 5 TABLET ORAL at 09:37

## 2025-05-19 RX ADMIN — MONTELUKAST SODIUM 10 MG: 10 TABLET, FILM COATED ORAL at 20:32

## 2025-05-19 RX ADMIN — METOPROLOL TARTRATE 50 MG: 50 TABLET, FILM COATED ORAL at 20:32

## 2025-05-19 RX ADMIN — PANTOPRAZOLE SODIUM 40 MG: 40 TABLET, DELAYED RELEASE ORAL at 06:17

## 2025-05-19 RX ADMIN — METHADONE HYDROCHLORIDE 5 MG: 5 TABLET ORAL at 20:32

## 2025-05-19 RX ADMIN — INSULIN LISPRO 8 UNITS: 100 INJECTION, SOLUTION INTRAVENOUS; SUBCUTANEOUS at 09:39

## 2025-05-19 RX ADMIN — METOPROLOL TARTRATE 50 MG: 50 TABLET, FILM COATED ORAL at 09:37

## 2025-05-19 RX ADMIN — NYSTATIN 1 APPLICATION: 100000 POWDER TOPICAL at 20:34

## 2025-05-19 RX ADMIN — INSULIN GLARGINE 10 UNITS: 100 INJECTION, SOLUTION SUBCUTANEOUS at 20:31

## 2025-05-19 RX ADMIN — INSULIN LISPRO 8 UNITS: 100 INJECTION, SOLUTION INTRAVENOUS; SUBCUTANEOUS at 17:37

## 2025-05-19 RX ADMIN — ACETAMINOPHEN 650 MG: 325 TABLET ORAL at 11:35

## 2025-05-19 ASSESSMENT — PAIN - FUNCTIONAL ASSESSMENT
PAIN_FUNCTIONAL_ASSESSMENT: 0-10

## 2025-05-19 ASSESSMENT — PAIN SCALES - GENERAL
PAINLEVEL_OUTOF10: 2
PAINLEVEL_OUTOF10: 2
PAINLEVEL_OUTOF10: 7
PAINLEVEL_OUTOF10: 9
PAINLEVEL_OUTOF10: 0 - NO PAIN
PAINLEVEL_OUTOF10: 0 - NO PAIN
PAINLEVEL_OUTOF10: 3
PAINLEVEL_OUTOF10: 8
PAINLEVEL_OUTOF10: 0 - NO PAIN

## 2025-05-19 ASSESSMENT — COGNITIVE AND FUNCTIONAL STATUS - GENERAL
TURNING FROM BACK TO SIDE WHILE IN FLAT BAD: A LITTLE
DRESSING REGULAR UPPER BODY CLOTHING: A LITTLE
DAILY ACTIVITIY SCORE: 17
TOILETING: A LITTLE
TURNING FROM BACK TO SIDE WHILE IN FLAT BAD: A LITTLE
CLIMB 3 TO 5 STEPS WITH RAILING: TOTAL
MOVING FROM LYING ON BACK TO SITTING ON SIDE OF FLAT BED WITH BEDRAILS: A LITTLE
WALKING IN HOSPITAL ROOM: A LOT
DRESSING REGULAR UPPER BODY CLOTHING: A LOT
WALKING IN HOSPITAL ROOM: A LOT
CLIMB 3 TO 5 STEPS WITH RAILING: A LOT
MOVING TO AND FROM BED TO CHAIR: A LOT
DAILY ACTIVITIY SCORE: 17
HELP NEEDED FOR BATHING: A LOT
DAILY ACTIVITIY SCORE: 17
STANDING UP FROM CHAIR USING ARMS: A LOT
STANDING UP FROM CHAIR USING ARMS: A LOT
MOBILITY SCORE: 14
DRESSING REGULAR LOWER BODY CLOTHING: A LOT
CLIMB 3 TO 5 STEPS WITH RAILING: A LOT
HELP NEEDED FOR BATHING: A LOT
TURNING FROM BACK TO SIDE WHILE IN FLAT BAD: A LOT
TOILETING: A LOT
HELP NEEDED FOR BATHING: A LOT
STANDING UP FROM CHAIR USING ARMS: A LOT
DRESSING REGULAR UPPER BODY CLOTHING: A LOT
TOILETING: A LITTLE
MOBILITY SCORE: 14
MOVING FROM LYING ON BACK TO SITTING ON SIDE OF FLAT BED WITH BEDRAILS: A LITTLE
WALKING IN HOSPITAL ROOM: A LOT
DRESSING REGULAR LOWER BODY CLOTHING: A LOT
MOBILITY SCORE: 13
MOVING TO AND FROM BED TO CHAIR: A LITTLE
MOVING TO AND FROM BED TO CHAIR: A LOT
MOVING FROM LYING ON BACK TO SITTING ON SIDE OF FLAT BED WITH BEDRAILS: A LITTLE
DRESSING REGULAR LOWER BODY CLOTHING: A LOT

## 2025-05-19 ASSESSMENT — PAIN DESCRIPTION - LOCATION
LOCATION: LEG

## 2025-05-19 ASSESSMENT — ACTIVITIES OF DAILY LIVING (ADL): HOME_MANAGEMENT_TIME_ENTRY: 16

## 2025-05-19 ASSESSMENT — PAIN DESCRIPTION - DESCRIPTORS
DESCRIPTORS: ACHING
DESCRIPTORS: ACHING;DISCOMFORT;SORE
DESCRIPTORS: SORE;DISCOMFORT
DESCRIPTORS: DISCOMFORT;SORE

## 2025-05-19 ASSESSMENT — PAIN DESCRIPTION - ORIENTATION
ORIENTATION: LEFT

## 2025-05-19 NOTE — PROGRESS NOTES
Isa Jack is a 74 y.o. female on day 10 of admission presenting with Atrial fibrillation with rapid ventricular response (Multi).    Subjective   NAEON. No acute complaints. She is ready to go.     Objective   GEN: ill appearing, appears stated age, NAD  CV: irregular rhythm, regular rate, no m/r/g, +2 BLE edema  LUNGS: distant breath sounds  ABD: soft, NT  MSK; ble wrapped, erythema noted  NEURO: awake, alert    Last Recorded Vitals:  /72 (BP Location: Right arm, Patient Position: Sitting)   Pulse 85   Temp 35.7 °C (96.3 °F) (Temporal)   Resp 18   Ht 1.524 m (5')   Wt 91.1 kg (200 lb 13.4 oz)   SpO2 99%   BMI 39.22 kg/m²      Scheduled medications:  Scheduled Medications[1]  Continuous medications:  Continuous Medications[2]  PRN medications:  PRN Medications[3]     Relevant Results:  Results for orders placed or performed during the hospital encounter of 05/09/25 (from the past 24 hours)   POCT GLUCOSE   Result Value Ref Range    POCT Glucose 232 (H) 74 - 99 mg/dL   POCT GLUCOSE   Result Value Ref Range    POCT Glucose 213 (H) 74 - 99 mg/dL   Basic metabolic panel   Result Value Ref Range    Glucose 186 (H) 74 - 99 mg/dL    Sodium 131 (L) 136 - 145 mmol/L    Potassium 3.9 3.5 - 5.3 mmol/L    Chloride 94 (L) 98 - 107 mmol/L    Bicarbonate 28 21 - 32 mmol/L    Anion Gap 13 10 - 20 mmol/L    Urea Nitrogen 45 (H) 6 - 23 mg/dL    Creatinine 0.89 0.50 - 1.05 mg/dL    eGFR 68 >60 mL/min/1.73m*2    Calcium 8.3 (L) 8.6 - 10.3 mg/dL   POCT GLUCOSE   Result Value Ref Range    POCT Glucose 154 (H) 74 - 99 mg/dL   POCT GLUCOSE   Result Value Ref Range    POCT Glucose 151 (H) 74 - 99 mg/dL   Magnesium   Result Value Ref Range    Magnesium 1.65 1.60 - 2.40 mg/dL   Basic Metabolic Panel   Result Value Ref Range    Glucose 195 (H) 74 - 99 mg/dL    Sodium 130 (L) 136 - 145 mmol/L    Potassium 4.1 3.5 - 5.3 mmol/L    Chloride 98 98 - 107 mmol/L    Bicarbonate 25 21 - 32 mmol/L    Anion Gap 11 10 - 20 mmol/L     Urea Nitrogen 41 (H) 6 - 23 mg/dL    Creatinine 1.01 0.50 - 1.05 mg/dL    eGFR 58 (L) >60 mL/min/1.73m*2    Calcium 8.0 (L) 8.6 - 10.3 mg/dL   POCT GLUCOSE   Result Value Ref Range    POCT Glucose 185 (H) 74 - 99 mg/dL       Imaging  No results found.    Cardiology, Vascular, and Other Imaging  No other imaging results found for the past 2 days      Assessment/Plan   Assessment & Plan      Acute on chronic HFrEF  -TTE (2023) LVEF 25-30%  -cardiology was following  -appeared sig volume overloaded, hyponatremic, BNP was very elevated  -can switch to PO diuretics later today  -strict I/os  -on BB, other GDMT has been limited by hypotension    Afib with RVR  -EP previously saw, now s/o  -dig was stopped reportedly d/t toxicity  -cont rate control with metoprolol, AC with Eliquis  -outpt fu with [facility] cardiology  -tele while here, remains rate controlled    Hyponatremia  -in s/o volume overload above, improved with diuresis    CAD  ICM  -managed medically d/t pt historical refusal of cath  -statin  -not sure why not on antiplatelet, presumably d/t use of eliquis, defer to cards    Transaminitis  -improved to essentially normal  -was possibly congestive hepatopathy    Acute hospital acquired agitated delirium  -now improved  -psych saw, no further changes, can use PRN IV Haldol    Methadone dependence  -cont pta pethadone    Chronic hypoxemic respiratory failure  -stable at baseline 2L O2 via NC  -singulair    LLE cellulitis  -wound cx grew GBS  -finished x7d Keflex    CKD3  -scr at baseline  -monitor with diuresis    DM2 with hyperglycemia  -basal bolus insulin regimen as ordered, titrate prn    Subclinical hypothyroidism  -repeat TFTs as outpt in 4-6 weeks    Vte ppx eliquis  Dispo is snf, pending precert  DNR/DNI, okay with ICU    Waldemar Amezcua MD  Hospitalist         [1] apixaban, 5 mg, oral, BID  atorvastatin, 40 mg, oral, Nightly  furosemide, 60 mg, intravenous, 2 times per day  furosemide, 40 mg, oral, BID  insulin  glargine, 10 Units, subcutaneous, Nightly  insulin lispro, 0-10 Units, subcutaneous, TID AC  insulin lispro, 8 Units, subcutaneous, TID AC  melatonin, 6 mg, oral, Nightly  methadone, 5 mg, oral, BID  metoprolol tartrate, 50 mg, oral, BID  montelukast, 10 mg, oral, Nightly  nystatin, 1 Application, Topical, BID  pantoprazole, 40 mg, oral, Daily     [2]    [3] PRN medications: acetaminophen **OR** acetaminophen **OR** acetaminophen, albuterol, dextrose, dextrose, glucagon, glucagon, haloperidol lactate, HYDROcodone-acetaminophen, menthol-zinc oxide, ondansetron **OR** ondansetron, prochlorperazine

## 2025-05-19 NOTE — PROGRESS NOTES
Occupational Therapy    OT Treatment    Patient Name: Isa Jack  MRN: [id number]  Department: [facility]  Room: [facility]809  Today's Date: 5/19/2025  Time Calculation  Start Time: 1325  Stop Time: 1356  Time Calculation (min): 31 min      Assessment:  OT Assessment: pt presents w/ impaired Ind w/ Adl's, decrease endurance/ tolerance with functional activity. Pt would benefit from con't OT to address deficits.  Prognosis: Good  Barriers to Discharge Home: Physical needs  Physical Needs: Stair navigation into home limited by function/safety, Ambulating household distances limited by function/safety, 24hr mobility assistance needed, 24hr ADL assistance needed, High falls risk due to function or environment  End of Session Communication: Bedside nurse  End of Session Patient Position: Alarm on, Up in chair (call light and all needs within reach,)  Prognosis: Good  Plan:  Treatment Interventions: ADL retraining, Functional transfer training, Endurance training, Patient/family training  OT Frequency: 2 times per week  OT Discharge Recommendations: Moderate intensity level of continued care  OT Recommended Transfer Status: Assist of 2  OT - OK to Discharge: Yes  Treatment Interventions: ADL retraining, Functional transfer training, Endurance training, Patient/family training    Subjective   OT Visit Info:  OT Received On: 05/19/25  General Visit Info:  General  Reason for Referral: impaired mobility  Referred By: Dr. Amezcua  Past Medical History Relevant to Rehab: asthma, dyslipidemia, HTN, CHF, DM, CAD, DJD, anemia, skin ulcer of L ankle, chronic cellulitis of bilateral LE, Afib on eliquis, ischemic cardiomyopathy, LVEF= 25-30%; GERD, CXR 5/9 noted left basilar opacification, small sided pleural effusion  Prior to Session Communication: Bedside nurse  Patient Position Received: Alarm on, Up in chair (2L nc , continous tele/ purwick  intact)  General Comment:  (pt agreeable with encouragement, pleasantly  "participated.)  Precautions:  Medical Precautions: Fall precautions    Pain:  Pain Assessment  Pain Assessment: 0-10  0-10 (Numeric) Pain Score: 8  Pain Type: Chronic pain  Pain Location: Leg  Pain Orientation: Left, Right    Objective    Cognition:  Cognition  Orientation Level: Disoriented to place (pt respond to place, \"I'm in [place]\", pt corrects place w/ cues)  Safety/Judgement: Within Functional Limits    Activities of Daily Living:    Grooming  Grooming Comments: pt completed oral rinse and face wash in stance at recliner w/ elevated tray, provided CGA for safety.  Unable to complete task at sinkside, limited in functional mobility d/t continous cardiac monitoring, cardiac status/ Afib,   UE Dressing  UE Dressing Comments:  (pt changed gowned seated at recliner w/ Min A.)    Therapy/Activity: Balance/Neuromuscular Re-Education  Balance/Neuromuscular Re-Education Activity Performed:  (engaged pt in dyn standing reaching activity at recliner w/ 0-1 UE support to ww, CGA for safety/balance, pt reached various levels demo fair standing balance, pt limited w/ R UE to reach and retrieve medium size jacoby bear able to complete task well w/ L UE.)    Outcome Measures:[facility] Daily Activity  Putting on and taking off regular lower body clothing: A lot  Bathing (including washing, rinsing, drying): A lot  Putting on and taking off regular upper body clothing: A little  Toileting, which includes using toilet, bedpan or urinal: A lot  Taking care of personal grooming such as brushing teeth: None  Eating Meals: None  Daily Activity - Total Score: 17    Education Documentation  ADL Training, taught by TRE Horton at 5/19/2025  2:45 PM.  Learner: Patient  Readiness: Acceptance  Method: Teach-back, Demonstration, Explanation  Response: Verbalizes Understanding, Needs Reinforcement    Goals:  Encounter Problems       Encounter Problems (Active)       OT Goals       pt will dress UB with SBA (Progressing)       " Start:  05/12/25    Expected End:  05/26/25            Pt will transfer to bed, chair, toilet with CGA (Progressing)       Start:  05/12/25    Expected End:  05/26/25            Pt will participate in gentle strengthening R elbow, wrist, hand and L UE  (Progressing)       Start:  05/12/25    Expected End:  05/26/25            Pt will demo fair + dyn sitting balance with ADLS (Progressing)       Start:  05/12/25    Expected End:  05/26/25

## 2025-05-19 NOTE — CONSULTS
Nutrition Initial Assessment:   Nutrition Assessment    Reason for Assessment: Length of stay    Patient is a 74 y.o. female presenting with afib with RVR  past medical history of systolic congestive heart failure EF 20%, A-fib on Eliquis, CAD, HTN, HLD, DM2, COPD not on home O2, asthma, chronic cellulitis to BLE with LLE wound followed by wound clinic presenting to [facility] with complaints of general malaise, nausea and vomiting x 3 days.       Nutrition History:  Energy Intake: Fair 50-75 %  Pain affecting nutrition status: N/A  Food and Nutrient History: RDN self consult for LOS. Pt reports good appetite and denies recent changes in appetite. Pt denies recent wt changes, states UBW of 198 lbs. Pt denies GI symptoms, denies chewing or swallowing difficulty. Pt denies questions regarding low sodium diet, has chips and pretzels at bedside. Per chart review, plan for discharge to SNF. Will continue to monitor.  Vitamin/Herbal Supplement Use: B12, KCl per home med list       Anthropometrics:  Height: 152.4 cm (5')   Weight: 91.1 kg (200 lb 13.4 oz)   BMI (Calculated): 39.22  IBW/kg (Dietitian Calculated): 45.5 kg  Percent of IBW: 200 %                      Weight History:   Wt Readings from Last 10 Encounters:   05/09/25 91.1 kg (200 lb 13.4 oz)   10/09/23 103 kg (227 lb 15.3 oz)   04/20/23 95.1 kg (209 lb 9.6 oz)   10/28/22 65.9 kg (145 lb 6 oz)   09/27/21 103 kg (226 lb 2 oz)   03/29/21 101 kg (223 lb 4 oz)   08/12/20 99.8 kg (220 lb)   01/30/20 99.3 kg (219 lb)      Weight Change %:  Weight History / % Weight Change: 27 lb wt loss in the past 2 years, current wt slightly above stated UBW  Significant Weight Loss: No    Nutrition Focused Physical Exam Findings:    Subcutaneous Fat Loss:   Defer Subcutaneous Fat Loss Assessment: Defer all  Defer All Reason: Deferred as pt visually appears well-nourished with no signs of malnutrition  Muscle Wasting:  Defer Muscle Wasting Assessment: Defer all  Defer All Reason:  Deferred as pt visually appears well-nourished with no signs of malnutrition  Edema:  Edema: +2 mild  Edema Location: BLE  Physical Findings:  Skin: Positive (wound left dorsal tibial)    Nutrition Significant Labs:  BMP Trend:   Results from last 7 days   Lab Units 05/19/25  0550 05/18/25  1549 05/18/25  0401 05/17/25  0539   GLUCOSE mg/dL 195* 186* 201* 165*   CALCIUM mg/dL 8.0* 8.3* 8.2* 8.9   SODIUM mmol/L 130* 131* 124* 125*   POTASSIUM mmol/L 4.1 3.9 4.5 4.8   CO2 mmol/L 25 28 19* 23   CHLORIDE mmol/L 98 94* 94* 93*   BUN mg/dL 41* 45* 50* 54*   CREATININE mg/dL 1.01 0.89 1.02 1.18*    , A1C:  Lab Results   Component Value Date    HGBA1C 8.2 (H) 03/30/2025   , BG POCT trend:   Results from last 7 days   Lab Units 05/19/25  0633 05/18/25  2055 05/18/25  1811 05/18/25  1539 05/18/25  1106   POCT GLUCOSE mg/dL 185* 151* 154* 213* 232*        Nutrition Specific Medications:  Scheduled medications  Scheduled Medications[1]  Continuous medications  Continuous Medications[2]  PRN medications  PRN Medications[3]     I/O:   Last BM Date: 05/18/25; Stool Appearance: Formed (05/19/25 0900)    Dietary Orders (From admission, onward)       Start     Ordered    05/14/25 1554  Adult diet Regular, Cardiac, Consistent Carb; CCD 60 gm/meal; 1800 mL fluid; 70 gm fat; 2 - 3 grams Sodium  Diet effective now        Question Answer Comment   Diet type Regular    Diet type Cardiac    Diet type Consistent Carb    Carb diet selection: CCD 60 gm/meal    Dietary fluid restriction / 24h: 1800 mL fluid    Fat restriction: 70 gm fat    Sodium restriction: 2 - 3 grams Sodium        05/14/25 1553    05/10/25 0837  May Participate in Room Service  ( ROOM SERVICE MAY PARTICIPATE)  Once        Question:  .  Answer:  Yes    05/10/25 0836                     Estimated Needs:   Total Energy Estimated Needs in 24 hours (kCal): 1822 kCal  Method for Estimating Needs: 20 kcal/kg ABW  Total Protein Estimated Needs in 24 Hours (g): 91 g  Method for  Estimating 24 Hour Protein Needs: 1 g/kg ABW  Total Fluid Estimated Needs in 24 Hours (mL): 1822 mL  Method for Estimating 24 Hour Fluid Needs: 1 ml/kcal or per MD  Patient on Order Fluid Restriction: Yes (1800 ml)        Nutrition Diagnosis   Malnutrition Diagnosis  Patient has Malnutrition Diagnosis: No    Nutrition Diagnosis  Patient has Nutrition Diagnosis: Yes  Diagnosis Status (1): New  Nutrition Diagnosis 1: Decreased nutrient needs  Related to (1): cardiac and endocrine dysfunction  As Evidenced by (1): need for therapeutic diet       Nutrition Interventions/Recommendations   Nutrition prescription for oral nutrition    Nutrition Recommendations:  Individualized Nutrition Prescription Provided for : Continue Cardiac, 60 g Consistent Carb, 1800 ml fluid restriction diet, monitor need for ONS    Nutrition Interventions/Goals:   Meals and Snacks: Carbohydrate-modified diet, Fat-modified diet, General healthful diet, Mineral-modified diet  Goal: Consumes 3 meals per day      Education Documentation  No documentation found.    Pt denies needs at this time, plan for discharge to SNF        Nutrition Monitoring and Evaluation   Food/Nutrient Related History Monitoring  Monitoring and Evaluation Plan: Intake / amount of food, Estimated Energy Intake  Estimated Energy Intake: Energy intake 50 -75% of estimated energy needs  Intake / Amount of food: Consumes at least 50% or more of meals/snacks/supplements    Anthropometric Measurements  Monitoring and Evaluation Plan: Body weight  Body Weight: Body weight - Maintain stable weight    Biochemical Data, Medical Tests and Procedures  Monitoring and Evaluation Plan: Electrolyte/renal panel, Glucose/endocrine profile  Electrolyte and Renal Panel: Electrolytes within normal limits  Glucose/Endocrine Profile: Glucose within normal limits ( mg/dL)              Time Spent (min): 40 minutes            [1] apixaban, 5 mg, oral, BID  atorvastatin, 40 mg, oral,  Nightly  furosemide, 40 mg, oral, BID  insulin glargine, 10 Units, subcutaneous, Nightly  insulin lispro, 0-10 Units, subcutaneous, TID AC  insulin lispro, 8 Units, subcutaneous, TID AC  melatonin, 6 mg, oral, Nightly  methadone, 5 mg, oral, BID  metoprolol tartrate, 50 mg, oral, BID  montelukast, 10 mg, oral, Nightly  nystatin, 1 Application, Topical, BID  pantoprazole, 40 mg, oral, Daily  [2]    [3] PRN medications: acetaminophen **OR** acetaminophen **OR** acetaminophen, albuterol, dextrose, dextrose, glucagon, glucagon, haloperidol lactate, HYDROcodone-acetaminophen, menthol-zinc oxide, ondansetron **OR** ondansetron, prochlorperazine

## 2025-05-19 NOTE — PROGRESS NOTES
05/19/25 1253   Discharge Planning   Home or Post Acute Services Post acute facilities (Rehab/SNF/etc)   Type of Post Acute Facility Services Skilled nursing   Expected Discharge Disposition SNF  ([facility])   Does the patient need discharge transport arranged? Yes   RoundTrip coordination needed? Yes   Patient Choice   Provider Choice list and CMS website (https://medicare.gov/care-compare#search) for post-acute Quality and Resource Measure Data were provided and reviewed with: Patient;Family   Patient / Family choosing to utilize agency / facility established prior to hospitalization No   Intensity of Service   Intensity of Service 0-30 min     Per [facility] SNF, pts precert with [facility] was cancelled d/t [facility] saying they are no longer active with pt. They are saying pt has [facility] instead. [facility] asked Insurance Verification to check into this and confirm what is showing as active. If pt does have [facility] then [facility] [place] to submit for auth. Pt will unfortunately needs PT and OT updates, only PT say pt today. [facility] asked therapy supervisor for OT to see pt as soon as they are able. They will try to get to pt today but may not  be able to. MD and SW updated.    UPDATE 1608:  PT/OT updates on chart for today. [facility] asked [facility] [place] to submit for precert today with [facility].

## 2025-05-19 NOTE — NURSING NOTE
Patient becoming agitated and repeatedly standing up/becoming verbally agressive with staff PRN IV haldol given per order.

## 2025-05-19 NOTE — PROGRESS NOTES
Physical Therapy    Physical Therapy Treatment    Patient Name: Isa Jack  MRN: [id number]  Today's Date: 5/19/2025  Time Calculation  Start Time: 0902  Stop Time: 0927  Time Calculation (min): 25 min     809/809-A    Assessment/Plan   PT Assessment  PT Assessment Results: Decreased mobility, Decreased strength  Rehab Prognosis: Good  Barriers to Discharge Home: Physical needs, Caregiver assistance  Caregiver Assistance: Caregiver assistance needed per identified barriers - however, level of patient's required assistance exceeds assistance available at home  Physical Needs: Stair navigation into home limited by function/safety, 24hr mobility assistance needed, 24hr ADL assistance needed, High falls risk due to function or environment  Evaluation/Treatment Tolerance: Patient limited by fatigue  Medical Staff Made Aware: Yes  End of Session Communication: Bedside nurse  Assessment Comment: pt would benefit from continued therapy to improve functional mobility and safety  End of Session Patient Position: Alarm on, Up in chair     Treatment/Interventions: Transfer training, Gait training, Therapeutic exercise, Therapeutic activity, Home exercise program  PT Plan: Ongoing PT  PT Frequency: 3 times per week  PT Discharge Recommendations: Moderate intensity level of continued care, Low intensity level of continued care    PT Recommended Transfer Status: Assist x1, Assistive device    General Visit Information:   PT  Visit  PT Received On: 05/19/25  General  Reason for Referral: impaired mobility  Referred By: Dr. Amezcua  Past Medical History Relevant to Rehab: asthma, dyslipidemia, HTN, CHF, DM, CAD, DJD, anemia, skin ulcer of L ankle, chronic cellulitis of bilateral LE, Afib on eliquis, ischemic cardiomyopathy, LVEF= 25-30%; GERD, CXR 5/9 noted left basilar opacification, small sided pleural effusion  Family/Caregiver Present: No  Prior to Session Communication: Bedside nurse (cleared to participate)  Patient  "Position Received: Alarm on, Up in chair  General Comment: agreeable to particpate, pt states that she doesnt know how well she will move but will try    General Observations:   General Observation: tele, external catheter , 3L O2    Subjective     Precautions:  Precautions  Medical Precautions: Fall precautions    Vital Signs:  Vital Signs  Heart Rate: 90  SpO2: 99 % (with 3L O2 in place)  Objective     Pain:  Pain Assessment  Pain Assessment: 0-10  0-10 (Numeric) Pain Score:  (pt reports that she always has chest pain due to her various ailments , no numerial value given .)  Pain Type: Acute pain, Chronic pain  Pain Descriptors: Aching  Pain Frequency: Intermittent  Pain Interventions: Ambulation/increased activity  Response to Interventions:  (report \"the pain has let up \" at end of session)    Cognition:  Cognition  Overall Cognitive Status: Within Functional Limits    Postural Control:    Activity Tolerance:  Activity Tolerance  Endurance: Decreased tolerance for upright activites    Treatments:  Therapeutic Exercise  Therapeutic Exercise Performed: Yes  Therapeutic Exercise Activity 1: instructed in and perfomed ther ex including AP, QS, GS, LAQ, Hip flexion, abd/add 10 reps each, pt reports that she needs to makes sure not to put pressure on L posterior calf due to having \" a sore that is statrting to heal there\"        Bed Mobility  Bed Mobility: No (seated upon arrival)  Ambulation/Gait Training  Ambulation/Gait Training Performed: Yes  Ambulation/Gait Training 1  Surface 1: Level tile  Device 1: Rolling walker  Comments/Distance (ft) 1: pt tolerated standing and wt shifting with WW and Mod A, able to take short shuffling sidesteps and 3 to 4 short steps FW and BW with WW ad Mod A , verbal and manual cues for walker placement and upright posture  Transfers  Transfer: Yes  Transfer 1  Technique 1: Sit to stand, Stand to sit  Trials/Comments 1: transfers sit <--> stand with   Mod A and vc for technique ,  " "hand placement and safety. pt has diffculty scooting in chair to repostion  Stairs  Stairs: No          Outcome Measures:     [facility] Basic Mobility  Turning from your back to your side while in a flat bed without using bedrails: A little  Moving from lying on your back to sitting on the side of a flat bed without using bedrails: A lot  Moving to and from bed to chair (including a wheelchair): A little  Standing up from a chair using your arms (e.g. wheelchair or bedside chair): A lot  To walk in hospital room: A lot  Climbing 3-5 steps with railing: Total  Basic Mobility - Total Score: 13      EDUCATION:  Individual(s) Educated: Patient  Education Provided: Fall Risk, Home Exercise Program, Home Safety  Patient Response to Education: Patient/Caregiver Verbalized Understanding of Information    Encounter Problems       Encounter Problems (Active)       PT Problem       sit<>stand, CGA use of WW for standing support  (Progressing)       Start:  05/12/25    Expected End:  05/26/25            patient to demonstrate standing tolerance of 2 minutes with use of WW for standing support  (Progressing)       Start:  05/12/25    Expected End:  05/26/25            patient to ambulate 15' 2-3 bouts, mod A x2, use of WW  (Progressing)       Start:  05/12/25    Expected End:  05/26/25            chair to chair transfer, use of WW, CGA  (Progressing)       Start:  05/12/25    Expected End:  05/26/25                patient to demonstrate ability to perform toe taps onto 2\" step, 5x bilaterally, use of WW for standing support  (Not Progressing)       Start:  05/12/25    Expected End:  05/26/25                   Pain - Adult              "

## 2025-05-19 NOTE — CARE PLAN
Problem: Safety - Medical Restraint  Goal: Remains free of injury from restraints (Restraint for Interference with Medical Device)  5/19/2025 1444 by Zuleyma Kelley RN  Outcome: Progressing  5/19/2025 1444 by Zuleyma Kelley RN  Outcome: Progressing  5/19/2025 1444 by Zuleyma Kelley RN  Outcome: Progressing  Goal: Free from restraint(s) (Restraint for Interference with Medical Device)  5/19/2025 1444 by Zuleyma Kelley RN  Outcome: Progressing  5/19/2025 1444 by Zuleyma Kelley RN  Outcome: Progressing  5/19/2025 1444 by Zuleyma Kelley RN  Outcome: Progressing     Problem: Skin  Goal: Decreased wound size/increased tissue granulation at next dressing change  5/19/2025 1444 by Zuleyma Kelley RN  Outcome: Progressing  Flowsheets (Taken 5/19/2025 1444)  Decreased wound size/increased tissue granulation at next dressing change:   Promote sleep for wound healing   Protective dressings over bony prominences  5/19/2025 1444 by Zuleyma Kelley RN  Outcome: Progressing  5/19/2025 1444 by Zuleyma Kelley RN  Outcome: Progressing  Flowsheets (Taken 5/19/2025 1444)  Decreased wound size/increased tissue granulation at next dressing change:   Promote sleep for wound healing   Protective dressings over bony prominences  Goal: Prevent/manage excess moisture  5/19/2025 1444 by Zuleyma Kelley RN  Outcome: Progressing  Flowsheets (Taken 5/19/2025 1444)  Prevent/manage excess moisture:   Cleanse incontinence/protect with barrier cream   Follow provider orders for dressing changes   Monitor for/manage infection if present   Moisturize dry skin  5/19/2025 1444 by Zuleyma Kelley RN  Outcome: Progressing  5/19/2025 1444 by Zuleyma Kelley RN  Outcome: Progressing  Flowsheets (Taken 5/19/2025 1444)  Prevent/manage excess moisture:   Cleanse incontinence/protect with barrier cream   Follow provider orders for dressing changes   Monitor for/manage infection if present   Moisturize dry skin  Goal: Prevent/minimize  sheer/friction injuries  5/19/2025 1444 by Zuleyma Kelley RN  Outcome: Progressing  Flowsheets (Taken 5/19/2025 1444)  Prevent/minimize sheer/friction injuries:   Complete micro-shifts as needed if patient unable. Adjust patient position to relieve pressure points, not a full turn   HOB 30 degrees or less   Increase activity/out of bed for meals   Turn/reposition every 2 hours/use positioning/transfer devices   Use pull sheet  5/19/2025 1444 by Zuleyma Kelley RN  Outcome: Progressing  5/19/2025 1444 by Zuleyma Kelley RN  Outcome: Progressing  Flowsheets (Taken 5/19/2025 1444)  Prevent/minimize sheer/friction injuries:   Complete micro-shifts as needed if patient unable. Adjust patient position to relieve pressure points, not a full turn   HOB 30 degrees or less   Increase activity/out of bed for meals   Turn/reposition every 2 hours/use positioning/transfer devices   Use pull sheet  Goal: Promote skin healing  5/19/2025 1444 by Zuleyma Kelley RN  Outcome: Progressing  Flowsheets (Taken 5/19/2025 1444)  Promote skin healing:   Assess skin/pad under line(s)/device(s)   Ensure correct size (line/device) and apply per  instructions   Protective dressings over bony prominences   Turn/reposition every 2 hours/use positioning/transfer devices   Rotate device position/do not position patient on device  5/19/2025 1444 by Zuleyma Kelley RN  Outcome: Progressing  5/19/2025 1444 by Zuleyma Kelley RN  Outcome: Progressing  Flowsheets (Taken 5/19/2025 1444)  Promote skin healing:   Assess skin/pad under line(s)/device(s)   Ensure correct size (line/device) and apply per  instructions   Protective dressings over bony prominences   Turn/reposition every 2 hours/use positioning/transfer devices   Rotate device position/do not position patient on device     Problem: Pain - Adult  Goal: Verbalizes/displays adequate comfort level or baseline comfort level  5/19/2025 1444 by Zuleyma Kelley  RN  Outcome: Progressing  5/19/2025 1444 by Zuleyma Kelley RN  Outcome: Progressing  5/19/2025 1444 by Zuleyma Kelley RN  Outcome: Progressing     Problem: Safety - Adult  Goal: Free from fall injury  5/19/2025 1444 by Zuleyma Kelley RN  Outcome: Progressing  5/19/2025 1444 by Zuleyma Kelley RN  Outcome: Progressing  5/19/2025 1444 by Zuleyma Kelley RN  Outcome: Progressing     Problem: Discharge Planning  Goal: Discharge to home or other facility with appropriate resources  5/19/2025 1444 by Zuleyma Kelley RN  Outcome: Progressing  5/19/2025 1444 by Zuleyma Kelley RN  Outcome: Progressing  5/19/2025 1444 by Zuleyma Kelley RN  Outcome: Progressing     Problem: Chronic Conditions and Co-morbidities  Goal: Patient's chronic conditions and co-morbidity symptoms are monitored and maintained or improved  5/19/2025 1444 by Zuleyma Kelley RN  Outcome: Progressing  5/19/2025 1444 by Zuleyma Kelley RN  Outcome: Progressing  5/19/2025 1444 by Zuleyma Kelley RN  Outcome: Progressing     Problem: Nutrition  Goal: Nutrient intake appropriate for maintaining nutritional needs  5/19/2025 1444 by Zuleyma Kelley RN  Outcome: Progressing  5/19/2025 1444 by Zuleyma Kelley RN  Outcome: Progressing  5/19/2025 1444 by Zuleyma Kelley RN  Outcome: Progressing

## 2025-05-20 VITALS
TEMPERATURE: 97.9 F | RESPIRATION RATE: 19 BRPM | HEART RATE: 103 BPM | HEIGHT: 60 IN | OXYGEN SATURATION: 98 % | DIASTOLIC BLOOD PRESSURE: 72 MMHG | SYSTOLIC BLOOD PRESSURE: 104 MMHG | BODY MASS INDEX: 39.43 KG/M2 | WEIGHT: 200.84 LBS

## 2025-05-20 LAB
ANION GAP SERPL CALC-SCNC: 12 MMOL/L (ref 10–20)
BUN SERPL-MCNC: 38 MG/DL (ref 6–23)
CALCIUM SERPL-MCNC: 7.9 MG/DL (ref 8.6–10.3)
CHLORIDE SERPL-SCNC: 96 MMOL/L (ref 98–107)
CO2 SERPL-SCNC: 25 MMOL/L (ref 21–32)
CREAT SERPL-MCNC: 1.1 MG/DL (ref 0.5–1.05)
EGFRCR SERPLBLD CKD-EPI 2021: 53 ML/MIN/1.73M*2
GLUCOSE BLD MANUAL STRIP-MCNC: 167 MG/DL (ref 74–99)
GLUCOSE BLD MANUAL STRIP-MCNC: 227 MG/DL (ref 74–99)
GLUCOSE SERPL-MCNC: 242 MG/DL (ref 74–99)
HOLD SPECIMEN: NORMAL
MAGNESIUM SERPL-MCNC: 1.67 MG/DL (ref 1.6–2.4)
POTASSIUM SERPL-SCNC: 4.3 MMOL/L (ref 3.5–5.3)
SODIUM SERPL-SCNC: 129 MMOL/L (ref 136–145)

## 2025-05-20 PROCEDURE — 83735 ASSAY OF MAGNESIUM: CPT | Performed by: INTERNAL MEDICINE

## 2025-05-20 PROCEDURE — 2500000001 HC RX 250 WO HCPCS SELF ADMINISTERED DRUGS (ALT 637 FOR MEDICARE OP): Performed by: STUDENT IN AN ORGANIZED HEALTH CARE EDUCATION/TRAINING PROGRAM

## 2025-05-20 PROCEDURE — 2500000001 HC RX 250 WO HCPCS SELF ADMINISTERED DRUGS (ALT 637 FOR MEDICARE OP): Performed by: NURSE PRACTITIONER

## 2025-05-20 PROCEDURE — S0109 METHADONE ORAL 5MG: HCPCS | Performed by: STUDENT IN AN ORGANIZED HEALTH CARE EDUCATION/TRAINING PROGRAM

## 2025-05-20 PROCEDURE — 82947 ASSAY GLUCOSE BLOOD QUANT: CPT

## 2025-05-20 PROCEDURE — 2500000001 HC RX 250 WO HCPCS SELF ADMINISTERED DRUGS (ALT 637 FOR MEDICARE OP): Performed by: INTERNAL MEDICINE

## 2025-05-20 PROCEDURE — 2500000002 HC RX 250 W HCPCS SELF ADMINISTERED DRUGS (ALT 637 FOR MEDICARE OP, ALT 636 FOR OP/ED): Performed by: INTERNAL MEDICINE

## 2025-05-20 PROCEDURE — 80048 BASIC METABOLIC PNL TOTAL CA: CPT | Performed by: INTERNAL MEDICINE

## 2025-05-20 PROCEDURE — 99239 HOSP IP/OBS DSCHRG MGMT >30: CPT | Performed by: STUDENT IN AN ORGANIZED HEALTH CARE EDUCATION/TRAINING PROGRAM

## 2025-05-20 PROCEDURE — 2500000002 HC RX 250 W HCPCS SELF ADMINISTERED DRUGS (ALT 637 FOR MEDICARE OP, ALT 636 FOR OP/ED): Performed by: STUDENT IN AN ORGANIZED HEALTH CARE EDUCATION/TRAINING PROGRAM

## 2025-05-20 PROCEDURE — 36415 COLL VENOUS BLD VENIPUNCTURE: CPT | Performed by: INTERNAL MEDICINE

## 2025-05-20 RX ORDER — METHADONE HYDROCHLORIDE 5 MG/1
5 TABLET ORAL 2 TIMES DAILY
Qty: 10 TABLET | Refills: 0 | Status: SHIPPED | OUTPATIENT
Start: 2025-05-20 | End: 2025-05-20 | Stop reason: HOSPADM

## 2025-05-20 RX ADMIN — INSULIN LISPRO 8 UNITS: 100 INJECTION, SOLUTION INTRAVENOUS; SUBCUTANEOUS at 11:23

## 2025-05-20 RX ADMIN — INSULIN LISPRO 4 UNITS: 100 INJECTION, SOLUTION INTRAVENOUS; SUBCUTANEOUS at 08:00

## 2025-05-20 RX ADMIN — HYDROCODONE BITARTRATE AND ACETAMINOPHEN 1 TABLET: 5; 325 TABLET ORAL at 04:40

## 2025-05-20 RX ADMIN — NYSTATIN 1 APPLICATION: 100000 POWDER TOPICAL at 08:01

## 2025-05-20 RX ADMIN — METOPROLOL TARTRATE 50 MG: 50 TABLET, FILM COATED ORAL at 08:00

## 2025-05-20 RX ADMIN — METHADONE HYDROCHLORIDE 5 MG: 5 TABLET ORAL at 08:00

## 2025-05-20 RX ADMIN — FUROSEMIDE 40 MG: 40 TABLET ORAL at 08:00

## 2025-05-20 RX ADMIN — PANTOPRAZOLE SODIUM 40 MG: 40 TABLET, DELAYED RELEASE ORAL at 04:41

## 2025-05-20 RX ADMIN — INSULIN LISPRO 8 UNITS: 100 INJECTION, SOLUTION INTRAVENOUS; SUBCUTANEOUS at 08:00

## 2025-05-20 RX ADMIN — INSULIN LISPRO 2 UNITS: 100 INJECTION, SOLUTION INTRAVENOUS; SUBCUTANEOUS at 11:23

## 2025-05-20 RX ADMIN — APIXABAN 5 MG: 5 TABLET, FILM COATED ORAL at 08:00

## 2025-05-20 ASSESSMENT — PAIN - FUNCTIONAL ASSESSMENT: PAIN_FUNCTIONAL_ASSESSMENT: 0-10

## 2025-05-20 ASSESSMENT — COGNITIVE AND FUNCTIONAL STATUS - GENERAL
WALKING IN HOSPITAL ROOM: A LOT
MOBILITY SCORE: 14
MOVING TO AND FROM BED TO CHAIR: A LOT
MOVING FROM LYING ON BACK TO SITTING ON SIDE OF FLAT BED WITH BEDRAILS: A LITTLE
DRESSING REGULAR LOWER BODY CLOTHING: A LOT
STANDING UP FROM CHAIR USING ARMS: A LOT
HELP NEEDED FOR BATHING: A LOT
DRESSING REGULAR UPPER BODY CLOTHING: A LOT
TOILETING: A LITTLE
TURNING FROM BACK TO SIDE WHILE IN FLAT BAD: A LITTLE
CLIMB 3 TO 5 STEPS WITH RAILING: A LOT
DAILY ACTIVITIY SCORE: 17

## 2025-05-20 ASSESSMENT — PAIN SCALES - GENERAL
PAINLEVEL_OUTOF10: 0 - NO PAIN
PAINLEVEL_OUTOF10: 0 - NO PAIN

## 2025-05-20 NOTE — CARE PLAN
The clinical goals for the shift include pt will remain free from falls throughout shift      Problem: Safety - Medical Restraint  Goal: Remains free of injury from restraints (Restraint for Interference with Medical Device)  Outcome: Progressing  Goal: Free from restraint(s) (Restraint for Interference with Medical Device)  Outcome: Progressing     Problem: Skin  Goal: Decreased wound size/increased tissue granulation at next dressing change  Outcome: Progressing  Goal: Prevent/manage excess moisture  Outcome: Progressing  Goal: Prevent/minimize sheer/friction injuries  Outcome: Progressing  Goal: Promote skin healing  Outcome: Progressing     Problem: Pain - Adult  Goal: Verbalizes/displays adequate comfort level or baseline comfort level  Outcome: Progressing     Problem: Safety - Adult  Goal: Free from fall injury  Outcome: Progressing     Problem: Discharge Planning  Goal: Discharge to home or other facility with appropriate resources  Outcome: Progressing     Problem: Chronic Conditions and Co-morbidities  Goal: Patient's chronic conditions and co-morbidity symptoms are monitored and maintained or improved  Outcome: Progressing     Problem: Nutrition  Goal: Nutrient intake appropriate for maintaining nutritional needs  Outcome: Progressing

## 2025-05-20 NOTE — PROGRESS NOTES
05/20/25 1107   Discharge Planning   Home or Post Acute Services Post acute facilities (Rehab/SNF/etc)   Type of Post Acute Facility Services Skilled nursing   Expected Discharge Disposition SNF  ([facility])   Does the patient need discharge transport arranged? Yes   RoundTrip coordination needed? Yes   Has discharge transport been arranged? Yes   What day is the transport expected? 05/20/25   What time is the transport expected? 1400   Patient Choice   Provider Choice list and CMS website (https://medicare.gov/care-compare#search) for post-acute Quality and Resource Measure Data were provided and reviewed with: Patient   Patient / Family choosing to utilize agency / facility established prior to hospitalization No   Intensity of Service   Intensity of Service 0-30 min     Precert obtained this morning. Transport requested in Roundtrip for 2pm  by cotUma Mack completed and signed, sent in Careport with MAR and SHARAD. HENS was previously completed. Pt and son updated. Bedside RN given report number. Facility aware.

## 2025-05-20 NOTE — DISCHARGE SUMMARY
Medical Group Discharge Summary  DISCHARGE DIAGNOSIS     Acute on chronic systolic CHF exacerbation, resolved  Atrial fibrillation with RVR resolved  Paroxysmal atrial fibrillation  Long-term anticoagulation use  CAD  Hypervolemic hyponatremia  Hospital-acquired delirium  Methadone dependence  Chronic respiratory failure with hypoxia  Left lower extremity cellulitis     ISSUES REQUIRING FOLLOW-UP     Follow-up with the wound care center as previously scheduled  Cardiology follow-up to discuss CHF and A-fib management    HOSPITAL COURSE AND DETAILS     This is a 74-year-old female with a significant past medical history of systolic CHF, atrial fibrillation, hypertension, hyperlipidemia, CAD, type II DM, COPD that presented with chief complaints of weakness nausea, vomiting.  While in the ED, patient was found to be in atrial fibrillation with RVR and admitted to the intermediate care unit.    During hospitalization, patient was seen by cardiology.  She was diuresed for acute CHF exacerbation and transition to oral Lasix.  Patient was hypervolemic and as a result also had hyponatremia which improved with diuresis.  Was in atrial fibrillation with RVR and rates improved with small adjustments in her medications.    Patient did also have issues with hospital-acquired delirium which improved.  Otherwise home medications were continued as ordered.  There were also concerns for left lower extremity cellulitis which seems chronic in nature.  Wound cultures did grow group B strep therefore we will continue and complete a course of Keflex.    Patient received today and patient is being discharged to SNF at this time.  PCP and cardiology follow-up as ordered upon discharge from SNF.    Total time spent on discharge: >30min      ---Of note, this documentation is completed using the Dragon Dictation system (voice recognition software). There may be spelling and/or grammatical errors that were not corrected prior to final  submission.---    Igor Alaniz MD    DISCHARGE PHYSICAL EXAM     Last Recorded Vitals:  Vitals:    05/19/25 2000 05/19/25 2304 05/20/25 0339 05/20/25 0746   BP: 96/58 137/66 135/63 112/70   BP Location: Right arm Left arm Left arm Left arm   Patient Position: Sitting Lying Lying Lying   Pulse: 91 90 84 101   Resp: 20 16 20 18   Temp: 37.1 °C (98.8 °F) 36.1 °C (97 °F) 36.7 °C (98.1 °F) 36.4 °C (97.5 °F)   TempSrc: Temporal Temporal Temporal Temporal   SpO2: 97% 99% 98% 95%   Weight:       Height:         Physical Exam  Vitals reviewed.   Constitutional:       General: She is not in acute distress.     Appearance: Normal appearance. She is not toxic-appearing.   HENT:      Head: Normocephalic and atraumatic.   Eyes:      Extraocular Movements: Extraocular movements intact.      Conjunctiva/sclera: Conjunctivae normal.   Cardiovascular:      Rate and Rhythm: Normal rate and regular rhythm.      Pulses: Normal pulses.      Heart sounds: Normal heart sounds.   Pulmonary:      Effort: Pulmonary effort is normal. No respiratory distress.      Breath sounds: Normal breath sounds. No wheezing.   Abdominal:      General: Bowel sounds are normal. There is no distension.      Palpations: Abdomen is soft.      Tenderness: There is no abdominal tenderness. There is no guarding.   Musculoskeletal:         General: Normal range of motion.      Cervical back: Normal range of motion and neck supple.   Neurological:      Mental Status: She is alert. Mental status is at baseline.       DISCHARGE MEDICATIONS        Your medication list        START taking these medications        Instructions Last Dose Given Next Dose Due   metoprolol succinate XL 50 mg 24 hr tablet  Commonly known as: Toprol-XL      Take 2 tablets (100 mg) by mouth once daily. Do not crush or chew.              CONTINUE taking these medications        Instructions Last Dose Given Next Dose Due   albuterol 2.5 mg /3 mL (0.083 %) nebulizer solution           ProAir HFA  90 mcg/actuation inhaler  Generic drug: albuterol           apixaban 5 mg tablet  Commonly known as: Eliquis      Take 1 tablet (5 mg) by mouth every 12 hours.       clotrimazole 1 % cream  Commonly known as: Lotrimin      Apply 1 Application topically 2 times a day. Apply to affected areas 2-3 times daily       cyanocobalamin (vitamin B-12) 2,500 mcg tablet, sublingual SL tablet      Place 1 each under the tongue once daily.       BAND-AID ACTIVE FLEX TOP           elastic bandage bandage           Lasix 40 mg tablet  Generic drug: furosemide           Lipitor 40 mg tablet  Generic drug: atorvastatin           metFORMIN 1,000 mg tablet  Commonly known as: Glucophage           methadone 5 mg tablet  Commonly known as: Dolophine           montelukast 10 mg tablet  Commonly known as: Singulair           mupirocin 2 % ointment  Commonly known as: Bactroban      Apply 1 Application topically 2 times a day as needed (infection).       naloxone 4 mg/0.1 mL nasal spray  Commonly known as: Narcan      Administer 1 spray (4 mg) into affected nostril(s) if needed for opioid reversal.       Nitrostat 0.4 mg SL tablet  Generic drug: nitroglycerin           OneTouch Ultra Test  Generic drug: blood sugar diagnostic                  STOP taking these medications      Coreg 6.25 mg tablet  Generic drug: carvedilol        digoxin 125 MCG tablet  Commonly known as: Lanoxin        HYDROcodone-acetaminophen 5-325 mg tablet  Commonly known as: Norco        meloxicam 7.5 mg tablet  Commonly known as: Mobic        potassium chloride CR 20 mEq ER tablet  Commonly known as: Klor-Con M20        sacubitriL-valsartan 24-26 mg tablet  Commonly known as: Entresto               ASK your doctor about these medications        Instructions Last Dose Given Next Dose Due   cephalexin 500 mg capsule  Commonly known as: Keflex  Ask about: Should I take this medication?      Take 1 capsule (500 mg) by mouth every 8 hours for 3 days.                 Where  to Get Your Medications        Information about where to get these medications is not yet available    Ask your nurse or doctor about these medications  cephalexin 500 mg capsule  metoprolol succinate XL 50 mg 24 hr tablet           OUTPATIENT FOLLOW-UP     Future Appointments   Date Time Provider Department Center   6/2/2025  8:00 AM Kyle Noel MD [id number] [place]

## 2025-05-21 DIAGNOSIS — G89.4 CHRONIC PAIN SYNDROME: Primary | ICD-10-CM

## 2025-05-21 RX ORDER — ALBUTEROL SULFATE 90 UG/1
2 INHALANT RESPIRATORY (INHALATION) EVERY 4 HOURS PRN
COMMUNITY
Start: 2024-06-27

## 2025-05-21 RX ORDER — ALBUTEROL SULFATE 0.83 MG/ML
2.5 SOLUTION RESPIRATORY (INHALATION) EVERY 4 HOURS PRN
COMMUNITY
Start: 2024-06-28

## 2025-05-21 RX ORDER — ACETAMINOPHEN 325 MG/1
650 TABLET ORAL EVERY 4 HOURS PRN
COMMUNITY

## 2025-05-21 RX ORDER — METHADONE HYDROCHLORIDE 5 MG/1
5 TABLET ORAL 2 TIMES DAILY
COMMUNITY

## 2025-05-21 RX ORDER — FUROSEMIDE 40 MG/1
40 TABLET ORAL 2 TIMES DAILY
COMMUNITY

## 2025-05-21 RX ORDER — MONTELUKAST SODIUM 10 MG/1
10 TABLET ORAL DAILY
COMMUNITY
Start: 2024-11-26

## 2025-05-21 RX ORDER — METOPROLOL SUCCINATE 100 MG/1
100 TABLET, EXTENDED RELEASE ORAL DAILY
COMMUNITY

## 2025-05-21 RX ORDER — CLOTRIMAZOLE 1 %
CREAM (GRAM) TOPICAL
COMMUNITY

## 2025-05-21 RX ORDER — NITROGLYCERIN 0.4 MG/1
0.4 TABLET SUBLINGUAL EVERY 5 MIN PRN
COMMUNITY
Start: 2025-03-13

## 2025-05-21 RX ORDER — APIXABAN 5 MG/1
5 TABLET, FILM COATED ORAL 2 TIMES DAILY
COMMUNITY
Start: 2024-11-26

## 2025-05-21 RX ORDER — METHADONE HYDROCHLORIDE 5 MG/1
5 TABLET ORAL 2 TIMES DAILY
Qty: 60 TABLET | Refills: 0 | Status: SHIPPED | OUTPATIENT
Start: 2025-05-21 | End: 2025-06-20

## 2025-05-21 RX ORDER — ATORVASTATIN CALCIUM 40 MG/1
40 TABLET, FILM COATED ORAL EVERY 24 HOURS
COMMUNITY
Start: 2024-09-11

## 2025-05-21 RX ORDER — MUPIROCIN 20 MG/G
OINTMENT TOPICAL EVERY 12 HOURS PRN
COMMUNITY

## 2025-05-22 ENCOUNTER — OFFICE VISIT (OUTPATIENT)
Dept: GERIATRIC MEDICINE | Age: 75
End: 2025-05-22

## 2025-05-22 DIAGNOSIS — I48.91 ATRIAL FIBRILLATION, UNSPECIFIED TYPE (HCC): ICD-10-CM

## 2025-05-22 DIAGNOSIS — G89.4 CHRONIC PAIN SYNDROME: ICD-10-CM

## 2025-05-22 DIAGNOSIS — I50.22 CHRONIC SYSTOLIC CHF (CONGESTIVE HEART FAILURE) (HCC): Primary | ICD-10-CM

## 2025-05-22 DIAGNOSIS — I10 ESSENTIAL (PRIMARY) HYPERTENSION: ICD-10-CM

## 2025-05-22 DIAGNOSIS — R52 PAIN: ICD-10-CM

## 2025-05-22 DIAGNOSIS — E11.9 TYPE 2 DIABETES MELLITUS WITHOUT COMPLICATION, WITHOUT LONG-TERM CURRENT USE OF INSULIN (HCC): ICD-10-CM

## 2025-05-23 ENCOUNTER — OFFICE VISIT (OUTPATIENT)
Dept: GERIATRIC MEDICINE | Age: 75
End: 2025-05-23

## 2025-05-23 DIAGNOSIS — I10 ESSENTIAL (PRIMARY) HYPERTENSION: ICD-10-CM

## 2025-05-23 DIAGNOSIS — I48.91 ATRIAL FIBRILLATION, UNSPECIFIED TYPE (HCC): ICD-10-CM

## 2025-05-23 DIAGNOSIS — G89.4 CHRONIC PAIN SYNDROME: ICD-10-CM

## 2025-05-23 DIAGNOSIS — F41.9 ANXIETY: ICD-10-CM

## 2025-05-23 DIAGNOSIS — I50.22 CHRONIC SYSTOLIC CHF (CONGESTIVE HEART FAILURE) (HCC): Primary | ICD-10-CM

## 2025-05-23 DIAGNOSIS — E11.9 TYPE 2 DIABETES MELLITUS WITHOUT COMPLICATION, WITHOUT LONG-TERM CURRENT USE OF INSULIN (HCC): ICD-10-CM

## 2025-05-23 NOTE — PROGRESS NOTES
History and Physical      CHIEF COMPLAINT:  CHF     History of Present Illness:      A 74 y.o. female who is being seen at [facility] for CHF and A fib with RVR.     REVIEW OF SYSTEMS:  A complete 10 Point review of systems was preformed and negative unless previously stated      PMH:  A fib   HTN  HLD   DM  COPD  Chf     Surgical History:    Hysteroscopy    Medications Prior to Admission:    Prior to Admission medications    Medication Sig Start Date End Date Taking? Authorizing Provider   methadone (DOLOPHINE) 5 MG tablet Take 1 tablet by mouth 2 times daily for 30 days. Max Daily Amount: 10 mg 25  Pavithra Gardner DO   albuterol (PROVENTIL) (2.5 MG/3ML) 0.083% nebulizer solution Inhale 3 mLs into the lungs every 4 hours as needed for Wheezing or Shortness of Breath 24   Jelly Cao MD   albuterol sulfate HFA (PROVENTIL;VENTOLIN;PROAIR) 108 (90 Base) MCG/ACT inhaler Inhale 2 puffs into the lungs every 4 hours as needed 24   Jelly Cao MD   ELIQUIS 5 MG TABS tablet Take 1 tablet by mouth 2 times daily Indications: Atrial Fibrillation 24   Jelly Cao MD   atorvastatin (LIPITOR) 40 MG tablet Take 1 tablet by mouth every 24 hours 24   Jelly Cao MD   clotrimazole (LOTRIMIN) 1 % cream Apply topically to affected area(s) for irritation q shift    Jelly Cao MD   montelukast (SINGULAIR) 10 MG tablet Take 1 tablet by mouth daily 24   Jelly Cao MD   nitroGLYCERIN (NITROSTAT) 0.4 MG SL tablet 1 tablet every 5 minutes as needed 3/13/25   Jelly Cao MD   acetaminophen (TYLENOL) 325 MG tablet Take 2 tablets by mouth every 4 hours as needed for Pain    Jelly Cao MD   sublingual tablet cyanocobalamin 2500 MCG SUBL Place 1 tablet under the tongue daily    Jelly Cao MD   furosemide (LASIX) 40 MG tablet Take 1 tablet by mouth in the morning and 1 tablet in the evening. Indications:

## 2025-05-24 NOTE — PROGRESS NOTES
SNF PROGRESS NOTE      Cc- CHF       Patient is a Phoebe Marina 74 y.o. female who is being seen at [facility] for CHF and A fib with RVR.      Patient is doing better on the ativan. She remains sitting in her wheelchair. She wants to go home.         No past medical history on file.  Adhesive tape, Gadolinium derivatives, Iodine, and Silicone    VS reviewed    Gen- Alert and oriented x 2  Heart- RRR no murmur no LE edema   Lungs- CTA b/l no resp distress RA oxygen   Abd- bs x 4, obese          Assessment and Plan    Chronic systolic CHF   Metoprolol 100 mg daily   Lasix   Follow-up cardiology   Fluid restriction  A fib   Metoprolol 100 mg daily   Eliquis  DM  Metformin 1000 mg bid   HTN  Metoprolol   COPD  HLD  Statin    Dementia / anxiety   Ativan   Psych consult       DAYRON Sinclair DO     Electronically signed by: Pavithra Gardner DO on 5/23/2025   Impression: Crystalline deposits in vitreous body, left eye: H43.22. Plan: Discussed findings. Patient is not bothered at this time. Consider PPV in the future if becomes more bothersome. Monitor.

## 2025-05-26 ENCOUNTER — OFFICE VISIT (OUTPATIENT)
Dept: GERIATRIC MEDICINE | Age: 75
End: 2025-05-26
Payer: MEDICARE

## 2025-05-26 DIAGNOSIS — G89.4 CHRONIC PAIN SYNDROME: ICD-10-CM

## 2025-05-26 DIAGNOSIS — I10 ESSENTIAL (PRIMARY) HYPERTENSION: ICD-10-CM

## 2025-05-26 DIAGNOSIS — I50.22 CHRONIC SYSTOLIC CHF (CONGESTIVE HEART FAILURE) (HCC): Primary | ICD-10-CM

## 2025-05-26 DIAGNOSIS — E11.9 TYPE 2 DIABETES MELLITUS WITHOUT COMPLICATION, WITHOUT LONG-TERM CURRENT USE OF INSULIN (HCC): ICD-10-CM

## 2025-05-26 DIAGNOSIS — I48.91 ATRIAL FIBRILLATION, UNSPECIFIED TYPE (HCC): ICD-10-CM

## 2025-05-26 DIAGNOSIS — F41.9 ANXIETY: ICD-10-CM

## 2025-05-26 PROCEDURE — 3046F HEMOGLOBIN A1C LEVEL >9.0%: CPT | Performed by: INTERNAL MEDICINE

## 2025-05-26 PROCEDURE — 1123F ACP DISCUSS/DSCN MKR DOCD: CPT | Performed by: INTERNAL MEDICINE

## 2025-05-26 PROCEDURE — 99309 SBSQ NF CARE MODERATE MDM 30: CPT | Performed by: INTERNAL MEDICINE

## 2025-05-27 ENCOUNTER — OFFICE VISIT (OUTPATIENT)
Dept: GERIATRIC MEDICINE | Age: 75
End: 2025-05-27

## 2025-05-27 DIAGNOSIS — F41.9 ANXIETY: ICD-10-CM

## 2025-05-27 DIAGNOSIS — I10 ESSENTIAL (PRIMARY) HYPERTENSION: ICD-10-CM

## 2025-05-27 DIAGNOSIS — E11.9 TYPE 2 DIABETES MELLITUS WITHOUT COMPLICATION, WITHOUT LONG-TERM CURRENT USE OF INSULIN (HCC): ICD-10-CM

## 2025-05-27 DIAGNOSIS — I50.22 CHRONIC SYSTOLIC CHF (CONGESTIVE HEART FAILURE) (HCC): Primary | ICD-10-CM

## 2025-05-27 DIAGNOSIS — G89.4 CHRONIC PAIN SYNDROME: ICD-10-CM

## 2025-05-27 DIAGNOSIS — I48.91 ATRIAL FIBRILLATION, UNSPECIFIED TYPE (HCC): ICD-10-CM

## 2025-05-28 ENCOUNTER — OFFICE VISIT (OUTPATIENT)
Dept: GERIATRIC MEDICINE | Age: 75
End: 2025-05-28

## 2025-05-28 DIAGNOSIS — G89.4 CHRONIC PAIN SYNDROME: ICD-10-CM

## 2025-05-28 DIAGNOSIS — I10 ESSENTIAL (PRIMARY) HYPERTENSION: ICD-10-CM

## 2025-05-28 DIAGNOSIS — E11.9 TYPE 2 DIABETES MELLITUS WITHOUT COMPLICATION, WITHOUT LONG-TERM CURRENT USE OF INSULIN (HCC): ICD-10-CM

## 2025-05-28 DIAGNOSIS — I50.22 CHRONIC SYSTOLIC CHF (CONGESTIVE HEART FAILURE) (HCC): Primary | ICD-10-CM

## 2025-05-28 DIAGNOSIS — F41.9 ANXIETY: ICD-10-CM

## 2025-05-28 DIAGNOSIS — I48.91 ATRIAL FIBRILLATION, UNSPECIFIED TYPE (HCC): ICD-10-CM

## 2025-05-28 NOTE — PROGRESS NOTES
SNF PROGRESS NOTE      Cc- CHF       Patient is a Phoebe Marina 74 y.o. female who is being seen at [facility] for CHF and A fib with RVR.     Patient is sitting in her chair at the nurses station. She is asking everyone for help and she is asking for a pad and paper.         No past medical history on file.  Adhesive tape, Gadolinium derivatives, Iodine, and Silicone    VS reviewed      Gen- Alert and oriented x 2  Heart- RRR no murmur no LE edema   Lungs- CTA b/l no resp distress RA oxygen   Abd- bs x 4, obese        Assessment and Plan    Chronic systolic CHF   Metoprolol 100 mg daily   Lasix   Follow-up cardiology   Fluid restriction  A fib   Metoprolol 100 mg daily   Eliquis  DM-- bs stable   Metformin 1000 mg bid   HTN-- BP controlled.   Metoprolol 100 mg daily   COPD  HLD  Statin    Dementia / anxiety   Ativan   Psych consult       DAYRON Sinclair DO     Electronically signed by: Pavithra Gardner DO on 5/26/2025

## 2025-05-29 NOTE — PROGRESS NOTES
SNF PROGRESS NOTE      Cc- CHF       Patient is a Phoebe Marina 74 y.o. female who is being seen at [facility] for CHF and A fib with RVR.      Patient is sitting in the main area. She is asking to go home. She denies any pain and her legs are wrapped.         No past medical history on file.  Adhesive tape, Gadolinium derivatives, Iodine, and Silicone    VS reviewed       Gen- Alert and oriented x 2  Heart- RRR no murmur no LE edema   Lungs- CTA b/l no resp distress RA oxygen   Abd- bs x 4, obese        Assessment and Plan    Chronic systolic CHF   Metoprolol 100 mg daily   Lasix   Follow-up cardiology   Fluid restriction  A fib --rate controlled   Metoprolol 100 mg daily   Eliquis  DM-- bs stable   Metformin 1000 mg bid   HTN-- BP controlled.   Metoprolol 100 mg daily   COPD  HLD  Statin    Dementia / anxiety   Ativan   Psych consult       DAYRON Sinclair DO     Electronically signed by: Pavithra Gardner DO on 5/27/2025

## 2025-05-30 ENCOUNTER — OFFICE VISIT (OUTPATIENT)
Dept: GERIATRIC MEDICINE | Age: 75
End: 2025-05-30

## 2025-05-30 DIAGNOSIS — I48.91 ATRIAL FIBRILLATION, UNSPECIFIED TYPE (HCC): ICD-10-CM

## 2025-05-30 DIAGNOSIS — E11.9 TYPE 2 DIABETES MELLITUS WITHOUT COMPLICATION, WITHOUT LONG-TERM CURRENT USE OF INSULIN (HCC): ICD-10-CM

## 2025-05-30 DIAGNOSIS — G89.4 CHRONIC PAIN SYNDROME: ICD-10-CM

## 2025-05-30 DIAGNOSIS — I50.22 CHRONIC SYSTOLIC CHF (CONGESTIVE HEART FAILURE) (HCC): Primary | ICD-10-CM

## 2025-05-30 DIAGNOSIS — F41.9 ANXIETY: ICD-10-CM

## 2025-05-30 DIAGNOSIS — I10 ESSENTIAL (PRIMARY) HYPERTENSION: ICD-10-CM

## 2025-05-31 NOTE — PROGRESS NOTES
SNF PROGRESS NOTE      Cc- CHF       Patient is a Phoebe Marina 74 y.o. female who is being seen at [facility] for CHF and A fib with RVR.     Patient is sitting up in her room and talking with a visitor. She is pleasant and enjoying the company.         No past medical history on file.  Adhesive tape, Gadolinium derivatives, Iodine, and Silicone    VS reviewed      Gen- Alert and oriented x 2  Heart- RRR no murmur no LE edema   Lungs- CTA b/l no resp distress RA oxygen   Abd- bs x 4, obese        Assessment and Plan    Chronic systolic CHF   Metoprolol 100 mg daily   Lasix   Follow-up cardiology   Fluid restriction  A fib --rate controlled   Metoprolol 100 mg daily   Eliquis  DM-- bs stable   Metformin 1000 mg bid   HTN-- BP controlled.   Metoprolol 100 mg daily   COPD  HLD  Statin    Dementia / anxiety   Ativan   Psych consult       DAYRON Sinclair DO     Electronically signed by: Pavithra Gardner DO on 5/28/2025

## 2025-06-01 ENCOUNTER — APPOINTMENT (OUTPATIENT)
Dept: CARDIOLOGY | Facility: HOSPITAL | Age: 75
End: 2025-06-01
Payer: COMMERCIAL

## 2025-06-01 ENCOUNTER — APPOINTMENT (OUTPATIENT)
Dept: RADIOLOGY | Facility: HOSPITAL | Age: 75
End: 2025-06-01
Payer: COMMERCIAL

## 2025-06-01 ENCOUNTER — HOSPITAL ENCOUNTER (INPATIENT)
Facility: HOSPITAL | Age: 75
LOS: 2 days | Discharge: NURSING, REHAB OR LONG-TERM CARE | End: 2025-06-04
Attending: STUDENT IN AN ORGANIZED HEALTH CARE EDUCATION/TRAINING PROGRAM | Admitting: INTERNAL MEDICINE
Payer: COMMERCIAL

## 2025-06-01 ENCOUNTER — OFFICE VISIT (OUTPATIENT)
Dept: GERIATRIC MEDICINE | Age: 75
End: 2025-06-01

## 2025-06-01 DIAGNOSIS — F41.9 ANXIETY: ICD-10-CM

## 2025-06-01 DIAGNOSIS — I48.91 ATRIAL FIBRILLATION WITH RVR (MULTI): ICD-10-CM

## 2025-06-01 DIAGNOSIS — E11.9 TYPE 2 DIABETES MELLITUS WITHOUT COMPLICATION, WITHOUT LONG-TERM CURRENT USE OF INSULIN (HCC): ICD-10-CM

## 2025-06-01 DIAGNOSIS — I50.22 CHRONIC SYSTOLIC CHF (CONGESTIVE HEART FAILURE) (HCC): Primary | ICD-10-CM

## 2025-06-01 DIAGNOSIS — I50.9 ACUTE ON CHRONIC CONGESTIVE HEART FAILURE, UNSPECIFIED HEART FAILURE TYPE: ICD-10-CM

## 2025-06-01 DIAGNOSIS — G89.4 CHRONIC PAIN SYNDROME: ICD-10-CM

## 2025-06-01 DIAGNOSIS — E87.1 HYPONATREMIA: ICD-10-CM

## 2025-06-01 DIAGNOSIS — I48.91 ATRIAL FIBRILLATION, UNSPECIFIED TYPE (HCC): ICD-10-CM

## 2025-06-01 DIAGNOSIS — R07.9 CHEST PAIN, UNSPECIFIED TYPE: Primary | ICD-10-CM

## 2025-06-01 DIAGNOSIS — I10 ESSENTIAL (PRIMARY) HYPERTENSION: ICD-10-CM

## 2025-06-01 LAB
ALBUMIN SERPL BCP-MCNC: 3.7 G/DL (ref 3.4–5)
ALP SERPL-CCNC: 79 U/L (ref 33–136)
ALT SERPL W P-5'-P-CCNC: 24 U/L (ref 7–45)
ANION GAP SERPL CALC-SCNC: 18 MMOL/L (ref 10–20)
APTT PPP: 35 SECONDS (ref 26–36)
AST SERPL W P-5'-P-CCNC: 36 U/L (ref 9–39)
BASOPHILS # BLD AUTO: 0.1 X10*3/UL (ref 0–0.1)
BASOPHILS NFR BLD AUTO: 1.1 %
BILIRUB SERPL-MCNC: 0.8 MG/DL (ref 0–1.2)
BNP SERPL-MCNC: 1597 PG/ML (ref 0–99)
BUN SERPL-MCNC: 39 MG/DL (ref 6–23)
CALCIUM SERPL-MCNC: 8.8 MG/DL (ref 8.6–10.3)
CARDIAC TROPONIN I PNL SERPL HS: 14 NG/L (ref 0–13)
CARDIAC TROPONIN I PNL SERPL HS: 16 NG/L (ref 0–13)
CHLORIDE SERPL-SCNC: 94 MMOL/L (ref 98–107)
CO2 SERPL-SCNC: 23 MMOL/L (ref 21–32)
CREAT SERPL-MCNC: 1.45 MG/DL (ref 0.5–1.05)
EGFRCR SERPLBLD CKD-EPI 2021: 38 ML/MIN/1.73M*2
EOSINOPHIL # BLD AUTO: 0.27 X10*3/UL (ref 0–0.4)
EOSINOPHIL NFR BLD AUTO: 2.9 %
ERYTHROCYTE [DISTWIDTH] IN BLOOD BY AUTOMATED COUNT: 17.3 % (ref 11.5–14.5)
GLUCOSE SERPL-MCNC: 126 MG/DL (ref 74–99)
HCT VFR BLD AUTO: 33.3 % (ref 36–46)
HGB BLD-MCNC: 10.1 G/DL (ref 12–16)
IMM GRANULOCYTES # BLD AUTO: 0.04 X10*3/UL (ref 0–0.5)
IMM GRANULOCYTES NFR BLD AUTO: 0.4 % (ref 0–0.9)
INR PPP: 1.6 (ref 0.9–1.1)
LACTATE SERPL-SCNC: 4.3 MMOL/L (ref 0.4–2)
LYMPHOCYTES # BLD AUTO: 0.62 X10*3/UL (ref 0.8–3)
LYMPHOCYTES NFR BLD AUTO: 6.6 %
MAGNESIUM SERPL-MCNC: 1.81 MG/DL (ref 1.6–2.4)
MCH RBC QN AUTO: 27.6 PG (ref 26–34)
MCHC RBC AUTO-ENTMCNC: 30.3 G/DL (ref 32–36)
MCV RBC AUTO: 91 FL (ref 80–100)
MONOCYTES # BLD AUTO: 1.34 X10*3/UL (ref 0.05–0.8)
MONOCYTES NFR BLD AUTO: 14.2 %
NEUTROPHILS # BLD AUTO: 7.07 X10*3/UL (ref 1.6–5.5)
NEUTROPHILS NFR BLD AUTO: 74.8 %
NRBC BLD-RTO: 0 /100 WBCS (ref 0–0)
PLATELET # BLD AUTO: 174 X10*3/UL (ref 150–450)
POTASSIUM SERPL-SCNC: 5.2 MMOL/L (ref 3.5–5.3)
PROT SERPL-MCNC: 6.6 G/DL (ref 6.4–8.2)
PROTHROMBIN TIME: 17.7 SECONDS (ref 9.8–12.4)
RBC # BLD AUTO: 3.66 X10*6/UL (ref 4–5.2)
SODIUM SERPL-SCNC: 130 MMOL/L (ref 136–145)
WBC # BLD AUTO: 9.4 X10*3/UL (ref 4.4–11.3)

## 2025-06-01 PROCEDURE — 85610 PROTHROMBIN TIME: CPT | Performed by: STUDENT IN AN ORGANIZED HEALTH CARE EDUCATION/TRAINING PROGRAM

## 2025-06-01 PROCEDURE — 85025 COMPLETE CBC W/AUTO DIFF WBC: CPT | Performed by: STUDENT IN AN ORGANIZED HEALTH CARE EDUCATION/TRAINING PROGRAM

## 2025-06-01 PROCEDURE — 36415 COLL VENOUS BLD VENIPUNCTURE: CPT | Performed by: STUDENT IN AN ORGANIZED HEALTH CARE EDUCATION/TRAINING PROGRAM

## 2025-06-01 PROCEDURE — 93005 ELECTROCARDIOGRAM TRACING: CPT

## 2025-06-01 PROCEDURE — 80053 COMPREHEN METABOLIC PANEL: CPT | Performed by: STUDENT IN AN ORGANIZED HEALTH CARE EDUCATION/TRAINING PROGRAM

## 2025-06-01 PROCEDURE — 71045 X-RAY EXAM CHEST 1 VIEW: CPT | Performed by: RADIOLOGY

## 2025-06-01 PROCEDURE — 71045 X-RAY EXAM CHEST 1 VIEW: CPT

## 2025-06-01 PROCEDURE — 83880 ASSAY OF NATRIURETIC PEPTIDE: CPT | Performed by: STUDENT IN AN ORGANIZED HEALTH CARE EDUCATION/TRAINING PROGRAM

## 2025-06-01 PROCEDURE — 96374 THER/PROPH/DIAG INJ IV PUSH: CPT

## 2025-06-01 PROCEDURE — 83605 ASSAY OF LACTIC ACID: CPT | Performed by: STUDENT IN AN ORGANIZED HEALTH CARE EDUCATION/TRAINING PROGRAM

## 2025-06-01 PROCEDURE — 84484 ASSAY OF TROPONIN QUANT: CPT | Performed by: STUDENT IN AN ORGANIZED HEALTH CARE EDUCATION/TRAINING PROGRAM

## 2025-06-01 PROCEDURE — 2500000004 HC RX 250 GENERAL PHARMACY W/ HCPCS (ALT 636 FOR OP/ED): Performed by: STUDENT IN AN ORGANIZED HEALTH CARE EDUCATION/TRAINING PROGRAM

## 2025-06-01 PROCEDURE — 83735 ASSAY OF MAGNESIUM: CPT | Performed by: STUDENT IN AN ORGANIZED HEALTH CARE EDUCATION/TRAINING PROGRAM

## 2025-06-01 PROCEDURE — 85730 THROMBOPLASTIN TIME PARTIAL: CPT | Performed by: STUDENT IN AN ORGANIZED HEALTH CARE EDUCATION/TRAINING PROGRAM

## 2025-06-01 PROCEDURE — 99285 EMERGENCY DEPT VISIT HI MDM: CPT | Performed by: STUDENT IN AN ORGANIZED HEALTH CARE EDUCATION/TRAINING PROGRAM

## 2025-06-01 RX ORDER — PANTOPRAZOLE SODIUM 40 MG/10ML
80 INJECTION, POWDER, LYOPHILIZED, FOR SOLUTION INTRAVENOUS ONCE
Status: COMPLETED | OUTPATIENT
Start: 2025-06-01 | End: 2025-06-01

## 2025-06-01 RX ADMIN — PANTOPRAZOLE SODIUM 80 MG: 40 INJECTION, POWDER, FOR SOLUTION INTRAVENOUS at 20:14

## 2025-06-01 ASSESSMENT — PAIN SCALES - GENERAL
PAINLEVEL_OUTOF10: 0 - NO PAIN
PAINLEVEL_OUTOF10: 0 - NO PAIN

## 2025-06-01 ASSESSMENT — COLUMBIA-SUICIDE SEVERITY RATING SCALE - C-SSRS
6. HAVE YOU EVER DONE ANYTHING, STARTED TO DO ANYTHING, OR PREPARED TO DO ANYTHING TO END YOUR LIFE?: NO
1. IN THE PAST MONTH, HAVE YOU WISHED YOU WERE DEAD OR WISHED YOU COULD GO TO SLEEP AND NOT WAKE UP?: NO
2. HAVE YOU ACTUALLY HAD ANY THOUGHTS OF KILLING YOURSELF?: NO

## 2025-06-01 ASSESSMENT — PAIN - FUNCTIONAL ASSESSMENT: PAIN_FUNCTIONAL_ASSESSMENT: 0-10

## 2025-06-02 ENCOUNTER — APPOINTMENT (OUTPATIENT)
Dept: CARDIOLOGY | Facility: HOSPITAL | Age: 75
End: 2025-06-02
Payer: COMMERCIAL

## 2025-06-02 ENCOUNTER — APPOINTMENT (OUTPATIENT)
Dept: CARDIOLOGY | Facility: CLINIC | Age: 75
End: 2025-06-02
Payer: COMMERCIAL

## 2025-06-02 PROBLEM — I48.91 ATRIAL FIBRILLATION WITH RVR (MULTI): Status: ACTIVE | Noted: 2025-06-02

## 2025-06-02 PROBLEM — R07.9 CHEST PAIN, UNSPECIFIED TYPE: Status: ACTIVE | Noted: 2025-06-02

## 2025-06-02 LAB
ANION GAP SERPL CALC-SCNC: 13 MMOL/L (ref 10–20)
ATRIAL RATE: 133 BPM
BUN SERPL-MCNC: 40 MG/DL (ref 6–23)
CALCIUM SERPL-MCNC: 8.8 MG/DL (ref 8.6–10.3)
CHLORIDE SERPL-SCNC: 97 MMOL/L (ref 98–107)
CO2 SERPL-SCNC: 28 MMOL/L (ref 21–32)
CREAT SERPL-MCNC: 1.33 MG/DL (ref 0.5–1.05)
EGFRCR SERPLBLD CKD-EPI 2021: 42 ML/MIN/1.73M*2
ERYTHROCYTE [DISTWIDTH] IN BLOOD BY AUTOMATED COUNT: 17.3 % (ref 11.5–14.5)
GLUCOSE BLD MANUAL STRIP-MCNC: 118 MG/DL (ref 74–99)
GLUCOSE BLD MANUAL STRIP-MCNC: 137 MG/DL (ref 74–99)
GLUCOSE BLD MANUAL STRIP-MCNC: 186 MG/DL (ref 74–99)
GLUCOSE BLD MANUAL STRIP-MCNC: 193 MG/DL (ref 74–99)
GLUCOSE SERPL-MCNC: 132 MG/DL (ref 74–99)
HCT VFR BLD AUTO: 31.5 % (ref 36–46)
HGB BLD-MCNC: 9.5 G/DL (ref 12–16)
HOLD SPECIMEN: NORMAL
LACTATE SERPL-SCNC: 2 MMOL/L (ref 0.4–2)
MCH RBC QN AUTO: 27.3 PG (ref 26–34)
MCHC RBC AUTO-ENTMCNC: 30.2 G/DL (ref 32–36)
MCV RBC AUTO: 91 FL (ref 80–100)
NRBC BLD-RTO: 0 /100 WBCS (ref 0–0)
PLATELET # BLD AUTO: 157 X10*3/UL (ref 150–450)
POTASSIUM SERPL-SCNC: 4.1 MMOL/L (ref 3.5–5.3)
POTASSIUM SERPL-SCNC: 4.1 MMOL/L (ref 3.5–5.3)
Q ONSET: 219 MS
QRS COUNT: 20 BEATS
QRS DURATION: 88 MS
QT INTERVAL: 314 MS
QTC CALCULATION(BAZETT): 443 MS
QTC FREDERICIA: 395 MS
R AXIS: -10 DEGREES
RBC # BLD AUTO: 3.48 X10*6/UL (ref 4–5.2)
SODIUM SERPL-SCNC: 134 MMOL/L (ref 136–145)
T AXIS: 210 DEGREES
T OFFSET: 376 MS
VENTRICULAR RATE: 120 BPM
WBC # BLD AUTO: 6.9 X10*3/UL (ref 4.4–11.3)

## 2025-06-02 PROCEDURE — 2500000004 HC RX 250 GENERAL PHARMACY W/ HCPCS (ALT 636 FOR OP/ED): Performed by: STUDENT IN AN ORGANIZED HEALTH CARE EDUCATION/TRAINING PROGRAM

## 2025-06-02 PROCEDURE — 84132 ASSAY OF SERUM POTASSIUM: CPT | Performed by: INTERNAL MEDICINE

## 2025-06-02 PROCEDURE — 82374 ASSAY BLOOD CARBON DIOXIDE: CPT | Performed by: INTERNAL MEDICINE

## 2025-06-02 PROCEDURE — 96361 HYDRATE IV INFUSION ADD-ON: CPT

## 2025-06-02 PROCEDURE — 99223 1ST HOSP IP/OBS HIGH 75: CPT | Performed by: INTERNAL MEDICINE

## 2025-06-02 PROCEDURE — 2500000001 HC RX 250 WO HCPCS SELF ADMINISTERED DRUGS (ALT 637 FOR MEDICARE OP): Performed by: STUDENT IN AN ORGANIZED HEALTH CARE EDUCATION/TRAINING PROGRAM

## 2025-06-02 PROCEDURE — 93005 ELECTROCARDIOGRAM TRACING: CPT

## 2025-06-02 PROCEDURE — 2500000002 HC RX 250 W HCPCS SELF ADMINISTERED DRUGS (ALT 637 FOR MEDICARE OP, ALT 636 FOR OP/ED): Performed by: STUDENT IN AN ORGANIZED HEALTH CARE EDUCATION/TRAINING PROGRAM

## 2025-06-02 PROCEDURE — 2500000004 HC RX 250 GENERAL PHARMACY W/ HCPCS (ALT 636 FOR OP/ED)

## 2025-06-02 PROCEDURE — 2500000004 HC RX 250 GENERAL PHARMACY W/ HCPCS (ALT 636 FOR OP/ED): Performed by: NURSE PRACTITIONER

## 2025-06-02 PROCEDURE — 82947 ASSAY GLUCOSE BLOOD QUANT: CPT

## 2025-06-02 PROCEDURE — 2500000001 HC RX 250 WO HCPCS SELF ADMINISTERED DRUGS (ALT 637 FOR MEDICARE OP): Performed by: NURSE PRACTITIONER

## 2025-06-02 PROCEDURE — 1200000002 HC GENERAL ROOM WITH TELEMETRY DAILY

## 2025-06-02 PROCEDURE — 2500000005 HC RX 250 GENERAL PHARMACY W/O HCPCS: Performed by: INTERNAL MEDICINE

## 2025-06-02 PROCEDURE — 36415 COLL VENOUS BLD VENIPUNCTURE: CPT | Performed by: STUDENT IN AN ORGANIZED HEALTH CARE EDUCATION/TRAINING PROGRAM

## 2025-06-02 PROCEDURE — 2500000002 HC RX 250 W HCPCS SELF ADMINISTERED DRUGS (ALT 637 FOR MEDICARE OP, ALT 636 FOR OP/ED): Performed by: INTERNAL MEDICINE

## 2025-06-02 PROCEDURE — 2500000001 HC RX 250 WO HCPCS SELF ADMINISTERED DRUGS (ALT 637 FOR MEDICARE OP): Performed by: INTERNAL MEDICINE

## 2025-06-02 PROCEDURE — 83605 ASSAY OF LACTIC ACID: CPT | Performed by: STUDENT IN AN ORGANIZED HEALTH CARE EDUCATION/TRAINING PROGRAM

## 2025-06-02 PROCEDURE — 85027 COMPLETE CBC AUTOMATED: CPT | Performed by: INTERNAL MEDICINE

## 2025-06-02 PROCEDURE — 36415 COLL VENOUS BLD VENIPUNCTURE: CPT | Performed by: INTERNAL MEDICINE

## 2025-06-02 PROCEDURE — 99232 SBSQ HOSP IP/OBS MODERATE 35: CPT | Performed by: STUDENT IN AN ORGANIZED HEALTH CARE EDUCATION/TRAINING PROGRAM

## 2025-06-02 PROCEDURE — 2500000004 HC RX 250 GENERAL PHARMACY W/ HCPCS (ALT 636 FOR OP/ED): Performed by: INTERNAL MEDICINE

## 2025-06-02 PROCEDURE — 93010 ELECTROCARDIOGRAM REPORT: CPT | Performed by: INTERNAL MEDICINE

## 2025-06-02 PROCEDURE — 84132 ASSAY OF SERUM POTASSIUM: CPT | Performed by: STUDENT IN AN ORGANIZED HEALTH CARE EDUCATION/TRAINING PROGRAM

## 2025-06-02 RX ORDER — GABAPENTIN 100 MG/1
100 CAPSULE ORAL 3 TIMES DAILY
COMMUNITY
Start: 2025-05-02

## 2025-06-02 RX ORDER — ACETAMINOPHEN 160 MG/5ML
650 SOLUTION ORAL EVERY 4 HOURS PRN
Status: DISCONTINUED | OUTPATIENT
Start: 2025-06-02 | End: 2025-06-04 | Stop reason: HOSPADM

## 2025-06-02 RX ORDER — ONDANSETRON 4 MG/1
4 TABLET, FILM COATED ORAL EVERY 8 HOURS PRN
Status: DISCONTINUED | OUTPATIENT
Start: 2025-06-02 | End: 2025-06-04 | Stop reason: HOSPADM

## 2025-06-02 RX ORDER — METOPROLOL TARTRATE 50 MG/1
50 TABLET ORAL 2 TIMES DAILY
COMMUNITY
End: 2025-06-04 | Stop reason: HOSPADM

## 2025-06-02 RX ORDER — TORSEMIDE 10 MG/1
10 TABLET ORAL DAILY
COMMUNITY
Start: 2025-05-01 | End: 2025-06-08

## 2025-06-02 RX ORDER — FUROSEMIDE 40 MG/1
40 TABLET ORAL 2 TIMES DAILY
Status: DISCONTINUED | OUTPATIENT
Start: 2025-06-02 | End: 2025-06-03

## 2025-06-02 RX ORDER — GABAPENTIN 100 MG/1
100 CAPSULE ORAL 3 TIMES DAILY
Status: DISCONTINUED | OUTPATIENT
Start: 2025-06-02 | End: 2025-06-04 | Stop reason: HOSPADM

## 2025-06-02 RX ORDER — MULTIVITAMIN
1 TABLET ORAL
COMMUNITY
Start: 2025-03-01

## 2025-06-02 RX ORDER — ATORVASTATIN CALCIUM 20 MG/1
40 TABLET, FILM COATED ORAL NIGHTLY
Status: DISCONTINUED | OUTPATIENT
Start: 2025-06-02 | End: 2025-06-04 | Stop reason: HOSPADM

## 2025-06-02 RX ORDER — DIGOXIN 0.25 MG/ML
250 INJECTION INTRAMUSCULAR; INTRAVENOUS ONCE
Status: COMPLETED | OUTPATIENT
Start: 2025-06-02 | End: 2025-06-02

## 2025-06-02 RX ORDER — OLANZAPINE 10 MG/2ML
5 INJECTION, POWDER, FOR SOLUTION INTRAMUSCULAR ONCE
Status: COMPLETED | OUTPATIENT
Start: 2025-06-02 | End: 2025-06-02

## 2025-06-02 RX ORDER — NITROGLYCERIN 0.4 MG/1
0.4 TABLET SUBLINGUAL EVERY 5 MIN PRN
Status: DISCONTINUED | OUTPATIENT
Start: 2025-06-02 | End: 2025-06-04 | Stop reason: HOSPADM

## 2025-06-02 RX ORDER — RANOLAZINE 500 MG/1
500 TABLET, EXTENDED RELEASE ORAL 2 TIMES DAILY
Status: DISCONTINUED | OUTPATIENT
Start: 2025-06-02 | End: 2025-06-04 | Stop reason: HOSPADM

## 2025-06-02 RX ORDER — MONTELUKAST SODIUM 10 MG/1
10 TABLET ORAL NIGHTLY
Status: DISCONTINUED | OUTPATIENT
Start: 2025-06-02 | End: 2025-06-04 | Stop reason: HOSPADM

## 2025-06-02 RX ORDER — METOPROLOL SUCCINATE 50 MG/1
100 TABLET, EXTENDED RELEASE ORAL DAILY
Status: DISCONTINUED | OUTPATIENT
Start: 2025-06-02 | End: 2025-06-04 | Stop reason: HOSPADM

## 2025-06-02 RX ORDER — DEXTROSE 50 % IN WATER (D50W) INTRAVENOUS SYRINGE
12.5
Status: DISCONTINUED | OUTPATIENT
Start: 2025-06-02 | End: 2025-06-04 | Stop reason: HOSPADM

## 2025-06-02 RX ORDER — AMIODARONE HYDROCHLORIDE 200 MG/1
200 TABLET ORAL
COMMUNITY
Start: 2025-05-01 | End: 2025-06-08

## 2025-06-02 RX ORDER — ACETAMINOPHEN 325 MG/1
650 TABLET ORAL EVERY 4 HOURS PRN
Status: DISCONTINUED | OUTPATIENT
Start: 2025-06-02 | End: 2025-06-04 | Stop reason: HOSPADM

## 2025-06-02 RX ORDER — ONDANSETRON HYDROCHLORIDE 2 MG/ML
4 INJECTION, SOLUTION INTRAVENOUS EVERY 8 HOURS PRN
Status: DISCONTINUED | OUTPATIENT
Start: 2025-06-02 | End: 2025-06-04 | Stop reason: HOSPADM

## 2025-06-02 RX ORDER — ALBUTEROL SULFATE 90 UG/1
2 INHALANT RESPIRATORY (INHALATION) EVERY 4 HOURS PRN
Status: DISCONTINUED | OUTPATIENT
Start: 2025-06-02 | End: 2025-06-04 | Stop reason: HOSPADM

## 2025-06-02 RX ORDER — AMIODARONE HYDROCHLORIDE 200 MG/1
200 TABLET ORAL
Status: DISCONTINUED | OUTPATIENT
Start: 2025-06-02 | End: 2025-06-04 | Stop reason: HOSPADM

## 2025-06-02 RX ORDER — LOSARTAN POTASSIUM 25 MG/1
25 TABLET ORAL NIGHTLY
COMMUNITY
Start: 2025-05-01 | End: 2025-06-08

## 2025-06-02 RX ORDER — INSULIN LISPRO 100 [IU]/ML
0-10 INJECTION, SOLUTION INTRAVENOUS; SUBCUTANEOUS
Status: DISCONTINUED | OUTPATIENT
Start: 2025-06-02 | End: 2025-06-04 | Stop reason: HOSPADM

## 2025-06-02 RX ORDER — DIGOXIN 125 MCG
125 TABLET ORAL DAILY
COMMUNITY

## 2025-06-02 RX ORDER — DIGOXIN 0.25 MG/ML
250 INJECTION INTRAMUSCULAR; INTRAVENOUS ONCE
Status: DISCONTINUED | OUTPATIENT
Start: 2025-06-02 | End: 2025-06-03

## 2025-06-02 RX ORDER — MAGNESIUM SULFATE HEPTAHYDRATE 40 MG/ML
INJECTION, SOLUTION INTRAVENOUS
Status: COMPLETED
Start: 2025-06-02 | End: 2025-06-02

## 2025-06-02 RX ORDER — MAGNESIUM SULFATE HEPTAHYDRATE 40 MG/ML
2 INJECTION, SOLUTION INTRAVENOUS ONCE
Status: COMPLETED | OUTPATIENT
Start: 2025-06-02 | End: 2025-06-02

## 2025-06-02 RX ORDER — ACETAMINOPHEN 650 MG/1
650 SUPPOSITORY RECTAL EVERY 4 HOURS PRN
Status: DISCONTINUED | OUTPATIENT
Start: 2025-06-02 | End: 2025-06-04 | Stop reason: HOSPADM

## 2025-06-02 RX ORDER — POLYETHYLENE GLYCOL 3350 17 G/17G
17 POWDER, FOR SOLUTION ORAL DAILY PRN
Status: DISCONTINUED | OUTPATIENT
Start: 2025-06-02 | End: 2025-06-04 | Stop reason: HOSPADM

## 2025-06-02 RX ORDER — LOSARTAN POTASSIUM 25 MG/1
25 TABLET ORAL NIGHTLY
Status: DISCONTINUED | OUTPATIENT
Start: 2025-06-02 | End: 2025-06-03

## 2025-06-02 RX ORDER — PANTOPRAZOLE SODIUM 40 MG/1
40 TABLET, DELAYED RELEASE ORAL DAILY
Status: DISCONTINUED | OUTPATIENT
Start: 2025-06-02 | End: 2025-06-04 | Stop reason: HOSPADM

## 2025-06-02 RX ORDER — LANOLIN ALCOHOL/MO/W.PET/CERES
400 CREAM (GRAM) TOPICAL 2 TIMES DAILY
COMMUNITY
Start: 2025-05-01

## 2025-06-02 RX ORDER — DEXTROSE 50 % IN WATER (D50W) INTRAVENOUS SYRINGE
25
Status: DISCONTINUED | OUTPATIENT
Start: 2025-06-02 | End: 2025-06-04 | Stop reason: HOSPADM

## 2025-06-02 RX ORDER — PANTOPRAZOLE SODIUM 40 MG/10ML
40 INJECTION, POWDER, LYOPHILIZED, FOR SOLUTION INTRAVENOUS DAILY
Status: DISCONTINUED | OUTPATIENT
Start: 2025-06-02 | End: 2025-06-02

## 2025-06-02 RX ORDER — HYDROCODONE BITARTRATE AND ACETAMINOPHEN 5; 325 MG/1; MG/1
1 TABLET ORAL EVERY 6 HOURS PRN
Refills: 0 | Status: DISCONTINUED | OUTPATIENT
Start: 2025-06-02 | End: 2025-06-04 | Stop reason: HOSPADM

## 2025-06-02 RX ORDER — TALC
3 POWDER (GRAM) TOPICAL NIGHTLY PRN
Status: DISCONTINUED | OUTPATIENT
Start: 2025-06-02 | End: 2025-06-04 | Stop reason: HOSPADM

## 2025-06-02 RX ADMIN — HYDROCODONE BITARTRATE AND ACETAMINOPHEN 1 TABLET: 5; 325 TABLET ORAL at 03:33

## 2025-06-02 RX ADMIN — MONTELUKAST SODIUM 10 MG: 10 TABLET, FILM COATED ORAL at 21:21

## 2025-06-02 RX ADMIN — RANOLAZINE 500 MG: 500 TABLET, FILM COATED, EXTENDED RELEASE ORAL at 14:17

## 2025-06-02 RX ADMIN — MAGNESIUM SULFATE HEPTAHYDRATE 2 G: 40 INJECTION, SOLUTION INTRAVENOUS at 16:26

## 2025-06-02 RX ADMIN — GABAPENTIN 100 MG: 100 CAPSULE ORAL at 21:21

## 2025-06-02 RX ADMIN — RANOLAZINE 500 MG: 500 TABLET, FILM COATED, EXTENDED RELEASE ORAL at 21:21

## 2025-06-02 RX ADMIN — APIXABAN 5 MG: 5 TABLET, FILM COATED ORAL at 21:21

## 2025-06-02 RX ADMIN — APIXABAN 5 MG: 5 TABLET, FILM COATED ORAL at 08:41

## 2025-06-02 RX ADMIN — DIGOXIN 250 MCG: 0.25 INJECTION INTRAMUSCULAR; INTRAVENOUS at 23:00

## 2025-06-02 RX ADMIN — DIGOXIN 250 MCG: 0.25 INJECTION INTRAMUSCULAR; INTRAVENOUS at 16:42

## 2025-06-02 RX ADMIN — SODIUM CHLORIDE 250 ML: 0.9 INJECTION, SOLUTION INTRAVENOUS at 00:10

## 2025-06-02 RX ADMIN — AMIODARONE HYDROCHLORIDE 200 MG: 200 TABLET ORAL at 14:17

## 2025-06-02 RX ADMIN — Medication 3 MG: at 21:21

## 2025-06-02 RX ADMIN — PANTOPRAZOLE SODIUM 40 MG: 40 INJECTION, POWDER, FOR SOLUTION INTRAVENOUS at 06:03

## 2025-06-02 RX ADMIN — OLANZAPINE 5 MG: 10 INJECTION, POWDER, FOR SOLUTION INTRAMUSCULAR at 21:22

## 2025-06-02 RX ADMIN — INSULIN LISPRO 2 UNITS: 100 INJECTION, SOLUTION INTRAVENOUS; SUBCUTANEOUS at 17:09

## 2025-06-02 RX ADMIN — METOPROLOL SUCCINATE 100 MG: 50 TABLET, EXTENDED RELEASE ORAL at 08:41

## 2025-06-02 RX ADMIN — ACETAMINOPHEN 650 MG: 325 TABLET ORAL at 03:33

## 2025-06-02 RX ADMIN — ATORVASTATIN CALCIUM 40 MG: 20 TABLET ORAL at 21:21

## 2025-06-02 SDOH — ECONOMIC STABILITY: INCOME INSECURITY: IN THE PAST 12 MONTHS HAS THE ELECTRIC, GAS, OIL, OR WATER COMPANY THREATENED TO SHUT OFF SERVICES IN YOUR HOME?: YES

## 2025-06-02 SDOH — SOCIAL STABILITY: SOCIAL INSECURITY: ARE YOU OR HAVE YOU BEEN THREATENED OR ABUSED PHYSICALLY, EMOTIONALLY, OR SEXUALLY BY ANYONE?: NO

## 2025-06-02 SDOH — SOCIAL STABILITY: SOCIAL INSECURITY: WITHIN THE LAST YEAR, HAVE YOU BEEN HUMILIATED OR EMOTIONALLY ABUSED IN OTHER WAYS BY YOUR PARTNER OR EX-PARTNER?: NO

## 2025-06-02 SDOH — ECONOMIC STABILITY: FOOD INSECURITY: WITHIN THE PAST 12 MONTHS, YOU WORRIED THAT YOUR FOOD WOULD RUN OUT BEFORE YOU GOT THE MONEY TO BUY MORE.: NEVER TRUE

## 2025-06-02 SDOH — ECONOMIC STABILITY: FOOD INSECURITY: WITHIN THE PAST 12 MONTHS, THE FOOD YOU BOUGHT JUST DIDN'T LAST AND YOU DIDN'T HAVE MONEY TO GET MORE.: NEVER TRUE

## 2025-06-02 SDOH — ECONOMIC STABILITY: HOUSING INSECURITY: IN THE LAST 12 MONTHS, WAS THERE A TIME WHEN YOU WERE NOT ABLE TO PAY THE MORTGAGE OR RENT ON TIME?: NO

## 2025-06-02 SDOH — SOCIAL STABILITY: SOCIAL INSECURITY: HAVE YOU HAD THOUGHTS OF HARMING ANYONE ELSE?: NO

## 2025-06-02 SDOH — ECONOMIC STABILITY: FOOD INSECURITY: HOW HARD IS IT FOR YOU TO PAY FOR THE VERY BASICS LIKE FOOD, HOUSING, MEDICAL CARE, AND HEATING?: NOT HARD AT ALL

## 2025-06-02 SDOH — ECONOMIC STABILITY: HOUSING INSECURITY: AT ANY TIME IN THE PAST 12 MONTHS, WERE YOU HOMELESS OR LIVING IN A SHELTER (INCLUDING NOW)?: NO

## 2025-06-02 SDOH — SOCIAL STABILITY: SOCIAL INSECURITY: DO YOU FEEL ANYONE HAS EXPLOITED OR TAKEN ADVANTAGE OF YOU FINANCIALLY OR OF YOUR PERSONAL PROPERTY?: NO

## 2025-06-02 SDOH — ECONOMIC STABILITY: HOUSING INSECURITY: IN THE PAST 12 MONTHS, HOW MANY TIMES HAVE YOU MOVED WHERE YOU WERE LIVING?: 0

## 2025-06-02 SDOH — SOCIAL STABILITY: SOCIAL INSECURITY: HAS ANYONE EVER THREATENED TO HURT YOUR FAMILY OR YOUR PETS?: NO

## 2025-06-02 SDOH — SOCIAL STABILITY: SOCIAL INSECURITY: WITHIN THE LAST YEAR, HAVE YOU BEEN AFRAID OF YOUR PARTNER OR EX-PARTNER?: NO

## 2025-06-02 SDOH — SOCIAL STABILITY: SOCIAL INSECURITY: DOES ANYONE TRY TO KEEP YOU FROM HAVING/CONTACTING OTHER FRIENDS OR DOING THINGS OUTSIDE YOUR HOME?: NO

## 2025-06-02 SDOH — ECONOMIC STABILITY: TRANSPORTATION INSECURITY: IN THE PAST 12 MONTHS, HAS LACK OF TRANSPORTATION KEPT YOU FROM MEDICAL APPOINTMENTS OR FROM GETTING MEDICATIONS?: NO

## 2025-06-02 SDOH — SOCIAL STABILITY: SOCIAL INSECURITY: ARE THERE ANY APPARENT SIGNS OF INJURIES/BEHAVIORS THAT COULD BE RELATED TO ABUSE/NEGLECT?: NO

## 2025-06-02 SDOH — SOCIAL STABILITY: SOCIAL INSECURITY: WERE YOU ABLE TO COMPLETE ALL THE BEHAVIORAL HEALTH SCREENINGS?: YES

## 2025-06-02 SDOH — SOCIAL STABILITY: SOCIAL INSECURITY: HAVE YOU HAD ANY THOUGHTS OF HARMING ANYONE ELSE?: NO

## 2025-06-02 SDOH — SOCIAL STABILITY: SOCIAL INSECURITY: ABUSE: ADULT

## 2025-06-02 SDOH — SOCIAL STABILITY: SOCIAL INSECURITY: DO YOU FEEL UNSAFE GOING BACK TO THE PLACE WHERE YOU ARE LIVING?: NO

## 2025-06-02 ASSESSMENT — COGNITIVE AND FUNCTIONAL STATUS - GENERAL
TURNING FROM BACK TO SIDE WHILE IN FLAT BAD: A LOT
CLIMB 3 TO 5 STEPS WITH RAILING: TOTAL
DAILY ACTIVITIY SCORE: 15
CLIMB 3 TO 5 STEPS WITH RAILING: TOTAL
STANDING UP FROM CHAIR USING ARMS: A LOT
DRESSING REGULAR UPPER BODY CLOTHING: A LOT
STANDING UP FROM CHAIR USING ARMS: A LOT
STANDING UP FROM CHAIR USING ARMS: A LOT
DRESSING REGULAR LOWER BODY CLOTHING: A LOT
PATIENT BASELINE BEDBOUND: NO
WALKING IN HOSPITAL ROOM: TOTAL
MOVING FROM LYING ON BACK TO SITTING ON SIDE OF FLAT BED WITH BEDRAILS: A LOT
TOILETING: A LOT
MOVING FROM LYING ON BACK TO SITTING ON SIDE OF FLAT BED WITH BEDRAILS: A LOT
WALKING IN HOSPITAL ROOM: TOTAL
MOVING FROM LYING ON BACK TO SITTING ON SIDE OF FLAT BED WITH BEDRAILS: A LOT
PERSONAL GROOMING: A LITTLE
DRESSING REGULAR UPPER BODY CLOTHING: A LOT
TURNING FROM BACK TO SIDE WHILE IN FLAT BAD: A LOT
HELP NEEDED FOR BATHING: A LOT
WALKING IN HOSPITAL ROOM: TOTAL
STANDING UP FROM CHAIR USING ARMS: A LOT
TOILETING: A LOT
PERSONAL GROOMING: A LITTLE
TOILETING: A LOT
DAILY ACTIVITIY SCORE: 15
PATIENT BASELINE BEDBOUND: NO
TURNING FROM BACK TO SIDE WHILE IN FLAT BAD: A LOT
MOBILITY SCORE: 10
CLIMB 3 TO 5 STEPS WITH RAILING: TOTAL
PERSONAL GROOMING: A LITTLE
MOVING TO AND FROM BED TO CHAIR: A LOT
MOBILITY SCORE: 10
TURNING FROM BACK TO SIDE WHILE IN FLAT BAD: A LOT
DRESSING REGULAR UPPER BODY CLOTHING: A LOT
DRESSING REGULAR LOWER BODY CLOTHING: A LOT
MOVING TO AND FROM BED TO CHAIR: A LOT
WALKING IN HOSPITAL ROOM: TOTAL
DAILY ACTIVITIY SCORE: 15
CLIMB 3 TO 5 STEPS WITH RAILING: TOTAL
DRESSING REGULAR LOWER BODY CLOTHING: A LOT
MOVING TO AND FROM BED TO CHAIR: A LOT
HELP NEEDED FOR BATHING: A LOT
MOVING FROM LYING ON BACK TO SITTING ON SIDE OF FLAT BED WITH BEDRAILS: A LOT
MOBILITY SCORE: 10
HELP NEEDED FOR BATHING: A LOT
MOVING TO AND FROM BED TO CHAIR: A LOT

## 2025-06-02 ASSESSMENT — LIFESTYLE VARIABLES
HOW OFTEN DO YOU HAVE 6 OR MORE DRINKS ON ONE OCCASION: NEVER
HOW MANY STANDARD DRINKS CONTAINING ALCOHOL DO YOU HAVE ON A TYPICAL DAY: PATIENT DOES NOT DRINK
AUDIT-C TOTAL SCORE: 0
HOW OFTEN DO YOU HAVE A DRINK CONTAINING ALCOHOL: NEVER
SKIP TO QUESTIONS 9-10: 1
AUDIT-C TOTAL SCORE: 0

## 2025-06-02 ASSESSMENT — ACTIVITIES OF DAILY LIVING (ADL)
JUDGMENT_ADEQUATE_SAFELY_COMPLETE_DAILY_ACTIVITIES: YES
ADEQUATE_TO_COMPLETE_ADL: YES
PATIENT'S MEMORY ADEQUATE TO SAFELY COMPLETE DAILY ACTIVITIES?: YES
BATHING: NEEDS ASSISTANCE
GROOMING: NEEDS ASSISTANCE
TOILETING: NEEDS ASSISTANCE
LACK_OF_TRANSPORTATION: NO
HEARING - LEFT EAR: FUNCTIONAL
FEEDING YOURSELF: INDEPENDENT
WALKS IN HOME: NEEDS ASSISTANCE
HEARING - RIGHT EAR: FUNCTIONAL
ASSISTIVE_DEVICE: CANE;WALKER;OTHER (COMMENT)
DRESSING YOURSELF: NEEDS ASSISTANCE

## 2025-06-02 ASSESSMENT — PAIN SCALES - GENERAL
PAINLEVEL_OUTOF10: 6
PAINLEVEL_OUTOF10: 0 - NO PAIN
PAINLEVEL_OUTOF10: 10 - WORST POSSIBLE PAIN
PAINLEVEL_OUTOF10: 0 - NO PAIN

## 2025-06-02 ASSESSMENT — PAIN - FUNCTIONAL ASSESSMENT
PAIN_FUNCTIONAL_ASSESSMENT: 0-10

## 2025-06-02 NOTE — CONSULTS
Cardiology Consult Note      Date:   6/2/2025  Patient name:  Isa Jack  Date of admission:  6/1/2025  7:42 PM  MRN:   [id number]  YOB: 1950  Time of Consult:  9:59 AM    Consulting Cardiologist: Dr. Kyle Camargo, APRN, CNP  Primary Cardiologist:  CCRANULFO Noel  Referring Provider: Dr Alaniz      Admission Diagnosis:     Chest pain, unspecified type      History of Present Illness:      Isa Jack is a 74 y.o.  female patient who is being at the request of Dr. Alaniz for inpatient consultation of A-fib with RVR. She was admitted on 6/1/2025.  Previous [facility] and [facility] records have been reviewed in detail.    Patient with a history of chronic systolic heart failure, chronic cellulitis, hypertension, CAD which included abnormal heart cath in 2006 but she is insisting medical management only, persistent A-fib on oral anticoagulation, diabetes, COPD, dyslipidemia  Patient was just recently in the hospital on 5/12/2025 she states that she went home she lives with family members she states that when she was at home she was feeling some shortness of breath not quite feeling like her self she said she had an episode of chest pain that was very brief.  I did discuss with her the last time she was here in the hospital she absolutely refuses for any invasive cardiac testing she states that that is still the exactly the same she is refusing an ICD replacement and she is absolutely refusing a heart catheterization.  She states that she just wants to be kept comfortable at this present time.  Throughout the night she did have some episodes of confusion so they currently do have a one-on-one with her because she was getting out of the bed by herself.  She does have chronic edema to her lower extremities.  She states that the chest pain is currently gone when she came into the emergency department her systolic blood pressure was in the 80s they did give her 500 cc and normal  slightly saline slowly and they were going to hold her p.o. diuretics for 1 day.  She  On admission she had 1 day of chest pain she is currently chest pain-free at this time.  Positive for fatigue and generalized weakness  Denies fever, chills, nausea, PND, orthopnea, claudication    EKG is A-fib with RVR  Troponin 16, 14  Sodium 134  Potassium 4.1  BUN 40  Creatinine 1.33  BNP 1597  Magnesium 1.81    Cardiac history  CONCLUSIONS:   - Exam indication: Sustained atrial fibrillation   - The left ventricle is normal in size. Left ventricular systolic function is   severely decreased. EF = 29 ± 5% (2D biplane) Left ventricular diastolic function   was not evaluated due to AF.   - The right ventricle is normal in size. Right ventricular systolic function is   normal.   - The left atrial cavity is dilated.   - The right atrial cavity is mildly dilated.   - No significant valvular abnormalities.   - Exam was compared with the prior  echocardiographic exam performed on 1/20/25.      2006 cath  FINDINGS:   LMCA (Ostial), Discrete 70% lesion   LAD PATENT Luminal Irregular   CX PATENT Luminal Irregular   RCA PATENT Luminal Irregular    LVEF is slightly increased. MR is decreased. Otherwise similar findings.          Allergies:     Allergies[1]      Past Medical History:     Medical History[2]    Past Surgical History:     Surgical History[3]    Family History:     Family History[4]    Social History:     Social History[5]    CURRENT INPATIENT MEDICATIONS    Scheduled Medications[6]  Continuous Medications[7]  Current Outpatient Medications   Medication Instructions    albuterol (ProAir HFA) 90 mcg/actuation inhaler 2 puffs, Every 4 hours PRN    albuterol 2.5 mg, Every 4 hours PRN    apixaban (ELIQUIS) 5 mg, oral, Every 12 hours    atorvastatin (LIPITOR) 40 mg, Nightly    clotrimazole (Lotrimin) 1 % cream 1 Application, Topical, 2 times daily, Apply to affected areas 2-3 times daily    cyanocobalamin, vitamin B-12, 2,500 mcg  "tablet, sublingual 1 each, sublingual, Daily RT    elastic bandage (BAND-AID ACTIVE FLEX TOP) USE AS DIRECTED. apply 1 4x4\" bandage to open wound once daily    elastic bandage bandage USE AS DIRECTED.    furosemide (LASIX) 40 mg, 2 times daily    metFORMIN (GLUCOPHAGE) 1,000 mg, 2 times daily    metoprolol succinate XL (TOPROL-XL) 100 mg, oral, Daily, Do not crush or chew.    montelukast (SINGULAIR) 10 mg, Nightly    mupirocin (Bactroban) 2 % ointment 1 Application, Topical, 2 times daily PRN    naloxone (NARCAN) 4 mg, nasal, As needed    nitroglycerin (NITROSTAT) 0.4 mg, sublingual, Every 5 min PRN    OneTouch Ultra Test strip 2 times daily        Review of Systems:      12 point review of systems was obtained in detail and is negative other than that detailed above.    Vital Signs:     Vitals:    06/02/25 0200 06/02/25 0205 06/02/25 0317 06/02/25 0811   BP:  106/68 111/62 108/69   BP Location:   Left arm    Patient Position:   Lying    Pulse: (!) 108  99 104   Resp:  17 20 16   Temp:   36.6 °C (97.9 °F) 37.6 °C (99.7 °F)   TempSrc:   Temporal    SpO2: 96% 98% 98% 98%   Weight:   93 kg (205 lb)    Height:   1.448 m (4' 9\")        Intake/Output Summary (Last 24 hours) at 6/2/2025 0959  Last data filed at 6/2/2025 0638  Gross per 24 hour   Intake 291.67 ml   Output --   Net 291.67 ml       Wt Readings from Last 4 Encounters:   06/02/25 93 kg (205 lb)   05/09/25 91.1 kg (200 lb 13.4 oz)   10/09/23 103 kg (227 lb 15.3 oz)   04/20/23 95.1 kg (209 lb 9.6 oz)       Physical Examination:     GENERAL APPEARANCE: Well developed, well nourished, in no acute distress.  CHEST: Symmetric and non-tender.  INTEGUMENT: Skin warm and dry, without gross excoriationis or lesions.  HEENT: No gross abnormalities of conjunctiva, teeth, gums, oral mucosa  NECK: Supple, no JVD, no bruit. Thyroid not palpable. Carotid upstrokes normal.  NEURO/PSHCY: Alert and oriented x3; appropriate behavior and responses and responses, grossly normal " cerebellar function with normal balance and coordination  LUNGS: scattered rales  HEART: S1, S2 irregular 1 out of 6 systolic murmur  ABDOMEN: Soft, nontender, no palpable hepatosplenomegaly, no mases, no bruits. Abdominal aorta not noted to be enlarged.  EXTREMITIES: Warm with good color, no clubbing or cyanois. 2 plus edema  PERIPHERAL VASCULAR: Pulses present and equally palpable; 1+ throughout. No femoral bruits.      Lab:     CBC:   Results from last 7 days   Lab Units 06/02/25 0625 06/01/25 2006   WBC AUTO x10*3/uL 6.9 9.4   RBC AUTO x10*6/uL 3.48* 3.66*   HEMOGLOBIN g/dL 9.5* 10.1*   HEMATOCRIT % 31.5* 33.3*   MCV fL 91 91   MCH pg 27.3 27.6   MCHC g/dL 30.2* 30.3*   RDW % 17.3* 17.3*   PLATELETS AUTO x10*3/uL 157 174     CMP:    Results from last 7 days   Lab Units 06/02/25 0625 06/02/25 0119 06/01/25 2006   SODIUM mmol/L 134*  --  130*   POTASSIUM mmol/L 4.1 4.1 5.2   CHLORIDE mmol/L 97*  --  94*   CO2 mmol/L 28  --  23   BUN mg/dL 40*  --  39*   CREATININE mg/dL 1.33*  --  1.45*   GLUCOSE mg/dL 132*  --  126*   PROTEIN TOTAL g/dL  --   --  6.6   CALCIUM mg/dL 8.8  --  8.8   BILIRUBIN TOTAL mg/dL  --   --  0.8   ALK PHOS U/L  --   --  79   AST U/L  --   --  36   ALT U/L  --   --  24     BMP:    Results from last 7 days   Lab Units 06/02/25 0625 06/02/25 0119 06/01/25 2006   SODIUM mmol/L 134*  --  130*   POTASSIUM mmol/L 4.1 4.1 5.2   CHLORIDE mmol/L 97*  --  94*   CO2 mmol/L 28  --  23   BUN mg/dL 40*  --  39*   CREATININE mg/dL 1.33*  --  1.45*   CALCIUM mg/dL 8.8  --  8.8   GLUCOSE mg/dL 132*  --  126*     Magnesium:  Results from last 7 days   Lab Units 06/01/25 2006   MAGNESIUM mg/dL 1.81     Troponin:    Results from last 7 days   Lab Units 06/01/25  2255 06/01/25 2006   TROPHS ng/L 14* 16*     BNP:   Results from last 7 days   Lab Units 06/01/25 2006   BNP pg/mL 1,597*     Lipid Panel:         Diagnostic Studies:       Radiology:     XR chest 1 view   Final Result   1. Mild diffuse  interstitial prominence, likely interstitial edema.   2. Indistinctness of the costophrenic sulci, which can be secondary   to suboptimal beam penetration or trace pleural effusions.   3. Stable cardiomegaly.        Signed by: Nabeel Frank 6/1/2025 8:54 PM   Dictation workstation:   [id number]          Problem List:     Problem List[8]    Assessment:   Chest pain rule out ACS resolved  Chronic left lower extremity wound  Chronic systolic heart failure NYHA class III EF 25 to 30%  Chronic A-fib on oral anticoagulation  History of CAD per patient medical management only  Hypertension  Diabetes  Morbid obesity  COPD    Plan:   Tele monitoring  Serial enzymes  2D echo done 3/25 EF 25 to 30%  Daily EKG  Eliquis 5 mg p.o. twice daily  Lipitor 40 mg daily  Toprol  mg daily  Okay to hold Lasix today can resume tomorrow  Digoxin 250 mcg IV push every 6 hours x 3 doses  Add Ranexa 500 mg p.o. twice daily  Spoke at length with Isa she absolutely refusing any invasive testing or procedures at this present time she request medical management only.    Tripp Camargo CNP  [facility]      Of note, this documentation is completed using the Dragon Dictation system (voice recognition software). There may be spelling and/or grammatical errors that were not corrected prior to final submission.      Electronically signed by EROS Beck-CNP, on 6/2/2025 at 9:59 AM    Patient seen and examined in conjunction with EROS Tam and agree with the evaluation as noted above.  I have personally interviewed and examined the patient.   I have personally and independently reviewed the pertinentlabs and diagnostic testing.  I have personally verified the elements of the history and physical listed above and changes, if any, are noted below.    74-year-old lady with a history of chronic systolic heart failure, chronic lower extremity cellulitis, hypertension,  CAD with an abnormal cardiac catheterization remotely in 2006 that showed some 75% stenosis for which the patient has declined  Any intervention.  She also has persistent atrial fibrillation on rate control and anticoagulation.  She presents with some increased shortness of breath and increased lower extremity swelling associated with intermittent episode of chest pain which was very brief in nature.  She denied any orthopnea or paroxysmal nocturnal dyspnea she was noted to be relatively hypotensive on initial presentation with a systolic blood pressure in the 80s which responded to IVF saline.  Diuretic is currently on hold as a result of this.  EKG shows A-fib with RVR, with no acute ST or T wave changes    Agree with exam as noted above.  Cardiac exam reveals irregularly irregular S1 and S2, no murmurs are heard.  There is normal carotid upstroke with no bruits.  Chest reveals reduced breath sounds in the lung bases bilaterally posteriorly  Abdomen is obese with no palpable hepatosplenomegaly.  Extremities: Both lower extremities are wrapped in dressing secondary to ongoing cellulitis    ASSESSMENT AND PLAN:  1.  Shortness of breath: This is likely multifactorial, with a component of CHF probably secondary to acute on chronic systolic and diastolic decompensation.  However because of her relative hypotension, we will continue to hold the diuretics and reevaluate in the a.m..  2.  CAD: With some atypical chest pain, but patient has declined repeat cardiac catheterization or intervention, she wants to continue with medical therapy.  Will consider low-dose Imdur once the blood pressure improves.  3.  Hypertension: With relative hypotension, continue with supportive treatment.  4.  A-fib: With episode of RVR, improved with digoxin which we will continue.  Continue with long-term oral anticoagulation.         [1]   Allergies  Allergen Reactions    Iodine Hives    Adhesive Tape-Silicones Rash    Gadolinium-Containing  Contrast Media Rash   [2]   Past Medical History:  Diagnosis Date    Diabetes mellitus (Multi)     Hyperlipemia     Hypertension     Morbid (severe) obesity due to excess calories (Multi) 10/28/2022    Class 3 severe obesity due to excess calories with serious comorbidity and body mass index (BMI) of 40.0 to 44.9 in adult    Non-pressure chronic ulcer of left ankle with unspecified severity (Multi) 2020    Ulcer of left ankle   [3]   Past Surgical History:  Procedure Laterality Date    OTHER SURGICAL HISTORY  2019    No history of surgery   [4] No family history on file.  [5]   Social History  Tobacco Use    Smoking status: Former     Current packs/day: 0.00     Types: Cigarettes     Quit date:      Years since quittin.4    Smokeless tobacco: Never   Substance Use Topics    Alcohol use: Never    Drug use: Never   [6] apixaban, 5 mg, oral, q12h  atorvastatin, 40 mg, oral, Nightly  digoxin, 250 mcg, intravenous, Once  digoxin, 250 mcg, intravenous, Once  digoxin, 250 mcg, intravenous, Once  [Held by provider] furosemide, 40 mg, oral, BID  insulin lispro, 0-10 Units, subcutaneous, Before meals & nightly  magnesium sulfate, 2 g, intravenous, Once  metoprolol succinate XL, 100 mg, oral, Daily  montelukast, 10 mg, oral, Nightly  pantoprazole, 40 mg, oral, Daily   Or  pantoprazole, 40 mg, intravenous, Daily  ranolazine, 500 mg, oral, BID     [7]    [8]   Patient Active Problem List  Diagnosis    Adnexal mass    Asthma in adult, unspecified asthma severity, uncomplicated (HHS-HCC)    Cellulitis    Chronic congestive heart failure    Chronic pain syndrome    Coronary artery disease involving native heart with angina pectoris    DJD (degenerative joint disease)    Endometrial hyperplasia    Essential hypertension    HLD (hyperlipidemia)    Pernicious anemia    Physical debility    Vitamin B12 deficiency    Skin ulcer of ankle (Multi)    Type 2 diabetes mellitus, without long-term current use of insulin  (Multi)    Sepsis due to pneumonia (Multi)    Cardiomyopathy, ischemic    Atrial fibrillation with rapid ventricular response (Multi)    Chest pain, unspecified type    Atrial fibrillation with RVR (Multi)

## 2025-06-02 NOTE — CARE PLAN
The patient's goals for the shift include      The clinical goals for the shift include Free from falls    Patient restless and anxious throughout shift. Patient admitted from ER with complaints of pain of 10/10 due to wounds and skin excoriation. Will continue to follow plan of care.      Problem: Pain - Adult  Goal: Verbalizes/displays adequate comfort level or baseline comfort level  Outcome: Progressing     Problem: Safety - Adult  Goal: Free from fall injury  Outcome: Progressing     Problem: Skin  Goal: Decreased wound size/increased tissue granulation at next dressing change  Outcome: Progressing  Goal: Participates in plan/prevention/treatment measures  Outcome: Progressing  Goal: Prevent/manage excess moisture  Outcome: Progressing  Goal: Prevent/minimize sheer/friction injuries  Outcome: Progressing  Goal: Promote/optimize nutrition  Outcome: Progressing  Goal: Promote skin healing  Outcome: Progressing

## 2025-06-02 NOTE — H&P
History Of Present Illness  Isa Jack is a 74 y.o. female presenting with chest pain for 1 day.  Patient reported intermittent chest pain associated with palpitation  Over the last 3 to 4 days.  The pain resolved by the time she arrived to the ER.  She described the pain as retrosternal, radiated to the shoulders, associated with nausea and not feeling well.  Patient was recently admitted to the hospital with A-fib with RVR and CHF exacerbation.  Her last echocardiogram shows ejection fraction about 25 to 30%.  Patient refused ICD placement.  She wanted to be DNR and DNI.  After adjustment of her medication and stabilization of A-fib, she was discharged to nursing home.    ER course: Patient was awake, alert, oriented, no acute distress.  Patient was in A-fib with RVR with heart rate up to 112.  Initially blood pressure was low 86/66.  She was given gentle IV fluid in the ER especially with elevated lactate 4.3  Her other labs shows potassium 5.2 and hyponatremia 130.  Patient also has mild DOMINIC 1.45.  She has no leukocytosis.  Blood glucose level was 126.  Her EKG also confirmed A-fib with RVR without no ischemic changes.  Chest x-ray shows mild diffuse interstitial prominence likely edema.  There is possible trace bilateral pleural effusion.  Also reported stable cardiomegaly.  Patient was given pantoprazole and to 50 cc of IV fluid in the ER.  Patient is admitted to medicine for further management.    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    Surgical History  She has a past surgical history that includes Other surgical history (08/07/2019).     Social History  She reports that she quit smoking about 6 years ago. Her smoking use included cigarettes. She has never used smokeless  tobacco. She reports that she does not drink alcohol and does not use drugs.    Family History  Family History[1]     Allergies  Iodine, Adhesive tape-silicones, and Gadolinium-containing contrast media    Review of Systems  10/10 points review of system were conducted, negative except as above.    Physical Exam  Constitutional:       Appearance: She is obese. She is not ill-appearing, toxic-appearing or diaphoretic.   HENT:      Head: Normocephalic and atraumatic.      Nose: Nose normal.      Mouth/Throat:      Mouth: Mucous membranes are dry.   Eyes:      General: No scleral icterus.     Extraocular Movements: Extraocular movements intact.      Conjunctiva/sclera: Conjunctivae normal.      Pupils: Pupils are equal, round, and reactive to light.   Cardiovascular:      Rate and Rhythm: Tachycardia present. Rhythm irregular.      Pulses: Normal pulses.      Heart sounds: No murmur heard.  Pulmonary:      Effort: No respiratory distress.      Breath sounds: No wheezing, rhonchi or rales.   Chest:      Chest wall: No tenderness.   Abdominal:      General: There is distension.      Tenderness: There is no abdominal tenderness. There is no right CVA tenderness, left CVA tenderness, guarding or rebound.      Hernia: No hernia is present.   Musculoskeletal:         General: Normal range of motion.      Cervical back: Normal range of motion and neck supple. No rigidity or tenderness.      Right lower leg: Edema present.      Left lower leg: Edema present.   Skin:     General: Skin is dry.      Findings: Bruising present. No lesion or rash.   Neurological:      General: No focal deficit present.      Mental Status: She is alert and oriented to person, place, and time. Mental status is at baseline.      Cranial Nerves: No cranial nerve deficit.   Psychiatric:         Mood and Affect: Mood normal.         Behavior: Behavior normal.          Last Recorded Vitals  Blood pressure 111/62, pulse 99, temperature 36.6 °C (97.9 °F),  "resp. rate 20, height 1.448 m (4' 9\"), weight 93 kg (205 lb), SpO2 98%.    Relevant Results  Scheduled medications  Scheduled Medications[2]  Continuous medications  Continuous Medications[3]  PRN medications  PRN Medications[4]       Results for orders placed or performed during the hospital encounter of 06/01/25 (from the past 24 hours)   CBC and Auto Differential   Result Value Ref Range    WBC 9.4 4.4 - 11.3 x10*3/uL    nRBC 0.0 0.0 - 0.0 /100 WBCs    RBC 3.66 (L) 4.00 - 5.20 x10*6/uL    Hemoglobin 10.1 (L) 12.0 - 16.0 g/dL    Hematocrit 33.3 (L) 36.0 - 46.0 %    MCV 91 80 - 100 fL    MCH 27.6 26.0 - 34.0 pg    MCHC 30.3 (L) 32.0 - 36.0 g/dL    RDW 17.3 (H) 11.5 - 14.5 %    Platelets 174 150 - 450 x10*3/uL    Neutrophils % 74.8 40.0 - 80.0 %    Immature Granulocytes %, Automated 0.4 0.0 - 0.9 %    Lymphocytes % 6.6 13.0 - 44.0 %    Monocytes % 14.2 2.0 - 10.0 %    Eosinophils % 2.9 0.0 - 6.0 %    Basophils % 1.1 0.0 - 2.0 %    Neutrophils Absolute 7.07 (H) 1.60 - 5.50 x10*3/uL    Immature Granulocytes Absolute, Automated 0.04 0.00 - 0.50 x10*3/uL    Lymphocytes Absolute 0.62 (L) 0.80 - 3.00 x10*3/uL    Monocytes Absolute 1.34 (H) 0.05 - 0.80 x10*3/uL    Eosinophils Absolute 0.27 0.00 - 0.40 x10*3/uL    Basophils Absolute 0.10 0.00 - 0.10 x10*3/uL   Magnesium   Result Value Ref Range    Magnesium 1.81 1.60 - 2.40 mg/dL   Comprehensive metabolic panel   Result Value Ref Range    Glucose 126 (H) 74 - 99 mg/dL    Sodium 130 (L) 136 - 145 mmol/L    Potassium 5.2 3.5 - 5.3 mmol/L    Chloride 94 (L) 98 - 107 mmol/L    Bicarbonate 23 21 - 32 mmol/L    Anion Gap 18 10 - 20 mmol/L    Urea Nitrogen 39 (H) 6 - 23 mg/dL    Creatinine 1.45 (H) 0.50 - 1.05 mg/dL    eGFR 38 (L) >60 mL/min/1.73m*2    Calcium 8.8 8.6 - 10.3 mg/dL    Albumin 3.7 3.4 - 5.0 g/dL    Alkaline Phosphatase 79 33 - 136 U/L    Total Protein 6.6 6.4 - 8.2 g/dL    AST 36 9 - 39 U/L    Bilirubin, Total 0.8 0.0 - 1.2 mg/dL    ALT 24 7 - 45 U/L   Lactate "   Result Value Ref Range    Lactate 4.3 (HH) 0.4 - 2.0 mmol/L   Protime-INR   Result Value Ref Range    Protime 17.7 (H) 9.8 - 12.4 seconds    INR 1.6 (H) 0.9 - 1.1   aPTT   Result Value Ref Range    aPTT 35 26 - 36 seconds   B-Type Natriuretic Peptide   Result Value Ref Range    BNP 1,597 (H) 0 - 99 pg/mL   Troponin I, High Sensitivity, Initial   Result Value Ref Range    Troponin I, High Sensitivity 16 (H) 0 - 13 ng/L   Troponin, High Sensitivity, 1 Hour   Result Value Ref Range    Troponin I, High Sensitivity 14 (H) 0 - 13 ng/L   Lactate   Result Value Ref Range    Lactate 2.0 0.4 - 2.0 mmol/L   Potassium   Result Value Ref Range    Potassium 4.1 3.5 - 5.3 mmol/L   POCT GLUCOSE   Result Value Ref Range    POCT Glucose 186 (H) 74 - 99 mg/dL         XR chest 1 view  Result Date: 6/1/2025  Interpreted By:  Nabeel Frank, STUDY: XR CHEST 1 VIEW;  6/1/2025 8:25 pm   INDICATION: Signs/Symptoms:cp.   COMPARISON: 05/09/2025   ACCESSION NUMBER(S): [id number]   ORDERING CLINICIAN: RANGEL STOVER   FINDINGS:     Stable enlargement of the cardiac silhouette. Indistinctness of the costophrenic sulci, which can be secondary to suboptimal beam penetration or trace pleural effusions. No pneumothorax. Slight diffuse interstitial prominence may reflect mild interstitial edema. Upper abdomen is unremarkable. Superior subluxation of the right humeral head relative to the glenoid appears similar to the prior exam may be secondary to chronic rotator cuff tear.       1. Mild diffuse interstitial prominence, likely interstitial edema. 2. Indistinctness of the costophrenic sulci, which can be secondary to suboptimal beam penetration or trace pleural effusions. 3. Stable cardiomegaly.   Signed by: Nabeel Frank 6/1/2025 8:54 PM Dictation workstation:   [id number]    ECG 12 Lead  Result Date: 5/16/2025  Atrial fibrillation Anterolateral infarct , age undetermined Abnormal ECG When compared with ECG of 15-MAY-2025 07:31,  (unconfirmed) Incomplete left bundle branch block is no longer Present Anterolateral infarct is now Present Confirmed by Lion Dominguez [id number]) on 5/16/2025 11:14:01 PM    ECG 12 Lead  Result Date: 5/16/2025  Atrial fibrillation Low voltage QRS Incomplete left bundle branch block Nonspecific T wave abnormality Abnormal ECG When compared with ECG of 14-MAY-2025 06:48, (unconfirmed) Questionable change in QRS axis Confirmed by Lion Dominguez ([id number]) on 5/16/2025 11:06:30 PM    ECG 12 Lead  Result Date: 5/16/2025  Atrial fibrillation Anterolateral infarct , age undetermined Abnormal ECG When compared with ECG of 13-MAY-2025 06:50, (unconfirmed) Incomplete left bundle branch block is no longer Present Nonspecific T wave abnormality now evident in Anterior leads Confirmed by Lion Dominguez ([id number]) on 5/16/2025 9:44:42 PM    ECG 12 Lead  Result Date: 5/15/2025  Atrial fibrillation Incomplete left bundle branch block Nonspecific T wave abnormality Abnormal ECG When compared with ECG of 12-MAY-2025 21:22, (unconfirmed) Borderline criteria for Anterior infarct are no longer Present Borderline criteria for Anterolateral infarct are no longer Present Nonspecific T wave abnormality no longer evident in Anterior leads Confirmed by Lion Dominguez [id number]) on 5/15/2025 6:44:48 AM    ECG 12 lead  Result Date: 5/14/2025  Atrial fibrillation Low voltage QRS Possible Anterolateral infarct (cited on or before 12-MAY-2025) Abnormal ECG When compared with ECG of 12-MAY-2025 06:51, Questionable change in initial forces of Anterior leads QT has shortened Confirmed by Lion Dominguez [id number]) on 5/14/2025 11:26:20 PM    ECG 12 Lead  Result Date: 5/12/2025  Atrial fibrillation Anterior infarct , age undetermined Abnormal ECG When compared with ECG of 11-MAY-2025 06:26, No significant change was found Confirmed by Pato Bowman ([id number]) on 5/12/2025 2:33:52 PM    ECG 12 Lead  Result Date: 5/11/2025  Atrial fibrillation with premature  ventricular or aberrantly conducted complexes Nonspecific T wave abnormality Abnormal ECG When compared with ECG of 10-MAY-2025 09:42, (unconfirmed) No significant change was found Confirmed by Pato Bowman [id number]) on 5/11/2025 10:09:39 AM    ECG 12 Lead  Result Date: 5/11/2025  Atrial fibrillation Low voltage QRS Nonspecific T wave abnormality Abnormal ECG When compared with ECG of 09-MAY-2025 17:06, (unconfirmed) Vent. rate has decreased BY  58 BPM Confirmed by Pato Bowman [id number]) on 5/11/2025 10:09:26 AM    ECG 12 lead  Result Date: 5/11/2025  Atrial fibrillation with rapid ventricular response Low voltage QRS Nonspecific T wave abnormality Abnormal ECG When compared with ECG of 12-OCT-2023 18:37, Questionable change in QRS axis See ED provider note for full interpretation and clinical correlation Confirmed by Pato Bowman ([id number]) on 5/11/2025 10:09:21 AM    XR chest 1 view  Result Date: 5/9/2025  Interpreted By:  Joel Matthews, STUDY: XR CHEST 1 VIEW;  5/9/2025 5:46 pm   INDICATION: Signs/Symptoms:SOB.   COMPARISON: Chest radiograph 10/07/2023   ACCESSION NUMBER(S): [id number]   ORDERING CLINICIAN: ELVIE WHITE   FINDINGS:     CARDIOMEDIASTINAL SILHOUETTE: Cardiomediastinal silhouette is stable in size and configuration.   LUNGS: Left basilar airspace opacification small sided pleural effusion. No pneumothorax.   ABDOMEN: No remarkable upper abdominal findings.   BONES: No acute osseous changes.       1.  Left basilar airspace opacification with small left-sided pleural effusion.     Signed by: Joel Matthews 5/9/2025 5:58 PM Dictation workstation:   [id number]         Assessment/Plan   Assessment & Plan  Chest pain, unspecified type    Atrial fibrillation with RVR (Multi)    Essential hypertension    Type 2 diabetes mellitus, without long-term current use of insulin (Multi)    Chronic congestive heart failure    Cardiomyopathy, ischemic    Patient presented with intermittent chest pain and  palpitation.  She has severely reduced ejection fraction on prior echo.  She is refusing ICD placement.  Possibly intermittent angina from ischemic cardiomyopathy.  - However today she is clinically dehydrated.  She has dry mouth and mild DOMINIC.  - She was on Lasix 40 mg twice daily.  Hold Lasix tonight, and resume tomorrow with reduced dose.  - She might benefit from compression stocking of lower extremities.  Patient does have severe lymphedema both legs.  - Lactic acid stabilized with gentle fluid hydration.  Hold metformin for now, and use sliding scale insulin coverage for diabetes.  - Cardiology consult for further recommendation.  - Resume metoprolol for rate control of A-fib  -Gentle oral hydration.        Amanda White MD         [1] No family history on file.  [2] atorvastatin, 40 mg, oral, Nightly  insulin lispro, 0-10 Units, subcutaneous, Before meals & nightly  metoprolol succinate XL, 100 mg, oral, Daily  montelukast, 10 mg, oral, Nightly  pantoprazole, 40 mg, oral, Daily   Or  pantoprazole, 40 mg, intravenous, Daily     [3]    [4] PRN medications: acetaminophen **OR** acetaminophen **OR** acetaminophen, albuterol, dextrose, dextrose, glucagon, glucagon, HYDROcodone-acetaminophen, melatonin, nitroglycerin, ondansetron **OR** ondansetron, polyethylene glycol

## 2025-06-02 NOTE — ED PROVIDER NOTES
Emergency Department Provider Note       History of Present Illness     History provided by: Patient  Limitations to History: None  External Records Reviewed with Brief Summary: Nursing home paperwork which showed history of A-fib on Eliquis, cardiomyopathy, DNR, no intubation.    HPI:  Isa Jack is a 74 y.o. female brought in by EMS from nursing home for intermittent chest pain over the last 3 to 4 days.  Patient is currently chest pain-free.  History of A-fib on Eliquis.  Patient also reports that she has had dark black stools.  No abdominal pain.  Has had mild intermittent nausea.  No significant shortness of breath.    Physical Exam   Triage vitals:  T 35.9 °C (96.6 °F)  HR 99  BP 86/66  RR (!) 22  O2 99 % None (Room air)    Physical Exam  Vitals and nursing note reviewed.   Constitutional:       General: She is not in acute distress.  HENT:      Head: Atraumatic.      Mouth/Throat:      Mouth: Mucous membranes are moist.      Pharynx: Oropharynx is clear.   Eyes:      Extraocular Movements: Extraocular movements intact.      Conjunctiva/sclera: Conjunctivae normal.      Pupils: Pupils are equal, round, and reactive to light.   Cardiovascular:      Rate and Rhythm: Tachycardia present. Rhythm irregular.      Pulses: Normal pulses.   Pulmonary:      Effort: Pulmonary effort is normal. No respiratory distress.      Breath sounds: Normal breath sounds.   Abdominal:      General: There is no distension.      Palpations: Abdomen is soft.      Tenderness: There is no abdominal tenderness. There is no guarding or rebound.   Musculoskeletal:         General: No deformity.      Cervical back: Neck supple.      Right lower leg: Edema present.      Left lower leg: Edema present.   Skin:     General: Skin is warm and dry.   Neurological:      Mental Status: She is alert and oriented to person, place, and time. Mental status is at baseline.      Cranial Nerves: No cranial nerve deficit.      Sensory: No sensory  deficit.      Motor: No weakness.   Psychiatric:         Mood and Affect: Mood normal.         Behavior: Behavior normal.           Medical Decision Making & ED Course   Medical Decision Makin y.o. female brought in by EMS from nursing home for intermittent chest pain.  Patient is chest pain-free on arrival, no acute distress.  A-fib with RVR on monitor with heart rates in the low 100s.  Maintaining normal SpO2 on room air.  Lungs are clear.  Blood pressures are soft.  Patient has history of systolic CHF, A-fib, hypertension, hyperlipidemia, CAD, type 2 diabetes, COPD.  Recently discharged from the hospital on  for A-fib with RVR, CHF exacerbation, hyponatremia.  Patient also reported dark-colored stools but states that this is a chronic issue for her and occurs with loose stools/diarrhea.  Given the possibility of GI bleeding while on anticoagulation, will treat with Protonix 80 mg IV.  No abdominal pain on examination.    Patient's chest x-ray showing cardiomegaly with interstitial edema.  Labs reviewed.  CBC without leukocytosis, baseline H&H.  Metabolic panel with worsening renal function with creatinine of 1.45 and GFR of 38.  Potassium 5.2, mildly hemolyzed, will get redraw.  Hyponatremia present with sodium of 130.  BNP significantly elevated at 1500.  Troponin mildly elevated at 16, repeat stable at 14.  This is lower than patient's previous baseline.  Initial lactic acid elevated at 4.3.  Given patient's worsening renal function and elevated lactic acid, will give small IV fluid bolus of 250 mL and repeat lactic acid.  Overall patient appears fluid overloaded but likely intravascularly depleted from recent diuresis.  Plan to admit for continued workup and management.  Patient's heart rate remains around 100 without further rate control.  Blood pressure remains around 100 systolic.  ----      Differential diagnoses considered include but are not limited to: ACS, pneumonia, CHF exacerbation, GI  bleeding    Social Determinants of Health which Significantly Impact Care: Social Determinants of Health which Significantly Impact Care: None identified     EKG Independent Interpretation: EKG interpreted by myself. Please see ED Course for full interpretation.    Independent Result Review and Interpretation: Relevant laboratory and radiographic results were reviewed and independently interpreted by myself.  As necessary, they are commented on in the ED Course.    Chronic conditions affecting the patient's care: As documented above in MDM    The patient was discussed with the following consultants/services: Hospitalist/Admitting Provider who accepted the patient for admission    Care Considerations: None    ED Course:  ED Course as of 06/02/25 0115   Sun Jun 01, 2025 2008 Twelve-lead ECG obtained at 1950 by my interpretation demonstrates atrial fibrillation with RVR, rate of 128, no STEMI. [MK]      ED Course User Index  [MK] Richie Steiner MD         Diagnoses as of 06/02/25 0115   Chest pain, unspecified type   Acute on chronic congestive heart failure, unspecified heart failure type   Atrial fibrillation with RVR (Multi)   Hyponatremia       Disposition   As a result of their workup, the patient will require admission to the hospital.  The patient was informed of her diagnosis.  The patient was given the opportunity to ask questions and I answered them. The patient agreed to be admitted to the hospital.    Procedures   Procedures        Richie Steiner MD  Emergency Medicine                                                            Richie Steiner MD  06/02/25 0115

## 2025-06-02 NOTE — PROGRESS NOTES
06/02/25 0949   Discharge Planning   Living Arrangements Other (Comment)  ([facility] return)   Support Systems Children   Type of Residence Skilled nursing facility   Who is requesting discharge planning? Provider   Home or Post Acute Services Post acute facilities (Rehab/SNF/etc)   Type of Post Acute Facility Services Skilled nursing   Expected Discharge Disposition SNF   Does the patient need discharge transport arranged? Yes   RoundTrip coordination needed? Yes   Has discharge transport been arranged? No   Patient Choice   Provider Choice list and CMS website (https://medicare.gov/care-compare#search) for post-acute Quality and Resource Measure Data were provided and reviewed with: Family   Patient / Family choosing to utilize agency / facility established prior to hospitalization Yes     Pt. Admitted from [facility]. Spoke, pt. Currently 1:1.  Spoke with son, wild who confirms plan of return to [facility], states has been there only a week prior lives with him.  Referral sent to [facility], currently awaiting pt/ot diane.  Pt. With [employer] dual complete, will require ins. Precert.    Update:  received call from cam, dhaval at [facility] pt. Was to transition to long term care at [facility] and may be better for pt. To go to [place] location from here skilled and transition to long term care, states they have already discussed this with son.  Spoke with son, states he is fine with [facility] ;but has some questions, requested cam call son. Referral sent to [facility].

## 2025-06-02 NOTE — CARE PLAN
The patient's goals for the shift include comply with medication regimen throughout my shift       The clinical goals for the shift include Free from falls

## 2025-06-03 ENCOUNTER — APPOINTMENT (OUTPATIENT)
Dept: CARDIOLOGY | Facility: HOSPITAL | Age: 75
End: 2025-06-03
Payer: COMMERCIAL

## 2025-06-03 LAB
ANION GAP SERPL CALC-SCNC: 12 MMOL/L (ref 10–20)
BASOPHILS # BLD AUTO: 0.06 X10*3/UL (ref 0–0.1)
BASOPHILS NFR BLD AUTO: 1 %
BUN SERPL-MCNC: 37 MG/DL (ref 6–23)
CALCIUM SERPL-MCNC: 8.6 MG/DL (ref 8.6–10.3)
CHLORIDE SERPL-SCNC: 97 MMOL/L (ref 98–107)
CO2 SERPL-SCNC: 30 MMOL/L (ref 21–32)
CREAT SERPL-MCNC: 1.2 MG/DL (ref 0.5–1.05)
EGFRCR SERPLBLD CKD-EPI 2021: 47 ML/MIN/1.73M*2
EOSINOPHIL # BLD AUTO: 0.27 X10*3/UL (ref 0–0.4)
EOSINOPHIL NFR BLD AUTO: 4.5 %
ERYTHROCYTE [DISTWIDTH] IN BLOOD BY AUTOMATED COUNT: 17.3 % (ref 11.5–14.5)
GLUCOSE BLD MANUAL STRIP-MCNC: 119 MG/DL (ref 74–99)
GLUCOSE BLD MANUAL STRIP-MCNC: 169 MG/DL (ref 74–99)
GLUCOSE BLD MANUAL STRIP-MCNC: 195 MG/DL (ref 74–99)
GLUCOSE BLD MANUAL STRIP-MCNC: 215 MG/DL (ref 74–99)
GLUCOSE BLD MANUAL STRIP-MCNC: 236 MG/DL (ref 74–99)
GLUCOSE SERPL-MCNC: 134 MG/DL (ref 74–99)
HCT VFR BLD AUTO: 32.6 % (ref 36–46)
HGB BLD-MCNC: 9.6 G/DL (ref 12–16)
HOLD SPECIMEN: NORMAL
IMM GRANULOCYTES # BLD AUTO: 0.03 X10*3/UL (ref 0–0.5)
IMM GRANULOCYTES NFR BLD AUTO: 0.5 % (ref 0–0.9)
LYMPHOCYTES # BLD AUTO: 0.77 X10*3/UL (ref 0.8–3)
LYMPHOCYTES NFR BLD AUTO: 12.9 %
MAGNESIUM SERPL-MCNC: 2.18 MG/DL (ref 1.6–2.4)
MCH RBC QN AUTO: 27 PG (ref 26–34)
MCHC RBC AUTO-ENTMCNC: 29.4 G/DL (ref 32–36)
MCV RBC AUTO: 92 FL (ref 80–100)
MONOCYTES # BLD AUTO: 1.31 X10*3/UL (ref 0.05–0.8)
MONOCYTES NFR BLD AUTO: 22 %
NEUTROPHILS # BLD AUTO: 3.52 X10*3/UL (ref 1.6–5.5)
NEUTROPHILS NFR BLD AUTO: 59.1 %
NRBC BLD-RTO: 0 /100 WBCS (ref 0–0)
PLATELET # BLD AUTO: 142 X10*3/UL (ref 150–450)
POTASSIUM SERPL-SCNC: 4.4 MMOL/L (ref 3.5–5.3)
Q ONSET: 216 MS
QRS COUNT: 16 BEATS
QRS DURATION: 96 MS
QT INTERVAL: 342 MS
QTC CALCULATION(BAZETT): 432 MS
QTC FREDERICIA: 400 MS
R AXIS: 20 DEGREES
RBC # BLD AUTO: 3.56 X10*6/UL (ref 4–5.2)
SODIUM SERPL-SCNC: 135 MMOL/L (ref 136–145)
T AXIS: 208 DEGREES
T OFFSET: 387 MS
VENTRICULAR RATE: 96 BPM
WBC # BLD AUTO: 6 X10*3/UL (ref 4.4–11.3)

## 2025-06-03 PROCEDURE — 82947 ASSAY GLUCOSE BLOOD QUANT: CPT

## 2025-06-03 PROCEDURE — 2500000005 HC RX 250 GENERAL PHARMACY W/O HCPCS: Performed by: INTERNAL MEDICINE

## 2025-06-03 PROCEDURE — 97165 OT EVAL LOW COMPLEX 30 MIN: CPT | Mod: GO

## 2025-06-03 PROCEDURE — 99232 SBSQ HOSP IP/OBS MODERATE 35: CPT | Performed by: NURSE PRACTITIONER

## 2025-06-03 PROCEDURE — 2500000002 HC RX 250 W HCPCS SELF ADMINISTERED DRUGS (ALT 637 FOR MEDICARE OP, ALT 636 FOR OP/ED): Performed by: STUDENT IN AN ORGANIZED HEALTH CARE EDUCATION/TRAINING PROGRAM

## 2025-06-03 PROCEDURE — 85025 COMPLETE CBC W/AUTO DIFF WBC: CPT | Performed by: STUDENT IN AN ORGANIZED HEALTH CARE EDUCATION/TRAINING PROGRAM

## 2025-06-03 PROCEDURE — 2500000001 HC RX 250 WO HCPCS SELF ADMINISTERED DRUGS (ALT 637 FOR MEDICARE OP): Performed by: FAMILY MEDICINE

## 2025-06-03 PROCEDURE — 2500000002 HC RX 250 W HCPCS SELF ADMINISTERED DRUGS (ALT 637 FOR MEDICARE OP, ALT 636 FOR OP/ED): Performed by: INTERNAL MEDICINE

## 2025-06-03 PROCEDURE — 97161 PT EVAL LOW COMPLEX 20 MIN: CPT | Mod: GP

## 2025-06-03 PROCEDURE — 80048 BASIC METABOLIC PNL TOTAL CA: CPT | Performed by: STUDENT IN AN ORGANIZED HEALTH CARE EDUCATION/TRAINING PROGRAM

## 2025-06-03 PROCEDURE — 2500000004 HC RX 250 GENERAL PHARMACY W/ HCPCS (ALT 636 FOR OP/ED): Performed by: STUDENT IN AN ORGANIZED HEALTH CARE EDUCATION/TRAINING PROGRAM

## 2025-06-03 PROCEDURE — 36415 COLL VENOUS BLD VENIPUNCTURE: CPT | Performed by: STUDENT IN AN ORGANIZED HEALTH CARE EDUCATION/TRAINING PROGRAM

## 2025-06-03 PROCEDURE — 83735 ASSAY OF MAGNESIUM: CPT | Performed by: STUDENT IN AN ORGANIZED HEALTH CARE EDUCATION/TRAINING PROGRAM

## 2025-06-03 PROCEDURE — 93005 ELECTROCARDIOGRAM TRACING: CPT

## 2025-06-03 PROCEDURE — 2500000001 HC RX 250 WO HCPCS SELF ADMINISTERED DRUGS (ALT 637 FOR MEDICARE OP): Performed by: INTERNAL MEDICINE

## 2025-06-03 PROCEDURE — 2500000001 HC RX 250 WO HCPCS SELF ADMINISTERED DRUGS (ALT 637 FOR MEDICARE OP): Performed by: STUDENT IN AN ORGANIZED HEALTH CARE EDUCATION/TRAINING PROGRAM

## 2025-06-03 PROCEDURE — 2500000001 HC RX 250 WO HCPCS SELF ADMINISTERED DRUGS (ALT 637 FOR MEDICARE OP): Performed by: NURSE PRACTITIONER

## 2025-06-03 PROCEDURE — 1200000002 HC GENERAL ROOM WITH TELEMETRY DAILY

## 2025-06-03 PROCEDURE — 99233 SBSQ HOSP IP/OBS HIGH 50: CPT | Performed by: FAMILY MEDICINE

## 2025-06-03 RX ORDER — FUROSEMIDE 40 MG/1
40 TABLET ORAL DAILY
Qty: 30 TABLET | Refills: 11 | Status: SHIPPED | OUTPATIENT
Start: 2025-06-04 | End: 2026-06-04

## 2025-06-03 RX ORDER — NYSTATIN 100000 [USP'U]/G
1 POWDER TOPICAL 2 TIMES DAILY
Status: DISCONTINUED | OUTPATIENT
Start: 2025-06-03 | End: 2025-06-04 | Stop reason: HOSPADM

## 2025-06-03 RX ORDER — RANOLAZINE 500 MG/1
500 TABLET, EXTENDED RELEASE ORAL 2 TIMES DAILY
Qty: 60 TABLET | Refills: 11 | Status: SHIPPED | OUTPATIENT
Start: 2025-06-03 | End: 2026-06-03

## 2025-06-03 RX ORDER — OLANZAPINE 10 MG/2ML
5 INJECTION, POWDER, FOR SOLUTION INTRAMUSCULAR ONCE
Status: COMPLETED | OUTPATIENT
Start: 2025-06-03 | End: 2025-06-03

## 2025-06-03 RX ORDER — PETROLATUM 420 MG/G
OINTMENT TOPICAL
Status: DISCONTINUED | OUTPATIENT
Start: 2025-06-03 | End: 2025-06-04 | Stop reason: HOSPADM

## 2025-06-03 RX ORDER — LOSARTAN POTASSIUM 25 MG/1
25 TABLET ORAL NIGHTLY
Status: DISCONTINUED | OUTPATIENT
Start: 2025-06-04 | End: 2025-06-04 | Stop reason: HOSPADM

## 2025-06-03 RX ORDER — FUROSEMIDE 40 MG/1
40 TABLET ORAL DAILY
Status: DISCONTINUED | OUTPATIENT
Start: 2025-06-03 | End: 2025-06-04 | Stop reason: HOSPADM

## 2025-06-03 RX ORDER — DIGOXIN 125 MCG
125 TABLET ORAL DAILY
Status: DISCONTINUED | OUTPATIENT
Start: 2025-06-03 | End: 2025-06-04 | Stop reason: HOSPADM

## 2025-06-03 RX ADMIN — APIXABAN 5 MG: 5 TABLET, FILM COATED ORAL at 22:13

## 2025-06-03 RX ADMIN — PETROLATUM: 420 OINTMENT TOPICAL at 15:51

## 2025-06-03 RX ADMIN — INSULIN LISPRO 4 UNITS: 100 INJECTION, SOLUTION INTRAVENOUS; SUBCUTANEOUS at 12:32

## 2025-06-03 RX ADMIN — MONTELUKAST SODIUM 10 MG: 10 TABLET, FILM COATED ORAL at 22:13

## 2025-06-03 RX ADMIN — PANTOPRAZOLE SODIUM 40 MG: 40 TABLET, DELAYED RELEASE ORAL at 06:13

## 2025-06-03 RX ADMIN — RANOLAZINE 500 MG: 500 TABLET, FILM COATED, EXTENDED RELEASE ORAL at 22:13

## 2025-06-03 RX ADMIN — NYSTATIN 1 APPLICATION: 100000 POWDER TOPICAL at 15:50

## 2025-06-03 RX ADMIN — RANOLAZINE 500 MG: 500 TABLET, FILM COATED, EXTENDED RELEASE ORAL at 08:04

## 2025-06-03 RX ADMIN — NYSTATIN 1 APPLICATION: 100000 POWDER TOPICAL at 22:13

## 2025-06-03 RX ADMIN — GABAPENTIN 100 MG: 100 CAPSULE ORAL at 14:19

## 2025-06-03 RX ADMIN — INSULIN LISPRO 2 UNITS: 100 INJECTION, SOLUTION INTRAVENOUS; SUBCUTANEOUS at 08:04

## 2025-06-03 RX ADMIN — GABAPENTIN 100 MG: 100 CAPSULE ORAL at 08:04

## 2025-06-03 RX ADMIN — INSULIN LISPRO 4 UNITS: 100 INJECTION, SOLUTION INTRAVENOUS; SUBCUTANEOUS at 22:13

## 2025-06-03 RX ADMIN — GABAPENTIN 100 MG: 100 CAPSULE ORAL at 22:13

## 2025-06-03 RX ADMIN — APIXABAN 5 MG: 5 TABLET, FILM COATED ORAL at 08:04

## 2025-06-03 RX ADMIN — Medication 3 MG: at 22:13

## 2025-06-03 RX ADMIN — OLANZAPINE 5 MG: 10 INJECTION, POWDER, FOR SOLUTION INTRAMUSCULAR at 22:19

## 2025-06-03 RX ADMIN — HYDROCODONE BITARTRATE AND ACETAMINOPHEN 1 TABLET: 5; 325 TABLET ORAL at 07:01

## 2025-06-03 RX ADMIN — ATORVASTATIN CALCIUM 40 MG: 20 TABLET ORAL at 22:13

## 2025-06-03 RX ADMIN — AMIODARONE HYDROCHLORIDE 200 MG: 200 TABLET ORAL at 06:13

## 2025-06-03 RX ADMIN — METOPROLOL SUCCINATE 100 MG: 50 TABLET, EXTENDED RELEASE ORAL at 08:04

## 2025-06-03 RX ADMIN — DIGOXIN 125 MCG: 125 TABLET ORAL at 13:43

## 2025-06-03 RX ADMIN — ACETAMINOPHEN 650 MG: 325 TABLET ORAL at 22:13

## 2025-06-03 ASSESSMENT — COGNITIVE AND FUNCTIONAL STATUS - GENERAL
STANDING UP FROM CHAIR USING ARMS: A LOT
STANDING UP FROM CHAIR USING ARMS: A LOT
PERSONAL GROOMING: A LOT
DAILY ACTIVITIY SCORE: 14
TURNING FROM BACK TO SIDE WHILE IN FLAT BAD: A LOT
CLIMB 3 TO 5 STEPS WITH RAILING: TOTAL
MOVING TO AND FROM BED TO CHAIR: A LOT
DRESSING REGULAR UPPER BODY CLOTHING: A LITTLE
DRESSING REGULAR LOWER BODY CLOTHING: A LOT
HELP NEEDED FOR BATHING: A LOT
TOILETING: A LOT
WALKING IN HOSPITAL ROOM: A LITTLE
MOVING FROM LYING ON BACK TO SITTING ON SIDE OF FLAT BED WITH BEDRAILS: A LITTLE
MOVING FROM LYING ON BACK TO SITTING ON SIDE OF FLAT BED WITH BEDRAILS: A LOT
CLIMB 3 TO 5 STEPS WITH RAILING: TOTAL
TURNING FROM BACK TO SIDE WHILE IN FLAT BAD: A LOT
MOVING TO AND FROM BED TO CHAIR: A LOT
EATING MEALS: A LITTLE
WALKING IN HOSPITAL ROOM: TOTAL
WALKING IN HOSPITAL ROOM: TOTAL
PERSONAL GROOMING: A LITTLE
CLIMB 3 TO 5 STEPS WITH RAILING: TOTAL
MOVING FROM LYING ON BACK TO SITTING ON SIDE OF FLAT BED WITH BEDRAILS: A LITTLE
MOVING TO AND FROM BED TO CHAIR: A LOT
DAILY ACTIVITIY SCORE: 14
DRESSING REGULAR LOWER BODY CLOTHING: TOTAL
DAILY ACTIVITIY SCORE: 14
STANDING UP FROM CHAIR USING ARMS: A LOT
MOBILITY SCORE: 12
TURNING FROM BACK TO SIDE WHILE IN FLAT BAD: A LOT
MOBILITY SCORE: 11
HELP NEEDED FOR BATHING: A LOT
DRESSING REGULAR UPPER BODY CLOTHING: A LOT
TOILETING: A LOT
HELP NEEDED FOR BATHING: A LOT
DRESSING REGULAR LOWER BODY CLOTHING: A LOT
DRESSING REGULAR UPPER BODY CLOTHING: A LOT
TOILETING: A LOT
MOBILITY SCORE: 11
PERSONAL GROOMING: A LOT

## 2025-06-03 ASSESSMENT — PAIN SCALES - GENERAL
PAINLEVEL_OUTOF10: 3
PAINLEVEL_OUTOF10: 8
PAINLEVEL_OUTOF10: 9
PAINLEVEL_OUTOF10: 9
PAINLEVEL_OUTOF10: 0 - NO PAIN

## 2025-06-03 ASSESSMENT — PAIN - FUNCTIONAL ASSESSMENT
PAIN_FUNCTIONAL_ASSESSMENT: 0-10

## 2025-06-03 ASSESSMENT — PAIN DESCRIPTION - LOCATION: LOCATION: ANKLE

## 2025-06-03 ASSESSMENT — PAIN DESCRIPTION - ORIENTATION: ORIENTATION: LEFT

## 2025-06-03 ASSESSMENT — ACTIVITIES OF DAILY LIVING (ADL): BATHING_ASSISTANCE: MODERATE

## 2025-06-03 NOTE — CARE PLAN
The patient's goals for the shift include      The clinical goals for the shift include patient remain free from injury

## 2025-06-03 NOTE — PROGRESS NOTES
Occupational Therapy    Evaluation    Patient Name: Isa Jack  MRN: [id number]  Department: [facility]  Room: 1014/1014-A  Today's Date: 6/3/2025  Time Calculation  Start Time: 1037  Stop Time: 1101  Time Calculation (min): 24 min      Assessment:  OT Assessment: Patient is limited by balance deficits, decreased strength/endurance and decreased safety awareness.  Prognosis: Good  Barriers to Discharge Home: Caregiver assistance, Physical needs  Evaluation/Treatment Tolerance: Patient limited by fatigue, Patient tolerated treatment well  End of Session Communication: Bedside nurse  End of Session Patient Position: Up in chair, Alarm on, Call light within reach.  OT Assessment Results: Decreased ADL status, Decreased safe judgment during ADL, Decreased endurance, Decreased functional mobility  Prognosis: Good  Evaluation/Treatment Tolerance: Patient limited by fatigue, Patient tolerated treatment well  Plan:  Treatment Interventions: ADL retraining, Functional transfer training, Endurance training, UE strengthening/ROM  OT Frequency: 2 times per week  OT Discharge Recommendations: Moderate intensity level of continued care  OT Recommended Transfer Status: Assist of 1  OT - OK to Discharge: Yes (Once medically appropriate.)    Subjective     OT Visit Info:  OT Received On: 06/03/25  General:  General  Reason for Referral: ADLs  Referred By: Dr. Alaniz  Past Medical History Relevant to Rehab: HLD, HTN, DM, Morbid obesity, Tobacco use, DJD  Family/Caregiver Present: Yes  Caregiver Feedback: Initially, patient had a sitter however discharged mid evaluation.  Co-Treatment: PT  Co-Treatment Reason: To maximize functional outcomes and patient safety.  Prior to Session Communication: Bedside nurse (Cleared for therapy evaluation by RN.)  Patient Position Received: Up in chair, Alarm off, not on at start of session  General Comment: Patient presented with c/o chest pain x3-4 days. + A fib with RVR. + chronic CHF and B/L LE  lymphadema. Dx: Chest pain. CXR: interstitial edema, cardiomegaly. Patient recently admitted to the hospital with A fib and CHF, was discharged to SNF.  Precautions:  Medical Precautions: Fall precautions          Pain:  Pain Assessment  Pain Assessment: 0-10  0-10 (Numeric) Pain Score: 9  Pain Type: Chronic pain  Pain Location:  (B/L LEs)  Pain Interventions: Repositioned  Response to Interventions: No change in pain    Objective   Cognition:  Overall Cognitive Status:  (Cooperative. Decreased safety.)  Orientation Level: Disoriented to time           Home Living:  Home Living Comments: Patient admitted from SNF. Prior to SNF, lives with son and daughter in law in a 2 story house with 2 AUGIE without a HR. Bedroom and bathroom on the main level. Walk in shower with a shower seat and grab bar.  Prior Function:  Prior Function Comments: Patient admitted from SNF, reports ambulating with a FWW and assist x1; assist with ADLs. Denies falls in the past 3 months. Prior to SNF, ambulates at mod I level with a rollator; owns a FWW, QC and wheelchair. Occasional assist with ADLs, family completes IADLs. Does not drive.     ADL:  Eating Assistance:  (Setup)  Grooming Assistance: Minimal (Min A for standing balance.)  Bathing Assistance: Moderate  UE Dressing Assistance:  (Setup/S)  LE Dressing Assistance: Maximal  Toileting Assistance with Device: Maximal  Activity Tolerance:  Endurance: Decreased tolerance for upright activites    Bed Mobility/Transfers:    Transfer 1  Technique 1: Sit to stand, Stand to sit  Transfer Device 1:  (FWW)  Trials/Comments 1: Patient completed sit <> stand from the recliner and bedside chair; mod A x2 for the sit <> stand with cues for safe hand placement.    Functional Mobility:  Functional Mobility  Functional Mobility Performed: Yes  Functional Mobility 1  Comments 1: Patient completed short distance functional mobility with a FWW, min A x2 for safety and balance. Patient requires cues for  walker management. One seated rest break d/t fatigue.     Standing Balance:  Dynamic Standing Balance  Dynamic Standing-Comments: Fair -     Sensation:  Sensation Comment: Denies paresthesia in UEs.  Strength:  Strength Comments: Not formally assessed, grossly WFL for tasks.     Extremities: RUE   RUE :  (R UE limited shoulder ROM, AROM elbow flex/ext and digit flex/ext WFL.) and LUE   LUE: Within Functional Limits    Outcome Measures:[facility] Daily Activity  Putting on and taking off regular lower body clothing: Total  Bathing (including washing, rinsing, drying): A lot  Putting on and taking off regular upper body clothing: A little  Toileting, which includes using toilet, bedpan or urinal: A lot  Taking care of personal grooming such as brushing teeth: A little  Eating Meals: A little  Daily Activity - Total Score: 14    Education Documentation  Body Mechanics, taught by Stefani Alexis OT at 6/3/2025  2:05 PM.  Learner: Patient  Readiness: Acceptance  Method: Explanation  Response: Needs Reinforcement  Comment: Walker management.    EDUCATION:  Education  Individual(s) Educated: Patient  Education Provided: POC discussed and agreed upon, Fall precautons, Risk and benefits of OT discussed with patient or other  Patient Response to Education: Patient/Caregiver Verbalized Understanding of Information    Goals:  Encounter Problems       Encounter Problems (Active)       OT Goals       Patient will complete functional mobility with a FWW at CGA level.  (Progressing)       Start:  06/03/25    Expected End:  06/17/25            Patient will complete functional transfers with a FWW at CGA level.  (Progressing)       Start:  06/03/25    Expected End:  06/17/25            Patient will complete toileting at a CGA level.  (Progressing)       Start:  06/03/25    Expected End:  06/17/25            Patient will demonstrate dynamic standing balance during ADLs.  (Progressing)       Start:  06/03/25    Expected End:  06/17/25             Patient will tolerate standing greater than 4 minutes during ADLs.  (Progressing)       Start:  06/03/25    Expected End:  06/17/25

## 2025-06-03 NOTE — CONSULTS
"Nutrition Initial Assessment:   Nutrition Assessment    Reason for Assessment: Admission nursing screening    Patient is a 74 y.o. female presenting with chest pain  Past medical history including diabetes, HLD, HTN, obesity, CHF.      Nutrition History:  Energy Intake:  (no meal intakes recorded at this time)  Pain affecting nutrition status: N/A  Food and Nutrient History: RDN consult for wounds per RN screen. Met with pt who reports stable wt at 205 lb. Pt states appetite has been OK, states she does not eat alot at meals. Pt from nursing home, receiving Cardiac diet with Prosource BID. Pt denies GI symptoms at this time, denies difficulty chewing or swallowing. Will add Prostat BID to stay consistent with nursing home diet orders. Discussed with RN, no significant open areas seen. Will continue to monitor.  Vitamin/Herbal Supplement Use: B12, mag-ox, MVI per home med list       Anthropometrics:  Height: 144.8 cm (4' 9\")   Weight: 93 kg (205 lb)   BMI (Calculated): 44.35  IBW/kg (Dietitian Calculated): 42.7 kg  Percent of IBW: 218 %                      Weight History:   Wt Readings from Last 10 Encounters:   06/02/25 93 kg (205 lb)   05/09/25 91.1 kg (200 lb 13.4 oz)   10/09/23 103 kg (227 lb 15.3 oz)   04/20/23 95.1 kg (209 lb 9.6 oz)   10/28/22 65.9 kg (145 lb 6 oz)   09/27/21 103 kg (226 lb 2 oz)   03/29/21 101 kg (223 lb 4 oz)   08/12/20 99.8 kg (220 lb)   01/30/20 99.3 kg (219 lb)      Weight Change %:  Weight History / % Weight Change: no significant wt changes per chart review  Significant Weight Loss: No    Nutrition Focused Physical Exam Findings:    Subcutaneous Fat Loss:   Orbital Fat Pads: Well nourished (slightly bulging fat pads)  Buccal Fat Pads: Well nourished (full, rounded cheeks)  Triceps: Well nourished (ample fat tissue)  Muscle Wasting:  Temporalis: Well nourished (well-defined muscle)  Pectoralis (Clavicular Region): Well nourished (clavicle not visible)  Deltoid/Trapezius: Well nourished " (rounded appearance at arm, shoulder, neck)  Edema:  Edema: +2 mild (BLE)  Physical Findings:  Skin: Positive (multiple areas with redness, nonhealing wound to left tibial)    Nutrition Significant Labs:  BMP Trend:   Results from last 7 days   Lab Units 06/03/25  0532 06/02/25  0625 06/02/25  0119 06/01/25  2006   GLUCOSE mg/dL 134* 132*  --  126*   CALCIUM mg/dL 8.6 8.8  --  8.8   SODIUM mmol/L 135* 134*  --  130*   POTASSIUM mmol/L 4.4 4.1   < > 5.2   CO2 mmol/L 30 28  --  23   CHLORIDE mmol/L 97* 97*  --  94*   BUN mg/dL 37* 40*  --  39*   CREATININE mg/dL 1.20* 1.33*  --  1.45*    < > = values in this interval not displayed.    , A1C:  Lab Results   Component Value Date    HGBA1C 8.2 (H) 03/30/2025   , BG POCT trend:   Results from last 7 days   Lab Units 06/03/25  1141 06/03/25  0722 06/03/25  0700 06/02/25 2021 06/02/25  1647   POCT GLUCOSE mg/dL 236* 195* 169* 118* 193*        Nutrition Specific Medications:  Scheduled medications  Scheduled Medications[1]  Continuous medications  Continuous Medications[2]  PRN medications  PRN Medications[3]     I/O:   Last BM Date: 06/02/25; Stool Appearance: Bloody (06/02/25 0303)    Dietary Orders (From admission, onward)       Start     Ordered    06/02/25 0327  May Participate in Room Service  ( ROOM SERVICE MAY PARTICIPATE)  Once        Question:  .  Answer:  Yes    06/02/25 0326    06/02/25 0148  Adult diet Regular, Consistent Carb; CCD 75 gm/meal  Diet effective now        Question Answer Comment   Diet type Regular    Diet type Consistent Carb    Carb diet selection: CCD 75 gm/meal        06/02/25 0149                     Estimated Needs:   Total Energy Estimated Needs in 24 hours (kCal): 1860 kCal  Method for Estimating Needs: 20 kcal/kg ABW  Total Protein Estimated Needs in 24 Hours (g): 93 g  Method for Estimating 24 Hour Protein Needs: 1 g/kg ABW  Total Fluid Estimated Needs in 24 Hours (mL): 1860 mL  Method for Estimating 24 Hour Fluid Needs: 1 ml/kcal or  per MD  Patient on Order Fluid Restriction: No        Nutrition Diagnosis   Malnutrition Diagnosis  Patient has Malnutrition Diagnosis: No    Nutrition Diagnosis  Patient has Nutrition Diagnosis: Yes  Additional Nutrition Diagnosis: Diagnosis 2  Diagnosis Status (2): New  Nutrition Diagnosis 2: Increased nutrient needs (protein)  Related to (2): increased metabolic demand  As Evidenced by (2): nonhealing wound to left tibial       Nutrition Interventions/Recommendations   Nutrition prescription for oral nutrition    Nutrition Recommendations:  Individualized Nutrition Prescription Provided for : 75 g Carb Control diet with ONS    Nutrition Interventions/Goals:   Meals and Snacks: Carbohydrate-modified diet, General healthful diet  Goal: Consumes 3 meals per day  Medical Food Supplement: Commercial beverage medical food supplement therapy  Goal: Pro-stat BID (100 kcal, 15 g protein per serving)  Coordination of Care with Providers: Nursing      Education Documentation  No documentation found.    No needs at this time        Nutrition Monitoring and Evaluation   Food/Nutrient Related History Monitoring  Monitoring and Evaluation Plan: Intake / amount of food, Estimated Energy Intake  Estimated Energy Intake: Energy intake 50 -75% of estimated energy needs  Intake / Amount of food: Consumes at least 50% or more of meals/snacks/supplements    Anthropometric Measurements  Monitoring and Evaluation Plan: Body weight  Body Weight: Body weight - Maintain stable weight    Biochemical Data, Medical Tests and Procedures  Monitoring and Evaluation Plan: Electrolyte/renal panel, Glucose/endocrine profile  Electrolyte and Renal Panel: Electrolytes within normal limits  Glucose/Endocrine Profile: Glucose within normal limits ( mg/dL)    Physical Exam Findings  Monitoring and Evaluation Plan: Skin  Skin Finding: Impaired wound healing - Improved wound healing         Time Spent (min): 40 minutes            [1] amiodarone, 200  mg, oral, Daily  apixaban, 5 mg, oral, q12h  atorvastatin, 40 mg, oral, Nightly  digoxin, 125 mcg, oral, Daily  furosemide, 40 mg, oral, Daily  gabapentin, 100 mg, oral, TID  insulin lispro, 0-10 Units, subcutaneous, Before meals & nightly  [START ON 6/4/2025] losartan, 25 mg, oral, Nightly  metoprolol succinate XL, 100 mg, oral, Daily  montelukast, 10 mg, oral, Nightly  nystatin, 1 Application, Topical, BID  pantoprazole, 40 mg, oral, Daily  ranolazine, 500 mg, oral, BID  [2]    [3] PRN medications: acetaminophen **OR** acetaminophen **OR** acetaminophen, albuterol, dextrose, dextrose, glucagon, glucagon, HYDROcodone-acetaminophen, melatonin, nitroglycerin, ondansetron **OR** ondansetron, polyethylene glycol, white petrolatum

## 2025-06-03 NOTE — PROGRESS NOTES
Medical Group Progress Note  ASSESSMENT & PLAN:        Paroxysmal atrial fibrillation  Atrial fibrillation with RVR, resolved  Long-term anticoagulation use  - Heart rates improved this morning to 90s, no longer in RVR  -Continue rate control regimen  - Continue anticoagulation with apixaban, rate control with metoprolol    Chronic systolic CHF  - Echo March 2025, EF 25 to 30%  - Continue, Eliquis  - Patient declining further interventions at this time    Chronic bilateral lower extremity lymphedema  - Chronic erythema with previous wound that is stable on exam  - No need for further antibiotics at this time    GERD  Type II DM  Previous history of CAD  Hypertension  COPD, not in exacerbation  - Continue home medications as reconciled    Morbid obesity    VTE Prophylaxis: Apixaban    PT OT evaluation and treatment    ---Of note, this documentation is completed using the Dragon Dictation system (voice recognition software). There may be spelling and/or grammatical errors that were not corrected prior to final submission.---    Светлана Moreno MD    SUBJECTIVE     Patient was seen and examined at bedside this morning.  Was alert and oriented x 2-3.  States that she did diurese overnight.  Otherwise had no complaints for me this morning.    OBJECTIVE:     Last Recorded Vitals:  Vitals:    06/02/25 2018 06/03/25 0110 06/03/25 0612 06/03/25 0731   BP: 111/86 110/73 108/72 123/75   BP Location: Left arm Left arm  Right arm   Patient Position:  Lying  Sitting   Pulse: 100 91 86 96   Resp: 17 15 16 16   Temp: 35.9 °C (96.6 °F) 36.3 °C (97.3 °F) 36 °C (96.8 °F) 36.5 °C (97.7 °F)   TempSrc: Temporal Temporal  Temporal   SpO2: 98% 97% 97% 96%   Weight:       Height:         Last I/O:  I/O last 3 completed shifts:  In: 871.7 (9.4 mL/kg) [P.O.:480; I.V.:100 (1.1 mL/kg); IV Piggyback:291.7]  Out: - (0 mL/kg)   Weight: 93 kg     Physical Exam  Vitals reviewed.   Constitutional:       General: She is not in acute distress.      Appearance: Normal appearance. She is not toxic-appearing.   HENT:      Head: Normocephalic and atraumatic.   Eyes:      Extraocular Movements: Extraocular movements intact.      Conjunctiva/sclera: Conjunctivae normal.   Cardiovascular:      Rate and Rhythm: Normal rate and regular rhythm.      Pulses: Normal pulses.      Heart sounds: Normal heart sounds.   Pulmonary:      Effort: Pulmonary effort is normal. No respiratory distress.      Breath sounds: No wheezing.      Comments: Breath sounds diminished auscultation bilaterally with body habitus.  Abdominal:      General: Bowel sounds are normal. There is no distension.      Palpations: Abdomen is soft.      Tenderness: There is no abdominal tenderness. There is no guarding.   Musculoskeletal:         General: Normal range of motion.      Cervical back: Normal range of motion and neck supple.      Right lower leg: Edema present.      Left lower leg: Edema present.      Comments: Chronic bilateral lower extremities with 3+ pitting edema.   Skin:     Findings: Erythema present.   Neurological:      General: No focal deficit present.      Mental Status: She is alert. Mental status is at baseline.       Inpatient Medications:  Scheduled Medications[1]    PRN Medications  PRN Medications[2]    Continuous Medications:  Continuous Medications[3]  LABS AND IMAGING:     Labs:  Results from last 7 days   Lab Units 06/03/25  0532 06/02/25  0625 06/01/25 2006   WBC AUTO x10*3/uL 6.0 6.9 9.4   RBC AUTO x10*6/uL 3.56* 3.48* 3.66*   HEMOGLOBIN g/dL 9.6* 9.5* 10.1*   HEMATOCRIT % 32.6* 31.5* 33.3*   MCV fL 92 91 91   MCH pg 27.0 27.3 27.6   MCHC g/dL 29.4* 30.2* 30.3*   RDW % 17.3* 17.3* 17.3*   PLATELETS AUTO x10*3/uL 142* 157 174     Results from last 7 days   Lab Units 06/03/25  0532 06/02/25  0625 06/02/25  0119 06/01/25 2006   SODIUM mmol/L 135* 134*  --  130*   POTASSIUM mmol/L 4.4 4.1 4.1 5.2   CHLORIDE mmol/L 97* 97*  --  94*   CO2 mmol/L 30 28  --  23   BUN mg/dL 37*  40*  --  39*   CREATININE mg/dL 1.20* 1.33*  --  1.45*   GLUCOSE mg/dL 134* 132*  --  126*   PROTEIN TOTAL g/dL  --   --   --  6.6   CALCIUM mg/dL 8.6 8.8  --  8.8   BILIRUBIN TOTAL mg/dL  --   --   --  0.8   ALK PHOS U/L  --   --   --  79   AST U/L  --   --   --  36   ALT U/L  --   --   --  24     Results from last 7 days   Lab Units 06/03/25  0532 06/01/25 2006   MAGNESIUM mg/dL 2.18 1.81     Results from last 7 days   Lab Units 06/01/25  2255 06/01/25 2006   TROPHS ng/L 14* 16*     Imaging:  ECG 12 Lead  Atrial fibrillation  Low voltage QRS  Anterior infarct , age undetermined  Abnormal ECG  When compared with ECG of 02-JUN-2025 10:08, (unconfirmed)  No significant change was found            [1] amiodarone, 200 mg, oral, Daily  apixaban, 5 mg, oral, q12h  atorvastatin, 40 mg, oral, Nightly  digoxin, 250 mcg, intravenous, Once  digoxin, 125 mcg, oral, Daily  [Held by provider] furosemide, 40 mg, oral, Daily  gabapentin, 100 mg, oral, TID  insulin lispro, 0-10 Units, subcutaneous, Before meals & nightly  [Held by provider] losartan, 25 mg, oral, Nightly  metoprolol succinate XL, 100 mg, oral, Daily  montelukast, 10 mg, oral, Nightly  pantoprazole, 40 mg, oral, Daily  ranolazine, 500 mg, oral, BID     [2] PRN medications: acetaminophen **OR** acetaminophen **OR** acetaminophen, albuterol, dextrose, dextrose, glucagon, glucagon, HYDROcodone-acetaminophen, melatonin, nitroglycerin, ondansetron **OR** ondansetron, polyethylene glycol  [3]

## 2025-06-03 NOTE — PROGRESS NOTES
[facility] Progress Note           Rounding VANI/Cardiologist:  Kike Camargo, APRN-CNP,   Primary Cardiologist: Dr. Kyle Noel    Date:  6/3/2025  Patient:  Isa Jack  YOB: 1950  MRN:  [id number]   Admit Date:  6/1/2025      SUBJECTIVE:    6/3/25  Patient is much more awake and alert at this present time she still has episodes of confusion.  Physical therapy and Occupational Therapy currently working with patient at this time.  Her heart rate is much better controlled since starting her on the digoxin I did explain this to the patient and that we would be cutting back on some of her diuretics may answered all of her questions.    3/7/2025 echo  CONCLUSIONS:   - Exam indication: Sustained atrial fibrillation   - The left ventricle is normal in size. Left ventricular systolic function is   severely decreased. EF = 29 ± 5% (2D biplane) Left ventricular diastolic function   was not evaluated due to AF.   - The right ventricle is normal in size. Right ventricular systolic function is   normal.   - The left atrial cavity is dilated.   - The right atrial cavity is mildly dilated.   - No significant valvular abnormalities.   - Exam was compared with the prior  echocardiographic exam performed on 1/20/25.    LVEF is slightly increased. MR is decreased. Otherwise similar findings.       VITALS:     Vitals:    06/02/25 2018 06/03/25 0110 06/03/25 0612 06/03/25 0731   BP: 111/86 110/73 108/72 123/75   BP Location: Left arm Left arm  Right arm   Patient Position:  Lying  Sitting   Pulse: 100 91 86 96   Resp: 17 15 16 16   Temp: 35.9 °C (96.6 °F) 36.3 °C (97.3 °F) 36 °C (96.8 °F) 36.5 °C (97.7 °F)   TempSrc: Temporal Temporal  Temporal   SpO2: 98% 97% 97% 96%   Weight:       Height:           Intake/Output Summary (Last 24 hours) at 6/3/2025 1054  Last data filed at 6/3/2025 0043  Gross per 24 hour   Intake 580 ml   Output --   Net 580 ml       Wt Readings from Last 4 Encounters:    06/02/25 93 kg (205 lb)   05/09/25 91.1 kg (200 lb 13.4 oz)   10/09/23 103 kg (227 lb 15.3 oz)   04/20/23 95.1 kg (209 lb 9.6 oz)       CURRENT HOSPITAL MEDICATIONS:   Scheduled Medications[1]  Continuous Medications[2]  Current Outpatient Medications   Medication Instructions    albuterol (ProAir HFA) 90 mcg/actuation inhaler 2 puffs, Every 4 hours PRN    albuterol 2.5 mg, Every 4 hours PRN    amiodarone (PACERONE) 200 mg, Daily RT    apixaban (ELIQUIS) 5 mg, oral, Every 12 hours    atorvastatin (LIPITOR) 40 mg, Nightly    clotrimazole (Lotrimin) 1 % cream 1 Application, Topical, 2 times daily, Apply to affected areas 2-3 times daily    cyanocobalamin, vitamin B-12, 2,500 mcg tablet, sublingual 1 each, sublingual, Daily RT    digoxin (LANOXIN) 125 mcg, Daily    gabapentin (NEURONTIN) 100 mg, 3 times daily    losartan (COZAAR) 25 mg, Nightly    magnesium oxide (MAG-OX) 400 mg, 2 times daily    metFORMIN (GLUCOPHAGE) 1,000 mg, 2 times daily    metoprolol succinate XL (TOPROL-XL) 100 mg, oral, Daily, Do not crush or chew.    metoprolol tartrate (LOPRESSOR) 50 mg, oral, 2 times daily    montelukast (SINGULAIR) 10 mg, Nightly    multivitamin tablet 1 tablet, Daily RT    mupirocin (Bactroban) 2 % ointment 1 Application, Topical, 2 times daily PRN    naloxone (NARCAN) 4 mg, nasal, As needed    nitroglycerin (NITROSTAT) 0.4 mg, Every 5 min PRN    torsemide (DEMADEX) 10 mg, Daily        PHYSICAL EXAMINATION:   GENERAL:  Well developed, well nourished, in no acute distress.  CHEST:  Symmetric and nontender.  NEURO/PSYCH: Alert and oriented x 2  NECK:  Supple, no JVD, no bruit.  LUNGS:  Clear to auscultation bilaterally, normal respiratory effort.  HEART: S1, S2 irregular1/6 systolic murmur  EXTREMITIES:  Warm with good color, no clubbing or cyanosis.  Trace edema  PERIPHERAL VASCULAR:  Pulses present and equally palpable; 1+ throughout.      LAB DATA:     CBC:   Results from last 7 days   Lab Units 06/03/25  0542  06/02/25 0625 06/01/25 2006   WBC AUTO x10*3/uL 6.0 6.9 9.4   RBC AUTO x10*6/uL 3.56* 3.48* 3.66*   HEMOGLOBIN g/dL 9.6* 9.5* 10.1*   HEMATOCRIT % 32.6* 31.5* 33.3*   MCV fL 92 91 91   MCH pg 27.0 27.3 27.6   MCHC g/dL 29.4* 30.2* 30.3*   RDW % 17.3* 17.3* 17.3*   PLATELETS AUTO x10*3/uL 142* 157 174     CMP:    Results from last 7 days   Lab Units 06/03/25 0532 06/02/25 0625 06/02/25 0119 06/01/25 2006   SODIUM mmol/L 135* 134*  --  130*   POTASSIUM mmol/L 4.4 4.1 4.1 5.2   CHLORIDE mmol/L 97* 97*  --  94*   CO2 mmol/L 30 28  --  23   BUN mg/dL 37* 40*  --  39*   CREATININE mg/dL 1.20* 1.33*  --  1.45*   GLUCOSE mg/dL 134* 132*  --  126*   PROTEIN TOTAL g/dL  --   --   --  6.6   CALCIUM mg/dL 8.6 8.8  --  8.8   BILIRUBIN TOTAL mg/dL  --   --   --  0.8   ALK PHOS U/L  --   --   --  79   AST U/L  --   --   --  36   ALT U/L  --   --   --  24     BMP:    Results from last 7 days   Lab Units 06/03/25 0532 06/02/25 0625 06/02/25 0119 06/01/25 2006   SODIUM mmol/L 135* 134*  --  130*   POTASSIUM mmol/L 4.4 4.1 4.1 5.2   CHLORIDE mmol/L 97* 97*  --  94*   CO2 mmol/L 30 28  --  23   BUN mg/dL 37* 40*  --  39*   CREATININE mg/dL 1.20* 1.33*  --  1.45*   CALCIUM mg/dL 8.6 8.8  --  8.8   GLUCOSE mg/dL 134* 132*  --  126*     Magnesium:  Results from last 7 days   Lab Units 06/03/25 0532 06/01/25 2006   MAGNESIUM mg/dL 2.18 1.81     Troponin:    Results from last 7 days   Lab Units 06/01/25  2255 06/01/25 2006   TROPHS ng/L 14* 16*     BNP:   Results from last 7 days   Lab Units 06/01/25 2006   BNP pg/mL 1,597*     Lipid Panel:         DIAGNOSTIC TESTING:          XR chest 1 view   Final Result   1. Mild diffuse interstitial prominence, likely interstitial edema.   2. Indistinctness of the costophrenic sulci, which can be secondary   to suboptimal beam penetration or trace pleural effusions.   3. Stable cardiomegaly.        Signed by: Nabeel Frank 6/1/2025 8:54 PM   Dictation workstation:   [id number]           No echocardiogram results found for the past 14 days    RADIOLOGY:     XR chest 1 view   Final Result   1. Mild diffuse interstitial prominence, likely interstitial edema.   2. Indistinctness of the costophrenic sulci, which can be secondary   to suboptimal beam penetration or trace pleural effusions.   3. Stable cardiomegaly.        Signed by: Nabeel Frank 6/1/2025 8:54 PM   Dictation workstation:   [id number]          PROBLEM LIST   Problem List[3]    ASSESSMENT:   Chest pain rule out ACS resolved  Chronic left lower extremity wound  Chronic systolic heart failure NYHA class III EF 25 to 30%  Chronic A-fib on oral anticoagulation  History of CAD per patient medical management only  Hypertension  Diabetes  Morbid obesity  COPD      PLAN:   Discharge from cardiac standpoint  Follow-up with Dr Noel in 2 weeks for TCM visit  Amiodarone 200 mg daily  Digoxin 125 mcg p.o. daily check a digoxin level on Monday  Toprol  mg p.o. twice daily  Add Ranexa 500 mg p.o. twice daily  Cozaar 25 mg daily  Eliquis 5 mg p.o. twice daily  Lipitor 40 mg daily  Lasix 40 mg daily if weight is up more than 3 pounds take 1 additional tablet  Check BMP in 1 week    Tripp Camargo CNP  [facility]      Of note, this documentation is completed using the Dragon Dictation system (voice recognition software). There may be spelling and/or grammatical errors that were not corrected prior to final submission.    Please do not hesitate to call with questions.  Electronically signed by EROS Beck-CNP, on 6/3/2025 at 10:54 AM       [1] amiodarone, 200 mg, oral, Daily  apixaban, 5 mg, oral, q12h  atorvastatin, 40 mg, oral, Nightly  digoxin, 250 mcg, intravenous, Once  digoxin, 125 mcg, oral, Daily  furosemide, 40 mg, oral, Daily  gabapentin, 100 mg, oral, TID  insulin lispro, 0-10 Units, subcutaneous, Before meals & nightly  [START ON 6/4/2025] losartan, 25 mg, oral,  Nightly  metoprolol succinate XL, 100 mg, oral, Daily  montelukast, 10 mg, oral, Nightly  pantoprazole, 40 mg, oral, Daily  ranolazine, 500 mg, oral, BID    [2]    [3]   Patient Active Problem List  Diagnosis    Adnexal mass    Asthma in adult, unspecified asthma severity, uncomplicated (HHS-HCC)    Cellulitis    Chronic congestive heart failure    Chronic pain syndrome    Coronary artery disease involving native heart with angina pectoris    DJD (degenerative joint disease)    Endometrial hyperplasia    Essential hypertension    HLD (hyperlipidemia)    Pernicious anemia    Physical debility    Vitamin B12 deficiency    Skin ulcer of ankle (Multi)    Type 2 diabetes mellitus, without long-term current use of insulin (Multi)    Sepsis due to pneumonia (Multi)    Cardiomyopathy, ischemic    Atrial fibrillation with rapid ventricular response (Multi)    Chest pain, unspecified type    Atrial fibrillation with RVR (Multi)

## 2025-06-03 NOTE — PROGRESS NOTES
Therapy notes sent to facility, asked team to start precert for patient going to [facility] for rehab, will wait for authorization.

## 2025-06-03 NOTE — PROGRESS NOTES
Physical Therapy    Physical Therapy Evaluation  Completion of this session, clinical decision making, and documentation performed under the supervision/direction of Yousif Castillo PT.    Patient Name: Isa Jack  MRN: [id number]  Today's Date: 6/3/2025  1014/1014-A  Assessment/Plan   PT Assessment  PT Assessment Results: Decreased strength, Decreased range of motion, Decreased endurance, Impaired balance, Decreased mobility, Decreased coordination, Obesity, Pain, Decreased skin integrity  Rehab Prognosis: Fair  Barriers to Discharge Home: Physical needs, Caregiver assistance  Caregiver Assistance: Caregiver assistance needed per identified barriers - however, level of patient's required assistance exceeds assistance available at home  Physical Needs: 24hr mobility assistance needed, 24hr ADL assistance needed  Evaluation/Treatment Tolerance: Patient limited by fatigue, Patient limited by pain  Strengths: Attitude of self, Premorbid level of function, Support of extended family/friends  Barriers to Participation: Comorbidities  End of Session Communication: Bedside nurse  Assessment Comment: Pt present with decreased strength, mobility and endurance. Would benefit from skillled physical therapy to address deficits and improve functional mobiliity  End of Session Patient Position: Up in chair, Alarm on  IP OR SWING BED PT PLAN  Inpatient or Swing Bed: Inpatient  PT Plan  Treatment/Interventions: Bed mobility, Transfer training, Gait training, Stair training, Balance training, Strengthening, Endurance training, Range of motion, Therapeutic exercise, Therapeutic activity  PT Plan: Ongoing PT  PT Frequency: 3 times per week  PT Discharge Recommendations: Moderate intensity level of continued care  Equipment Recommended upon Discharge: Wheeled walker (Pt has equipment at home)  PT Recommended Transfer Status: Assist x2, Assistive device  PT - OK to Discharge: Yes (When medically cleared for next level of  care)  Subjective   Current Problem:  1. Chest pain, unspecified type        2. Acute on chronic congestive heart failure, unspecified heart failure type        3. Atrial fibrillation with RVR (Multi)  furosemide (Lasix) 40 mg tablet    ranolazine (Ranexa) 500 mg 12 hr tablet    Digoxin level    Basic metabolic panel      4. Hyponatremia          General Visit Information:  General  Reason for Referral: Weakness and impaired mobility  Referred By: PT/OT Katty 6/2  Past Medical History Relevant to Rehab: Angina, CHF, A-Fib RVR, DM, claudication, COPD, HLD, HTN, CAD, GERD, Cellulitis, Bi Lymphedema  Family/Caregiver Present: Yes (Pt at start of eval had 1 on 1 sitter. Sitter left mid eval and said they discharged her as a sitter.)  Co-Treatment: OT  Co-Treatment Reason: To maximize patient/staff reason  Prior to Session Communication: Bedside nurse (Cleared to see for therapy evals by nursing)  Patient Position Received: Up in chair, Alarm off, not on at start of session (Pt was attached to [employer])  General Comment: Patient is a 74 year old female who came to ED with chest pain, CHF, bilateral lymphedema, and A-fib RVR. She was recieving wound care for ulcer on the back of her L calf. She presented with elevated levels of troponin (14), and BNP (1597). CXR showed interstitial edema and cardiomegaly.  Home Living:  Home Living  Home Living Comments: Per Pt report she lives in a 1 story house, everything is on the main floor. There is 2 steps to enter the home w/out HR. She has a walk in shower and with a chair and grab bar. She has a WW, cane. RW, W/C at home if needed.  Prior Level of Function:  Prior Function Per Pt/Caregiver Report  Prior Function Comments: Pt reported being able to stand and walk around her house with a RW independent. She needed minimal assistance with ADL's like bathing and dressing. Her kids fully assist with IADL's. She reported no falls in the last 3 months and her kids drive  her.  Precautions:  Precautions  Medical Precautions: Fall precautions  Objective   Pain:  Pain Assessment  Pain Assessment: 0-10  0-10 (Numeric) Pain Score: 9  Pain Type: Chronic pain  Pain Location: Leg  Pain Orientation: Right, Left, Posterior  Pain Interventions: Repositioned  Response to Interventions: No change in pain  Cognition:  Cognition  Orientation Level: Disoriented to time  General Assessments:  Activity Tolerance  Endurance: Tolerates less than 10 min exercise, no significant change in vital signs  Sensation  Sensation Comment: Sensation intact bilateral LE  Strength  Strength Comments: WFL of gross bilateral LE strength  Coordination  Coordination Comment: Pt did not exhibit coordination deficits  Static Sitting Balance  Static Sitting-Comment/Number of Minutes: good  Dynamic Sitting Balance  Dynamic Sitting-Comments: fair  Static Standing Balance  Static Standing-Comment/Number of Minutes: fair-  Dynamic Standing Balance  Dynamic Standing-Comments: fair-  Functional Assessments:     Bed Mobility  Bed Mobility: No  Transfers  Transfer: Yes  Transfer 1  Technique 1: Sit to stand, Stand to sit  Transfer Device 1: Walker (FWW)  Transfer Level of Assistance 1: Moderate assistance, +2, Minimal verbal cues  Trials/Comments 1: Pt was mod assist +2 with cues to weight shift forward to stand.  Ambulation/Gait Training  Ambulation/Gait Training Performed: Yes  Ambulation/Gait Training 1  Surface 1: Level tile  Device 1: Rolling walker  Assistance 1: Minimum assistance (Min +2)  Quality of Gait 1: Inconsistent stride length, Forward flexed posture, Narrow base of support  Comments/Distance (ft) 1: Pt walked 15ft with FWW with min assist +2. Took 1 break before returning to chair  Stairs  Stairs: No  Extremity/Trunk Assessments:  RUE   RUE : Within Functional Limits  LUE   LUE: Within Functional Limits  RLE   RLE : Within Functional Limits  LLE   LLE : Within Functional Limits    Outcome Measures:     [facility]  Basic Mobility  Turning from your back to your side while in a flat bed without using bedrails: A lot  Moving from lying on your back to sitting on the side of a flat bed without using bedrails: A lot  Moving to and from bed to chair (including a wheelchair): A lot  Standing up from a chair using your arms (e.g. wheelchair or bedside chair): A lot  To walk in hospital room: A little  Climbing 3-5 steps with railing: Total  Basic Mobility - Total Score: 12  Goals:  Encounter Problems       Encounter Problems (Active)       PT Problem       Pt will demonstrate bed mobility with min assist x1 within 2 weeks (Progressing)       Start:  06/03/25    Expected End:  06/17/25            Pt will ambulate 50ft with FWW under CGA within 2 weeks (Progressing)       Start:  06/03/25    Expected End:  06/17/25            Pt will increase strength to demonstrate supine to sit therapeutic exercise 2x12 with 2 rest breaks within 2 weeks (Progressing)       Start:  06/03/25    Expected End:  06/17/25            Pt will perform sit to stand transfer under min assist x1 within 2 weeks (Progressing)       Start:  06/03/25    Expected End:  06/17/25               Pain - Adult            Education Documentation  Mobility Training, taught by MAYO Xiao at 6/3/2025  1:54 PM.  Learner: Patient  Readiness: Acceptance  Method: Explanation  Response: Needs Reinforcement  Comment: Pt was educated on safety with transfers with walker. Keeping the walker closer during ambulation. Educated on decreasing forward flexed posture during static standing and ambulation.    Education Comments  No comments found.

## 2025-06-04 ENCOUNTER — APPOINTMENT (OUTPATIENT)
Dept: CARDIOLOGY | Facility: HOSPITAL | Age: 75
End: 2025-06-04
Payer: COMMERCIAL

## 2025-06-04 VITALS
WEIGHT: 205 LBS | HEART RATE: 82 BPM | HEIGHT: 57 IN | BODY MASS INDEX: 44.23 KG/M2 | RESPIRATION RATE: 20 BRPM | SYSTOLIC BLOOD PRESSURE: 126 MMHG | DIASTOLIC BLOOD PRESSURE: 67 MMHG | OXYGEN SATURATION: 95 % | TEMPERATURE: 97.2 F

## 2025-06-04 LAB
ANION GAP SERPL CALC-SCNC: 12 MMOL/L (ref 10–20)
BASOPHILS # BLD AUTO: 0.07 X10*3/UL (ref 0–0.1)
BASOPHILS NFR BLD AUTO: 1.1 %
BUN SERPL-MCNC: 40 MG/DL (ref 6–23)
CALCIUM SERPL-MCNC: 8.7 MG/DL (ref 8.6–10.3)
CHLORIDE SERPL-SCNC: 98 MMOL/L (ref 98–107)
CO2 SERPL-SCNC: 28 MMOL/L (ref 21–32)
CREAT SERPL-MCNC: 1.33 MG/DL (ref 0.5–1.05)
EGFRCR SERPLBLD CKD-EPI 2021: 42 ML/MIN/1.73M*2
EOSINOPHIL # BLD AUTO: 0.37 X10*3/UL (ref 0–0.4)
EOSINOPHIL NFR BLD AUTO: 5.8 %
ERYTHROCYTE [DISTWIDTH] IN BLOOD BY AUTOMATED COUNT: 17.2 % (ref 11.5–14.5)
GLUCOSE BLD MANUAL STRIP-MCNC: 158 MG/DL (ref 74–99)
GLUCOSE BLD MANUAL STRIP-MCNC: 159 MG/DL (ref 74–99)
GLUCOSE BLD MANUAL STRIP-MCNC: 175 MG/DL (ref 74–99)
GLUCOSE SERPL-MCNC: 131 MG/DL (ref 74–99)
HCT VFR BLD AUTO: 30.9 % (ref 36–46)
HGB BLD-MCNC: 9.1 G/DL (ref 12–16)
HOLD SPECIMEN: NORMAL
IMM GRANULOCYTES # BLD AUTO: 0.02 X10*3/UL (ref 0–0.5)
IMM GRANULOCYTES NFR BLD AUTO: 0.3 % (ref 0–0.9)
LYMPHOCYTES # BLD AUTO: 0.82 X10*3/UL (ref 0.8–3)
LYMPHOCYTES NFR BLD AUTO: 12.8 %
MAGNESIUM SERPL-MCNC: 2.28 MG/DL (ref 1.6–2.4)
MCH RBC QN AUTO: 26.7 PG (ref 26–34)
MCHC RBC AUTO-ENTMCNC: 29.4 G/DL (ref 32–36)
MCV RBC AUTO: 91 FL (ref 80–100)
MONOCYTES # BLD AUTO: 1.48 X10*3/UL (ref 0.05–0.8)
MONOCYTES NFR BLD AUTO: 23.1 %
NEUTROPHILS # BLD AUTO: 3.65 X10*3/UL (ref 1.6–5.5)
NEUTROPHILS NFR BLD AUTO: 56.9 %
NRBC BLD-RTO: 0 /100 WBCS (ref 0–0)
PLATELET # BLD AUTO: 160 X10*3/UL (ref 150–450)
POTASSIUM SERPL-SCNC: 4.6 MMOL/L (ref 3.5–5.3)
RBC # BLD AUTO: 3.41 X10*6/UL (ref 4–5.2)
SODIUM SERPL-SCNC: 133 MMOL/L (ref 136–145)
WBC # BLD AUTO: 6.4 X10*3/UL (ref 4.4–11.3)

## 2025-06-04 PROCEDURE — 2500000002 HC RX 250 W HCPCS SELF ADMINISTERED DRUGS (ALT 637 FOR MEDICARE OP, ALT 636 FOR OP/ED): Performed by: INTERNAL MEDICINE

## 2025-06-04 PROCEDURE — 2500000001 HC RX 250 WO HCPCS SELF ADMINISTERED DRUGS (ALT 637 FOR MEDICARE OP): Performed by: NURSE PRACTITIONER

## 2025-06-04 PROCEDURE — 80048 BASIC METABOLIC PNL TOTAL CA: CPT | Performed by: STUDENT IN AN ORGANIZED HEALTH CARE EDUCATION/TRAINING PROGRAM

## 2025-06-04 PROCEDURE — 99239 HOSP IP/OBS DSCHRG MGMT >30: CPT | Performed by: FAMILY MEDICINE

## 2025-06-04 PROCEDURE — 82374 ASSAY BLOOD CARBON DIOXIDE: CPT | Performed by: STUDENT IN AN ORGANIZED HEALTH CARE EDUCATION/TRAINING PROGRAM

## 2025-06-04 PROCEDURE — 2500000002 HC RX 250 W HCPCS SELF ADMINISTERED DRUGS (ALT 637 FOR MEDICARE OP, ALT 636 FOR OP/ED): Performed by: STUDENT IN AN ORGANIZED HEALTH CARE EDUCATION/TRAINING PROGRAM

## 2025-06-04 PROCEDURE — 97535 SELF CARE MNGMENT TRAINING: CPT | Mod: GO,CO

## 2025-06-04 PROCEDURE — 83735 ASSAY OF MAGNESIUM: CPT | Performed by: STUDENT IN AN ORGANIZED HEALTH CARE EDUCATION/TRAINING PROGRAM

## 2025-06-04 PROCEDURE — 93010 ELECTROCARDIOGRAM REPORT: CPT | Performed by: INTERNAL MEDICINE

## 2025-06-04 PROCEDURE — 82947 ASSAY GLUCOSE BLOOD QUANT: CPT

## 2025-06-04 PROCEDURE — 97116 GAIT TRAINING THERAPY: CPT | Mod: GP,CQ

## 2025-06-04 PROCEDURE — 36415 COLL VENOUS BLD VENIPUNCTURE: CPT | Performed by: STUDENT IN AN ORGANIZED HEALTH CARE EDUCATION/TRAINING PROGRAM

## 2025-06-04 PROCEDURE — 93005 ELECTROCARDIOGRAM TRACING: CPT

## 2025-06-04 PROCEDURE — 85025 COMPLETE CBC W/AUTO DIFF WBC: CPT | Performed by: STUDENT IN AN ORGANIZED HEALTH CARE EDUCATION/TRAINING PROGRAM

## 2025-06-04 PROCEDURE — 2500000001 HC RX 250 WO HCPCS SELF ADMINISTERED DRUGS (ALT 637 FOR MEDICARE OP): Performed by: STUDENT IN AN ORGANIZED HEALTH CARE EDUCATION/TRAINING PROGRAM

## 2025-06-04 PROCEDURE — 2500000001 HC RX 250 WO HCPCS SELF ADMINISTERED DRUGS (ALT 637 FOR MEDICARE OP): Performed by: INTERNAL MEDICINE

## 2025-06-04 RX ADMIN — PANTOPRAZOLE SODIUM 40 MG: 40 TABLET, DELAYED RELEASE ORAL at 05:59

## 2025-06-04 RX ADMIN — DIGOXIN 125 MCG: 125 TABLET ORAL at 08:00

## 2025-06-04 RX ADMIN — NYSTATIN 1 APPLICATION: 100000 POWDER TOPICAL at 08:00

## 2025-06-04 RX ADMIN — AMIODARONE HYDROCHLORIDE 200 MG: 200 TABLET ORAL at 06:00

## 2025-06-04 RX ADMIN — GABAPENTIN 100 MG: 100 CAPSULE ORAL at 08:00

## 2025-06-04 RX ADMIN — INSULIN LISPRO 2 UNITS: 100 INJECTION, SOLUTION INTRAVENOUS; SUBCUTANEOUS at 16:50

## 2025-06-04 RX ADMIN — INSULIN LISPRO 2 UNITS: 100 INJECTION, SOLUTION INTRAVENOUS; SUBCUTANEOUS at 07:58

## 2025-06-04 RX ADMIN — RANOLAZINE 500 MG: 500 TABLET, FILM COATED, EXTENDED RELEASE ORAL at 08:00

## 2025-06-04 RX ADMIN — GABAPENTIN 100 MG: 100 CAPSULE ORAL at 14:13

## 2025-06-04 RX ADMIN — METOPROLOL SUCCINATE 100 MG: 50 TABLET, EXTENDED RELEASE ORAL at 08:00

## 2025-06-04 RX ADMIN — APIXABAN 5 MG: 5 TABLET, FILM COATED ORAL at 07:59

## 2025-06-04 RX ADMIN — INSULIN LISPRO 2 UNITS: 100 INJECTION, SOLUTION INTRAVENOUS; SUBCUTANEOUS at 11:44

## 2025-06-04 RX ADMIN — FUROSEMIDE 40 MG: 40 TABLET ORAL at 08:00

## 2025-06-04 ASSESSMENT — COGNITIVE AND FUNCTIONAL STATUS - GENERAL
STANDING UP FROM CHAIR USING ARMS: A LOT
CLIMB 3 TO 5 STEPS WITH RAILING: TOTAL
MOVING FROM LYING ON BACK TO SITTING ON SIDE OF FLAT BED WITH BEDRAILS: A LOT
WALKING IN HOSPITAL ROOM: TOTAL
MOVING FROM LYING ON BACK TO SITTING ON SIDE OF FLAT BED WITH BEDRAILS: A LITTLE
EATING MEALS: A LITTLE
HELP NEEDED FOR BATHING: A LOT
TOILETING: A LOT
PERSONAL GROOMING: A LOT
DAILY ACTIVITIY SCORE: 12
PERSONAL GROOMING: A LOT
DRESSING REGULAR LOWER BODY CLOTHING: TOTAL
MOVING TO AND FROM BED TO CHAIR: A LOT
DRESSING REGULAR UPPER BODY CLOTHING: A LOT
MOBILITY SCORE: 10
STANDING UP FROM CHAIR USING ARMS: A LOT
MOVING TO AND FROM BED TO CHAIR: A LOT
DRESSING REGULAR LOWER BODY CLOTHING: A LOT
HELP NEEDED FOR BATHING: A LOT
MOBILITY SCORE: 11
DRESSING REGULAR UPPER BODY CLOTHING: A LOT
TURNING FROM BACK TO SIDE WHILE IN FLAT BAD: A LOT
DAILY ACTIVITIY SCORE: 14
TURNING FROM BACK TO SIDE WHILE IN FLAT BAD: A LOT
WALKING IN HOSPITAL ROOM: TOTAL
TOILETING: A LOT
CLIMB 3 TO 5 STEPS WITH RAILING: TOTAL

## 2025-06-04 ASSESSMENT — PAIN - FUNCTIONAL ASSESSMENT
PAIN_FUNCTIONAL_ASSESSMENT: 0-10

## 2025-06-04 ASSESSMENT — PAIN SCALES - GENERAL
PAINLEVEL_OUTOF10: 9
PAINLEVEL_OUTOF10: 9
PAINLEVEL_OUTOF10: 0 - NO PAIN

## 2025-06-04 ASSESSMENT — ACTIVITIES OF DAILY LIVING (ADL): HOME_MANAGEMENT_TIME_ENTRY: 17

## 2025-06-04 NOTE — DOCUMENTATION CLARIFICATION NOTE
"    PATIENT:               DINA LANG  ACCT #:                  [id number]  MRN:                       [id number]  :                       1950  ADMIT DATE:       2025 7:42 PM  DISCH DATE:  RESPONDING PROVIDER #:        [id number]          PROVIDER RESPONSE TEXT:    Acute Myocardial Injury    CDI QUERY TEXT:    Clarification    Instruction:    Based on your assessment of the patient and the clinical information, please provide the requested documentation by clicking on the appropriate radio button and enter any additional information if prompted.    Question: Is there a diagnosis indicative of the patient elevated Troponins and symptoms    When answering this query, please exercise your independent professional judgment. The fact that a question is being asked, does not imply that any particular answer is desired or expected.    The patient's clinical indicators include:  Clinical Information: Pt presents d/t c/o chest pain and found to have chf exac    Clinical Indicators:    Trop trend 16, 14    ekg per the ED provider note dated 25 - \"Twelve-lead ECG obtained at 1950 by my interpretation demonstrates atrial fibrillation with RVR, rate of 128, no STEMI.\"    Treatment: trend labs, consult cardiology    Risk Factors: afib, chf exac  Options provided:  -- Acute Myocardial Injury  -- Other - I will add my own diagnosis  -- Refer to Clinical Documentation Reviewer    Query created by: Guillermo Petersen on 2025 9:54 AM      Electronically signed by:  BELINDA ORDAZ MD 2025 11:10 AM          "

## 2025-06-04 NOTE — PROGRESS NOTES
Physical Therapy    Physical Therapy Treatment    Patient Name: Isa Jack  MRN: [id number]  Today's Date: 6/4/2025  Time Calculation  Start Time: 0913  Stop Time: 0943  Time Calculation (min): 30 min     1014/1014-A    Assessment/Plan   End of Session Communication: Bedside nurse  Assessment Comment: Patient presents with good effort throughout todays session. Fatigue and poor endurance limiting increased gait distances and standing tolerance. Increased pain and limited mobility of LLE>RLE. Fair carry over of cues, TC required for AD management with sequencing during pivoting motions. Call light and all needs in reach.  End of Session Patient Position: Up in chair, Alarm on  PT Plan  PT Frequency: 3 times per week  PT Discharge Recommendations: Moderate intensity level of continued care    General Visit Information:   PT  Visit  PT Received On: 06/04/25  General  Co-Treatment: OT  Co-Treatment Reason: Due to pt's significant debility for optimal safety and therapy session  Prior to Session Communication: Bedside nurse  Patient Position Received: Up in chair, Alarm on  General Comment: Pt agreeable to therapy this date.  Subjective     Precautions:  Precautions  Medical Precautions: Fall precautions       Objective     Pain:  Pain Assessment  Pain Assessment: 0-10  0-10 (Numeric) Pain Score: 9  Pain Type: Chronic pain  Pain Location: Leg  Pain Orientation: Right, Left  Pain Interventions: Repositioned    Cognition:  Cognition  Orientation Level: Oriented X4  Problem Solving: Exceptions to WFL  Safety/Judgement: Exceptions to WFL      Treatments:              Ambulation/Gait Training  Ambulation/Gait Training Performed: Yes  Ambulation/Gait Training 1  Surface 1: Level tile  Device 1: Rolling walker (FWW)  Gait Support Devices: Gait belt  Assistance 1: Moderate assistance, Moderate verbal cues (ModA x2)  Quality of Gait 1: Wide base of support, Diminished heel strike  Comments/Distance (ft) 1: Pt ambulated 5' x1,  3' x2 with FWW, ModA x2 with chair follow for safety purposes.  Pt demonstrates step to gait, WBOS with short step length. Fair foot clearance. Poor endurance and quick fatigue limiting. VC for corrections and AD management with navigation as pt demonstrates difficulty with pivoting motions. Increased time for performance.  Transfers  Transfer: Yes  Transfer 1  Transfer From 1: Chair with arms to  Transfer to 1: Chair with arms, Commode-standard  Technique 1: Stand pivot  Transfer Device 1: Walker, Gait belt (FWW)  Transfer Level of Assistance 1: Moderate assistance, +2, Moderate tactile cues, Moderate verbal cues  Trials/Comments 1: Pt performed stand pivot from BSC<>chair with FWW, ModA x2. Pt requires VC for sequencing, good eccentric control to sitting position. Fair AD management throughout, difficulty with pivoting motions.  Transfers 2  Transfer From 2: Chair with arms to, Commode-standard to  Transfer to 2: Stand  Technique 2: Stand to sit, Sit to stand  Transfer Device 2: Walker, Gait belt (FWW)  Transfer Level of Assistance 2: Moderate assistance, +2, Moderate verbal cues  Trials/Comments 2: Pt performed multiple STS from recliner/BSC<>FWW Variable between ModA x2/Lorin x2. VC required for sequencing. Utilizes momentum of trunk to assist in lifting to stand.          Outcome Measures:     [facility] Basic Mobility  Turning from your back to your side while in a flat bed without using bedrails: A lot  Moving from lying on your back to sitting on the side of a flat bed without using bedrails: A lot  Moving to and from bed to chair (including a wheelchair): A lot  Standing up from a chair using your arms (e.g. wheelchair or bedside chair): A lot  To walk in hospital room: Total  Climbing 3-5 steps with railing: Total  Basic Mobility - Total Score: 10                                      Education Documentation  Mobility Training, taught by Tory Rhodes PTA at 6/4/2025 11:02 AM.  Learner: Patient  Readiness:  Acceptance  Method: Explanation, Demonstration  Response: Verbalizes Understanding, Needs Reinforcement    Education Comments  No comments found.           EDUCATION:     Encounter Problems       Encounter Problems (Active)       PT Problem       Pt will demonstrate bed mobility with min assist x1 within 2 weeks (Not Progressing)       Start:  06/03/25    Expected End:  06/17/25            Pt will ambulate 50ft with FWW under CGA within 2 weeks (Progressing)       Start:  06/03/25    Expected End:  06/17/25            Pt will increase strength to demonstrate supine to sit therapeutic exercise 2x12 with 2 rest breaks within 2 weeks (Not Progressing)       Start:  06/03/25    Expected End:  06/17/25            Pt will perform sit to stand transfer under min assist x1 within 2 weeks (Progressing)       Start:  06/03/25    Expected End:  06/17/25               Pain - Adult

## 2025-06-04 NOTE — CARE PLAN
The patient's goals for the shift include      The clinical goals for the shift include Patient to be free from injury t/o this shiift

## 2025-06-04 NOTE — DISCHARGE INSTR - OTHER ORDERS
Wound care orders - Wound Care Consult No open areas BLE, edema present 3++, legs slightly discolored at pink. Legs will at times weep due to edema. Apply xeroform dressings to BLE, ABD pads and ace wrap from base of toes to knees daily.

## 2025-06-04 NOTE — NURSING NOTE
Patient became verbally aggressive towards staff . Attempting to get up and leave , refusing medications . Patient also refused to get in her bed . Patient prefers to sleep in the recliner tonight . This nurse helped patient get washed up and clean gown and external cath , sat with patient for about 45 minutes to help calm and patient agreed to take meds but was still upset and wanting to leave . New order obtained for zyprexa 5mg  IM . Patient tolerated well . Siting up in the recliner eating a snack .

## 2025-06-04 NOTE — NURSING NOTE
Report called to [facility]. I gave report to Rachele. Patient is waiting for a targeted  time of 6:30pm. I also spoke with the son, Hector to update him.

## 2025-06-04 NOTE — CONSULTS
Wound Care Consult No open areas BLE, edema present 3++, legs slightly discolored at pink. Legs will at times weep due to edema. Apply xeroform dressings to BLE, ABD pads and ace wrap from base of toes to knees daily.     Visit Date: 6/4/2025      Patient Name: Isa Jack         MRN: [id number]             Reason for Consult: BLE Wounds        Wound History: Chronic     Pertinent Labs:       Assessment:                         Wound Plan: Continue with daily dressings to BLE with xeroform and ace wraps.      Salbador Aguilar LPN  6/4/2025  3:18 PM

## 2025-06-04 NOTE — NURSING NOTE
[facility] arrived to transport the patient to [facility]. IV has been removed, Purewick removed, and tele pack removed. Patient's belongings have already been obtained by her son, Hector. Other supplies from the room were packed for the patient to take with them.

## 2025-06-04 NOTE — CARE PLAN
The patient's goals for the shift include  increased hydration and nutrition    The clinical goals for the shift include pt will remain free from falls

## 2025-06-04 NOTE — PROGRESS NOTES
Rashid Moncada has been obtained.  Pt. Will discharge this date to [facility] at 5:00 pm  via ambulance.  Spoke with son who is aware and in agreement to transfer.

## 2025-06-04 NOTE — PROGRESS NOTES
Occupational Therapy    OT Treatment    Patient Name: Isa Jcak  MRN: [id number]  Department: [facility]  Room: [facility]-A  Today's Date: 6/4/2025  Time Calculation  Start Time: 0911  Stop Time: 0943  Time Calculation (min): 32 min      Assessment:  End of Session Communication: Bedside nurse, PCT/NA/CTA  End of Session Patient Position: Up in chair, Alarm on     Plan:  Treatment Interventions: ADL retraining, Functional transfer training, Endurance training, UE strengthening/ROM  OT Frequency: 2 times per week  Treatment Interventions: ADL retraining, Functional transfer training, Endurance training, UE strengthening/ROM    Subjective   OT Visit Info:  OT Received On: 06/04/25  General Visit Info:  General  Reason for Referral: Dx: Chest pain  Co-Treatment: PT  Co-Treatment Reason: Due to pt's significant debility for optimal safety and therapy session  Prior to Session Communication: Bedside nurse  Patient Position Received: Up in chair, Alarm on  Precautions:  Medical Precautions: Lymphedema precautions, Fall precautions    Pain:  Pain Assessment  Pain Assessment: 0-10  0-10 (Numeric) Pain Score: 9  Pain Type: Chronic pain  Pain Location: Leg  Pain Orientation: Right, Left    Objective    Cognition:  Cognition  Overall Cognitive Status: Impaired  Orientation Level: Oriented X4  Following Commands: Follows multistep commands with repetition  Safety Judgment: Decreased awareness of need for safety precautions  Insight: Moderate  Impulsive: Moderately     Activities of Daily Living: LE Dressing  LE Dressing: Yes  Sock Level of Assistance: Dependent (donned/doffed)    Toileting  Toileting Level of Assistance: Dependent (posterior)  Where Assessed: Bedside commode  Functional Standing Tolerance:  Functional Standing Tolerance Comments: patient stood for a total of 4 mins this date with fair/fair- balance with 2-1 ue support at all times during functional ambulation/transfers and ADL task  Bed Mobility/Transfers:  Transfers  Transfer: Yes  Transfer 1  Technique 1: Sit to stand  Transfer Device 1: Walker, Gait belt  Transfer Level of Assistance 1: Minimum assistance, Moderate assistance, +2, Moderate verbal cues, Moderate tactile cues  Transfers 2  Transfer Level of Assistance 2: Moderate assistance, +2, Maximum tactile cues, Maximum verbal cues  Trials/Comments 2: functional ambualtion with chair follow    Toilet Transfers  Toilet Transfer Type: To and from  Toilet Transfer to: Extra wide bedside commode  Toilet Transfer Technique: Ambulating  Toilet Transfers:  (mod a x2)  Sitting Balance:  Dynamic Sitting Balance  Dynamic Sitting-Level of Assistance:  (fair/fair+)  Standing Balance:  Dynamic Standing Balance  Dynamic Standing-Level of Assistance:  (fair/fair-)    Outcome Measures:[facility] Daily Activity  Putting on and taking off regular lower body clothing: Total  Bathing (including washing, rinsing, drying): A lot  Putting on and taking off regular upper body clothing: A lot  Toileting, which includes using toilet, bedpan or urinal: A lot  Taking care of personal grooming such as brushing teeth: A lot  Eating Meals: A little  Daily Activity - Total Score: 12    Education Documentation  Body Mechanics, taught by NELIA Ko at 6/4/2025  2:30 PM.  Learner: Patient  Readiness: Acceptance  Method: Explanation  Response: Needs Reinforcement    OP EDUCATION:  Education  Individual(s) Educated: Patient  Education Provided: Symptom management, Ergonomics and postural realignment, Joint protection and energy conservation, Fall precautons, Risk and benefits of OT discussed with patient or other, POC discussed and agreed upon, Lymphedema, Edema control  Equipment:  (AE, EC tech, home DME, PLB)  Patient/Caregiver Demonstrated Understanding: yes  Plan of Care Discussed and Agreed Upon: yes  Patient Response to Education: Patient/Caregiver Verbalized Understanding of Information    Goals:  Encounter Problems       Encounter  Problems (Active)       OT Goals       Patient will complete functional mobility with a FWW at CGA level.  (Progressing)       Start:  06/03/25    Expected End:  06/17/25            Patient will complete functional transfers with a FWW at CGA level.  (Progressing)       Start:  06/03/25    Expected End:  06/17/25            Patient will complete toileting at a CGA level.  (Progressing)       Start:  06/03/25    Expected End:  06/17/25            Patient will demonstrate dynamic standing balance during ADLs.  (Progressing)       Start:  06/03/25    Expected End:  06/17/25            Patient will tolerate standing greater than 4 minutes during ADLs.  (Progressing)       Start:  06/03/25    Expected End:  06/17/25

## 2025-06-04 NOTE — DISCHARGE SUMMARY
Discharge Diagnosis  Paroxysmal atrial fibrillation  Atrial fibrillation with RVR, resolved  Long-term anticoagulation use  Chronic systolic CHF  Chronic bilateral lower extremity lymphedema  GERD  Type II DM  Previous history of CAD  Hypertension  COPD, not in exacerbation  Morbid obesity    Discharge Meds     Medication List      START taking these medications     ranolazine 500 mg 12 hr tablet; Commonly known as: Ranexa; Take 1 tablet   (500 mg) by mouth 2 times a day. Do not crush, chew, or split.     CHANGE how you take these medications     furosemide 40 mg tablet; Commonly known as: Lasix; Take 1 tablet (40 mg)   by mouth once daily. If weight is up 3 pounds please take 1 additional   tablet; What changed: when to take this, additional instructions     CONTINUE taking these medications     * albuterol 2.5 mg /3 mL (0.083 %) nebulizer solution   * ProAir HFA 90 mcg/actuation inhaler; Generic drug: albuterol   amiodarone 200 mg tablet; Commonly known as: Pacerone   apixaban 5 mg tablet; Commonly known as: Eliquis; Take 1 tablet (5 mg)   by mouth every 12 hours.   clotrimazole 1 % cream; Commonly known as: Lotrimin; Apply 1 Application   topically 2 times a day. Apply to affected areas 2-3 times daily   cyanocobalamin (vitamin B-12) 2,500 mcg tablet, sublingual SL tablet;   Place 1 each under the tongue once daily.   digoxin 125 MCG tablet; Commonly known as: Lanoxin   gabapentin 100 mg capsule; Commonly known as: Neurontin   Lipitor 40 mg tablet; Generic drug: atorvastatin   losartan 25 mg tablet; Commonly known as: Cozaar   magnesium oxide 400 mg (241.3 mg elemental) tablet; Commonly known as:   Mag-Ox   metFORMIN 1,000 mg tablet; Commonly known as: Glucophage   metoprolol succinate XL 50 mg 24 hr tablet; Commonly known as:   Toprol-XL; Take 2 tablets (100 mg) by mouth once daily. Do not crush or   chew.   montelukast 10 mg tablet; Commonly known as: Singulair   multivitamin tablet   mupirocin 2 % ointment;  Commonly known as: Bactroban; Apply 1   Application topically 2 times a day as needed (infection).   naloxone 4 mg/0.1 mL nasal spray; Commonly known as: Narcan; Administer   1 spray (4 mg) into affected nostril(s) if needed for opioid reversal.   Nitrostat 0.4 mg SL tablet; Generic drug: nitroglycerin   torsemide 10 mg tablet; Commonly known as: Demadex  * This list has 2 medication(s) that are the same as other medications   prescribed for you. Read the directions carefully, and ask your doctor or   other care provider to review them with you.     STOP taking these medications     metoprolol tartrate 50 mg tablet; Commonly known as: Lopressor       Test Results Pending At Discharge  Pending Labs       No current pending labs.            Hospital Course   Isa Jack is a 74 y.o. female presenting with chest pain for 1 day.  Patient reported intermittent chest pain associated with palpitation  Over the last 3 to 4 days.  The pain resolved by the time she arrived to the ER.  She described the pain as retrosternal, radiated to the shoulders, associated with nausea and not feeling well.  Patient was recently admitted to the hospital with A-fib with RVR and CHF exacerbation.  Her last echocardiogram shows ejection fraction about 25 to 30%.  Patient refused ICD placement.  She wanted to be DNR and DNI.  Cardiology was consulted.  Heart rate was controlled with medications.  Medications adjusted and the patient was discharged to short-term SNF in stable condition with close follow-up with cardiology     Pertinent Physical Exam At Time of Discharge  Physical Exam  Alert oriented x 3, morbidly obese  Lungs clear to auscultation bilaterally  Heart regular rhythm  Abdomen soft nontender  Extremities bilateral leg edema  CNS no focal deficit    Outpatient Follow-Up  Future Appointments   Date Time Provider Department Center   6/18/2025  8:30 AM Kyle Noel MD [id number]         Светлана Moreno MD

## 2025-06-06 LAB
ATRIAL RATE: 153 BPM
Q ONSET: 215 MS
Q ONSET: 219 MS
QRS COUNT: 12 BEATS
QRS COUNT: 17 BEATS
QRS DURATION: 82 MS
QRS DURATION: 98 MS
QT INTERVAL: 334 MS
QT INTERVAL: 334 MS
QTC CALCULATION(BAZETT): 392 MS
QTC CALCULATION(BAZETT): 428 MS
QTC FREDERICIA: 372 MS
QTC FREDERICIA: 394 MS
R AXIS: -20 DEGREES
R AXIS: -3 DEGREES
T AXIS: 212 DEGREES
T AXIS: 220 DEGREES
T OFFSET: 382 MS
T OFFSET: 386 MS
VENTRICULAR RATE: 83 BPM
VENTRICULAR RATE: 99 BPM

## 2025-06-09 DIAGNOSIS — I48.91 ATRIAL FIBRILLATION WITH RVR (MULTI): ICD-10-CM

## 2025-06-12 ENCOUNTER — NURSING HOME VISIT (OUTPATIENT)
Dept: POST ACUTE CARE | Facility: EXTERNAL LOCATION | Age: 75
End: 2025-06-12
Payer: COMMERCIAL

## 2025-06-12 DIAGNOSIS — R53.81 PHYSICAL DEBILITY: ICD-10-CM

## 2025-06-12 DIAGNOSIS — I50.42 CHRONIC COMBINED SYSTOLIC AND DIASTOLIC CONGESTIVE HEART FAILURE: ICD-10-CM

## 2025-06-12 DIAGNOSIS — E11.65 TYPE 2 DIABETES MELLITUS WITH HYPERGLYCEMIA, WITHOUT LONG-TERM CURRENT USE OF INSULIN: ICD-10-CM

## 2025-06-12 DIAGNOSIS — I25.5 CARDIOMYOPATHY, ISCHEMIC: ICD-10-CM

## 2025-06-12 DIAGNOSIS — I48.91 ATRIAL FIBRILLATION WITH RAPID VENTRICULAR RESPONSE (MULTI): Primary | ICD-10-CM

## 2025-06-12 PROCEDURE — 99306 1ST NF CARE HIGH MDM 50: CPT | Performed by: STUDENT IN AN ORGANIZED HEALTH CARE EDUCATION/TRAINING PROGRAM

## 2025-06-14 LAB
ATRIAL RATE: 133 BPM
Q ONSET: 216 MS
Q ONSET: 219 MS
QRS COUNT: 16 BEATS
QRS COUNT: 20 BEATS
QRS DURATION: 88 MS
QRS DURATION: 96 MS
QT INTERVAL: 314 MS
QT INTERVAL: 342 MS
QTC CALCULATION(BAZETT): 432 MS
QTC CALCULATION(BAZETT): 443 MS
QTC FREDERICIA: 395 MS
QTC FREDERICIA: 400 MS
R AXIS: -10 DEGREES
R AXIS: 20 DEGREES
T AXIS: 208 DEGREES
T AXIS: 210 DEGREES
T OFFSET: 376 MS
T OFFSET: 387 MS
VENTRICULAR RATE: 120 BPM
VENTRICULAR RATE: 96 BPM

## 2025-06-14 ASSESSMENT — ENCOUNTER SYMPTOMS
GASTROINTESTINAL NEGATIVE: 1
SHORTNESS OF BREATH: 1
FATIGUE: 1
MUSCULOSKELETAL NEGATIVE: 1
WEAKNESS: 1
PSYCHIATRIC NEGATIVE: 1

## 2025-06-14 NOTE — PROGRESS NOTES
Subjective   Patient ID: Isa Jack is a 74 y.o. female.    Patient is a 74-year-old female who presents to the ED with underlying chest pain.  Patient was admitted for further management, and did also have a episode of A-fib RVR with CHF exacerbation.  Patient was found to have a EF of 25 to 30%, and subsequently was discharged to SNF.  She was discharged to SNF, and on evaluation states she is overall doing well.  Is frustrated with the aspect that she is not allowed salty foods such as sausage in her diet aspect.  She states that over the past couple days, has been having significant fluid overload.  She feels like her legs are swelling, and is having significant weeping of her lower extremities.  Otherwise, was also found to have a potential breast mass, however she did not want further workup of this.  Otherwise, states no additional issues or concerns.  Overall feels well.         Review of Systems   Constitutional:  Positive for fatigue.   HENT: Negative.     Respiratory:  Positive for shortness of breath.    Cardiovascular:  Positive for leg swelling.   Gastrointestinal: Negative.    Musculoskeletal: Negative.    Neurological:  Positive for weakness.   Psychiatric/Behavioral: Negative.         Objective Vitals Reviewed via facility EMR   Physical Exam  Constitutional:       General: She is not in acute distress.     Appearance: She is not ill-appearing.   Eyes:      Pupils: Pupils are equal, round, and reactive to light.   Cardiovascular:      Rate and Rhythm: Normal rate and regular rhythm.      Pulses: Normal pulses.      Heart sounds: No murmur heard.  Pulmonary:      Effort: No respiratory distress.      Breath sounds: No wheezing.   Abdominal:      General: Abdomen is flat. Bowel sounds are normal. There is no distension.   Musculoskeletal:      Right lower leg: Edema present.      Left lower leg: Edema present.   Skin:     General: Skin is warm and dry.      Findings: Erythema present.       Comments: Shabbir carr   Neurological:      Mental Status: She is alert. Mental status is at baseline.      Cranial Nerves: No cranial nerve deficit.      Motor: Weakness present.   Psychiatric:         Mood and Affect: Mood normal.         Behavior: Behavior normal.         Assessment/Plan   Diagnoses and all orders for this visit:  Atrial fibrillation with rapid ventricular response (Multi)  Cardiomyopathy, ischemic  Chronic combined systolic and diastolic congestive heart failure  Type 2 diabetes mellitus with hyperglycemia, without long-term current use of insulin  Physical debility    Patient seen examined at bedside.  Overall medically stable however significantly fluid overloaded on exam, recommend initiation of metolazone as patient is already doubling up her diuretics due to fluid overload.  Low threshold for sending to the hospital, as patient has significantly had a weight gain over the past couple days and is symptomatic from this.  Encourage low-salt diet leg elevation and fluid restrictions however it does seem like patient is deferring these lifestyle modifications.  Discussed pathophysiology and the portance of lifestyle with the patient.  She will continue to monitor.  Consider palliative or hospice evaluation as patient is deferred ICD in the past, and does have significant cardiac comorbidities.  Otherwise no additional issues.  For breast issue advised risks of deferring workup and she voices understanding. Discussed care plan with staff, will check labs tomorrow.     Reviewed and approved by CARLOS HASSAN on 6/14/25 at 4:56 PM.

## 2025-06-18 ENCOUNTER — APPOINTMENT (OUTPATIENT)
Dept: CARDIOLOGY | Facility: CLINIC | Age: 75
End: 2025-06-18
Payer: COMMERCIAL

## 2025-06-18 ENCOUNTER — NURSING HOME VISIT (OUTPATIENT)
Dept: POST ACUTE CARE | Facility: EXTERNAL LOCATION | Age: 75
End: 2025-06-18

## 2025-06-18 DIAGNOSIS — I50.42 CHRONIC COMBINED SYSTOLIC AND DIASTOLIC CONGESTIVE HEART FAILURE: ICD-10-CM

## 2025-06-18 DIAGNOSIS — I10 ESSENTIAL HYPERTENSION: ICD-10-CM

## 2025-06-18 DIAGNOSIS — R53.81 PHYSICAL DEBILITY: ICD-10-CM

## 2025-06-18 DIAGNOSIS — E11.65 TYPE 2 DIABETES MELLITUS WITH HYPERGLYCEMIA, WITHOUT LONG-TERM CURRENT USE OF INSULIN: ICD-10-CM

## 2025-06-18 DIAGNOSIS — E87.70 HYPERVOLEMIA, UNSPECIFIED HYPERVOLEMIA TYPE: ICD-10-CM

## 2025-06-18 DIAGNOSIS — I25.5 CARDIOMYOPATHY, ISCHEMIC: Primary | ICD-10-CM

## 2025-06-18 PROCEDURE — 99310 SBSQ NF CARE HIGH MDM 45: CPT | Performed by: STUDENT IN AN ORGANIZED HEALTH CARE EDUCATION/TRAINING PROGRAM

## 2025-06-19 PROBLEM — E87.70 FLUID OVERLOAD: Status: ACTIVE | Noted: 2025-06-19

## 2025-06-19 ASSESSMENT — ENCOUNTER SYMPTOMS
FATIGUE: 1
RESPIRATORY NEGATIVE: 1
PSYCHIATRIC NEGATIVE: 1
MUSCULOSKELETAL NEGATIVE: 1
WEAKNESS: 1
GASTROINTESTINAL NEGATIVE: 1

## 2025-06-20 NOTE — PROGRESS NOTES
Subjective   Patient ID: Isa Jack is a 74 y.o. female.    Patient seen and examined on routine follow-up.  She regards that she is overall doing well, but is having some significant issues with lower extremity swelling.  She has had some significant weight gain as well and wounds are overall weeping.  Last week, was transition over to a trial of metolazone in addition to her diuretics, and symptoms have significantly worsened.  Otherwise, breathing status is overall stable.  We did discuss goals of care, and she would like to be evaluated in the hospital if warranted.  Otherwise no additional issues.         Review of Systems   Constitutional:  Positive for fatigue.   HENT: Negative.     Respiratory: Negative.     Cardiovascular:  Positive for leg swelling.   Gastrointestinal: Negative.    Musculoskeletal: Negative.    Skin:  Positive for rash.   Neurological:  Positive for weakness.   Psychiatric/Behavioral: Negative.         Objective Vitals Reviewed via facility EMR   Physical Exam  Constitutional:       General: She is not in acute distress.     Appearance: She is not ill-appearing.   Eyes:      Pupils: Pupils are equal, round, and reactive to light.   Cardiovascular:      Rate and Rhythm: Normal rate and regular rhythm.      Pulses: Normal pulses.      Heart sounds: No murmur heard.  Pulmonary:      Effort: No respiratory distress.      Breath sounds: No wheezing.   Abdominal:      General: Abdomen is flat. Bowel sounds are normal. There is no distension.   Musculoskeletal:      Right lower leg: Edema present.      Left lower leg: Edema present.      Comments: Weeping wound   Skin:     General: Skin is warm and dry.   Neurological:      Mental Status: She is alert. Mental status is at baseline.      Cranial Nerves: No cranial nerve deficit.      Motor: Weakness present.   Psychiatric:         Mood and Affect: Mood normal.         Behavior: Behavior normal.         Assessment/Plan   Diagnoses and all  orders for this visit:  Cardiomyopathy, ischemic  Chronic combined systolic and diastolic congestive heart failure  Essential hypertension  Type 2 diabetes mellitus with hyperglycemia, without long-term current use of insulin  Physical debility  Hypervolemia, unspecified hypervolemia type  Patient seen and examined at bedside.  I did discuss with the facility that we are able to do IV Lasix.  She is fluid overloaded, and likely need better optimization of fluid status and due to failure of oral medications, would warrant IV management. In addition, could potentially be cellulitis which we could start abx for however, i do suspect fluid overload is biggest culprit. I did offer the patient to be managed at the facility, however she defers at this time and would like to be sent to the hospital.  I did also update the son with primary care who was agreeable.  Otherwise, no additional issues at this time.  Will plan for transport due to hospital of choice.  Facility updated with care plan.     Reviewed and approved by CARLOS HASSAN on 6/19/25 at 8:53 PM.

## 2025-06-26 ENCOUNTER — NURSING HOME VISIT (OUTPATIENT)
Dept: POST ACUTE CARE | Facility: EXTERNAL LOCATION | Age: 75
End: 2025-06-26
Payer: COMMERCIAL

## 2025-06-26 DIAGNOSIS — I25.5 CARDIOMYOPATHY, ISCHEMIC: ICD-10-CM

## 2025-06-26 DIAGNOSIS — E11.65 TYPE 2 DIABETES MELLITUS WITH HYPERGLYCEMIA, WITHOUT LONG-TERM CURRENT USE OF INSULIN: ICD-10-CM

## 2025-06-26 DIAGNOSIS — I50.42 CHRONIC COMBINED SYSTOLIC AND DIASTOLIC CONGESTIVE HEART FAILURE: Primary | ICD-10-CM

## 2025-06-26 DIAGNOSIS — I48.91 ATRIAL FIBRILLATION WITH RVR (MULTI): ICD-10-CM

## 2025-06-26 DIAGNOSIS — R53.81 PHYSICAL DEBILITY: ICD-10-CM

## 2025-06-26 PROCEDURE — 99309 SBSQ NF CARE MODERATE MDM 30: CPT | Performed by: STUDENT IN AN ORGANIZED HEALTH CARE EDUCATION/TRAINING PROGRAM

## 2025-06-28 ASSESSMENT — ENCOUNTER SYMPTOMS
MUSCULOSKELETAL NEGATIVE: 1
WEAKNESS: 1
RESPIRATORY NEGATIVE: 1
PSYCHIATRIC NEGATIVE: 1
FATIGUE: 1
GASTROINTESTINAL NEGATIVE: 1

## 2025-06-28 NOTE — PROGRESS NOTES
Subjective   Patient ID: Isa Jack is a 74 y.o. female.    Patient seen and examined on hospital follow-up.  Recently was seen and sent to the hospital for exacerbation of underlying CHF.  Admitted for further management with regards to lower extremity wounds, as well as IV diuresis.  She reports that she received a couple doses of IV diuresis, fluid status improved tremendously and was discharged back in stable condition.  On evaluation, she reports that her fluid status is stable.  She states that her wound is also improving as well, and is not leaking anymore.  Otherwise, has been trying leg elevation to help with this.  With regards to her lower extremity wounds she does report that wound care is helping with this.  No additional issues or concerns at this time.         Review of Systems   Constitutional:  Positive for fatigue.   HENT: Negative.     Respiratory: Negative.     Cardiovascular:  Positive for leg swelling.   Gastrointestinal: Negative.    Musculoskeletal: Negative.    Neurological:  Positive for weakness.   Psychiatric/Behavioral: Negative.         Objective Vitals Reviewed via facility EMR   Physical Exam  Constitutional:       General: She is not in acute distress.     Appearance: She is not ill-appearing.   Eyes:      Pupils: Pupils are equal, round, and reactive to light.   Cardiovascular:      Rate and Rhythm: Normal rate and regular rhythm.      Pulses: Normal pulses.      Heart sounds: No murmur heard.  Pulmonary:      Effort: No respiratory distress.      Breath sounds: No wheezing.   Abdominal:      General: Abdomen is flat. Bowel sounds are normal. There is no distension.   Musculoskeletal:      Right lower leg: Edema present.      Left lower leg: Edema present.      Comments: Euvolemic but still +1-2 on exam   Skin:     General: Skin is warm and dry.      Comments: Rash wound, improved   Neurological:      Mental Status: She is alert. Mental status is at baseline.      Cranial  Nerves: No cranial nerve deficit.      Motor: Weakness present.   Psychiatric:         Mood and Affect: Mood normal.         Behavior: Behavior normal.         Assessment/Plan   Diagnoses and all orders for this visit:  Chronic combined systolic and diastolic congestive heart failure  Cardiomyopathy, ischemic  Atrial fibrillation with RVR (Multi)  Type 2 diabetes mellitus with hyperglycemia, without long-term current use of insulin  Physical debility    Patient seen and examined at bedside.  Overall medically stable, hospital course reviewed and medications reviewed and reconciled.  Fluid overload status, likely secondary to noncompliance with regimen of heart failure.  Continue to closely monitor weights, and implement further protocols to weigh the patient daily, to call providers if there is a leaking significant in 1 day or 3 weeks.  Encourage leg elevation wound care.  Otherwise, discussed with patient that if she starts again we can initiate IV diuresis in the facility to hopefully avoid hospitalization and she is agreeable.  Encouraged follow-up with specialist in the hospitalization with PT OT otherwise no additional issues.  Continue with wound care and closely monitor.  Otherwise no additional issues.  Check weekly labs and optimize magnesium and potassium greater than 2 and 4 respectively.     Reviewed and approved by CARLOS HASSAN on 6/28/25 at 2:03 PM.

## 2025-07-03 ENCOUNTER — NURSING HOME VISIT (OUTPATIENT)
Dept: POST ACUTE CARE | Facility: EXTERNAL LOCATION | Age: 75
End: 2025-07-03
Payer: COMMERCIAL

## 2025-07-03 DIAGNOSIS — I10 ESSENTIAL HYPERTENSION: ICD-10-CM

## 2025-07-03 DIAGNOSIS — I50.42 CHRONIC COMBINED SYSTOLIC AND DIASTOLIC CONGESTIVE HEART FAILURE: ICD-10-CM

## 2025-07-03 DIAGNOSIS — I48.91 ATRIAL FIBRILLATION WITH RVR (MULTI): ICD-10-CM

## 2025-07-03 DIAGNOSIS — I25.5 CARDIOMYOPATHY, ISCHEMIC: Primary | ICD-10-CM

## 2025-07-03 DIAGNOSIS — R53.81 PHYSICAL DEBILITY: ICD-10-CM

## 2025-07-03 DIAGNOSIS — E78.2 MIXED HYPERLIPIDEMIA: ICD-10-CM

## 2025-07-04 ASSESSMENT — ENCOUNTER SYMPTOMS
PSYCHIATRIC NEGATIVE: 1
FATIGUE: 1
GASTROINTESTINAL NEGATIVE: 1
RESPIRATORY NEGATIVE: 1
MUSCULOSKELETAL NEGATIVE: 1
WEAKNESS: 1

## 2025-07-05 NOTE — PROGRESS NOTES
Subjective   Patient ID: Isa Jcak is a 74 y.o. female.    Patient seen and examined on routine evaluation.  Regards that she is overall doing well however has noticed some fluid swelling in her lower extremities.  Endorses that she is compliant with her medications however dietary and compression wise she is less compliant due to her having a wound on her lower extremity.  Otherwise, states no acute issues or concerns.  Breathing is overall stable slowly improving with physical therapy.  She is also frustrated with the fact that she likely will not be able to go home secondary to the multiple needs at home.  Otherwise, no additional issues at this time.         Review of Systems   Constitutional:  Positive for fatigue.   HENT: Negative.     Respiratory: Negative.     Cardiovascular:  Positive for leg swelling.   Gastrointestinal: Negative.    Musculoskeletal: Negative.    Neurological:  Positive for weakness.   Psychiatric/Behavioral: Negative.         Objective  Vitals Reviewed via facility EMR   Physical Exam  Constitutional:       General: She is not in acute distress.     Appearance: She is not ill-appearing.   Eyes:      Pupils: Pupils are equal, round, and reactive to light.   Cardiovascular:      Rate and Rhythm: Normal rate and regular rhythm.      Pulses: Normal pulses.      Heart sounds: No murmur heard.  Pulmonary:      Effort: No respiratory distress.      Breath sounds: No wheezing.   Abdominal:      General: Abdomen is flat. Bowel sounds are normal. There is no distension.   Musculoskeletal:      Right lower leg: Edema present.      Left lower leg: Edema present.   Skin:     General: Skin is warm and dry.   Neurological:      Mental Status: She is alert. Mental status is at baseline.      Cranial Nerves: No cranial nerve deficit.      Motor: Weakness present.   Psychiatric:         Mood and Affect: Mood normal.         Behavior: Behavior normal.         Assessment/Plan   Diagnoses and all  orders for this visit:  Cardiomyopathy, ischemic  Atrial fibrillation with RVR (Multi)  Essential hypertension  Mixed hyperlipidemia  Physical debility  Chronic combined systolic and diastolic congestive heart failure      Patient seen and examined at bedside.  Overall medically stable, however fluid overloaded.  This is likely multifactorial but mostly secondary to dietary noncompliance.  She is unable to utilize compression stockings.  Otherwise, continue supportive care.  Will double up on patient's diuretics and monitor lab work.  Encourage low-salt diet fluid restriction.  Otherwise continue supportive care closely monitor lab work and vitals.  Facility agreeable with plan.     Reviewed and approved by CARLOS HASSAN on 7/4/25 at 9:49 PM.

## 2025-07-07 NOTE — PROGRESS NOTES
Interdisciplinary Discharge Planning Note    Anticipated Discharge Date:2-4 days    Anticipated Discharge Location: Home    Clinical Needs Before Discharge:  PT/OT eval, abx, pain control     Treatment Needs After Discharge:  None identified    Potential Barriers to Discharge: None Identified     DEJON Cornejo  7/7/2025,  12:28 PM      "   Medical Group Progress Note  ASSESSMENT & PLAN:        Paroxysmal atrial fibrillation  Atrial fibrillation with RVR, resolved  Long-term anticoagulation use  - Heart rates improved this morning to 90s to low 100s, no longer in RVR  - Cardiology consulted  - Digoxin 250 mcg IV x 3 doses ordered  - Continue anticoagulation with apixaban, rate control with metoprolol    Chronic systolic CHF  - Echo March 2025, EF 25 to 30%  - Cardiology following  - Continue GDMT with metoprolol  - Holding off Lasix today  - Patient declining further interventions at this time    Chronic bilateral lower extremity lymphedema  - Chronic erythema with previous wound that is stable on exam  - No need for further antibiotics at this time    GERD  Type II DM  Previous history of CAD  Hypertension  COPD, not in exacerbation  - Continue home medications as reconciled    Morbid obesity    VTE Prophylaxis: Apixaban      ---Of note, this documentation is completed using the Dragon Dictation system (voice recognition software). There may be spelling and/or grammatical errors that were not corrected prior to final submission.---    Igor Alaniz MD    SUBJECTIVE     Patient was seen and examined at bedside this morning.  Was alert and oriented x 2-3.  States that she did diurese overnight.  Otherwise had no complaints for me this morning.    OBJECTIVE:     Last Recorded Vitals:  Vitals:    06/02/25 0200 06/02/25 0205 06/02/25 0317 06/02/25 0811   BP:  106/68 111/62 108/69   BP Location:   Left arm    Patient Position:   Lying    Pulse: (!) 108  99 104   Resp:  17 20 16   Temp:   36.6 °C (97.9 °F) 37.6 °C (99.7 °F)   TempSrc:   Temporal    SpO2: 96% 98% 98% 98%   Weight:   93 kg (205 lb)    Height:   1.448 m (4' 9\")      Last I/O:  I/O last 3 completed shifts:  In: 291.7 (3.1 mL/kg) [IV Piggyback:291.7]  Out: - (0 mL/kg)   Weight: 93 kg     Physical Exam  Vitals reviewed.   Constitutional:       General: She is not in acute distress.     " Appearance: Normal appearance. She is not toxic-appearing.   HENT:      Head: Normocephalic and atraumatic.   Eyes:      Extraocular Movements: Extraocular movements intact.      Conjunctiva/sclera: Conjunctivae normal.   Cardiovascular:      Rate and Rhythm: Normal rate and regular rhythm.      Pulses: Normal pulses.      Heart sounds: Normal heart sounds.   Pulmonary:      Effort: Pulmonary effort is normal. No respiratory distress.      Breath sounds: No wheezing.      Comments: Breath sounds diminished auscultation bilaterally with body habitus.  Abdominal:      General: Bowel sounds are normal. There is no distension.      Palpations: Abdomen is soft.      Tenderness: There is no abdominal tenderness. There is no guarding.   Musculoskeletal:         General: Normal range of motion.      Cervical back: Normal range of motion and neck supple.      Right lower leg: Edema present.      Left lower leg: Edema present.      Comments: Chronic bilateral lower extremities with 3+ pitting edema.   Skin:     Findings: Erythema present.   Neurological:      General: No focal deficit present.      Mental Status: She is alert. Mental status is at baseline.       Inpatient Medications:  Scheduled Medications[1]    PRN Medications  PRN Medications[2]    Continuous Medications:  Continuous Medications[3]  LABS AND IMAGING:     Labs:  Results from last 7 days   Lab Units 06/02/25 0625 06/01/25 2006   WBC AUTO x10*3/uL 6.9 9.4   RBC AUTO x10*6/uL 3.48* 3.66*   HEMOGLOBIN g/dL 9.5* 10.1*   HEMATOCRIT % 31.5* 33.3*   MCV fL 91 91   MCH pg 27.3 27.6   MCHC g/dL 30.2* 30.3*   RDW % 17.3* 17.3*   PLATELETS AUTO x10*3/uL 157 174     Results from last 7 days   Lab Units 06/02/25 0625 06/02/25  0119 06/01/25 2006   SODIUM mmol/L 134*  --  130*   POTASSIUM mmol/L 4.1 4.1 5.2   CHLORIDE mmol/L 97*  --  94*   CO2 mmol/L 28  --  23   BUN mg/dL 40*  --  39*   CREATININE mg/dL 1.33*  --  1.45*   GLUCOSE mg/dL 132*  --  126*   PROTEIN TOTAL  g/dL  --   --  6.6   CALCIUM mg/dL 8.8  --  8.8   BILIRUBIN TOTAL mg/dL  --   --  0.8   ALK PHOS U/L  --   --  79   AST U/L  --   --  36   ALT U/L  --   --  24     Results from last 7 days   Lab Units 06/01/25 2006   MAGNESIUM mg/dL 1.81     Results from last 7 days   Lab Units 06/01/25  2255 06/01/25 2006   TROPHS ng/L 14* 16*     Imaging:  ECG 12 Lead  Atrial fibrillation with premature ventricular or aberrantly conducted complexes  Nonspecific T wave abnormality  Abnormal ECG  When compared with ECG of 01-JUN-2025 19:50, (unconfirmed)  Nonspecific T wave abnormality now evident in Anterior leads  ECG 12 lead  Atrial fibrillation with rapid ventricular response  Anterolateral infarct (cited on or before 16-MAY-2025)  Abnormal ECG  When compared with ECG of 16-MAY-2025 06:49,  Questionable change in QRS axis            [1] apixaban, 5 mg, oral, q12h  atorvastatin, 40 mg, oral, Nightly  digoxin, 250 mcg, intravenous, Once  digoxin, 250 mcg, intravenous, Once  digoxin, 250 mcg, intravenous, Once  [Held by provider] furosemide, 40 mg, oral, BID  insulin lispro, 0-10 Units, subcutaneous, Before meals & nightly  magnesium sulfate, 2 g, intravenous, Once  metoprolol succinate XL, 100 mg, oral, Daily  montelukast, 10 mg, oral, Nightly  pantoprazole, 40 mg, oral, Daily   Or  pantoprazole, 40 mg, intravenous, Daily  ranolazine, 500 mg, oral, BID    [2] PRN medications: acetaminophen **OR** acetaminophen **OR** acetaminophen, albuterol, dextrose, dextrose, glucagon, glucagon, HYDROcodone-acetaminophen, melatonin, nitroglycerin, ondansetron **OR** ondansetron, polyethylene glycol  [3]

## 2025-07-09 ENCOUNTER — NURSING HOME VISIT (OUTPATIENT)
Dept: POST ACUTE CARE | Facility: EXTERNAL LOCATION | Age: 75
End: 2025-07-09
Payer: COMMERCIAL

## 2025-07-09 DIAGNOSIS — R53.81 PHYSICAL DEBILITY: ICD-10-CM

## 2025-07-09 DIAGNOSIS — E78.2 MIXED HYPERLIPIDEMIA: ICD-10-CM

## 2025-07-09 DIAGNOSIS — I10 ESSENTIAL HYPERTENSION: ICD-10-CM

## 2025-07-09 DIAGNOSIS — I50.42 CHRONIC COMBINED SYSTOLIC AND DIASTOLIC CONGESTIVE HEART FAILURE: ICD-10-CM

## 2025-07-09 DIAGNOSIS — E11.65 TYPE 2 DIABETES MELLITUS WITH HYPERGLYCEMIA, WITHOUT LONG-TERM CURRENT USE OF INSULIN: ICD-10-CM

## 2025-07-09 DIAGNOSIS — I48.91 ATRIAL FIBRILLATION WITH RVR (MULTI): Primary | ICD-10-CM

## 2025-07-09 PROCEDURE — 99308 SBSQ NF CARE LOW MDM 20: CPT | Performed by: STUDENT IN AN ORGANIZED HEALTH CARE EDUCATION/TRAINING PROGRAM

## 2025-07-10 ASSESSMENT — ENCOUNTER SYMPTOMS
PSYCHIATRIC NEGATIVE: 1
GASTROINTESTINAL NEGATIVE: 1
MUSCULOSKELETAL NEGATIVE: 1
FATIGUE: 1
RESPIRATORY NEGATIVE: 1
CARDIOVASCULAR NEGATIVE: 1
WEAKNESS: 1

## 2025-07-11 NOTE — PROGRESS NOTES
Subjective   Patient ID: Isa Jack is a 74 y.o. female.    Patient seen and examined on routine evaluation per her request.  She was given statin recently, has had a significant weight loss over the past week or so.  Endorses that she has been more compliant with her diet fluid restrictions and salt intake.  Has lost about 10 to 15 pounds secondary to this, and endorses that she is gaining her strength back, and feels overall much better.  Otherwise, states no acute issues or concerns and overall feels well.         Review of Systems   Constitutional:  Positive for fatigue.   HENT: Negative.     Respiratory: Negative.     Cardiovascular: Negative.    Gastrointestinal: Negative.    Musculoskeletal: Negative.    Neurological:  Positive for weakness.   Psychiatric/Behavioral: Negative.         Objective Vitals Reviewed via facility EMR   Physical Exam  Constitutional:       General: She is not in acute distress.     Appearance: She is not ill-appearing.   Eyes:      Pupils: Pupils are equal, round, and reactive to light.   Cardiovascular:      Rate and Rhythm: Normal rate and regular rhythm.      Pulses: Normal pulses.      Heart sounds: No murmur heard.  Pulmonary:      Effort: No respiratory distress.      Breath sounds: No wheezing.   Abdominal:      General: Abdomen is flat. Bowel sounds are normal. There is no distension.   Musculoskeletal:      Right lower leg: No edema.      Left lower leg: No edema.   Skin:     General: Skin is warm and dry.   Neurological:      Mental Status: She is alert. Mental status is at baseline.      Cranial Nerves: No cranial nerve deficit.      Motor: Weakness present.   Psychiatric:         Mood and Affect: Mood normal.         Behavior: Behavior normal.         Assessment/Plan   Diagnoses and all orders for this visit:  Atrial fibrillation with RVR (Multi)  Chronic combined systolic and diastolic congestive heart failure  Mixed hyperlipidemia  Essential hypertension  Type 2  diabetes mellitus with hyperglycemia, without long-term current use of insulin  Physical debility          Patient seen and examined at bedside.  Overall medically stable, labs reviewed and no issues.  Continue heart failure precautions.  otherwise, continue supportive care.  Is euvolemic at this time, would need to monitor for further weight loss as this could be pathological if this contineus at this rate. No additional issues at this time.     Reviewed and approved by CARLOS HASSAN on 7/10/25 at 10:59 PM.

## 2025-07-24 ENCOUNTER — NURSING HOME VISIT (OUTPATIENT)
Dept: POST ACUTE CARE | Facility: EXTERNAL LOCATION | Age: 75
End: 2025-07-24
Payer: COMMERCIAL

## 2025-07-24 DIAGNOSIS — I48.91 ATRIAL FIBRILLATION WITH RVR (MULTI): Primary | ICD-10-CM

## 2025-07-24 DIAGNOSIS — R53.81 PHYSICAL DEBILITY: ICD-10-CM

## 2025-07-24 DIAGNOSIS — G89.4 CHRONIC PAIN SYNDROME: ICD-10-CM

## 2025-07-24 DIAGNOSIS — I50.42 CHRONIC COMBINED SYSTOLIC AND DIASTOLIC CONGESTIVE HEART FAILURE: ICD-10-CM

## 2025-07-24 DIAGNOSIS — E11.65 TYPE 2 DIABETES MELLITUS WITH HYPERGLYCEMIA, WITHOUT LONG-TERM CURRENT USE OF INSULIN: ICD-10-CM

## 2025-07-24 DIAGNOSIS — I25.5 CARDIOMYOPATHY, ISCHEMIC: ICD-10-CM

## 2025-07-24 PROCEDURE — 99308 SBSQ NF CARE LOW MDM 20: CPT | Performed by: STUDENT IN AN ORGANIZED HEALTH CARE EDUCATION/TRAINING PROGRAM

## 2025-07-25 ASSESSMENT — ENCOUNTER SYMPTOMS
ARTHRALGIAS: 1
PSYCHIATRIC NEGATIVE: 1
RESPIRATORY NEGATIVE: 1
FATIGUE: 1
WEAKNESS: 1
CARDIOVASCULAR NEGATIVE: 1
GASTROINTESTINAL NEGATIVE: 1

## 2025-07-26 NOTE — PROGRESS NOTES
Subjective   Patient ID: Isa Jack is a 74 y.o. female.    Patient seen and examined on routine evaluation.  In regards that she is overall doing well without any acute issues or concerns.  Fluid status is overall stable, and she feels like she is doing well from a physical standpoint.  Otherwise, states no acute issues or complaints.         Review of Systems   Constitutional:  Positive for fatigue.   HENT: Negative.     Respiratory: Negative.     Cardiovascular: Negative.    Gastrointestinal: Negative.    Musculoskeletal:  Positive for arthralgias.   Neurological:  Positive for weakness.   Psychiatric/Behavioral: Negative.         Objective Vitals Reviewed via facility EMR   Physical Exam  Constitutional:       General: She is not in acute distress.     Appearance: She is not ill-appearing.     Eyes:      Pupils: Pupils are equal, round, and reactive to light.       Cardiovascular:      Rate and Rhythm: Normal rate and regular rhythm.      Pulses: Normal pulses.      Heart sounds: No murmur heard.  Pulmonary:      Effort: No respiratory distress.      Breath sounds: No wheezing.   Abdominal:      General: Abdomen is flat. Bowel sounds are normal. There is no distension.     Musculoskeletal:      Right lower leg: No edema.      Left lower leg: No edema.      Comments: Euvolemic,      Skin:     General: Skin is warm and dry.     Neurological:      Mental Status: She is alert. Mental status is at baseline.      Cranial Nerves: No cranial nerve deficit.      Motor: Weakness present.     Psychiatric:         Mood and Affect: Mood normal.         Behavior: Behavior normal.         Assessment/Plan   Diagnoses and all orders for this visit:  Atrial fibrillation with RVR (Multi)  Cardiomyopathy, ischemic  Chronic combined systolic and diastolic congestive heart failure  Type 2 diabetes mellitus with hyperglycemia, without long-term current use of insulin  Physical debility  Chronic pain syndrome    Patient seen and  examined at bedside.  Overall medically stable and euvolemic at this time.  Continue supportive care.  Her biggest issue today, as well as recently, has been issues with arthritis.  We are still awaiting the injections for this, and which we will give at the facility.  Otherwise, no additional issues or concerns at this time.  Continue supportive care.       Reviewed and approved by CARLOS HASSAN on 7/25/25 at 8:04 PM.

## 2025-07-31 ENCOUNTER — NURSING HOME VISIT (OUTPATIENT)
Dept: POST ACUTE CARE | Facility: EXTERNAL LOCATION | Age: 75
End: 2025-07-31
Payer: COMMERCIAL

## 2025-07-31 DIAGNOSIS — G89.4 CHRONIC PAIN SYNDROME: ICD-10-CM

## 2025-07-31 DIAGNOSIS — E78.2 MIXED HYPERLIPIDEMIA: ICD-10-CM

## 2025-07-31 DIAGNOSIS — M19.90 ARTHRITIS: ICD-10-CM

## 2025-07-31 DIAGNOSIS — R53.81 PHYSICAL DEBILITY: ICD-10-CM

## 2025-07-31 DIAGNOSIS — I25.5 CARDIOMYOPATHY, ISCHEMIC: Primary | ICD-10-CM

## 2025-07-31 ASSESSMENT — ENCOUNTER SYMPTOMS
CARDIOVASCULAR NEGATIVE: 1
MUSCULOSKELETAL NEGATIVE: 1
NEUROLOGICAL NEGATIVE: 1
RESPIRATORY NEGATIVE: 1
GASTROINTESTINAL NEGATIVE: 1
PSYCHIATRIC NEGATIVE: 1
CONSTITUTIONAL NEGATIVE: 1

## 2025-07-31 NOTE — PROGRESS NOTES
Subjective   Patient ID: Isa Jack is a 74 y.o. female.    Patient seen and examined on routine evaluation.  Has been having chronic issues with underlying knee pain, and typically has gotten knee injections for this.  She regards that she feels like she needs otherwise, states no acute issues or concerns.         Review of Systems   Constitutional: Negative.    HENT: Negative.     Respiratory: Negative.     Cardiovascular: Negative.    Gastrointestinal: Negative.    Musculoskeletal: Negative.    Neurological: Negative.    Psychiatric/Behavioral: Negative.         Objective Visit Vitals  Smoking Status Former    Vitals Reviewed via facility EMR   Physical Exam  Constitutional:       General: She is not in acute distress.     Appearance: She is not ill-appearing.     Eyes:      Pupils: Pupils are equal, round, and reactive to light.       Cardiovascular:      Rate and Rhythm: Normal rate and regular rhythm.      Pulses: Normal pulses.      Heart sounds: No murmur heard.  Pulmonary:      Effort: No respiratory distress.      Breath sounds: No wheezing.   Abdominal:      General: Abdomen is flat. Bowel sounds are normal. There is no distension.     Musculoskeletal:         General: Tenderness present.      Right lower leg: No edema.      Left lower leg: No edema.     Skin:     General: Skin is warm and dry.     Neurological:      Mental Status: She is alert. Mental status is at baseline.      Cranial Nerves: No cranial nerve deficit.      Motor: No weakness.     Psychiatric:         Mood and Affect: Mood normal.         Behavior: Behavior normal.         Assessment/Plan   Diagnoses and all orders for this visit:  Cardiomyopathy, ischemic  Mixed hyperlipidemia  Chronic pain syndrome  Physical debility  Arthritis    Patient seen and examined at bedside.  Overall medically stable, otherwise, continue supportive care.  Under sterile conditions, 4 cc of 1% lidocaine and 40 mg of methylprednisolone was injected into  the left knee.  Patient tolerated this well.  Nursing will monitor area after injection.  Otherwise no additional issues.     Reviewed and approved by CARLOS HASSAN on 7/31/25 at 4:25 PM.

## 2025-08-27 ENCOUNTER — NURSING HOME VISIT (OUTPATIENT)
Dept: POST ACUTE CARE | Facility: EXTERNAL LOCATION | Age: 75
End: 2025-08-27
Payer: COMMERCIAL

## 2025-08-27 DIAGNOSIS — R53.81 PHYSICAL DEBILITY: ICD-10-CM

## 2025-08-27 DIAGNOSIS — E78.2 MIXED HYPERLIPIDEMIA: ICD-10-CM

## 2025-08-27 DIAGNOSIS — M19.90 ARTHRITIS: ICD-10-CM

## 2025-08-27 DIAGNOSIS — I10 ESSENTIAL HYPERTENSION: ICD-10-CM

## 2025-08-27 DIAGNOSIS — I50.42 CHRONIC COMBINED SYSTOLIC AND DIASTOLIC CONGESTIVE HEART FAILURE: Primary | ICD-10-CM

## 2025-08-27 PROCEDURE — 99308 SBSQ NF CARE LOW MDM 20: CPT | Performed by: STUDENT IN AN ORGANIZED HEALTH CARE EDUCATION/TRAINING PROGRAM

## 2025-08-27 ASSESSMENT — ENCOUNTER SYMPTOMS
MUSCULOSKELETAL NEGATIVE: 1
PSYCHIATRIC NEGATIVE: 1
CARDIOVASCULAR NEGATIVE: 1
GASTROINTESTINAL NEGATIVE: 1
NEUROLOGICAL NEGATIVE: 1
CONSTITUTIONAL NEGATIVE: 1
RESPIRATORY NEGATIVE: 1

## 2025-09-04 ENCOUNTER — NURSING HOME VISIT (OUTPATIENT)
Dept: POST ACUTE CARE | Facility: EXTERNAL LOCATION | Age: 75
End: 2025-09-04
Payer: COMMERCIAL

## 2025-09-04 DIAGNOSIS — E11.65 TYPE 2 DIABETES MELLITUS WITH HYPERGLYCEMIA, WITHOUT LONG-TERM CURRENT USE OF INSULIN: ICD-10-CM

## 2025-09-04 DIAGNOSIS — R53.81 PHYSICAL DEBILITY: ICD-10-CM

## 2025-09-04 DIAGNOSIS — E87.70 HYPERVOLEMIA, UNSPECIFIED HYPERVOLEMIA TYPE: ICD-10-CM

## 2025-09-04 DIAGNOSIS — I50.42 CHRONIC COMBINED SYSTOLIC AND DIASTOLIC CONGESTIVE HEART FAILURE: ICD-10-CM

## 2025-09-04 DIAGNOSIS — I25.5 CARDIOMYOPATHY, ISCHEMIC: Primary | ICD-10-CM

## 2025-09-04 DIAGNOSIS — I10 ESSENTIAL HYPERTENSION: ICD-10-CM

## 2025-09-04 PROCEDURE — 99309 SBSQ NF CARE MODERATE MDM 30: CPT | Performed by: STUDENT IN AN ORGANIZED HEALTH CARE EDUCATION/TRAINING PROGRAM

## 2025-09-04 ASSESSMENT — ENCOUNTER SYMPTOMS
RESPIRATORY NEGATIVE: 1
GASTROINTESTINAL NEGATIVE: 1
FATIGUE: 1
PSYCHIATRIC NEGATIVE: 1
WEAKNESS: 1
MUSCULOSKELETAL NEGATIVE: 1